# Patient Record
Sex: FEMALE | Race: WHITE | Employment: PART TIME | URBAN - METROPOLITAN AREA
[De-identification: names, ages, dates, MRNs, and addresses within clinical notes are randomized per-mention and may not be internally consistent; named-entity substitution may affect disease eponyms.]

---

## 2017-03-31 PROBLEM — F41.1 ANXIETY STATE: Status: ACTIVE | Noted: 2017-03-31

## 2017-03-31 PROBLEM — R07.89 MUSCULOSKELETAL CHEST PAIN: Status: ACTIVE | Noted: 2017-03-31

## 2018-04-18 ENCOUNTER — OFFICE VISIT (OUTPATIENT)
Dept: OBGYN | Age: 33
End: 2018-04-18
Payer: COMMERCIAL

## 2018-04-18 VITALS
SYSTOLIC BLOOD PRESSURE: 110 MMHG | BODY MASS INDEX: 34.55 KG/M2 | WEIGHT: 195 LBS | RESPIRATION RATE: 16 BRPM | HEART RATE: 82 BPM | TEMPERATURE: 98.3 F | HEIGHT: 63 IN | DIASTOLIC BLOOD PRESSURE: 66 MMHG

## 2018-04-18 DIAGNOSIS — N95.1 VASOMOTOR SYMPTOMS DUE TO MENOPAUSE: Primary | ICD-10-CM

## 2018-04-18 DIAGNOSIS — R23.2 VASOMOTOR FLUSHING: ICD-10-CM

## 2018-04-18 PROCEDURE — 99212 OFFICE O/P EST SF 10 MIN: CPT | Performed by: OBSTETRICS & GYNECOLOGY

## 2018-04-18 PROCEDURE — 1036F TOBACCO NON-USER: CPT | Performed by: OBSTETRICS & GYNECOLOGY

## 2018-04-18 PROCEDURE — G8417 CALC BMI ABV UP PARAM F/U: HCPCS | Performed by: OBSTETRICS & GYNECOLOGY

## 2018-04-18 PROCEDURE — G8427 DOCREV CUR MEDS BY ELIG CLIN: HCPCS | Performed by: OBSTETRICS & GYNECOLOGY

## 2018-04-18 RX ORDER — ESTRADIOL 1 MG/1
1 TABLET ORAL DAILY
Qty: 30 TABLET | Refills: 5 | Status: SHIPPED | OUTPATIENT
Start: 2018-04-18 | End: 2018-09-11

## 2018-04-22 ENCOUNTER — APPOINTMENT (OUTPATIENT)
Dept: CT IMAGING | Age: 33
End: 2018-04-22
Payer: COMMERCIAL

## 2018-04-22 ENCOUNTER — HOSPITAL ENCOUNTER (EMERGENCY)
Age: 33
Discharge: HOME | End: 2018-04-22
Attending: EMERGENCY MEDICINE
Payer: COMMERCIAL

## 2018-04-22 VITALS
RESPIRATION RATE: 20 BRPM | SYSTOLIC BLOOD PRESSURE: 102 MMHG | BODY MASS INDEX: 33.31 KG/M2 | WEIGHT: 188 LBS | DIASTOLIC BLOOD PRESSURE: 52 MMHG | OXYGEN SATURATION: 99 % | TEMPERATURE: 98.4 F | HEIGHT: 63 IN | HEART RATE: 77 BPM

## 2018-04-22 DIAGNOSIS — S16.1XXD CERVICAL STRAIN, ACUTE, SUBSEQUENT ENCOUNTER: ICD-10-CM
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
## 2018-04-22 DIAGNOSIS — G44.319 ACUTE POST-TRAUMATIC HEADACHE, NOT INTRACTABLE: Primary | ICD-10-CM

## 2018-04-22 LAB
CHP ED QC CHECK: YES
PREGNANCY TEST URINE, POC: NEGATIVE

## 2018-04-22 PROCEDURE — 6370000000 HC RX 637 (ALT 250 FOR IP): Performed by: PHYSICIAN ASSISTANT

## 2018-04-22 PROCEDURE — 72125 CT NECK SPINE W/O DYE: CPT

## 2018-04-22 PROCEDURE — 99284 EMERGENCY DEPT VISIT MOD MDM: CPT

## 2018-04-22 PROCEDURE — 70450 CT HEAD/BRAIN W/O DYE: CPT

## 2018-04-22 RX ORDER — ACETAMINOPHEN 500 MG
1000 TABLET ORAL EVERY 8 HOURS PRN
Qty: 20 TABLET | Refills: 0 | Status: SHIPPED | OUTPATIENT
Start: 2018-04-22 | End: 2018-09-11

## 2018-04-22 RX ORDER — ACETAMINOPHEN 500 MG
1000 TABLET ORAL ONCE
Status: COMPLETED | OUTPATIENT
Start: 2018-04-22 | End: 2018-04-22

## 2018-04-22 RX ADMIN — ACETAMINOPHEN 1000 MG: 500 TABLET ORAL at 11:52

## 2018-04-22 ASSESSMENT — PAIN DESCRIPTION - LOCATION: LOCATION: HEAD

## 2018-04-22 ASSESSMENT — PAIN SCALES - GENERAL
PAINLEVEL_OUTOF10: 10
PAINLEVEL_OUTOF10: 10

## 2018-04-22 ASSESSMENT — PAIN DESCRIPTION - PAIN TYPE: TYPE: ACUTE PAIN

## 2018-05-06 ENCOUNTER — HOSPITAL ENCOUNTER (EMERGENCY)
Age: 33
Discharge: HOME | End: 2018-05-06
Attending: EMERGENCY MEDICINE
Payer: COMMERCIAL

## 2018-05-06 ENCOUNTER — APPOINTMENT (OUTPATIENT)
Dept: GENERAL RADIOLOGY | Age: 33
End: 2018-05-06
Payer: COMMERCIAL

## 2018-05-06 VITALS
SYSTOLIC BLOOD PRESSURE: 113 MMHG | BODY MASS INDEX: 33.3 KG/M2 | WEIGHT: 188 LBS | HEART RATE: 66 BPM | TEMPERATURE: 97.8 F | DIASTOLIC BLOOD PRESSURE: 73 MMHG | OXYGEN SATURATION: 100 % | RESPIRATION RATE: 16 BRPM

## 2018-05-06 DIAGNOSIS — R07.89 CHEST WALL PAIN: Primary | ICD-10-CM

## 2018-05-06 LAB
ALBUMIN SERPL-MCNC: 3.9 G/DL (ref 3.5–5.2)
ALP BLD-CCNC: 88 U/L (ref 35–104)
ALT SERPL-CCNC: 18 U/L (ref 0–32)
ANION GAP SERPL CALCULATED.3IONS-SCNC: 12 MMOL/L (ref 7–16)
AST SERPL-CCNC: 31 U/L (ref 0–31)
BASOPHILS ABSOLUTE: 0.04 E9/L (ref 0–0.2)
BASOPHILS RELATIVE PERCENT: 0.5 % (ref 0–2)
BILIRUB SERPL-MCNC: <0.2 MG/DL (ref 0–1.2)
BUN BLDV-MCNC: 10 MG/DL (ref 6–20)
CALCIUM SERPL-MCNC: 8.8 MG/DL (ref 8.6–10.2)
CHLORIDE BLD-SCNC: 99 MMOL/L (ref 98–107)
CO2: 25 MMOL/L (ref 22–29)
CREAT SERPL-MCNC: 0.7 MG/DL (ref 0.5–1)
EKG ATRIAL RATE: 70 BPM
EKG P-R INTERVAL: 150 MS
EKG Q-T INTERVAL: 366 MS
EKG QRS DURATION: 78 MS
EKG QTC CALCULATION (BAZETT): 395 MS
EKG R AXIS: 37 DEGREES
EKG T AXIS: 19 DEGREES
EKG VENTRICULAR RATE: 70 BPM
EOSINOPHILS ABSOLUTE: 0.36 E9/L (ref 0.05–0.5)
EOSINOPHILS RELATIVE PERCENT: 4.2 % (ref 0–6)
GFR AFRICAN AMERICAN: >60
GFR NON-AFRICAN AMERICAN: >60 ML/MIN/1.73
GLUCOSE BLD-MCNC: 77 MG/DL (ref 74–109)
GONADOTROPIN, CHORIONIC (HCG) QUANT: <0.1 MIU/ML
HCT VFR BLD CALC: 33.9 % (ref 34–48)
HEMOGLOBIN: 11.2 G/DL (ref 11.5–15.5)
IMMATURE GRANULOCYTES #: 0.03 E9/L
IMMATURE GRANULOCYTES %: 0.3 % (ref 0–5)
LYMPHOCYTES ABSOLUTE: 2.42 E9/L (ref 1.5–4)
LYMPHOCYTES RELATIVE PERCENT: 28.2 % (ref 20–42)
MCH RBC QN AUTO: 30.3 PG (ref 26–35)
MCHC RBC AUTO-ENTMCNC: 33 % (ref 32–34.5)
MCV RBC AUTO: 91.6 FL (ref 80–99.9)
MONOCYTES ABSOLUTE: 0.73 E9/L (ref 0.1–0.95)
MONOCYTES RELATIVE PERCENT: 8.5 % (ref 2–12)
NEUTROPHILS ABSOLUTE: 5 E9/L (ref 1.8–7.3)
NEUTROPHILS RELATIVE PERCENT: 58.3 % (ref 43–80)
PDW BLD-RTO: 13.4 FL (ref 11.5–15)
PLATELET # BLD: 266 E9/L (ref 130–450)
PMV BLD AUTO: 10 FL (ref 7–12)
POTASSIUM SERPL-SCNC: 4.5 MMOL/L (ref 3.5–5)
RBC # BLD: 3.7 E12/L (ref 3.5–5.5)
SODIUM BLD-SCNC: 136 MMOL/L (ref 132–146)
TOTAL PROTEIN: 7.5 G/DL (ref 6.4–8.3)
TROPONIN: <0.01 NG/ML (ref 0–0.03)
WBC # BLD: 8.6 E9/L (ref 4.5–11.5)

## 2018-05-06 PROCEDURE — 93005 ELECTROCARDIOGRAM TRACING: CPT | Performed by: NURSE PRACTITIONER

## 2018-05-06 PROCEDURE — 99285 EMERGENCY DEPT VISIT HI MDM: CPT

## 2018-05-06 PROCEDURE — 6370000000 HC RX 637 (ALT 250 FOR IP): Performed by: PREVENTIVE MEDICINE

## 2018-05-06 PROCEDURE — 96374 THER/PROPH/DIAG INJ IV PUSH: CPT

## 2018-05-06 PROCEDURE — 36415 COLL VENOUS BLD VENIPUNCTURE: CPT

## 2018-05-06 PROCEDURE — 85025 COMPLETE CBC W/AUTO DIFF WBC: CPT

## 2018-05-06 PROCEDURE — 84484 ASSAY OF TROPONIN QUANT: CPT

## 2018-05-06 PROCEDURE — 84702 CHORIONIC GONADOTROPIN TEST: CPT

## 2018-05-06 PROCEDURE — 6360000002 HC RX W HCPCS: Performed by: PREVENTIVE MEDICINE

## 2018-05-06 PROCEDURE — 71046 X-RAY EXAM CHEST 2 VIEWS: CPT

## 2018-05-06 PROCEDURE — 80053 COMPREHEN METABOLIC PANEL: CPT

## 2018-05-06 RX ORDER — DIPHENHYDRAMINE HCL 25 MG
25 TABLET ORAL ONCE
Status: COMPLETED | OUTPATIENT
Start: 2018-05-06 | End: 2018-05-06

## 2018-05-06 RX ORDER — CYCLOBENZAPRINE HCL 5 MG
5 TABLET ORAL 3 TIMES DAILY PRN
COMMUNITY
End: 2018-09-11

## 2018-05-06 RX ORDER — KETOROLAC TROMETHAMINE 30 MG/ML
30 INJECTION, SOLUTION INTRAMUSCULAR; INTRAVENOUS ONCE
Status: COMPLETED | OUTPATIENT
Start: 2018-05-06 | End: 2018-05-06

## 2018-05-06 RX ADMIN — KETOROLAC TROMETHAMINE 30 MG: 30 INJECTION, SOLUTION INTRAMUSCULAR at 20:00

## 2018-05-06 RX ADMIN — DIPHENHYDRAMINE HCL 25 MG: 25 TABLET ORAL at 20:00

## 2018-05-06 ASSESSMENT — PAIN SCALES - GENERAL
PAINLEVEL_OUTOF10: 5
PAINLEVEL_OUTOF10: 8
PAINLEVEL_OUTOF10: 8

## 2018-05-06 ASSESSMENT — ENCOUNTER SYMPTOMS
CHEST TIGHTNESS: 0
ALLERGIC/IMMUNOLOGIC NEGATIVE: 1
VOMITING: 0
CONSTIPATION: 0
DIARRHEA: 0
ABDOMINAL PAIN: 0
NAUSEA: 0
SHORTNESS OF BREATH: 0
COUGH: 0

## 2018-05-06 ASSESSMENT — HEART SCORE: ECG: 0

## 2018-05-06 ASSESSMENT — PAIN DESCRIPTION - LOCATION: LOCATION: CHEST

## 2018-05-06 ASSESSMENT — PAIN DESCRIPTION - PAIN TYPE: TYPE: ACUTE PAIN

## 2018-05-06 ASSESSMENT — PAIN DESCRIPTION - DESCRIPTORS: DESCRIPTORS: PRESSURE;SHARP

## 2018-06-21 ENCOUNTER — HOSPITAL ENCOUNTER (OUTPATIENT)
Age: 33
Discharge: HOME | End: 2018-06-23
Payer: COMMERCIAL

## 2018-06-21 ENCOUNTER — OFFICE VISIT (OUTPATIENT)
Dept: OBGYN | Age: 33
End: 2018-06-21
Payer: COMMERCIAL

## 2018-06-21 VITALS
WEIGHT: 198 LBS | TEMPERATURE: 99.9 F | DIASTOLIC BLOOD PRESSURE: 60 MMHG | BODY MASS INDEX: 35.08 KG/M2 | SYSTOLIC BLOOD PRESSURE: 120 MMHG | HEIGHT: 63 IN | RESPIRATION RATE: 14 BRPM | HEART RATE: 81 BPM

## 2018-06-21 DIAGNOSIS — R23.2 VASOMOTOR FLUSHING: ICD-10-CM

## 2018-06-21 DIAGNOSIS — N89.8 LEUKORRHEA: ICD-10-CM

## 2018-06-21 DIAGNOSIS — N95.1 VASOMOTOR SYMPTOMS DUE TO MENOPAUSE: Primary | ICD-10-CM

## 2018-06-21 LAB
CLUE CELLS: ABNORMAL
SOURCE WET PREP: ABNORMAL
TRICHOMONAS PREP: ABNORMAL
YEAST WET PREP: ABNORMAL

## 2018-06-21 PROCEDURE — 87210 SMEAR WET MOUNT SALINE/INK: CPT | Performed by: OBSTETRICS & GYNECOLOGY

## 2018-06-21 PROCEDURE — 99213 OFFICE O/P EST LOW 20 MIN: CPT | Performed by: OBSTETRICS & GYNECOLOGY

## 2018-06-21 PROCEDURE — 87210 SMEAR WET MOUNT SALINE/INK: CPT

## 2018-06-21 PROCEDURE — 99212 OFFICE O/P EST SF 10 MIN: CPT | Performed by: OBSTETRICS & GYNECOLOGY

## 2018-06-21 PROCEDURE — G8417 CALC BMI ABV UP PARAM F/U: HCPCS | Performed by: OBSTETRICS & GYNECOLOGY

## 2018-06-21 PROCEDURE — G8427 DOCREV CUR MEDS BY ELIG CLIN: HCPCS | Performed by: OBSTETRICS & GYNECOLOGY

## 2018-06-21 PROCEDURE — 1036F TOBACCO NON-USER: CPT | Performed by: OBSTETRICS & GYNECOLOGY

## 2018-06-22 ENCOUNTER — TELEPHONE (OUTPATIENT)
Dept: OBGYN | Age: 33
End: 2018-06-22

## 2018-06-22 DIAGNOSIS — N76.0 BV (BACTERIAL VAGINOSIS): Primary | ICD-10-CM

## 2018-06-22 DIAGNOSIS — B96.89 BV (BACTERIAL VAGINOSIS): Primary | ICD-10-CM

## 2018-06-22 RX ORDER — METRONIDAZOLE 500 MG/1
500 TABLET ORAL 2 TIMES DAILY
Qty: 14 TABLET | Refills: 0 | Status: SHIPPED | OUTPATIENT
Start: 2018-06-22 | End: 2018-06-29

## 2018-09-11 ENCOUNTER — APPOINTMENT (OUTPATIENT)
Dept: CT IMAGING | Age: 33
End: 2018-09-11
Payer: COMMERCIAL

## 2018-09-11 ENCOUNTER — HOSPITAL ENCOUNTER (EMERGENCY)
Age: 33
Discharge: HOME | End: 2018-09-11
Attending: EMERGENCY MEDICINE
Payer: COMMERCIAL

## 2018-09-11 VITALS
HEART RATE: 60 BPM | WEIGHT: 185 LBS | RESPIRATION RATE: 16 BRPM | SYSTOLIC BLOOD PRESSURE: 114 MMHG | HEIGHT: 63 IN | TEMPERATURE: 98.2 F | DIASTOLIC BLOOD PRESSURE: 77 MMHG | OXYGEN SATURATION: 97 % | BODY MASS INDEX: 32.78 KG/M2

## 2018-09-11 DIAGNOSIS — K63.89 EPIPLOIC APPENDAGITIS: ICD-10-CM

## 2018-09-11 DIAGNOSIS — R10.9 ABDOMINAL PAIN, UNSPECIFIED ABDOMINAL LOCATION: Primary | ICD-10-CM

## 2018-09-11 DIAGNOSIS — K57.32 DIVERTICULITIS OF COLON: ICD-10-CM

## 2018-09-11 LAB
ALBUMIN SERPL-MCNC: 3.8 G/DL (ref 3.5–5.2)
ALP BLD-CCNC: 91 U/L (ref 35–104)
ALT SERPL-CCNC: 20 U/L (ref 0–32)
ANION GAP SERPL CALCULATED.3IONS-SCNC: 12 MMOL/L (ref 7–16)
AST SERPL-CCNC: 30 U/L (ref 0–31)
BASOPHILS ABSOLUTE: 0.02 E9/L (ref 0–0.2)
BASOPHILS RELATIVE PERCENT: 0.2 % (ref 0–2)
BILIRUB SERPL-MCNC: 0.3 MG/DL (ref 0–1.2)
BILIRUBIN URINE: NEGATIVE
BLOOD, URINE: NEGATIVE
BUN BLDV-MCNC: 11 MG/DL (ref 6–20)
CALCIUM SERPL-MCNC: 9.3 MG/DL (ref 8.6–10.2)
CHLORIDE BLD-SCNC: 101 MMOL/L (ref 98–107)
CLARITY: CLEAR
CO2: 25 MMOL/L (ref 22–29)
COLOR: YELLOW
CREAT SERPL-MCNC: 0.7 MG/DL (ref 0.5–1)
EOSINOPHILS ABSOLUTE: 0.29 E9/L (ref 0.05–0.5)
EOSINOPHILS RELATIVE PERCENT: 2.8 % (ref 0–6)
GFR AFRICAN AMERICAN: >60
GFR NON-AFRICAN AMERICAN: >60 ML/MIN/1.73
GLUCOSE BLD-MCNC: 91 MG/DL (ref 74–109)
GLUCOSE URINE: NEGATIVE MG/DL
HCT VFR BLD CALC: 35.9 % (ref 34–48)
HEMOGLOBIN: 11.7 G/DL (ref 11.5–15.5)
IMMATURE GRANULOCYTES #: 0.05 E9/L
IMMATURE GRANULOCYTES %: 0.5 % (ref 0–5)
KETONES, URINE: NEGATIVE MG/DL
LACTIC ACID: 1.2 MMOL/L (ref 0.5–2.2)
LEUKOCYTE ESTERASE, URINE: NEGATIVE
LIPASE: 26 U/L (ref 13–60)
LYMPHOCYTES ABSOLUTE: 3.34 E9/L (ref 1.5–4)
LYMPHOCYTES RELATIVE PERCENT: 32.4 % (ref 20–42)
MCH RBC QN AUTO: 30.2 PG (ref 26–35)
MCHC RBC AUTO-ENTMCNC: 32.6 % (ref 32–34.5)
MCV RBC AUTO: 92.5 FL (ref 80–99.9)
MONOCYTES ABSOLUTE: 0.71 E9/L (ref 0.1–0.95)
MONOCYTES RELATIVE PERCENT: 6.9 % (ref 2–12)
NEUTROPHILS ABSOLUTE: 5.91 E9/L (ref 1.8–7.3)
NEUTROPHILS RELATIVE PERCENT: 57.2 % (ref 43–80)
NITRITE, URINE: NEGATIVE
PDW BLD-RTO: 13.2 FL (ref 11.5–15)
PH UA: 7 (ref 5–9)
PLATELET # BLD: 285 E9/L (ref 130–450)
PMV BLD AUTO: 10.1 FL (ref 7–12)
POTASSIUM SERPL-SCNC: 4.2 MMOL/L (ref 3.5–5)
PROTEIN UA: NEGATIVE MG/DL
RBC # BLD: 3.88 E12/L (ref 3.5–5.5)
SODIUM BLD-SCNC: 138 MMOL/L (ref 132–146)
SPECIFIC GRAVITY UA: 1.02 (ref 1–1.03)
TOTAL PROTEIN: 7.6 G/DL (ref 6.4–8.3)
UROBILINOGEN, URINE: 0.2 E.U./DL
WBC # BLD: 10.3 E9/L (ref 4.5–11.5)

## 2018-09-11 PROCEDURE — 85025 COMPLETE CBC W/AUTO DIFF WBC: CPT

## 2018-09-11 PROCEDURE — 80053 COMPREHEN METABOLIC PANEL: CPT

## 2018-09-11 PROCEDURE — 83690 ASSAY OF LIPASE: CPT

## 2018-09-11 PROCEDURE — 81003 URINALYSIS AUTO W/O SCOPE: CPT

## 2018-09-11 PROCEDURE — 99284 EMERGENCY DEPT VISIT MOD MDM: CPT

## 2018-09-11 PROCEDURE — 6370000000 HC RX 637 (ALT 250 FOR IP): Performed by: EMERGENCY MEDICINE

## 2018-09-11 PROCEDURE — 96372 THER/PROPH/DIAG INJ SC/IM: CPT

## 2018-09-11 PROCEDURE — 83605 ASSAY OF LACTIC ACID: CPT

## 2018-09-11 PROCEDURE — 74176 CT ABD & PELVIS W/O CONTRAST: CPT

## 2018-09-11 PROCEDURE — 6360000002 HC RX W HCPCS: Performed by: EMERGENCY MEDICINE

## 2018-09-11 RX ORDER — CIPROFLOXACIN 500 MG/1
500 TABLET, FILM COATED ORAL 2 TIMES DAILY
Qty: 20 TABLET | Refills: 0 | Status: SHIPPED | OUTPATIENT
Start: 2018-09-11 | End: 2018-09-21

## 2018-09-11 RX ORDER — 0.9 % SODIUM CHLORIDE 0.9 %
500 INTRAVENOUS SOLUTION INTRAVENOUS ONCE
Status: DISCONTINUED | OUTPATIENT
Start: 2018-09-11 | End: 2018-09-11

## 2018-09-11 RX ORDER — ONDANSETRON 4 MG/1
4 TABLET, FILM COATED ORAL EVERY 8 HOURS PRN
Qty: 20 TABLET | Refills: 0 | Status: SHIPPED | OUTPATIENT
Start: 2018-09-11 | End: 2019-05-29

## 2018-09-11 RX ORDER — DICYCLOMINE HYDROCHLORIDE 10 MG/ML
20 INJECTION INTRAMUSCULAR ONCE
Status: COMPLETED | OUTPATIENT
Start: 2018-09-11 | End: 2018-09-11

## 2018-09-11 RX ORDER — DICYCLOMINE HYDROCHLORIDE 10 MG/1
20 CAPSULE ORAL 3 TIMES DAILY PRN
Qty: 20 CAPSULE | Refills: 0 | Status: SHIPPED | OUTPATIENT
Start: 2018-09-11 | End: 2019-05-29

## 2018-09-11 RX ORDER — ONDANSETRON 2 MG/ML
4 INJECTION INTRAMUSCULAR; INTRAVENOUS ONCE
Status: DISCONTINUED | OUTPATIENT
Start: 2018-09-11 | End: 2018-09-11

## 2018-09-11 RX ORDER — PANTOPRAZOLE SODIUM 40 MG/1
40 TABLET, DELAYED RELEASE ORAL ONCE
Status: COMPLETED | OUTPATIENT
Start: 2018-09-11 | End: 2018-09-11

## 2018-09-11 RX ORDER — CIPROFLOXACIN 500 MG/1
500 TABLET, FILM COATED ORAL ONCE
Status: COMPLETED | OUTPATIENT
Start: 2018-09-11 | End: 2018-09-11

## 2018-09-11 RX ORDER — ONDANSETRON 4 MG/1
4 TABLET, ORALLY DISINTEGRATING ORAL ONCE
Status: COMPLETED | OUTPATIENT
Start: 2018-09-11 | End: 2018-09-11

## 2018-09-11 RX ORDER — METRONIDAZOLE 500 MG/1
500 TABLET ORAL ONCE
Status: COMPLETED | OUTPATIENT
Start: 2018-09-11 | End: 2018-09-11

## 2018-09-11 RX ORDER — METRONIDAZOLE 500 MG/1
500 TABLET ORAL 4 TIMES DAILY
Qty: 40 TABLET | Refills: 0 | Status: SHIPPED | OUTPATIENT
Start: 2018-09-11 | End: 2018-09-21

## 2018-09-11 RX ADMIN — PANTOPRAZOLE SODIUM 40 MG: 40 TABLET, DELAYED RELEASE ORAL at 20:36

## 2018-09-11 RX ADMIN — DICYCLOMINE HYDROCHLORIDE 20 MG: 20 INJECTION, SOLUTION INTRAMUSCULAR at 20:13

## 2018-09-11 RX ADMIN — ONDANSETRON 4 MG: 4 TABLET, ORALLY DISINTEGRATING ORAL at 20:36

## 2018-09-11 RX ADMIN — METRONIDAZOLE 500 MG: 500 TABLET ORAL at 20:36

## 2018-09-11 RX ADMIN — CIPROFLOXACIN HYDROCHLORIDE 500 MG: 500 TABLET, FILM COATED ORAL at 20:36

## 2018-09-11 ASSESSMENT — PAIN SCALES - GENERAL: PAINLEVEL_OUTOF10: 8

## 2018-09-11 NOTE — LETTER
[facility] Emergency Department  [address] [place] [id number]  Phone: [phone]    No name on file. September 11, 2018     Patient: Dorie Begum   YOB: 1985   Date of Visit: 9/11/2018       To Whom It May Concern: It is my medical opinion that Kristie Sandoval may return to work on 3 days. If you have any questions or concerns, please don't hesitate to call. Sincerely,        No name on file.

## 2018-09-11 NOTE — ED NOTES
Bed:  [facility]03  Expected date:   Expected time:   Means of arrival:   Comments:  [address], RN  09/11/18 [id number]

## 2018-09-11 NOTE — ED PROVIDER NOTES
hospital encounter of 09/11/18   Comprehensive Metabolic Panel   Result Value Ref Range    Sodium 138 132 - 146 mmol/L    Potassium 4.2 3.5 - 5.0 mmol/L    Chloride 101 98 - 107 mmol/L    CO2 25 22 - 29 mmol/L    Anion Gap 12 7 - 16 mmol/L    Glucose 91 74 - 109 mg/dL    BUN 11 6 - 20 mg/dL    CREATININE 0.7 0.5 - 1.0 mg/dL    GFR Non-African American >60 >=60 mL/min/1.73    GFR African American >60     Calcium 9.3 8.6 - 10.2 mg/dL    Total Protein 7.6 6.4 - 8.3 g/dL    Alb 3.8 3.5 - 5.2 g/dL    Total Bilirubin 0.3 0.0 - 1.2 mg/dL    Alkaline Phosphatase 91 35 - 104 U/L    ALT 20 0 - 32 U/L    AST 30 0 - 31 U/L   CBC Auto Differential   Result Value Ref Range    WBC 10.3 4.5 - 11.5 E9/L    RBC 3.88 3.50 - 5.50 E12/L    Hemoglobin 11.7 11.5 - 15.5 g/dL    Hematocrit 35.9 34.0 - 48.0 %    MCV 92.5 80.0 - 99.9 fL    MCH 30.2 26.0 - 35.0 pg    MCHC 32.6 32.0 - 34.5 %    RDW 13.2 11.5 - 15.0 fL    Platelets 631 027 - 329 E9/L    MPV 10.1 7.0 - 12.0 fL    Neutrophils % 57.2 43.0 - 80.0 %    Immature Granulocytes % 0.5 0.0 - 5.0 %    Lymphocytes % 32.4 20.0 - 42.0 %    Monocytes % 6.9 2.0 - 12.0 %    Eosinophils % 2.8 0.0 - 6.0 %    Basophils % 0.2 0.0 - 2.0 %    Neutrophils # 5.91 1.80 - 7.30 E9/L    Immature Granulocytes # 0.05 E9/L    Lymphocytes # 3.34 1.50 - 4.00 E9/L    Monocytes # 0.71 0.10 - 0.95 E9/L    Eosinophils # 0.29 0.05 - 0.50 E9/L    Basophils # 0.02 0.00 - 0.20 E9/L   Lipase   Result Value Ref Range    Lipase 26 13 - 60 U/L   Urinalysis   Result Value Ref Range    Color, UA Yellow Straw/Yellow    Clarity, UA Clear Clear    Glucose, Ur Negative Negative mg/dL    Bilirubin Urine Negative Negative    Ketones, Urine Negative Negative mg/dL    Specific Gravity, UA 1.020 1.005 - 1.030    Blood, Urine Negative Negative    pH, UA 7.0 5.0 - 9.0    Protein, UA Negative Negative mg/dL    Urobilinogen, Urine 0.2 <2.0 E.U./dL    Nitrite, Urine Negative Negative    Leukocyte Esterase, Urine Negative Negative   Lactic Decision Making:    Patient likely with sigmoid diverticulitis. Patient to be discharged follow up with PCP in 2-3 days. Patient warning signs for when to return. Patient discharged on antibiotics. I have reviewed my findings and recommendations with Kym Hummel and members of family present at the time of disposition. My findings/plan: The primary encounter diagnosis was Abdominal pain, unspecified abdominal location. Diagnoses of Epiploic appendagitis and Diverticulitis of colon were also pertinent to this visit. Discharge Medication List as of 9/11/2018  9:06 PM      START taking these medications    Details   ciprofloxacin (CIPRO) 500 MG tablet Take 1 tablet by mouth 2 times daily for 10 days, Disp-20 tablet, R-0Print      metroNIDAZOLE (FLAGYL) 500 MG tablet Take 1 tablet by mouth 4 times daily for 10 days, Disp-40 tablet, R-0Print      dicyclomine (BENTYL) 10 MG capsule Take 2 capsules by mouth 3 times daily as needed (abdominal pain), Disp-20 capsule, R-0Print      ondansetron (ZOFRAN) 4 MG tablet Take 1 tablet by mouth every 8 hours as needed for Nausea or Vomiting, Disp-20 tablet, R-0Print                 Counseling: The emergency provider has spoken with the patient and discussed todays results, in addition to providing specific details for the plan of care and counseling regarding the diagnosis and prognosis. Questions are answered at this time and they are agreeable with the plan.      --------------------------------- IMPRESSION AND DISPOSITION ---------------------------------    IMPRESSION  1. Abdominal pain, unspecified abdominal location    2. Epiploic appendagitis    3. Diverticulitis of colon        DISPOSITION  Disposition: Discharge to home  Patient condition is good      NOTE: This report was transcribed using voice recognition software.  Every effort was made to ensure accuracy; however, inadvertent computerized transcription errors may be present       Jaylin Mcneil

## 2018-09-21 ENCOUNTER — OFFICE VISIT (OUTPATIENT)
Dept: OBGYN | Age: 33
End: 2018-09-21
Payer: COMMERCIAL

## 2018-09-21 VITALS
TEMPERATURE: 98.7 F | SYSTOLIC BLOOD PRESSURE: 117 MMHG | DIASTOLIC BLOOD PRESSURE: 61 MMHG | BODY MASS INDEX: 34.9 KG/M2 | WEIGHT: 197 LBS | HEART RATE: 83 BPM

## 2018-09-21 DIAGNOSIS — N89.8 VAGINAL ITCHING: ICD-10-CM

## 2018-09-21 DIAGNOSIS — B37.9 MONILIA INFECTION: Primary | ICD-10-CM

## 2018-09-21 PROCEDURE — 99213 OFFICE O/P EST LOW 20 MIN: CPT | Performed by: OBSTETRICS & GYNECOLOGY

## 2018-09-21 PROCEDURE — G8427 DOCREV CUR MEDS BY ELIG CLIN: HCPCS | Performed by: OBSTETRICS & GYNECOLOGY

## 2018-09-21 PROCEDURE — G8417 CALC BMI ABV UP PARAM F/U: HCPCS | Performed by: OBSTETRICS & GYNECOLOGY

## 2018-09-21 PROCEDURE — 1036F TOBACCO NON-USER: CPT | Performed by: OBSTETRICS & GYNECOLOGY

## 2018-09-21 PROCEDURE — 99212 OFFICE O/P EST SF 10 MIN: CPT | Performed by: OBSTETRICS & GYNECOLOGY

## 2018-09-21 RX ORDER — CLOTRIMAZOLE 1 %
CREAM (GRAM) TOPICAL
COMMUNITY
Start: 2018-09-19 | End: 2019-05-29

## 2018-09-21 NOTE — PROGRESS NOTES
Was in the ER with pain and found to have diverticulitis. Treated with Cipro and Flagyl,  Now complains of vaginal itching. Pain is gone. Pelvic:  Loaded with yeast    IMP: Momilia    PLAN: Terazol 7 cream at hs. RTC 3 weeks to recheck.

## 2018-09-21 NOTE — PROGRESS NOTES
Patient here today for ER follow up- Diverticulitis. Patient reported having vaginal discharge and itching.   Patient discharged by Dr. Navya Munoz

## 2018-09-21 NOTE — PATIENT INSTRUCTIONS
Patient is was prescribed vaginal cream for a yeast infection by Dr. Markos Madsen.   Per Dr. Markos Madsen patient is to return in 3 weeks for follow up

## 2018-10-15 ENCOUNTER — OFFICE VISIT (OUTPATIENT)
Dept: OBGYN | Age: 33
End: 2018-10-15
Payer: COMMERCIAL

## 2018-10-15 VITALS
HEIGHT: 63 IN | WEIGHT: 196 LBS | BODY MASS INDEX: 34.73 KG/M2 | RESPIRATION RATE: 16 BRPM | SYSTOLIC BLOOD PRESSURE: 110 MMHG | HEART RATE: 82 BPM | DIASTOLIC BLOOD PRESSURE: 60 MMHG

## 2018-10-15 DIAGNOSIS — B37.9 MONILIA INFECTION: Primary | ICD-10-CM

## 2018-10-15 PROCEDURE — G8417 CALC BMI ABV UP PARAM F/U: HCPCS | Performed by: OBSTETRICS & GYNECOLOGY

## 2018-10-15 PROCEDURE — G8427 DOCREV CUR MEDS BY ELIG CLIN: HCPCS | Performed by: OBSTETRICS & GYNECOLOGY

## 2018-10-15 PROCEDURE — 1036F TOBACCO NON-USER: CPT | Performed by: OBSTETRICS & GYNECOLOGY

## 2018-10-15 PROCEDURE — 99212 OFFICE O/P EST SF 10 MIN: CPT | Performed by: OBSTETRICS & GYNECOLOGY

## 2018-10-15 PROCEDURE — G8484 FLU IMMUNIZE NO ADMIN: HCPCS | Performed by: OBSTETRICS & GYNECOLOGY

## 2018-10-15 NOTE — PROGRESS NOTES
Here to recheck regarding monilia. Thinks it is better. Pelvic: Vagina: appears totally clear today.     RTC prn

## 2018-10-21 ENCOUNTER — HOSPITAL ENCOUNTER (EMERGENCY)
Age: 33
Discharge: HOME | End: 2018-10-21
Payer: COMMERCIAL

## 2018-10-21 VITALS
SYSTOLIC BLOOD PRESSURE: 115 MMHG | OXYGEN SATURATION: 98 % | DIASTOLIC BLOOD PRESSURE: 78 MMHG | HEART RATE: 60 BPM | RESPIRATION RATE: 16 BRPM | TEMPERATURE: 98.4 F

## 2018-10-21 DIAGNOSIS — J02.9 ACUTE PHARYNGITIS, UNSPECIFIED ETIOLOGY: Primary | ICD-10-CM

## 2018-10-21 PROCEDURE — 87880 STREP A ASSAY W/OPTIC: CPT

## 2018-10-21 PROCEDURE — 99282 EMERGENCY DEPT VISIT SF MDM: CPT

## 2018-10-21 RX ORDER — PREDNISONE 10 MG/1
40 TABLET ORAL DAILY
Qty: 20 TABLET | Refills: 0 | Status: SHIPPED | OUTPATIENT
Start: 2018-10-21 | End: 2018-10-26

## 2018-10-21 RX ORDER — AMOXICILLIN AND CLAVULANATE POTASSIUM 875; 125 MG/1; MG/1
1 TABLET, FILM COATED ORAL 2 TIMES DAILY
Qty: 14 TABLET | Refills: 0 | Status: SHIPPED | OUTPATIENT
Start: 2018-10-21 | End: 2018-10-28

## 2018-10-23 NOTE — ED PROVIDER NOTES
intact. Lab / Imaging Results   (All laboratory and radiology results have been personally reviewed by myself)  Labs:  No results found for this visit on 10/21/18. Imaging: All Radiology results interpreted by Radiologist unless otherwise noted. No orders to display       ED Course / Medical Decision Making   Medications - No data to display     Consult(s):   None    Procedure(s):   none    MDM:   Based on moderate suspicion for Strep as per history/physical findings, Rapid Strep/Throat Culture testing was not done due to lab complications. Pharyngitis may  be Strep. Antibiotics are indicated at this time based on clinical presentation and physical findings. Not hypoxic, nothing to suggest pneumonia. Patient is well appearing, non toxic and appropriate for outpatient management. Plan of Care: Normal progression of disease discussed. All questions answered. Instruction provided in the use of fluids, vaporizer, acetaminophen, and other OTC medication for symptom control. Extra fluids  Analgesics as needed, dose reviewed. Follow up as needed should symptoms fail to improve. Follow-up in 2 days, or sooner should symptoms worsen. Counseling: The emergency provider has spoken with the patient and discussed todays results, in addition to providing specific details for the plan of care and counseling regarding the diagnosis and prognosis. Questions are answered at this time and they are agreeable with the plan. Assessment     1.  Acute pharyngitis, unspecified etiology      Plan   Discharge to home  Patient condition is good    New Medications     Discharge Medication List as of 10/21/2018 12:43 PM      START taking these medications    Details   amoxicillin-clavulanate (AUGMENTIN) 875-125 MG per tablet Take 1 tablet by mouth 2 times daily for 7 days, Disp-14 tablet, R-0Print      predniSONE (DELTASONE) 10 MG tablet Take 4 tablets by mouth daily for 5 days, Disp-20 tablet, R-0Print

## 2018-12-17 ENCOUNTER — APPOINTMENT (OUTPATIENT)
Dept: GENERAL RADIOLOGY | Age: 33
End: 2018-12-17
Payer: COMMERCIAL

## 2018-12-17 ENCOUNTER — HOSPITAL ENCOUNTER (EMERGENCY)
Age: 33
Discharge: HOME | End: 2018-12-17
Attending: EMERGENCY MEDICINE
Payer: COMMERCIAL

## 2018-12-17 VITALS
OXYGEN SATURATION: 100 % | DIASTOLIC BLOOD PRESSURE: 78 MMHG | HEART RATE: 64 BPM | RESPIRATION RATE: 18 BRPM | SYSTOLIC BLOOD PRESSURE: 118 MMHG | TEMPERATURE: 98.2 F

## 2018-12-17 DIAGNOSIS — R07.89 CHEST WALL PAIN: Primary | ICD-10-CM

## 2018-12-17 DIAGNOSIS — F41.1 ANXIETY STATE: ICD-10-CM

## 2018-12-17 LAB
ALBUMIN SERPL-MCNC: 4.2 G/DL (ref 3.5–5.2)
ALP BLD-CCNC: 98 U/L (ref 35–104)
ALT SERPL-CCNC: 19 U/L (ref 0–32)
AMPHETAMINE SCREEN, URINE: NOT DETECTED
ANION GAP SERPL CALCULATED.3IONS-SCNC: 12 MMOL/L (ref 7–16)
AST SERPL-CCNC: 23 U/L (ref 0–31)
BARBITURATE SCREEN URINE: NOT DETECTED
BASOPHILS ABSOLUTE: 0.04 E9/L (ref 0–0.2)
BASOPHILS RELATIVE PERCENT: 0.5 % (ref 0–2)
BENZODIAZEPINE SCREEN, URINE: NOT DETECTED
BILIRUB SERPL-MCNC: <0.2 MG/DL (ref 0–1.2)
BILIRUBIN DIRECT: <0.2 MG/DL (ref 0–0.3)
BILIRUBIN URINE: NEGATIVE
BILIRUBIN, INDIRECT: NORMAL MG/DL (ref 0–1)
BLOOD, URINE: NEGATIVE
BUN BLDV-MCNC: 9 MG/DL (ref 6–20)
CALCIUM SERPL-MCNC: 9.2 MG/DL (ref 8.6–10.2)
CANNABINOID SCREEN URINE: POSITIVE
CHLORIDE BLD-SCNC: 103 MMOL/L (ref 98–107)
CLARITY: CLEAR
CO2: 28 MMOL/L (ref 22–29)
COCAINE METABOLITE SCREEN URINE: NOT DETECTED
COLOR: YELLOW
CREAT SERPL-MCNC: 0.7 MG/DL (ref 0.5–1)
EKG ATRIAL RATE: 64 BPM
EKG P AXIS: 8 DEGREES
EKG P-R INTERVAL: 148 MS
EKG Q-T INTERVAL: 382 MS
EKG QRS DURATION: 84 MS
EKG QTC CALCULATION (BAZETT): 394 MS
EKG R AXIS: 29 DEGREES
EKG T AXIS: 8 DEGREES
EKG VENTRICULAR RATE: 64 BPM
EOSINOPHILS ABSOLUTE: 0.21 E9/L (ref 0.05–0.5)
EOSINOPHILS RELATIVE PERCENT: 2.5 % (ref 0–6)
GFR AFRICAN AMERICAN: >60
GFR NON-AFRICAN AMERICAN: >60 ML/MIN/1.73
GLUCOSE BLD-MCNC: 76 MG/DL (ref 74–99)
GLUCOSE URINE: NEGATIVE MG/DL
HCT VFR BLD CALC: 37.6 % (ref 34–48)
HEMOGLOBIN: 12 G/DL (ref 11.5–15.5)
IMMATURE GRANULOCYTES #: 0.02 E9/L
IMMATURE GRANULOCYTES %: 0.2 % (ref 0–5)
KETONES, URINE: NEGATIVE MG/DL
LEUKOCYTE ESTERASE, URINE: NEGATIVE
LIPASE: 24 U/L (ref 13–60)
LYMPHOCYTES ABSOLUTE: 2.86 E9/L (ref 1.5–4)
LYMPHOCYTES RELATIVE PERCENT: 34.2 % (ref 20–42)
MCH RBC QN AUTO: 29.9 PG (ref 26–35)
MCHC RBC AUTO-ENTMCNC: 31.9 % (ref 32–34.5)
MCV RBC AUTO: 93.8 FL (ref 80–99.9)
METHADONE SCREEN, URINE: NOT DETECTED
MONOCYTES ABSOLUTE: 0.56 E9/L (ref 0.1–0.95)
MONOCYTES RELATIVE PERCENT: 6.7 % (ref 2–12)
NEUTROPHILS ABSOLUTE: 4.68 E9/L (ref 1.8–7.3)
NEUTROPHILS RELATIVE PERCENT: 55.9 % (ref 43–80)
NITRITE, URINE: NEGATIVE
OPIATE SCREEN URINE: NOT DETECTED
PDW BLD-RTO: 13 FL (ref 11.5–15)
PH UA: 8.5 (ref 5–9)
PHENCYCLIDINE SCREEN URINE: NOT DETECTED
PLATELET # BLD: 286 E9/L (ref 130–450)
PMV BLD AUTO: 10.3 FL (ref 7–12)
POTASSIUM SERPL-SCNC: 3.7 MMOL/L (ref 3.5–5)
PROPOXYPHENE SCREEN: NOT DETECTED
PROTEIN UA: NEGATIVE MG/DL
RBC # BLD: 4.01 E12/L (ref 3.5–5.5)
SODIUM BLD-SCNC: 143 MMOL/L (ref 132–146)
SPECIFIC GRAVITY UA: 1.01 (ref 1–1.03)
TOTAL PROTEIN: 7.6 G/DL (ref 6.4–8.3)
TROPONIN: <0.01 NG/ML (ref 0–0.03)
UROBILINOGEN, URINE: 0.2 E.U./DL
WBC # BLD: 8.4 E9/L (ref 4.5–11.5)

## 2018-12-17 PROCEDURE — G0480 DRUG TEST DEF 1-7 CLASSES: HCPCS

## 2018-12-17 PROCEDURE — 80076 HEPATIC FUNCTION PANEL: CPT

## 2018-12-17 PROCEDURE — 84484 ASSAY OF TROPONIN QUANT: CPT

## 2018-12-17 PROCEDURE — 81003 URINALYSIS AUTO W/O SCOPE: CPT

## 2018-12-17 PROCEDURE — 80307 DRUG TEST PRSMV CHEM ANLYZR: CPT

## 2018-12-17 PROCEDURE — 83690 ASSAY OF LIPASE: CPT

## 2018-12-17 PROCEDURE — 71046 X-RAY EXAM CHEST 2 VIEWS: CPT

## 2018-12-17 PROCEDURE — 99285 EMERGENCY DEPT VISIT HI MDM: CPT

## 2018-12-17 PROCEDURE — 80048 BASIC METABOLIC PNL TOTAL CA: CPT

## 2018-12-17 PROCEDURE — 93005 ELECTROCARDIOGRAM TRACING: CPT | Performed by: PHYSICIAN ASSISTANT

## 2018-12-17 PROCEDURE — 96374 THER/PROPH/DIAG INJ IV PUSH: CPT

## 2018-12-17 PROCEDURE — 85025 COMPLETE CBC W/AUTO DIFF WBC: CPT

## 2018-12-17 PROCEDURE — 6360000002 HC RX W HCPCS: Performed by: EMERGENCY MEDICINE

## 2018-12-17 PROCEDURE — 96375 TX/PRO/DX INJ NEW DRUG ADDON: CPT

## 2018-12-17 RX ORDER — HYDROXYZINE PAMOATE 50 MG/1
50 CAPSULE ORAL 3 TIMES DAILY PRN
Qty: 21 CAPSULE | Refills: 0 | Status: SHIPPED | OUTPATIENT
Start: 2018-12-17 | End: 2018-12-24

## 2018-12-17 RX ORDER — DIPHENHYDRAMINE HYDROCHLORIDE 50 MG/ML
25 INJECTION INTRAMUSCULAR; INTRAVENOUS ONCE
Status: COMPLETED | OUTPATIENT
Start: 2018-12-17 | End: 2018-12-17

## 2018-12-17 RX ORDER — MORPHINE SULFATE 2 MG/ML
2 INJECTION, SOLUTION INTRAMUSCULAR; INTRAVENOUS ONCE
Status: COMPLETED | OUTPATIENT
Start: 2018-12-17 | End: 2018-12-17

## 2018-12-17 RX ADMIN — MORPHINE SULFATE 2 MG: 2 INJECTION, SOLUTION INTRAMUSCULAR; INTRAVENOUS at 19:33

## 2018-12-17 RX ADMIN — DIPHENHYDRAMINE HYDROCHLORIDE 25 MG: 50 INJECTION, SOLUTION INTRAMUSCULAR; INTRAVENOUS at 19:33

## 2018-12-17 ASSESSMENT — PAIN SCALES - GENERAL
PAINLEVEL_OUTOF10: 2
PAINLEVEL_OUTOF10: 10
PAINLEVEL_OUTOF10: 9

## 2018-12-17 ASSESSMENT — PAIN DESCRIPTION - DESCRIPTORS: DESCRIPTORS: TIGHTNESS;SHARP

## 2018-12-17 ASSESSMENT — PAIN DESCRIPTION - FREQUENCY: FREQUENCY: CONTINUOUS

## 2018-12-17 ASSESSMENT — PAIN DESCRIPTION - ORIENTATION: ORIENTATION: MID

## 2018-12-17 ASSESSMENT — PAIN DESCRIPTION - PAIN TYPE: TYPE: ACUTE PAIN

## 2018-12-17 ASSESSMENT — PAIN DESCRIPTION - LOCATION: LOCATION: CHEST

## 2018-12-17 NOTE — ED PROVIDER NOTES
Naproxen; and Percocet [oxycodone-acetaminophen]    -------------------------------------------------- RESULTS -------------------------------------------------  All laboratory and radiology results have been personally reviewed by myself   LABS:  Results for orders placed or performed during the hospital encounter of 12/17/18   CBC Auto Differential   Result Value Ref Range    WBC 8.4 4.5 - 11.5 E9/L    RBC 4.01 3.50 - 5.50 E12/L    Hemoglobin 12.0 11.5 - 15.5 g/dL    Hematocrit 37.6 34.0 - 48.0 %    MCV 93.8 80.0 - 99.9 fL    MCH 29.9 26.0 - 35.0 pg    MCHC 31.9 (L) 32.0 - 34.5 %    RDW 13.0 11.5 - 15.0 fL    Platelets 714 806 - 602 E9/L    MPV 10.3 7.0 - 12.0 fL    Neutrophils % 55.9 43.0 - 80.0 %    Immature Granulocytes % 0.2 0.0 - 5.0 %    Lymphocytes % 34.2 20.0 - 42.0 %    Monocytes % 6.7 2.0 - 12.0 %    Eosinophils % 2.5 0.0 - 6.0 %    Basophils % 0.5 0.0 - 2.0 %    Neutrophils # 4.68 1.80 - 7.30 E9/L    Immature Granulocytes # 0.02 E9/L    Lymphocytes # 2.86 1.50 - 4.00 E9/L    Monocytes # 0.56 0.10 - 0.95 E9/L    Eosinophils # 0.21 0.05 - 0.50 E9/L    Basophils # 0.04 0.00 - 0.20 G4/X   Basic Metabolic Panel   Result Value Ref Range    Sodium 143 132 - 146 mmol/L    Potassium 3.7 3.5 - 5.0 mmol/L    Chloride 103 98 - 107 mmol/L    CO2 28 22 - 29 mmol/L    Anion Gap 12 7 - 16 mmol/L    Glucose 76 74 - 99 mg/dL    BUN 9 6 - 20 mg/dL    CREATININE 0.7 0.5 - 1.0 mg/dL    GFR Non-African American >60 >=60 mL/min/1.73    GFR African American >60     Calcium 9.2 8.6 - 10.2 mg/dL   Hepatic Function Panel   Result Value Ref Range    Total Protein 7.6 6.4 - 8.3 g/dL    Alb 4.2 3.5 - 5.2 g/dL    Alkaline Phosphatase 98 35 - 104 U/L    ALT 19 0 - 32 U/L    AST 23 0 - 31 U/L    Total Bilirubin <0.2 0.0 - 1.2 mg/dL    Bilirubin, Direct <0.2 0.0 - 0.3 mg/dL    Bilirubin, Indirect see below 0.0 - 1.0 mg/dL   Urinalysis   Result Value Ref Range    Color, UA Yellow Straw/Yellow    Clarity, UA Clear Clear    Glucose, Ur EXAM--------------------------------------      Constitutional/General: Alert and oriented x3, well appearing, non toxic in NAD  Head: NC/AT  Eyes: PERRL, EOMI  Mouth: Oropharynx clear, handling secretions, no trismus  Neck: Supple, full ROM, no meningeal signs  Pulmonary: Lungs clear to auscultation bilaterally, no wheezes, rales, or rhonchi. Not in respiratory distress  Cardiovascular:  Regular rate and rhythm, no murmurs, gallops, or rubs. 2+ distal pulses, Chest wall is tender to palpation. No crepitus palpable. Abdomen: Soft, non tender, non distended,   Extremities: Moves all extremities x 4. Warm and well perfused  Skin: warm and dry without rash  Neurologic: GCS 15,  Psych: Normal Affect      EKG #1:  Interpreted by emergency department physician unless otherwise noted. Time:  1745    Rate: 64  Rhythm: Sinus. Interpretation: normal sinus rhythm.    ------------------------------ ED COURSE/MEDICAL DECISION MAKING----------------------  Medications   morphine (PF) injection 2 mg (2 mg Intravenous Given 12/17/18 1933)   diphenhydrAMINE (BENADRYL) injection 25 mg (25 mg Intravenous Given 12/17/18 1933)         Medical Decision Making:    EKG reviewed cardiac workup chest x-ray pending. Discussed Results and  EKG with patient and family. Counseling: The emergency provider has spoken with the patient and discussed todays results, in addition to providing specific details for the plan of care and counseling regarding the diagnosis and prognosis. Questions are answered at this time and they are agreeable with the plan.      --------------------------------- IMPRESSION AND DISPOSITION ---------------------------------    IMPRESSION  1. Chest wall pain    2.  Anxiety state        DISPOSITION  Disposition: Discharge to home  Patient condition is stable                Loy Nielsen, DO  12/17/18 2115

## 2018-12-22 LAB — CANNABINOIDS CONF, URINE: >500 NG/ML

## 2019-02-23 ENCOUNTER — APPOINTMENT (OUTPATIENT)
Dept: NUCLEAR MEDICINE | Age: 34
End: 2019-02-23
Payer: COMMERCIAL

## 2019-02-23 ENCOUNTER — APPOINTMENT (OUTPATIENT)
Dept: GENERAL RADIOLOGY | Age: 34
End: 2019-02-23
Payer: COMMERCIAL

## 2019-02-23 ENCOUNTER — HOSPITAL ENCOUNTER (EMERGENCY)
Age: 34
Discharge: HOME | End: 2019-02-23
Attending: EMERGENCY MEDICINE
Payer: COMMERCIAL

## 2019-02-23 VITALS
WEIGHT: 175 LBS | TEMPERATURE: 98.2 F | SYSTOLIC BLOOD PRESSURE: 117 MMHG | DIASTOLIC BLOOD PRESSURE: 73 MMHG | OXYGEN SATURATION: 98 % | RESPIRATION RATE: 18 BRPM | HEIGHT: 63 IN | BODY MASS INDEX: 31.01 KG/M2 | HEART RATE: 68 BPM

## 2019-02-23 DIAGNOSIS — R07.89 CHEST WALL PAIN: Primary | ICD-10-CM

## 2019-02-23 LAB
ANION GAP SERPL CALCULATED.3IONS-SCNC: 14 MMOL/L (ref 7–16)
BUN BLDV-MCNC: 11 MG/DL (ref 6–20)
CALCIUM SERPL-MCNC: 9.7 MG/DL (ref 8.6–10.2)
CHLORIDE BLD-SCNC: 103 MMOL/L (ref 98–107)
CO2: 23 MMOL/L (ref 22–29)
CREAT SERPL-MCNC: 0.8 MG/DL (ref 0.5–1)
GFR AFRICAN AMERICAN: >60
GFR NON-AFRICAN AMERICAN: >60 ML/MIN/1.73
GLUCOSE BLD-MCNC: 92 MG/DL (ref 74–99)
HCT VFR BLD CALC: 37.1 % (ref 34–48)
HEMOGLOBIN: 12.3 G/DL (ref 11.5–15.5)
LV EF: 64 %
LVEF MODALITY: NORMAL
MCH RBC QN AUTO: 29.6 PG (ref 26–35)
MCHC RBC AUTO-ENTMCNC: 33.2 % (ref 32–34.5)
MCV RBC AUTO: 89.2 FL (ref 80–99.9)
PDW BLD-RTO: 13 FL (ref 11.5–15)
PLATELET # BLD: 332 E9/L (ref 130–450)
PMV BLD AUTO: 10.5 FL (ref 7–12)
POTASSIUM REFLEX MAGNESIUM: 4.2 MMOL/L (ref 3.5–5)
RBC # BLD: 4.16 E12/L (ref 3.5–5.5)
SODIUM BLD-SCNC: 140 MMOL/L (ref 132–146)
TROPONIN: <0.01 NG/ML (ref 0–0.03)
TROPONIN: <0.01 NG/ML (ref 0–0.03)
WBC # BLD: 9.6 E9/L (ref 4.5–11.5)

## 2019-02-23 PROCEDURE — 80048 BASIC METABOLIC PNL TOTAL CA: CPT

## 2019-02-23 PROCEDURE — 93017 CV STRESS TEST TRACING ONLY: CPT

## 2019-02-23 PROCEDURE — 3430000000 HC RX DIAGNOSTIC RADIOPHARMACEUTICAL: Performed by: RADIOLOGY

## 2019-02-23 PROCEDURE — 6360000002 HC RX W HCPCS: Performed by: EMERGENCY MEDICINE

## 2019-02-23 PROCEDURE — A9500 TC99M SESTAMIBI: HCPCS | Performed by: RADIOLOGY

## 2019-02-23 PROCEDURE — 71046 X-RAY EXAM CHEST 2 VIEWS: CPT

## 2019-02-23 PROCEDURE — 84484 ASSAY OF TROPONIN QUANT: CPT

## 2019-02-23 PROCEDURE — 93016 CV STRESS TEST SUPVJ ONLY: CPT | Performed by: INTERNAL MEDICINE

## 2019-02-23 PROCEDURE — 6370000000 HC RX 637 (ALT 250 FOR IP): Performed by: EMERGENCY MEDICINE

## 2019-02-23 PROCEDURE — 78452 HT MUSCLE IMAGE SPECT MULT: CPT

## 2019-02-23 PROCEDURE — 93018 CV STRESS TEST I&R ONLY: CPT | Performed by: INTERNAL MEDICINE

## 2019-02-23 PROCEDURE — 36415 COLL VENOUS BLD VENIPUNCTURE: CPT

## 2019-02-23 PROCEDURE — 99285 EMERGENCY DEPT VISIT HI MDM: CPT

## 2019-02-23 PROCEDURE — 96375 TX/PRO/DX INJ NEW DRUG ADDON: CPT

## 2019-02-23 PROCEDURE — 85027 COMPLETE CBC AUTOMATED: CPT

## 2019-02-23 PROCEDURE — 96374 THER/PROPH/DIAG INJ IV PUSH: CPT

## 2019-02-23 RX ORDER — LORAZEPAM 2 MG/ML
1 INJECTION INTRAMUSCULAR ONCE
Status: COMPLETED | OUTPATIENT
Start: 2019-02-23 | End: 2019-02-23

## 2019-02-23 RX ORDER — MORPHINE SULFATE 4 MG/ML
4 INJECTION, SOLUTION INTRAMUSCULAR; INTRAVENOUS ONCE
Status: COMPLETED | OUTPATIENT
Start: 2019-02-23 | End: 2019-02-23

## 2019-02-23 RX ORDER — ACETAMINOPHEN 500 MG
1000 TABLET ORAL ONCE
Status: COMPLETED | OUTPATIENT
Start: 2019-02-23 | End: 2019-02-23

## 2019-02-23 RX ORDER — ASPIRIN 81 MG/1
324 TABLET, CHEWABLE ORAL ONCE
Status: COMPLETED | OUTPATIENT
Start: 2019-02-23 | End: 2019-02-23

## 2019-02-23 RX ORDER — PREDNISONE 20 MG/1
40 TABLET ORAL DAILY
Qty: 10 TABLET | Refills: 0 | Status: SHIPPED | OUTPATIENT
Start: 2019-02-23 | End: 2019-02-28

## 2019-02-23 RX ADMIN — MORPHINE SULFATE 4 MG: 4 INJECTION INTRAVENOUS at 13:12

## 2019-02-23 RX ADMIN — ASPIRIN 81 MG 324 MG: 81 TABLET ORAL at 13:12

## 2019-02-23 RX ADMIN — Medication 11 MILLICURIE: at 13:31

## 2019-02-23 RX ADMIN — ACETAMINOPHEN 1000 MG: 500 TABLET ORAL at 11:00

## 2019-02-23 RX ADMIN — Medication 32.9 MILLICURIE: at 15:19

## 2019-02-23 RX ADMIN — REGADENOSON 0.4 MG: 0.08 INJECTION, SOLUTION INTRAVENOUS at 15:02

## 2019-02-23 RX ADMIN — LORAZEPAM 1 MG: 2 INJECTION INTRAMUSCULAR; INTRAVENOUS at 11:00

## 2019-02-23 ASSESSMENT — PAIN SCALES - GENERAL
PAINLEVEL_OUTOF10: 8
PAINLEVEL_OUTOF10: 9

## 2019-03-01 LAB
EKG ATRIAL RATE: 72 BPM
EKG P AXIS: 47 DEGREES
EKG P-R INTERVAL: 156 MS
EKG Q-T INTERVAL: 374 MS
EKG QRS DURATION: 80 MS
EKG QTC CALCULATION (BAZETT): 409 MS
EKG R AXIS: 50 DEGREES
EKG T AXIS: 17 DEGREES
EKG VENTRICULAR RATE: 72 BPM

## 2019-05-29 ENCOUNTER — APPOINTMENT (OUTPATIENT)
Dept: CT IMAGING | Age: 34
End: 2019-05-29
Payer: COMMERCIAL

## 2019-05-29 ENCOUNTER — HOSPITAL ENCOUNTER (OUTPATIENT)
Age: 34
Setting detail: OBSERVATION
Discharge: HOME | End: 2019-05-31
Attending: EMERGENCY MEDICINE | Admitting: INTERNAL MEDICINE
Payer: COMMERCIAL

## 2019-05-29 ENCOUNTER — APPOINTMENT (OUTPATIENT)
Dept: MRI IMAGING | Age: 34
End: 2019-05-29
Payer: COMMERCIAL

## 2019-05-29 DIAGNOSIS — M48.02 CERVICAL STENOSIS OF SPINE: ICD-10-CM

## 2019-05-29 DIAGNOSIS — R20.2 PARESTHESIAS: Primary | ICD-10-CM

## 2019-05-29 DIAGNOSIS — E89.40 SURGICAL MENOPAUSE: ICD-10-CM

## 2019-05-29 LAB
AMPHETAMINE SCREEN, URINE: NOT DETECTED
ANION GAP SERPL CALCULATED.3IONS-SCNC: 9 MMOL/L (ref 7–16)
APTT: 33 SEC (ref 24.5–35.1)
BARBITURATE SCREEN URINE: NOT DETECTED
BASOPHILS ABSOLUTE: 0.04 E9/L (ref 0–0.2)
BASOPHILS RELATIVE PERCENT: 0.4 % (ref 0–2)
BENZODIAZEPINE SCREEN, URINE: NOT DETECTED
BUN BLDV-MCNC: 9 MG/DL (ref 6–20)
CALCIUM SERPL-MCNC: 9.8 MG/DL (ref 8.6–10.2)
CANNABINOID SCREEN URINE: POSITIVE
CHLORIDE BLD-SCNC: 105 MMOL/L (ref 98–107)
CO2: 29 MMOL/L (ref 22–29)
COCAINE METABOLITE SCREEN URINE: NOT DETECTED
CREAT SERPL-MCNC: 0.9 MG/DL (ref 0.5–1)
D DIMER: <200 NG/ML DDU
EKG ATRIAL RATE: 56 BPM
EKG P AXIS: 43 DEGREES
EKG P-R INTERVAL: 150 MS
EKG Q-T INTERVAL: 396 MS
EKG QRS DURATION: 74 MS
EKG QTC CALCULATION (BAZETT): 382 MS
EKG R AXIS: 44 DEGREES
EKG T AXIS: 19 DEGREES
EKG VENTRICULAR RATE: 56 BPM
EOSINOPHILS ABSOLUTE: 0.15 E9/L (ref 0.05–0.5)
EOSINOPHILS RELATIVE PERCENT: 1.6 % (ref 0–6)
GFR AFRICAN AMERICAN: >60
GFR NON-AFRICAN AMERICAN: >60 ML/MIN/1.73
GLUCOSE BLD-MCNC: 102 MG/DL (ref 74–99)
HCT VFR BLD CALC: 37.7 % (ref 34–48)
HEMOGLOBIN: 12.1 G/DL (ref 11.5–15.5)
IMMATURE GRANULOCYTES #: 0.04 E9/L
IMMATURE GRANULOCYTES %: 0.4 % (ref 0–5)
INR BLD: 1
LYMPHOCYTES ABSOLUTE: 2.79 E9/L (ref 1.5–4)
LYMPHOCYTES RELATIVE PERCENT: 29 % (ref 20–42)
MCH RBC QN AUTO: 29.4 PG (ref 26–35)
MCHC RBC AUTO-ENTMCNC: 32.1 % (ref 32–34.5)
MCV RBC AUTO: 91.5 FL (ref 80–99.9)
METHADONE SCREEN, URINE: NOT DETECTED
MONOCYTES ABSOLUTE: 0.63 E9/L (ref 0.1–0.95)
MONOCYTES RELATIVE PERCENT: 6.6 % (ref 2–12)
NEUTROPHILS ABSOLUTE: 5.96 E9/L (ref 1.8–7.3)
NEUTROPHILS RELATIVE PERCENT: 62 % (ref 43–80)
OPIATE SCREEN URINE: NOT DETECTED
PDW BLD-RTO: 13.3 FL (ref 11.5–15)
PHENCYCLIDINE SCREEN URINE: NOT DETECTED
PLATELET # BLD: 325 E9/L (ref 130–450)
PMV BLD AUTO: 10.1 FL (ref 7–12)
POTASSIUM SERPL-SCNC: 3.9 MMOL/L (ref 3.5–5)
PROPOXYPHENE SCREEN: NOT DETECTED
PROTHROMBIN TIME: 11.9 SEC (ref 9.3–12.4)
RBC # BLD: 4.12 E12/L (ref 3.5–5.5)
SODIUM BLD-SCNC: 143 MMOL/L (ref 132–146)
TROPONIN: <0.01 NG/ML (ref 0–0.03)
WBC # BLD: 9.6 E9/L (ref 4.5–11.5)

## 2019-05-29 PROCEDURE — 85730 THROMBOPLASTIN TIME PARTIAL: CPT

## 2019-05-29 PROCEDURE — 6360000002 HC RX W HCPCS: Performed by: EMERGENCY MEDICINE

## 2019-05-29 PROCEDURE — 80048 BASIC METABOLIC PNL TOTAL CA: CPT

## 2019-05-29 PROCEDURE — 96374 THER/PROPH/DIAG INJ IV PUSH: CPT

## 2019-05-29 PROCEDURE — 93010 ELECTROCARDIOGRAM REPORT: CPT | Performed by: INTERNAL MEDICINE

## 2019-05-29 PROCEDURE — 72141 MRI NECK SPINE W/O DYE: CPT

## 2019-05-29 PROCEDURE — 70450 CT HEAD/BRAIN W/O DYE: CPT

## 2019-05-29 PROCEDURE — 85610 PROTHROMBIN TIME: CPT

## 2019-05-29 PROCEDURE — 36415 COLL VENOUS BLD VENIPUNCTURE: CPT

## 2019-05-29 PROCEDURE — 85378 FIBRIN DEGRADE SEMIQUANT: CPT

## 2019-05-29 PROCEDURE — 96376 TX/PRO/DX INJ SAME DRUG ADON: CPT

## 2019-05-29 PROCEDURE — 6370000000 HC RX 637 (ALT 250 FOR IP): Performed by: INTERNAL MEDICINE

## 2019-05-29 PROCEDURE — 93005 ELECTROCARDIOGRAM TRACING: CPT | Performed by: EMERGENCY MEDICINE

## 2019-05-29 PROCEDURE — 85025 COMPLETE CBC W/AUTO DIFF WBC: CPT

## 2019-05-29 PROCEDURE — G0378 HOSPITAL OBSERVATION PER HR: HCPCS

## 2019-05-29 PROCEDURE — 6360000002 HC RX W HCPCS: Performed by: INTERNAL MEDICINE

## 2019-05-29 PROCEDURE — G0480 DRUG TEST DEF 1-7 CLASSES: HCPCS

## 2019-05-29 PROCEDURE — 96375 TX/PRO/DX INJ NEW DRUG ADDON: CPT

## 2019-05-29 PROCEDURE — 96372 THER/PROPH/DIAG INJ SC/IM: CPT

## 2019-05-29 PROCEDURE — 99285 EMERGENCY DEPT VISIT HI MDM: CPT

## 2019-05-29 PROCEDURE — 99223 1ST HOSP IP/OBS HIGH 75: CPT | Performed by: INTERNAL MEDICINE

## 2019-05-29 PROCEDURE — 84484 ASSAY OF TROPONIN QUANT: CPT

## 2019-05-29 PROCEDURE — 80307 DRUG TEST PRSMV CHEM ANLYZR: CPT

## 2019-05-29 PROCEDURE — 2580000003 HC RX 258: Performed by: INTERNAL MEDICINE

## 2019-05-29 PROCEDURE — 70551 MRI BRAIN STEM W/O DYE: CPT

## 2019-05-29 RX ORDER — ONDANSETRON 2 MG/ML
4 INJECTION INTRAMUSCULAR; INTRAVENOUS EVERY 6 HOURS PRN
Status: DISCONTINUED | OUTPATIENT
Start: 2019-05-29 | End: 2019-05-31 | Stop reason: HOSPADM

## 2019-05-29 RX ORDER — ACETAMINOPHEN 325 MG/1
650 TABLET ORAL EVERY 4 HOURS PRN
Status: DISCONTINUED | OUTPATIENT
Start: 2019-05-29 | End: 2019-05-31 | Stop reason: HOSPADM

## 2019-05-29 RX ORDER — METHYLPREDNISOLONE SODIUM SUCCINATE 125 MG/2ML
125 INJECTION, POWDER, LYOPHILIZED, FOR SOLUTION INTRAMUSCULAR; INTRAVENOUS ONCE
Status: DISCONTINUED | OUTPATIENT
Start: 2019-05-29 | End: 2019-05-29

## 2019-05-29 RX ORDER — GABAPENTIN 100 MG/1
200 CAPSULE ORAL 3 TIMES DAILY
Status: DISCONTINUED | OUTPATIENT
Start: 2019-05-30 | End: 2019-05-30

## 2019-05-29 RX ORDER — METHYLPREDNISOLONE SODIUM SUCCINATE 125 MG/2ML
125 INJECTION, POWDER, LYOPHILIZED, FOR SOLUTION INTRAMUSCULAR; INTRAVENOUS ONCE
Status: COMPLETED | OUTPATIENT
Start: 2019-05-29 | End: 2019-05-29

## 2019-05-29 RX ORDER — ALBUTEROL SULFATE 90 UG/1
2 AEROSOL, METERED RESPIRATORY (INHALATION) EVERY 6 HOURS PRN
COMMUNITY

## 2019-05-29 RX ORDER — LIDOCAINE 4 G/G
1 PATCH TOPICAL DAILY
Status: DISCONTINUED | OUTPATIENT
Start: 2019-05-29 | End: 2019-05-31 | Stop reason: HOSPADM

## 2019-05-29 RX ORDER — SODIUM CHLORIDE 0.9 % (FLUSH) 0.9 %
10 SYRINGE (ML) INJECTION PRN
Status: DISCONTINUED | OUTPATIENT
Start: 2019-05-29 | End: 2019-05-31 | Stop reason: HOSPADM

## 2019-05-29 RX ORDER — DIPHENHYDRAMINE HYDROCHLORIDE 50 MG/ML
50 INJECTION INTRAMUSCULAR; INTRAVENOUS ONCE
Status: DISCONTINUED | OUTPATIENT
Start: 2019-05-29 | End: 2019-05-29

## 2019-05-29 RX ORDER — FENTANYL CITRATE 50 UG/ML
50 INJECTION, SOLUTION INTRAMUSCULAR; INTRAVENOUS ONCE
Status: COMPLETED | OUTPATIENT
Start: 2019-05-29 | End: 2019-05-29

## 2019-05-29 RX ORDER — ALBUTEROL SULFATE 90 UG/1
2 AEROSOL, METERED RESPIRATORY (INHALATION) EVERY 6 HOURS PRN
Status: DISCONTINUED | OUTPATIENT
Start: 2019-05-29 | End: 2019-05-31 | Stop reason: HOSPADM

## 2019-05-29 RX ORDER — DIPHENHYDRAMINE HYDROCHLORIDE 50 MG/ML
50 INJECTION INTRAMUSCULAR; INTRAVENOUS ONCE
Status: DISCONTINUED | OUTPATIENT
Start: 2019-05-29 | End: 2019-05-31 | Stop reason: HOSPADM

## 2019-05-29 RX ORDER — SODIUM CHLORIDE 0.9 % (FLUSH) 0.9 %
10 SYRINGE (ML) INJECTION EVERY 12 HOURS SCHEDULED
Status: DISCONTINUED | OUTPATIENT
Start: 2019-05-29 | End: 2019-05-31 | Stop reason: HOSPADM

## 2019-05-29 RX ORDER — FENTANYL CITRATE 50 UG/ML
50 INJECTION, SOLUTION INTRAMUSCULAR; INTRAVENOUS ONCE
Status: DISCONTINUED | OUTPATIENT
Start: 2019-05-29 | End: 2019-05-29

## 2019-05-29 RX ORDER — CYCLOBENZAPRINE HCL 5 MG
5 TABLET ORAL 2 TIMES DAILY PRN
COMMUNITY
End: 2019-11-10 | Stop reason: ALTCHOICE

## 2019-05-29 RX ORDER — CYCLOBENZAPRINE HCL 10 MG
5 TABLET ORAL 2 TIMES DAILY PRN
Status: DISCONTINUED | OUTPATIENT
Start: 2019-05-29 | End: 2019-05-31 | Stop reason: HOSPADM

## 2019-05-29 RX ADMIN — ACETAMINOPHEN 650 MG: 325 TABLET, FILM COATED ORAL at 21:05

## 2019-05-29 RX ADMIN — METHYLPREDNISOLONE SODIUM SUCCINATE 125 MG: 125 INJECTION, POWDER, FOR SOLUTION INTRAMUSCULAR; INTRAVENOUS at 10:19

## 2019-05-29 RX ADMIN — Medication 10 ML: at 21:13

## 2019-05-29 RX ADMIN — FENTANYL CITRATE 50 MCG: 50 INJECTION, SOLUTION INTRAMUSCULAR; INTRAVENOUS at 12:15

## 2019-05-29 RX ADMIN — ACETAMINOPHEN 650 MG: 325 TABLET, FILM COATED ORAL at 15:48

## 2019-05-29 RX ADMIN — CYCLOBENZAPRINE 5 MG: 10 TABLET, FILM COATED ORAL at 18:24

## 2019-05-29 RX ADMIN — FENTANYL CITRATE 50 MCG: 50 INJECTION, SOLUTION INTRAMUSCULAR; INTRAVENOUS at 10:20

## 2019-05-29 RX ADMIN — ENOXAPARIN SODIUM 40 MG: 40 INJECTION SUBCUTANEOUS at 17:06

## 2019-05-29 ASSESSMENT — PAIN DESCRIPTION - PAIN TYPE
TYPE: ACUTE PAIN

## 2019-05-29 ASSESSMENT — ENCOUNTER SYMPTOMS
BACK PAIN: 0
BLOOD IN STOOL: 0
SHORTNESS OF BREATH: 0
NAUSEA: 0
ABDOMINAL PAIN: 0
COUGH: 0
VOMITING: 0

## 2019-05-29 ASSESSMENT — PAIN SCALES - GENERAL
PAINLEVEL_OUTOF10: 9
PAINLEVEL_OUTOF10: 10

## 2019-05-29 ASSESSMENT — PAIN DESCRIPTION - FREQUENCY
FREQUENCY: CONTINUOUS

## 2019-05-29 ASSESSMENT — PAIN DESCRIPTION - LOCATION
LOCATION: BACK
LOCATION: NECK;BACK

## 2019-05-29 ASSESSMENT — PAIN DESCRIPTION - ONSET
ONSET: ON-GOING

## 2019-05-29 ASSESSMENT — PAIN DESCRIPTION - DESCRIPTORS
DESCRIPTORS: ACHING;SHARP;CONSTANT;DISCOMFORT
DESCRIPTORS: ACHING;DISCOMFORT;CONSTANT;SHARP

## 2019-05-29 ASSESSMENT — PAIN DESCRIPTION - ORIENTATION
ORIENTATION: MID;UPPER

## 2019-05-29 ASSESSMENT — PAIN DESCRIPTION - PROGRESSION
CLINICAL_PROGRESSION: GRADUALLY WORSENING

## 2019-05-29 NOTE — H&P
[facility]  Internal Medicine Residency Program  History and Physical    Patient:  Yoni Marquez 34 y.o. female MRN: [id number]     Date of Service: 2019    Hospital Day: 1      Chief complaint: had concerns including Tingling (face, shoulders and back). History of Present Illness   The patient is a 34 y.o. female with non significant PMH presented to the ED today with complaints of paraesthesia of BUE as well as RLE, dizziness and severe neck pain (back of the neck) 30/10 in severity , symptoms have started suddenly this morning however she has been complaining of headaches for the past 2 days.  Denied any weakness, focal deficits, photophobia, ringing of the ears, slurred speech, fever, chills, nausea, vomiting, any change in bowel habits or urinary symptoms.    -Patient reported a similar episode a few weeks ago when she has been told she has nerve pinch.    -She works as a caregiver which requires heavy weight lifting  -current smoker 4-5/day since 16  -Occasional alcohol use, admitted to Marijuana smoking    ED Course:  -VS significant for bradycardia in the 50-60s  -NIHSS 1  -CT head was non significant   -MRI showed possibly an early demyelinating disorder cannot be excluded  -1 dose of 125mg Solumedrol      Past Medical History:      Diagnosis Date    Anxiety     Depression     Ectopic pregnancy, tubal 2012    Gall stone     Heart murmur     Hernia 8548    umbilical     History of blood transfusion        Past Surgical History:        Procedure Laterality Date    ABDOMINAL WALL SURGERY  [id number]     SECTION      x3    CHOLECYSTECTOMY      ECTOPIC PREGNANCY SURGERY      HYSTERECTOMY N/A 2017    robotic assisted ,   BSO    HYSTERECTOMY, TOTAL ABDOMINAL  14    LAPAROSCOPY  2012    left salpingectomy    LAPAROSCOPY  [id number]    RASHEEDA    OTHER SURGICAL HISTORY  3/25/2014    D&C;LEEP    TUBAL LIGATION         Medications Prior to Admission:    Prior to Admission medications    Medication Sig Start Date End Date Taking? Authorizing Provider   albuterol sulfate  (90 Base) MCG/ACT inhaler Inhale 2 puffs into the lungs every 6 hours as needed for Wheezing   Yes Historical Provider, MD   cyclobenzaprine (FLEXERIL) 5 MG tablet Take 5 mg by mouth 2 times daily as needed for Muscle spasms    Yes Historical Provider, MD       Allergies:  Latex; Bactrim [sulfamethoxazole-trimethoprim]; Fish-derived products; Ibuprofen; Iodine; Naproxen; and Percocet [oxycodone-acetaminophen]    Social History:   TOBACCO:   reports that she quit smoking about 2 years ago. Her smoking use included cigarettes. She has never used smokeless tobacco.  ETOH:   reports that she drinks alcohol. Family History:       Problem Relation Age of Onset    Diabetes Mother     High Blood Pressure Mother     Cancer Mother     Ovarian Cancer Neg Hx     Uterine Cancer Neg Hx     Breast Cancer Neg Hx     Colon Cancer Neg Hx        REVIEW OF SYSTEMS:    · Constitutional: No fever, no chills, no change in weight; good appetite  · HEENT: No blurred vision, no ear problems, no sore throat, no rhinorrhea. · Respiratory: No cough, no sputum production, no pleuritic chest pain, no shortness of breath  · Cardiology: No angina, no dyspnea on exertion, no paroxysmal nocturnal dyspnea, no orthopnea, no palpitation, no leg swelling. · Gastroenterology: No dysphagia, no reflux; no abdominal pain, no nausea or vomiting; no constipation or diarrhea. No hematochezia   · Genitourinary: No dysuria, no frequency, hesitancy; no hematuria  · Musculoskeletal: + myalgia, + Neck pain.  no change in range of movement  · Neurology: no focal weakness in extremities, no slurred speech, no double vision, + tingling and numbness of UE and LLE  · Endocrinology: no temperature intolerance, no polyphagia, polydipsia or polyuria  · Hematology: no increased bleeding, no bruising, no lymphadenopathy  · Skin: no skin changes noticed by patient  · Psychology: no depressed mood, no suicidal ideation    Physical Exam   · Vitals: BP (!) 111/58   Pulse 52   Temp 97.6 °F (36.4 °C) (Temporal)   Resp 16   Ht 5' 3\" (1.6 m)   Wt 190 lb (86.2 kg)   LMP 05/19/2014   SpO2 96%   BMI 33.66 kg/m²     Physical Exam   Constitutional: She is oriented to person, place, and time. She appears well-developed and well-nourished. HENT:   Head: Normocephalic and atraumatic. Mouth/Throat: Oropharynx is clear and moist.   Eyes: Pupils are equal, round, and reactive to light. Conjunctivae and EOM are normal.   Neck: Normal range of motion. Neck supple. Noted with severe tenderness on touch of the back of the neck, increased paraesthesia of the BUE with manipulating the neck. Also noted with warmth and mild swelling of the area at the cervical spine    Cardiovascular: Normal rate, regular rhythm, normal heart sounds and intact distal pulses. Pulmonary/Chest: Effort normal and breath sounds normal.   Abdominal: Soft. Bowel sounds are normal.   Musculoskeletal: Normal range of motion. She exhibits tenderness (Upper spine). Neurological: She is alert and oriented to person, place, and time. She displays abnormal reflex (Hyperreflexia on the LE). No cranial nerve deficit. She exhibits normal muscle tone. Coordination normal.   Skin: Skin is warm.      Labs and Imaging Studies   Basic Labs  Recent Labs     05/29/19  1009      K 3.9      CO2 29   BUN 9   CREATININE 0.9   GLUCOSE 102*   CALCIUM 9.8       Recent Labs     05/29/19  1009   WBC 9.6   RBC 4.12   HGB 12.1   HCT 37.7   MCV 91.5   MCH 29.4   MCHC 32.1   RDW 13.3      MPV 10.1       CBC:   Lab Results   Component Value Date    WBC 9.6 05/29/2019    RBC 4.12 05/29/2019    HGB 12.1 05/29/2019    HCT 37.7 05/29/2019    MCV 91.5 05/29/2019    RDW 13.3 05/29/2019     05/29/2019     BMP:    Lab Results   Component Value Date     2019    K 3.9 2019    K 4.2 2019     2019    CO2 29 2019    BUN 9 2019       Imaging Studies:     Ct Head Wo Contrast    Result Date: 2019  Patient MRN: [id number] : 1985 Age:  34 years Gender: Female Order Date: 2019 8:00 AM Exam: CT HEAD WO CONTRAST Number of Images: 139 views Indication:   decreased sensation on right  Comparison: Prior study from 2018 is available Technique: Sequential axial CT of the head was obtained from the base of the skull to the vertex without IV contrast.  Radiation Output: CTDIvol 68.59 (mGy); DLP 1114.03 (mGy-cm) Findings: The study demonstrates the fourth ventricle to be midline. The posterior fossa appears to be normal. There is no mass, mass effect or midline shift. The ventricles, sulci and cisterns are normal in appearance for patient's age. The gray-white matter differentiation is preserved throughout. There is no acute intracranial hemorrhage. No extra-axial fluid collection is identified. The bony calvarium is intact. The visualized portion of the paranasal sinuses and the mastoid air cells are clear. There is no focal extracranial soft tissue swelling. NO ACUTE INTRACRANIAL PROCESS     Mri Brain Wo Contrast    Result Date: 2019  Patient MRN:  [id number] : 1985 Age: 34 years Gender: Female Order Date:  2019 8:00 AM EXAM: MRI BRAIN WO CONTRAST NUMBER OF IMAGES: 180 INDICATION:  STROKE COMPARISON: None Technique and findings: Only limited MRI of the brain was obtained for last. Axial T2 FLAIR images of the whole brain were obtained. Axial diffusion-weighted and ADC maps of the brain were additionally obtained. Several nonspecific tiny bright T2 foci are noted in the gray-white matter junction in the left mid temporal lobe region. Another punctate bright T2 focus is seen on the right side in the vicinity of the claustrum. No areas of diffusion restriction are noted no other abnormality is seen. Nonspecific bilateral punctate bright T2 foci. Correlation with patient's history including any drug ingestion history is suggested. Also, an early demyelinating disorder cannot be excluded and correlation with close clinical examination and laboratory studies suggested. Resident's Assessment and Plan     Patricia Thomas is a 34 y.o. female    Neck pain, r/o discitis vs. osteomylitis vs. Cervical disc prolapse vs. ?MS  -CT head non significant, MRI with possible early demyelinating lesion, possible MS  -R/o infectious process, will check procal, ESR, CRP, A1c, blood Cx  -Denied IVDU, will check UDS, SDS  -Obtain MRI cervical spine, pt os allergic to iodine  -Patient might need LP, neurology consult if infectious markers are negative  -Will hold off to Abx for now  -Pain control, lidocaine patch. Pt is allergic to NSAIDs      PT/OT evaluation: None  DVT prophylaxis/ GI prophylaxis: Lovenox/diet  Disposition: Inpatient    Matteo Ash MD, PGY-1   Attending physician: Dr. Lizzie Duarte  Department of Internal Medicine  Internal Medicine Residency / [address]    Attending Physician Statement    Patricia Thomas case was discussed, including pertinent history and examination findings with the multidisciplinary team during bedside rounds. I have seen and examined the patient and the key elements of the encounter have been performed by myself. I have read and reviewed the documentation. If needed or disputed the following findings, counseling and treatment options which have been corrected and communicated back to the patient, family if applicable and contributing physicians. I agree with the assessment, plan and orders as noted by the resident.         C spine needed  Not a new issue -       Phillip Hawkins DO , Jorden Carrero  Professor of Medicine  Pulmonary, [facility] Sleep Medicine  Internal Medicine Academic Faculty

## 2019-05-29 NOTE — ED PROVIDER NOTES
Horizontal Extraocular Movements 0 - normal   3: Test Visual Fields 0 - no visual loss   4: Test Facial Palsy 0 - normal symmetric movement   5A: Test Left Arm Motor Drift 0 - no drift, limb holds 90 (or 45) degrees for full 10 seconds   5B: Test Right Arm Motor Drift 0 - no drift, limb holds 90 (or 45) degrees for full 10 seconds   6A: Test Left Leg Motor Drift 0 - no drift; leg holds 30 degree position for full 5 seconds   6B: Test Right Leg Motor Drift 0 - no drift; leg holds 30 degree position for full 5 seconds   7: Test Limb Ataxia   (FNF/Heel-Shin) 0 - absent   8: Test Sensation 1 - mild to moderate sensory loss; patient feels pinprick is less sharp or is dull on the affected side; there is a loss of superficial pain with pinprick but patient is aware of being touched    9: Test Language/Aphasia 0 - no aphasia, normal   10: Test Dysarthria 0 - normal   11: Test Extinction/Inattention 0 - no abnormality   Total Score: 1   5/29/19 at 8:16 AM.      Acute CVA Core Measures:      - t-PA Eligibility: IV t-PA was considered and not given due to violations in inclusion criteria including mild/rapidly resolving deficit        MDM  Number of Diagnoses or Management Options  Diagnosis management comments: Patient presented with right sided paresthesias. Sensory deficit on exam. Patient immediately sent to CT with negative exam. MRI DWI study obtained and was negative for stroke although T2 foci present. Patient does have a hx of left sided neurological symptoms. Patient's symptoms concerning for demyelinating disease. Patient also complaining of back pain. No  Injuries. She was treated with fentanyl. Case discussed with Dr. Tracey Daniel and he will be admitting the patient.  Case discussed with medicine resident.               --------------------------------------------- PAST HISTORY ---------------------------------------------  Past Medical History:  has a past medical history of Anxiety, Depression, Ectopic pregnancy, tubal, Gall stone, Heart murmur, Hernia, and History of blood transfusion. Past Surgical History:  has a past surgical history that includes  section; laparoscopy (2012); Ectopic pregnancy surgery; Tubal ligation; laparoscopy (3941955); other surgical history (3/25/2014); Hysterectomy, total abdominal (14); Abdominal wall surgery ([id number]); Cholecystectomy; and Hysterectomy (N/A, 2017). Social History:  reports that she quit smoking about 2 years ago. Her smoking use included cigarettes. She has never used smokeless tobacco. She reports that she drinks alcohol. She reports that she has current or past drug history. Drug: Marijuana. Family History: family history includes Cancer in her mother; Diabetes in her mother; High Blood Pressure in her mother. The patients home medications have been reviewed. Allergies: Latex; Bactrim [sulfamethoxazole-trimethoprim]; Fish-derived products; Ibuprofen;  Iodine; Naproxen; and Percocet [oxycodone-acetaminophen]    -------------------------------------------------- RESULTS -------------------------------------------------    LABS:  Results for orders placed or performed during the hospital encounter of 19   Troponin   Result Value Ref Range    Troponin <0.01 0.00 - 0.03 ng/mL   CBC Auto Differential   Result Value Ref Range    WBC 9.6 4.5 - 11.5 E9/L    RBC 4.12 3.50 - 5.50 E12/L    Hemoglobin 12.1 11.5 - 15.5 g/dL    Hematocrit 37.7 34.0 - 48.0 %    MCV 91.5 80.0 - 99.9 fL    MCH 29.4 26.0 - 35.0 pg    MCHC 32.1 32.0 - 34.5 %    RDW 13.3 11.5 - 15.0 fL    Platelets 569 257 - 741 E9/L    MPV 10.1 7.0 - 12.0 fL    Neutrophils % 62.0 43.0 - 80.0 %    Immature Granulocytes % 0.4 0.0 - 5.0 %    Lymphocytes % 29.0 20.0 - 42.0 %    Monocytes % 6.6 2.0 - 12.0 %    Eosinophils % 1.6 0.0 - 6.0 %    Basophils % 0.4 0.0 - 2.0 %    Neutrophils # 5.96 1.80 - 7.30 E9/L    Immature Granulocytes # 0.04 E9/L    Lymphocytes # 2.79 1.50 - 4.00 E9/L Monocytes # 0.63 0.10 - 0.95 E9/L    Eosinophils # 0.15 0.05 - 0.50 E9/L    Basophils # 0.04 0.00 - 0.20 K6/I   Basic Metabolic Panel   Result Value Ref Range    Sodium 143 132 - 146 mmol/L    Potassium 3.9 3.5 - 5.0 mmol/L    Chloride 105 98 - 107 mmol/L    CO2 29 22 - 29 mmol/L    Anion Gap 9 7 - 16 mmol/L    Glucose 102 (H) 74 - 99 mg/dL    BUN 9 6 - 20 mg/dL    CREATININE 0.9 0.5 - 1.0 mg/dL    GFR Non-African American >60 >=60 mL/min/1.73    GFR African American >60     Calcium 9.8 8.6 - 10.2 mg/dL   Protime-INR   Result Value Ref Range    Protime 11.9 9.3 - 12.4 sec    INR 1.0    APTT   Result Value Ref Range    aPTT 33.0 24.5 - 35.1 sec   D-Dimer, Quantitative   Result Value Ref Range    D-Dimer, Quant <200 ng/mL DDU   EKG 12 Lead   Result Value Ref Range    Ventricular Rate 56 BPM    Atrial Rate 56 BPM    P-R Interval 150 ms    QRS Duration 74 ms    Q-T Interval 396 ms    QTc Calculation (Bazett) 382 ms    P Axis 43 degrees    R Axis 44 degrees    T Axis 19 degrees       RADIOLOGY:  MRI BRAIN WO CONTRAST   Final Result   Nonspecific bilateral punctate bright T2 foci. Correlation   with patient's history including any drug ingestion history is   suggested. Also, an early demyelinating disorder cannot be excluded   and correlation with close clinical examination and laboratory studies   suggested. CT Head WO Contrast   Final Result      NO ACUTE INTRACRANIAL PROCESS         CTA PULMONARY W CONTRAST    (Results Pending)         ------------------------- NURSING NOTES AND VITALS REVIEWED ---------------------------  Date / Time Roomed:  5/29/2019  7:24 AM  ED Bed Assignment:  [id number]    The nursing notes within the ED encounter and vital signs as below have been reviewed.      Patient Vitals for the past 24 hrs:   BP Temp Temp src Pulse Resp SpO2 Height Weight   05/29/19 1500 (!) 111/58 97.6 °F (36.4 °C) Temporal 52 16 96 % -- --   05/29/19 1315 113/60 98 °F (36.7 °C) Temporal 60 18 95 % -- -- 05/29/19 1215 121/71 99 °F (37.2 °C) -- 88 18 -- -- --   05/29/19 1059 110/75 -- -- 56 16 99 % -- --   05/29/19 0728 (!) 140/74 -- -- 62 -- 98 % -- --   05/29/19 0727 -- 98.7 °F (37.1 °C) -- -- 17 -- 5' 3\" (1.6 m) 190 lb (86.2 kg)       Oxygen Saturation Interpretation: Normal    ------------------------------------------ PROGRESS NOTES ------------------------------------------  Re-evaluation(s):  Time:   Patients symptoms show no change  Repeat physical examination is not changed    Counseling:  I have spoken with the patient and discussed todays results, in addition to providing specific details for the plan of care and counseling regarding the diagnosis and prognosis. Their questions are answered at this time and they are agreeable with the plan of admission.    --------------------------------- ADDITIONAL PROVIDER NOTES ---------------------------------  Consultations:  Time: . Spoke with Dr. Sal Melton. Discussed case. They will admit the patient. This patient's ED course included: a personal history and physicial examination, multiple bedside re-evaluations, IV medications, cardiac monitoring, continuous pulse oximetry and complex medical decision making and emergency management    This patient has remained hemodynamically stable during their ED course. Diagnosis:  1. Paresthesias        Disposition:  Patient's disposition: Admit to telemetry  Patient's condition is stable.          Khadijah Herrmann DO  Resident  05/29/19 [id number]

## 2019-05-29 NOTE — LETTER
[address] [place] [id number]  Phone: [phone]    No name on file. May 31, 2019     Patient: Angely Moser   YOB: 1985   Date of Visit: 5/29/2019       To Whom It May Concern: It is my medical opinion that Angely Moser should remain out of work until seen by Neurosurgery. If you have any questions or concerns, please don't hesitate to call. Sincerely,    Emily Patel      No name on file.

## 2019-05-29 NOTE — ED NOTES
Pt is extremely anxious, she was redirected to remain still for catscan and mri. . I was able to calm for completion of the test.     Teagan Rubio RN  05/29/19 [id number]

## 2019-05-29 NOTE — ED NOTES
Bed: 21  Expected date:   Expected time:   Means of arrival:   Comments:  ems     Loc Fraga RN  05/29/19 [id number]

## 2019-05-30 PROBLEM — M48.02 CERVICAL STENOSIS OF SPINAL CANAL: Status: ACTIVE | Noted: 2019-05-30

## 2019-05-30 LAB
ACETAMINOPHEN LEVEL: 9.6 MCG/ML (ref 10–30)
C-REACTIVE PROTEIN: 0.1 MG/DL (ref 0–0.4)
CHOLESTEROL, TOTAL: 182 MG/DL (ref 0–199)
ETHANOL: <10 MG/DL (ref 0–0.08)
HBA1C MFR BLD: 5.6 % (ref 4–5.6)
HDLC SERPL-MCNC: 53 MG/DL
LDL CHOLESTEROL CALCULATED: 112 MG/DL (ref 0–99)
PROCALCITONIN: 0.03 NG/ML (ref 0–0.08)
SALICYLATE, SERUM: <0.3 MG/DL (ref 0–30)
SEDIMENTATION RATE, ERYTHROCYTE: 30 MM/HR (ref 0–20)
TRICYCLIC ANTIDEPRESSANTS SCREEN SERUM: NEGATIVE NG/ML
TRIGL SERPL-MCNC: 84 MG/DL (ref 0–149)
TSH SERPL DL<=0.05 MIU/L-ACNC: 0.56 UIU/ML (ref 0.27–4.2)
VITAMIN D 25-HYDROXY: 19 NG/ML (ref 30–100)
VLDLC SERPL CALC-MCNC: 17 MG/DL

## 2019-05-30 PROCEDURE — 99219 PR INITIAL OBSERVATION CARE/DAY 50 MINUTES: CPT | Performed by: NEUROLOGICAL SURGERY

## 2019-05-30 PROCEDURE — 84443 ASSAY THYROID STIM HORMONE: CPT

## 2019-05-30 PROCEDURE — 99233 SBSQ HOSP IP/OBS HIGH 50: CPT | Performed by: INTERNAL MEDICINE

## 2019-05-30 PROCEDURE — 6360000002 HC RX W HCPCS: Performed by: INTERNAL MEDICINE

## 2019-05-30 PROCEDURE — 6370000000 HC RX 637 (ALT 250 FOR IP): Performed by: INTERNAL MEDICINE

## 2019-05-30 PROCEDURE — G0378 HOSPITAL OBSERVATION PER HR: HCPCS

## 2019-05-30 PROCEDURE — 80307 DRUG TEST PRSMV CHEM ANLYZR: CPT

## 2019-05-30 PROCEDURE — 82306 VITAMIN D 25 HYDROXY: CPT

## 2019-05-30 PROCEDURE — 87040 BLOOD CULTURE FOR BACTERIA: CPT

## 2019-05-30 PROCEDURE — 96372 THER/PROPH/DIAG INJ SC/IM: CPT

## 2019-05-30 PROCEDURE — 84145 PROCALCITONIN (PCT): CPT

## 2019-05-30 PROCEDURE — 36415 COLL VENOUS BLD VENIPUNCTURE: CPT

## 2019-05-30 PROCEDURE — 2580000003 HC RX 258: Performed by: INTERNAL MEDICINE

## 2019-05-30 PROCEDURE — G0480 DRUG TEST DEF 1-7 CLASSES: HCPCS

## 2019-05-30 PROCEDURE — 86140 C-REACTIVE PROTEIN: CPT

## 2019-05-30 PROCEDURE — 85651 RBC SED RATE NONAUTOMATED: CPT

## 2019-05-30 PROCEDURE — 93005 ELECTROCARDIOGRAM TRACING: CPT | Performed by: INTERNAL MEDICINE

## 2019-05-30 PROCEDURE — 83036 HEMOGLOBIN GLYCOSYLATED A1C: CPT

## 2019-05-30 PROCEDURE — 80061 LIPID PANEL: CPT

## 2019-05-30 RX ORDER — GABAPENTIN 400 MG/1
400 CAPSULE ORAL 3 TIMES DAILY
Status: DISCONTINUED | OUTPATIENT
Start: 2019-05-30 | End: 2019-05-31 | Stop reason: HOSPADM

## 2019-05-30 RX ORDER — DEXAMETHASONE 4 MG/1
10 TABLET ORAL EVERY 6 HOURS SCHEDULED
Status: DISCONTINUED | OUTPATIENT
Start: 2019-05-30 | End: 2019-05-31 | Stop reason: HOSPADM

## 2019-05-30 RX ADMIN — Medication 10 ML: at 08:28

## 2019-05-30 RX ADMIN — ENOXAPARIN SODIUM 40 MG: 40 INJECTION SUBCUTANEOUS at 08:28

## 2019-05-30 RX ADMIN — Medication 10 ML: at 21:33

## 2019-05-30 RX ADMIN — GABAPENTIN 200 MG: 100 CAPSULE ORAL at 00:19

## 2019-05-30 RX ADMIN — GABAPENTIN 200 MG: 100 CAPSULE ORAL at 08:28

## 2019-05-30 RX ADMIN — DEXAMETHASONE 10 MG: 4 TABLET ORAL at 15:42

## 2019-05-30 RX ADMIN — ACETAMINOPHEN 650 MG: 325 TABLET, FILM COATED ORAL at 18:45

## 2019-05-30 RX ADMIN — CYCLOBENZAPRINE 5 MG: 10 TABLET, FILM COATED ORAL at 10:46

## 2019-05-30 RX ADMIN — ACETAMINOPHEN 650 MG: 325 TABLET, FILM COATED ORAL at 06:12

## 2019-05-30 RX ADMIN — GABAPENTIN 400 MG: 400 CAPSULE ORAL at 21:30

## 2019-05-30 RX ADMIN — GABAPENTIN 200 MG: 100 CAPSULE ORAL at 14:02

## 2019-05-30 RX ADMIN — DEXAMETHASONE 10 MG: 4 TABLET ORAL at 21:30

## 2019-05-30 ASSESSMENT — PAIN DESCRIPTION - ONSET: ONSET: ON-GOING

## 2019-05-30 ASSESSMENT — PAIN DESCRIPTION - PAIN TYPE: TYPE: ACUTE PAIN

## 2019-05-30 ASSESSMENT — PAIN SCALES - GENERAL
PAINLEVEL_OUTOF10: 9
PAINLEVEL_OUTOF10: 9
PAINLEVEL_OUTOF10: 8

## 2019-05-30 ASSESSMENT — PAIN DESCRIPTION - PROGRESSION: CLINICAL_PROGRESSION: GRADUALLY WORSENING

## 2019-05-30 ASSESSMENT — PAIN DESCRIPTION - ORIENTATION: ORIENTATION: MID;UPPER

## 2019-05-30 ASSESSMENT — PAIN DESCRIPTION - FREQUENCY: FREQUENCY: CONTINUOUS

## 2019-05-30 ASSESSMENT — PAIN - FUNCTIONAL ASSESSMENT: PAIN_FUNCTIONAL_ASSESSMENT: ACTIVITIES ARE NOT PREVENTED

## 2019-05-30 ASSESSMENT — PAIN DESCRIPTION - LOCATION: LOCATION: BACK

## 2019-05-30 ASSESSMENT — PAIN DESCRIPTION - DESCRIPTORS: DESCRIPTORS: ACHING;BURNING;CONSTANT;DISCOMFORT
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
## 2019-05-30 NOTE — PLAN OF CARE
Problem: Falls - Risk of:  Goal: Will remain free from falls  Description  Will remain free from falls  5/30/2019 0113 by Darryl Hernandez RN  Outcome: Met This Shift     Problem: Pain:  Goal: Pain level will decrease  Description  Pain level will decrease  5/30/2019 0113 by Darryl Hernandez RN  Outcome: Met This Shift
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
## 2019-05-30 NOTE — PROGRESS NOTES
[facility]  Internal Medicine Residency Program  Progress Note - House Team 1    Patient:  Brandie Al 34 y.o. female MRN: [id number]     Date of Service: 5/30/2019     CC: Neck pain and paresthesias  Overnight events: Gabapentin was prescribed for pain management. Subjective     Patient explains that the paresthesia in bilateral C5-7 as well as right L4-5 is persistent. The pain has decreased with the use of gabapentin, but is still in pain. The headache and neck pain has not subsided. She slept through the night. The patient denies trouble breathing or shortness of breath. Patient denies fever, nausea, emesis, incontinence, or new symptoms. The patient denies any family history of paresthesia. The patient has not been taking her oral estrogen as prescribed, and last visited her OBGYN in March 2019. The patient presents hirsutism which she claims has been apparent at age 8 and has persisted. The patient admits to smoking cigarettes for the past 15 years, as well as marijuana since her teenage years. The patient works as a caregiver. Objective     Physical Exam:  · Vitals: /70   Pulse 60   Temp 98 °F (36.7 °C) (Temporal)   Resp 16   Ht 5' 3\" (1.6 m)   Wt 190 lb (86.2 kg)   LMP 05/19/2014   SpO2 95%   BMI 33.66 kg/m²     · I & O - 24hr: I/O this shift:  · In: 300 [P.O.:300]  · Out: -    · General Appearance: alert, appears stated age, cooperative, mild distress and mildly obese  · HEENT:  Head: Normocephalic, no lesions, without obvious abnormality. · Eye: Normal external eye, conjunctiva, lids cornea, MERI. · Neck: no adenopathy, no JVD, supple, symmetrical, trachea midline and thyroid not enlarged, symmetric, no tenderness/mass/nodules The patient can flex her neck to 45 degrees before sensing pain.   · Lung: clear to auscultation bilaterally  · Heart: regular rate and rhythm, S1, S2 normal, no murmur, click, rub or gallop  · Abdomen: soft, non-tender; bowel sounds normal; no masses,  no organomegaly  · Extremities:  extremities normal, atraumatic, no cyanosis or edema, no edema, redness or tenderness in the calves or thighs and no ulcers, gangrene or trophic changes  · Musculokeletal: No joint swelling, no muscle tenderness. ROM normal in all joints of extremities. · Neurologic: Mental status: Alert, oriented, thought content appropriate. Strength is noted to be 5/5 in all 4 extremities. Bartlett sign is no longer appreciated. Sensation intact to respected dermatomes. Reflexes are 2/4 in C5-7 and L4 dermatomes. Subject  Pertinent Labs & Imaging Studies   jian  CBC with Differential:    Lab Results   Component Value Date    WBC 9.6 05/29/2019    RBC 4.12 05/29/2019    HGB 12.1 05/29/2019    HCT 37.7 05/29/2019     05/29/2019    MCV 91.5 05/29/2019    MCH 29.4 05/29/2019    MCHC 32.1 05/29/2019    RDW 13.3 05/29/2019    SEGSPCT 57 02/13/2013    LYMPHOPCT 29.0 05/29/2019    MONOPCT 6.6 05/29/2019    BASOPCT 0.4 05/29/2019    MONOSABS 0.63 05/29/2019    LYMPHSABS 2.79 05/29/2019    EOSABS 0.15 05/29/2019    BASOSABS 0.04 05/29/2019     BMP:    Lab Results   Component Value Date     05/29/2019    K 3.9 05/29/2019    K 4.2 02/23/2019     05/29/2019    CO2 29 05/29/2019    BUN 9 05/29/2019    LABALBU 4.2 12/17/2018    CREATININE 0.9 05/29/2019    CALCIUM 9.8 05/29/2019    GFRAA >60 05/29/2019    LABGLOM >60 05/29/2019    GLUCOSE 102 05/29/2019       Resident's Assessment and Plan     Melinda LUIS Patricio Raya is a 34 y.o. female    Cervical Spinal Stenosis  - MRI reveals cervical spinal canal stenosis at C3-4. C4-5, C5-6, and C6-7.  - ESR and CRP are negative for infection. No signs of osteomyelitis and/or discitis on imaging.  - Neurosurgery consulted, recommends conservative management  - Consider surgery if conservative management fails  - Start dexamethasone 10mg Q6H and taper to 5mg tomorrow. - Keep gabapentin and add neck brace.  Patient should be sent to outpatient rehab for pain management. Hx of ESTELA-BSO  -early surgical menopause  -would recommend early osteoporosis screening  -consider DEXA scan outpatient   - check Vit.  D levels.   -    PT/OT evaluation:  DVT prophylaxis/ GI prophylaxis:   Disposition: home +/- home health / SNF / Cheyenne Vargas / Hannah Llanos, PGY-1  Attending physician: Dr. Dominic Bullock

## 2019-05-30 NOTE — PROGRESS NOTES
Consult received, will review chart will have Dr. Abdirahman Jefferson see tomorrow morning. Await PT and OT evals.

## 2019-05-31 VITALS
RESPIRATION RATE: 18 BRPM | SYSTOLIC BLOOD PRESSURE: 117 MMHG | HEART RATE: 76 BPM | DIASTOLIC BLOOD PRESSURE: 67 MMHG | BODY MASS INDEX: 33.66 KG/M2 | OXYGEN SATURATION: 94 % | HEIGHT: 63 IN | TEMPERATURE: 98 F | WEIGHT: 190 LBS

## 2019-05-31 PROCEDURE — 6360000002 HC RX W HCPCS: Performed by: INTERNAL MEDICINE

## 2019-05-31 PROCEDURE — 2580000003 HC RX 258: Performed by: INTERNAL MEDICINE

## 2019-05-31 PROCEDURE — 97161 PT EVAL LOW COMPLEX 20 MIN: CPT

## 2019-05-31 PROCEDURE — 97165 OT EVAL LOW COMPLEX 30 MIN: CPT

## 2019-05-31 PROCEDURE — G0378 HOSPITAL OBSERVATION PER HR: HCPCS

## 2019-05-31 PROCEDURE — 97530 THERAPEUTIC ACTIVITIES: CPT

## 2019-05-31 PROCEDURE — 6370000000 HC RX 637 (ALT 250 FOR IP): Performed by: INTERNAL MEDICINE

## 2019-05-31 PROCEDURE — 99233 SBSQ HOSP IP/OBS HIGH 50: CPT | Performed by: INTERNAL MEDICINE

## 2019-05-31 RX ORDER — DEXAMETHASONE 2 MG/1
TABLET ORAL
Qty: 22 TABLET | Refills: 0 | Status: SHIPPED | OUTPATIENT
Start: 2019-06-01 | End: 2019-05-31 | Stop reason: SDUPTHER

## 2019-05-31 RX ORDER — DEXAMETHASONE 2 MG/1
TABLET ORAL
Qty: 22 TABLET | Refills: 0 | Status: SHIPPED | OUTPATIENT
Start: 2019-06-01 | End: 2019-06-07

## 2019-05-31 RX ORDER — GABAPENTIN 400 MG/1
400 CAPSULE ORAL 3 TIMES DAILY
Qty: 90 CAPSULE | Refills: 0 | Status: SHIPPED | OUTPATIENT
Start: 2019-05-31 | End: 2020-02-13

## 2019-05-31 RX ADMIN — GABAPENTIN 400 MG: 400 CAPSULE ORAL at 08:30

## 2019-05-31 RX ADMIN — DEXAMETHASONE 10 MG: 4 TABLET ORAL at 13:47

## 2019-05-31 RX ADMIN — CYCLOBENZAPRINE 5 MG: 10 TABLET, FILM COATED ORAL at 01:08

## 2019-05-31 RX ADMIN — DEXAMETHASONE 10 MG: 4 TABLET ORAL at 08:30

## 2019-05-31 RX ADMIN — DEXAMETHASONE 10 MG: 4 TABLET ORAL at 02:12

## 2019-05-31 RX ADMIN — ACETAMINOPHEN 650 MG: 325 TABLET, FILM COATED ORAL at 08:30

## 2019-05-31 RX ADMIN — GABAPENTIN 400 MG: 400 CAPSULE ORAL at 13:47

## 2019-05-31 RX ADMIN — VITAMIN D, TAB 1000IU (100/BT) 1000 UNITS: 25 TAB at 10:31

## 2019-05-31 RX ADMIN — Medication 10 ML: at 08:30

## 2019-05-31 ASSESSMENT — PAIN DESCRIPTION - FREQUENCY: FREQUENCY: INTERMITTENT

## 2019-05-31 ASSESSMENT — PAIN DESCRIPTION - ONSET: ONSET: ON-GOING

## 2019-05-31 ASSESSMENT — PAIN DESCRIPTION - ORIENTATION: ORIENTATION: UPPER

## 2019-05-31 ASSESSMENT — PAIN SCALES - GENERAL
PAINLEVEL_OUTOF10: 7
PAINLEVEL_OUTOF10: 0
PAINLEVEL_OUTOF10: 7

## 2019-05-31 ASSESSMENT — PAIN DESCRIPTION - PAIN TYPE: TYPE: ACUTE PAIN

## 2019-05-31 ASSESSMENT — PAIN DESCRIPTION - DESCRIPTORS: DESCRIPTORS: PRESSURE

## 2019-05-31 ASSESSMENT — PAIN DESCRIPTION - LOCATION: LOCATION: BACK

## 2019-05-31 NOTE — CARE COORDINATION
5/31/2019  Social work:dischsrge planning   Spoke within patient at her request. Her plan is home and she state she is awaiting pt/ot eval. She is requesting a script to do out patient pt and ot no c or acute rehab here. Her grand mother will pick ehr up. Will follow . Nursing aware and called for stat pt and ot evals.

## 2019-05-31 NOTE — PROGRESS NOTES
[facility]  Internal Medicine Residency Program  Progress Note - House Team 1    Patient:  Haritha Wright 34 y.o. female MRN: [id number]     Date of Service: 5/31/2019     CC: Tingling and numbness of BUE and RLE    Subjective     Patient was seen and examined this morning, awake, alert and oriented x3, stated she feels better today but complaints of paraesthesia and neck pain are still present.    -Will discharge home later today    Objective     Physical Exam:  · Vitals: /67   Pulse 76   Temp 98 °F (36.7 °C) (Temporal)   Resp 18   Ht 5' 3\" (1.6 m)   Wt 190 lb (86.2 kg)   LMP 05/19/2014   SpO2 94%   BMI 33.66 kg/m²     · I & O - 24hr: No intake/output data recorded. · General Appearance: alert, appears stated age and cooperative  · HEENT:  Head: Normocephalic, no lesions, without obvious abnormality. · Neck: Normal range of motion. Neck supple. Noted with severe tenderness on touch of the back of the neck  · Lung: clear to auscultation bilaterally  · Heart: regular rate and rhythm, S1, S2 normal, no murmur, click, rub or gallop  · Abdomen: soft, non-tender; bowel sounds normal; no masses,  no organomegaly  · Extremities:  extremities normal, atraumatic, no cyanosis or edema  · Musculokeletal: No joint swelling, no muscle tenderness. ROM normal in all joints of extremities.    · Neurologic: Mental status: Alert, oriented, thought content appropriate  Subject  Pertinent Labs & Imaging Studies   jian  CBC with Differential:    Lab Results   Component Value Date    WBC 9.6 05/29/2019    RBC 4.12 05/29/2019    HGB 12.1 05/29/2019    HCT 37.7 05/29/2019     05/29/2019    MCV 91.5 05/29/2019    MCH 29.4 05/29/2019    MCHC 32.1 05/29/2019    RDW 13.3 05/29/2019    SEGSPCT 57 02/13/2013    LYMPHOPCT 29.0 05/29/2019    MONOPCT 6.6 05/29/2019    BASOPCT 0.4 05/29/2019    MONOSABS 0.63 05/29/2019    LYMPHSABS 2.79 05/29/2019    EOSABS 0.15 05/29/2019    BASOSABS 0.04 disputed the following findings, counseling and treatment options which have been corrected and communicated back to the patient, family if applicable and contributing physicians. I agree with the assessment, plan and orders as noted by the resident.       Lorenzo Rebolledo DO , Shani Jin  Professor of Medicine  Pulmonary, [facility] Sleep Medicine  Internal Medicine Academic Faculty

## 2019-05-31 NOTE — PROGRESS NOTES
Acute Rehab Pre-Admission Screen      Referral date: 5/30/19  Onset/Hospital Admit Date: 5/29/2019  7:24 AM  Current Location: [facility]  Admitting Diagnosis: parasthesias   Surgery /  Date:    Name: Michelle Ugalde  YOB: 1985  Age: 34 y.o. Address: [address] [place] [address], [address]  Home Phone: [phone] (home)  Nazario Fitzgerald #:     Sex: female  Race:   Marital Status: single   Ethnic/Cultural/Anglican Considerations: Sikhism    Advanced Directives: [x] Full Code  [] [facility] [] Medications only       [] Living Will  [] DPOA      []Organ donor      [] No mechanical breathing or ventilation     [] no tube feeding, nutrition or hydration      [x] Patient does not have advanced directives or living will     Copies in Chart: n/a    COVERAGE INFORMATION   Primary Insurer: [address] [place] [address],[address] medicaid  Payor Contact:   Phone:   FAX:  Authorization #:     Medicaid #:   Verified coverage: [] Patient  [] Family/caregiver    [x] financial department [] insurance carrier    PHYSICIAN / Sharyle Crea    Referring Physician: NICOLLE DIXONNRWBXF  Attending Physician: Patricia Clarke DO  Primary Care Physician: No primary care provider on file.   Consultants/Opinions (see full consult notes on chart): [facility] ( neurosurgery) - cervical stenosis without signs of myelopathy    SOCIAL INFORMATION    Primary  Contact: Aamir Hernández  Relationship: spouse  Primary Phone: [phone]    Secondary  Contact: Rivera Casillas  Relationship: grandmother  Primary Phone: [phone]      Previous Community Services: none noted  Caregiver available: [x] Yes [] No Hours per day available:   Patient previously employed:  [x] Yes [] Part Time [] Full Time [] No [] Retired  Occupation/Profession: healthcare  Prior living arrangements: [x] Home  [] Assisted living  [] SNF [] Other  Lived with:  [] Alone  [x] Spouse  [] Family  [] Other  Lived with: [address] [place] [address] phone: [phone]  Home:   story home   entry steps  Rails:   Steps:   to 2nd floor  Rails:     Bedroom: [] 1st floor  [] 2nd floor    Bathroom:  [] 1st floor  [] 2nd floor    Prior Functional Level: Independent for:   Assistance for:   Dependent for:   Dominant hand: [] Right  [] Left    Previous Home Equipment:  [] Cane [] Grab bars [] Orthotic / prosthetic   [] Shower chair [] Tub bench  [] 3-in-1 Commode [] Long handle sponge   [] Oxygen [] Sock aide  [] Wheelchair  [] motorized wc/scooter  [] Wheelchair cushion   [] Crutches [] Long handle shoehorn  [] Reachers [] Toilet seat elevator  [] Walker(wheeled)   [] Walker(standard) [] Mechanical lift    [] None of the above    MEDICAL UPDATE:  History of present admission: presents 19 - with complaints of paraesthesia of BUE as well as RLE, dizziness and severe neck pain (back of the neck) 30/10 in severity , symptoms have started suddenly this morning however she has been complaining of headaches for the past 2 days. Per neurosurgery consult has stenosis from C3 - C7. Non-surgical, conservative therapy for now. Past Medical:  Past Medical History:   Diagnosis Date    Anxiety     Depression     Ectopic pregnancy, tubal     Hansa booth     Heart murmur     Hernia 9681    umbilical     History of blood transfusion         Influenza vaccine given:  [] yes   [] no Date:  [x] n/a (out of season)    Has patient had 2 or more falls in the past year? [] yes   [x] no Date  Has patient had any fall with injury in the past year? [] yes   [x] no  [] unknown  Has patient had major surgery during past 100 days prior to admission?    [] yes   [x] no Type/ Date:       Surgical History:  Past Surgical History:   Procedure Laterality Date    ABDOMINAL WALL SURGERY  [id number]     SECTION      x3    CHOLECYSTECTOMY      ECTOPIC PREGNANCY SURGERY      HYSTERECTOMY N/A 2017    robotic assisted ,   BSO    HYSTERECTOMY, TOTAL ABDOMINAL  14    LAPAROSCOPY  9/28/2012    left salpingectomy    LAPAROSCOPY  [id number]    RASHEEDA    OTHER SURGICAL HISTORY  3/25/2014    D&C;LEEP    TUBAL LIGATION           Current Co-morbidities:  [] Alzheimer's   [] Dysphasia     [] Parkinsonism  [] Amputation   [] Edema of larynx  [] Peripheral artery disease   [] Anemia      [] Encephalopathy  [] Peripheral vascular disease  [x] Anxiety   [] Gangrene   [] Pneumonia  [] Aphasia   [] Gout    [] Polyneuropathy  [] Asthma   [] Heart Failure (diastolic) [] Post-polio syndrome  [] Atrial fibrillation  [] Heart Failure (left-sided) [] Pseudomonas enteritis   [] Blind    [] Heart Failure (right-sided) [] Pulmonary embolism  [] Cellulitis     [] Heart Failure (systolic) [] Renal dialysis  [] Clostridium difficile  [] Hemiparesis   [] Renal failure  [] Congestive heart failure [] Hypertension   [] Rheumatoid arthritis  [] COPD   [] Hypotension   [] Seizure disorder   [] Coronary Artery Disease [] Hypothyroidism  [] Septicemia   [] Deaf    [] Malnutrition   [] Sleep apnea  [x] Depression   [] Morbid obesity  [] Spinal cord injury  [] Diabetes   [] MRSA   [] Stroke  [] Diabetic nephropathy  [] Myocardial infarction  [] Tracheostomy  [] Diabetic neuropathy  [] Osteoarthritis  [] Traumatic brain injury   [] Diabetic retinopathy  [] Osteoporosis   [] Urinary tract infection  [] DVT    [] Pancytopenia  [] Vocal cord paralysis  [x] neck pain    []     [] VRE          Medical/Functional Conditions, Risk for Clinical Complications/Interventions Required:    Medical/Functional Conditions: anxiety,depression, neck pain    Risk for Medical/Clinical Complications: falls, injuries, decreased mobility    CLINICAL DATA:     Height : 5'3\"     Weight:  190#   BMI: 33.7         Date: 5/31/19 Date:  Date:    temperature 97.9     pulse 65     respirations 18     Blood pressure 119/78     Pulse oximeter 94% room air          ALLERGIES: Latex; Bactrim [sulfamethoxazole-trimethoprim];  Fish-derived products; Ibuprofen; Iodine; Naproxen; and Percocet [oxycodone-acetaminophen]    DIET : DIET GENERAL;    Current Lab and Diagnostic Tests:   Recent Results (from the past 24 hour(s))   EKG 12 Lead    Collection Time: 05/30/19  9:42 AM   Result Value Ref Range    Ventricular Rate 58 BPM    Atrial Rate 58 BPM    P-R Interval 140 ms    QRS Duration 82 ms    Q-T Interval 386 ms    QTc Calculation (Bazett) 378 ms    P Axis 0 degrees    R Axis 28 degrees    T Axis 4 degrees     Ct Head Wo Contrast  Result Date: 5/29/2019  NO ACUTE INTRACRANIAL PROCESS     Mri Cervical Spine Wo Contrast  Result Date: 5/29/2019  Central spinal canal stenosis is present at C3-4, C4-5, C5-6 and C6-7. Posterior disc protrusions exert mass effect on the cord at C3-4, C4-5, C5-6 and C6-7. No foraminal stenosis. Cervical kyphosis apex C4-5. This could be due to muscle spasm or positional.    Mri Brain Wo Contrast  Result Date: 5/29/2019  Nonspecific bilateral punctate bright T2 foci. Correlation with patient's history including any drug ingestion history is suggested. Also, an early demyelinating disorder cannot be excluded and correlation with close clinical examination and laboratory studies suggested.       Additional labs or diagnostic studies needed before admission to rehabilitation unit:      Medications:   vitamin D  1,000 Units Oral Daily    dexamethasone  10 mg Oral 4 times per day    gabapentin  400 mg Oral TID    diphenhydrAMINE  50 mg Intravenous Once    sodium chloride flush  10 mL Intravenous 2 times per day    enoxaparin  40 mg Subcutaneous Daily    lidocaine  1 patch Transdermal Daily       acetaminophen, sodium chloride flush, magnesium hydroxide, ondansetron, cyclobenzaprine, albuterol sulfate HFA    SPECIAL PRECAUTIONS: [x] No current precautions  [] Cardiac  [] Renal [] Sternal [] Respiratory      [] Neurological           [] Hip  [] Spinal [] Seizure  [] Aspiration  [] Isolation precautions:    [] Contact   [] Respiratory   [] Expression SP      Social Interaction SP      Problem Solving SP      Memory SP      Comprehension SP      Swallowing SP      Bowel Management NSG      Bladder Management NSG        Comments on Functional Status:     [x] Able to participate a minimum of 3 hours per day of therapy intervention    Required treatments/services: [x] Rehabilitation nursing [] Dietitian / nurtition                 [x] Case management  [] Respiratory Therapy      [x] Social work   [] Other     Required Therapy:  Therapy Hours per Day Days per Week Therapeutic Interventions Required   [x] Physical Therapy      [x] Occupational Therapy      [] Speech Pathology      [] Prosthetics / Orthotics       []         Anticipated Discharge Plan:   Anticipated DME Needs:  [] Home     [] Commode   [] Alone    [] Wheelchair   [] Supervised    [] Catia Monico   [] Assist    [] Oxygen  [] LTAC     [] Hospital Bed  [] Assisted Living    [] [facility]  [] Chintan Pito   [] To Be Determined      Anticipated Home Health Services:  Anticipated Outpatient Services:  [] PT       [] PT  [] OT      [] OT  [] Speech     [] Speech  [] Nursing     [] Dialysis  [] Aide      [] To Be Determined  [] To Be Determined    Anticipated support group:  [] Amputation  [] Multiple Sclerosis  [] Stroke  [] Brain Injury  [] Spinal cord injury  [] Other     Barriers to discharge:     Discharge Support: [] Patient lives alone and does not have a caregiver available     [] Patient has a caregiver available     [] Discharge plan has been verified with patient's caregiver    [] Caregiver is in agreement with the discharge plan     Expected functional status for safe discharge:     Patient/support person goals:     Expected length of stay:     Discussed expected length of stay and agreeable to IRF plan: [] Yes   [] No    Impairment Group Category:     Etiological Diagnosis:     Primary Rehabilitation Diagnosis:       Electronically signed by Nic Lucio RN on 5/31/2019 at 8:45 TERENCE    Prescreen completed __________________________________ (signature of prescreener)    Date:    Time:        JUSTIFICATION FOR ADMISSION TO ACUTE REHABILITATION:          RECOMMEND LEVEL OF CARE  Recommend inpatient rehabilitation: [] Yes   [x] No  If no indicate reason:    [] Functional level too high  [] Unmotivated  [] No insurance carrier approval [] Unlikely to return to community  [x] No medical necessity  [] Patient or family chose other facility  [] Too medically complex  [] Inadequate discharge plan  [] Rehabilitation bed unavailable [] Functional level too low  [] patient or family refused ARU    If patient not accepted for IRF admission, recommended level of care:  [] [facility]  [] [facility]  [] [facility]   [x] Home   [] Other      [] LTAC       Physician Assigned:  [] Dr. Jing Lopes [] Dr. Nnamdi Bojorquez  [x] Dr. Malika Rosa     [] Dr. Leona Schultz [] Dr. Dilia Gomez  [] Dr. Pascale Betancourt:    ____________________________________________________________________  ____________________________________________________________________  ____________________________________________________________________  ____________________________________________________________________  ____________________________________________________________________      Physician Signature:_____________________________________    Print Signature:_________________________________________    Date:    Time:          PRE-SCREEN ASSESSMENT UPDATE (if not admitted within 48 hours of initial pre-screen)    Medical Update/Changes:     Functional Update/Changes:       Reviewer Signature:_____________________________________    Date:   Time:     PHYSICIAN ADMISSION DETERMINATION AND REVIEW UPDATE: ____________________________________________________________________  ____________________________________________________________________  ____________________________________________________________________  ____________________________________________________________________  ____________________________________________________________________                                                                                                                                                                                                                                                                                                                                            Physician Signature:_____________________________________    Print Signature:_________________________________________    Date:     Time:

## 2019-05-31 NOTE — CARE COORDINATION
PM&R order noted. Met with patient in room to explain role and discuss transition of care. Patient said that Dr. Juan Carlos Henao was in to see her this am. He said that he will review the PT/OT evals once completed to see if she qualifies for AR. If not she could go to outpatient therapy. Therapy notified to to the evals stat. Patient said that she lives with her  and 4 children in a ranch style home with bed & bath on first floor. Children are ages, 15, 8, 15, 11. She said that her grandmother will be transporting her home when discharged. Await PT/OT evals to see if she is a candidate for AR.   Oneil Gage RN CM

## 2019-05-31 NOTE — PROGRESS NOTES
OCCUPATIONAL THERAPY INITIAL EVALUATION      Date:2019  Patient Name: Karma Valentine  MRN: [id number]  : 1985  Room: [address] [place] [address]    Evaluating OT: Sherwin Baumgarten, MOT, OTR/L [id number]    AM-PAC Daily Activity Raw Score:   Recommended Adaptive Equipment: none    Diagnosis: paresthesias   Surgery: n/a   Pertinent Medical History: none   Precautions:  Falls, spinal stenosis, soft cervical collar, spinal precautions for conservation     Home Living: Pt lives with  and 4 children in a ranch home with 3 step(s) to enter and 0 rail(s); bed/bath on main floor; laundry in basement but  completes   Bathroom setup: tub/shower unit   Equipment owned: none  Prior Level of Function: Mod I with ADLs, completes cooking/cleaning; using no AD for functional mobility   Driving: yes  Occupation: is a caregiver for [employer]    Pain Level: 7/10 at cervical  Cognition: A&O: 4/4; Follows multi step directions   Memory:  good    Sequencing:  good    Problem solving:  good    Judgement/safety:  good      Functional Assessment:   Initial Eval Status  Date: 19     Feeding Mod I (self feeding in rm upon arrival)     Grooming Mod I (standing at sink)      UB Dressing Mod I       LB Dressing Mod I (pt able to use crossing of leg technique to doff/becca B socks bed level)     Bathing Mod I     Toileting Mod I     Bed Mobility  Log roll: Mod I  Supine to sit: Mod I   Sit to supine: Mod I      Functional Transfers Sit to stand: Mod I   Stand to sit:Mod I  Commode: Mod I     Functional Mobility Mod I (no AD, to/from bathroom and in miller with PT, slow,steady pace)     Balance Sitting:     Static:  Mod I    Dynamic:Mod I  Standing:  Mod I     Activity Tolerance good     Visual/  Perceptual Glasses: no                UE ROM: RUE:  WFL  LUE:  WFL  Strength: Deferred MMT due to pressure at neck, however, RUE: grossly 3/5 LUE: grossly 3/5    Strength: B WFL  Fine Motor Coordination:  WFL    Hearing: WFL  Sensation: c/o numbness/tingling in B hands and R leg; light touch assessment WFL  Tone:  WFL  Edema: none noted                            Comments/Treatment: Cleared by RN to see pt. Upon arrival, patient sitting upright in bed and agreeable to OT session. Educated pt on spinal precautions for conservation and heavy duties at [employer]. Educated pt on crossing of leg technique for ease with LE dressing. Pt completes ADL/sfunctional transfer/mobility at Mod I, demo'ing good balance/safety awareness. Pt appeared to have tolerated session well. At end of session, patient sitting upright in bed with call light and phone within reach, all lines and tubes intact. No OT needs at this time. Eval Complexity: Low    Assessment of current deficits   Functional mobility [x]  ADLs [x] Strength [x]  Cognition [x]  Functional transfers  [x] IADLs [x] Safety Awareness [x]  Endurance [x]  Fine Motor Coordination [] Balance [x] Vision/perception [] Sensation []   Gross Motor Coordination [] ROM [] Delirium []                  Motor Control []    Patient / Family Goal: to get back home     Patient and/or family were instructed on diagnosis, prognosis/goals and plan of care. Demonstrated good understanding. [] Malnutrition indicators have been identified and nursing has been notified to ensure a dietitian consult is ordered. Evaluation time includes thorough review of current medical information, gathering information on past medical & social history & PLOF, completion of standardized testing, informal observation of tasks, consultation with other medical professions/disciplines, assessment of data & development of POC/goals. Tx. Time in: 1:20  Tx.  Time out: 1:40  low Evaluation + 20 timed treatment minutes    Nicole Loaiza, [facility], OTR/L [id number]

## 2019-05-31 NOTE — PLAN OF CARE
Problem: Falls - Risk of:  Goal: Will remain free from falls  Description  Will remain free from falls  5/31/2019 0046 by Vonda Garrison RN  Outcome: Met This Shift  5/30/2019 1329 by Mimi Porter RN  Outcome: Met This Shift     Problem: Pain:  Goal: Pain level will decrease  Description  Pain level will decrease  5/31/2019 0046 by Vonda Garrison RN  Outcome: Met This Shift  5/30/2019 1329 by Mimi Porter RN  Outcome: Met This Shift

## 2019-05-31 NOTE — CONSULTS
[address]                  [address] [place],  [facility]                                  CONSULTATION    PATIENT NAME: Janette Wise        :        1985  MED REC NO:   [id number]                            ROOM:       8405  ACCOUNT NO:   [de-identified]                           ADMIT DATE: 2019  PROVIDER:     Muna Elise MD    REHAB CONSULT    CHIEF COMPLAINT:  Upper extremity numbness. HISTORY OF PRESENT ILLNESS:  The patient was admitted to [facility]  with numbness and tingling of both hands. She is a rather vague  historian, but apparently it was sudden onset. She had an MRI of the  cervical spine that showed extensive spinal stenosis. She was evaluated  by Dr. Yesenia Fountain. At this point, he does not feel that there is anything  urgent surgically, although he has strongly recommended that she quit  smoking and have further followup with a possible plan for decompression  down the road. She has not yet been seen by Therapy, but as will be  seen below on my exam, she seems to be moving pretty well. PAST MEDICAL HISTORY:  1. Asthma, although the patient continues to smoke. 2.  Chronic anxiety. 3.  Previous low back pain. ALLERGIES:  LATEX, BACTRIM, FISH, IBUPROFEN, IODINE, NAPROXEN, and  PERCOCET. HABITS:  The patient smoked four to five cigarettes daily. Occasional  alcohol. REVIEW OF SYSTEMS:  Negative for prior HEENT problems. Negative for  prior cardiac problems. Pulmonary is positive for asthma. GI is  positive for adhesions.  is negative. Orthopedic is positive for the  spinal stenosis. Neurologic is positive for the numbness and tingling. PHYSICAL EXAMINATION:  GENERAL:  Obese  female in no acute distress at rest.  HEAD:  Normocephalic, atraumatic. EYES:  Pupils equal, round, reactive to light. PHARYNX:  Normal.  NECK:  Very minimal tenderness.   Normal

## 2019-05-31 NOTE — PLAN OF CARE
Problem: Falls - Risk of:  Goal: Will remain free from falls  Description  Will remain free from falls  5/31/2019 0243 by Dorothy Mann RN  Outcome: Met This Shift  5/31/2019 0046 by Latoya Howard RN  Outcome: Met This Shift  5/30/2019 1329 by Kat Baldwin RN  Outcome: Met This Shift     Problem: Falls - Risk of:  Goal: Absence of physical injury  Description  Absence of physical injury  Outcome: Met This Shift     Problem: Pain:  Goal: Control of acute pain  Description  Control of acute pain  Outcome: Met This Shift

## 2019-05-31 NOTE — PROGRESS NOTES
All discharge instructions reviewed with patient at bedside. Patient verbalizes understanding of all medications and importance of follow up appointments. Patient's IV removed. Script for therapy and copy of discharge instructions given to patient.

## 2019-05-31 NOTE — PROGRESS NOTES
[facility]  Physical Therapy Initial Evaluation  Name: Ramsey Benites  : 1985  MRN: [id number]    Date of Service: 2019    Evaluating Therapist: Danny Malik PT, DPT  [id number]    Room #: 7125/0799-C  DIAGNOSIS: Paraesthesias  PRECAUTIONS: Falls    Social:  Pt lives with her  and 4 kids in a 1 story home with 3 stairs and no rail/s to enter. Pt was Independent for mobility and ADLs. Initial Evaluation  Date:    Was pt agreeable to Eval/treatment? Yes   Does pt have pain? 7/10 neck    Bed Mobility  Rolling: Independent   Supine to sit: Independent   Sit to supine: Independent   Scooting: Independent    Transfers Sit to stand: Independent   Stand to sit: Independent   Stand pivot: Independent    Ambulation   400 feet using no AD with [place]   Stair negotiation:  10 steps using 1 rail with Mod Independent    ROM RLE: WFL  LLE: WFL   Strength RLE: 4+/5  LLE: 5/5   Balance   Sitting: Independent   Standing: Independent    AM-PAC Basic Mobility Inpatient Short Form Raw Score:  24/24     Patient is A&Ox4. Sensation: numbness/tingling in BUE and RLE; intact to light touch  Coordination: NT  Edema: None noted     ASSESSMENT  Pt displays functional ability as noted in the objective portion of this evaluation. Comments/Treatment:  Pt was cleared by RN prior to PT evaluation. Pt was received supine and was agreeable to PT evaluation. Pt amb with decreased gait speed and non reciprocal pattern on steps when descending. Pt educated on cervical/spinal precautions for spine health and benefits of smoking cessation. Pt perseverative on going to smoke a \"black and mild\" during evaluation. Pt returned to supine following evaluation with call button within reach and all needs met. Patient education  Pt educated on PT role, good spinal mechanics and benefits of smoking cessation. Benefits/option of outpatient PT.       Patient response to education:   Pt verbalized understanding Pt demonstrated skill Pt requires further education in this area   Yes  No     Pts/ family goals   1. To return home. Patient and or family understand(s) diagnosis, prognosis, and plan of care. PLAN  Patient reports that she is at her baseline for functional mobility. Pt does not demonstrate any skilled acute Physical Therapy needs and will be discharged from PT services.       Time in: 1325  Time out: [address] [facility] [place] DP  License .[id number]

## 2019-05-31 NOTE — PROGRESS NOTES
[facility]  Internal Medicine Residency Program  Progress Note - House Team 1    Patient:  Emmanuel Hernandez 34 y.o. female MRN: [id number]     Date of Service: 5/31/2019     CC: Paresthesias in BUE and RLE with severe neck pain  Overnight events: Slept through the night with no issue     Subjective     Patient appears rested, calm, and afebrile. The headache has resolved and the neck pain has decreased to 7/10. The paresthesia in her RLE has almost completely dissipated, and the paresthesia in BUE has decreased but is still present. She denies the onset of new symptoms including fever. She has walked twice with mild dizziness. Patient denies difficulty breathing, SOB, and weakness. Objective     Physical Exam:  · Vitals: /78   Pulse 65   Temp 97.9 °F (36.6 °C) (Temporal)   Resp 18   Ht 5' 3\" (1.6 m)   Wt 190 lb (86.2 kg)   LMP 05/19/2014   SpO2 94%   BMI 33.66 kg/m²     · I & O - 24hr: No intake/output data recorded. · General Appearance: alert, appears stated age, cooperative and no distress  · HEENT:  Head: Normocephalic, no lesions, without obvious abnormality. · Head: Normal, normocephalic, atraumatic. · Neck: no adenopathy, no carotid bruit, no JVD, supple, symmetrical, trachea midline and thyroid not enlarged, symmetric, no tenderness/mass/nodules  · Lung: clear to auscultation bilaterally  · Heart: regular rate and rhythm, S1, S2 normal, no murmur, click, rub or gallop  · Abdomen: soft, non-tender; bowel sounds normal; no masses,  no organomegaly  · Extremities:  extremities normal, atraumatic, no cyanosis or edema  · Musculokeletal: No joint swelling, no muscle tenderness. ROM normal in all joints of extremities. · Neurologic: Mental status: Alert, oriented, thought content appropriate. No sign of hyperreflexia in the RLE. Sensation to light touch in tact in bilateral C5-8 dermatomes. BUE reflexes 2/4.   Subject  Pertinent Labs & Imaging Studies jian      Resident's Assessment and Plan     Patricia Davenport 44 is a 34 y.o. female    Cervical Spinal Stenosis   - MRI shows posterior disc herniation and cervical stenosis   - Paresthesias in BUE   - denies fever, inflammatory markers CRP and ESR are inconsistent with osteomyelitis and discitis   - Keep soft collar and f/u with PT   - Keep Dexamethasone 10Q6h, decrease dose to 4mgQ6h IM, switch to oral when edema is resolved. Taper off in 5 to 7 days.   - Keep gabapentin 400 mg TID   - Consult with PMR   -  Vit. D deficiency   - Started on 1000 mg Vit.  D   - Order DEXA scan outpatient      PT/OT evaluation:  DVT prophylaxis/ GI prophylaxis:   Disposition: home +/- home health / SNF / Mariam Luna / Greer Harrison, PGY-1  Attending physician: Dr. Geovanny Pratt

## 2019-05-31 NOTE — PROGRESS NOTES
Messaged attending Aracelis Monge in regards to patients request for something for anxiety. Will wait for new orders.

## 2019-06-01 NOTE — DISCHARGE SUMMARY
Base) MCG/ACT inhaler  Inhale 2 puffs into the lungs every 6 hours as needed for Wheezing             cyclobenzaprine (FLEXERIL) 5 MG tablet  Take 5 mg by mouth 2 times daily as needed for Muscle spasms              dexamethasone (DECADRON) 2 MG tablet  Take 2 tablets by mouth every 6 hours for 1 day, THEN 2 tablets every 8 hours for 1 day, THEN 2 tablets 2 times daily (with meals) for 1 day, THEN 2 tablets daily for 1 day, THEN 1 tablet daily for 1 day, THEN 0.5 tablets daily for 1 day.             gabapentin (NEURONTIN) 400 MG capsule  Take 1 capsule by mouth 3 times daily for 30 days. vitamin D (CHOLECALCIFEROL) 1000 UNIT TABS tablet  Take 1 tablet by mouth daily                 Follow-up appointments:  -PCP in 1 week  -Neurosurgery   -Physical therapy       To do:  1. Keep appointment  2.  Be compliant with medication      Eze Raymundo M.D., PGY 1    Attending physician: Dr. Jen Johnston

## 2019-06-02 LAB
EKG ATRIAL RATE: 58 BPM
EKG P AXIS: 0 DEGREES
EKG P-R INTERVAL: 140 MS
EKG Q-T INTERVAL: 386 MS
EKG QRS DURATION: 82 MS
EKG QTC CALCULATION (BAZETT): 378 MS
EKG R AXIS: 28 DEGREES
EKG T AXIS: 4 DEGREES
EKG VENTRICULAR RATE: 58 BPM

## 2019-06-02 PROCEDURE — 93010 ELECTROCARDIOGRAM REPORT: CPT | Performed by: INTERNAL MEDICINE

## 2019-06-03 LAB — CANNABINOIDS CONF, URINE: >500 NG/ML

## 2019-06-04 LAB
BLOOD CULTURE, ROUTINE: NORMAL
CULTURE, BLOOD 2: NORMAL

## 2019-06-06 ENCOUNTER — HOSPITAL ENCOUNTER (OUTPATIENT)
Dept: PHYSICAL THERAPY | Age: 34
Setting detail: THERAPIES SERIES
Discharge: HOME | End: 2019-06-06
Payer: COMMERCIAL

## 2019-06-06 PROCEDURE — 97161 PT EVAL LOW COMPLEX 20 MIN: CPT | Performed by: PHYSICAL THERAPIST

## 2019-06-06 ASSESSMENT — PAIN DESCRIPTION - ORIENTATION: ORIENTATION: RIGHT;LEFT

## 2019-06-06 ASSESSMENT — PAIN DESCRIPTION - DESCRIPTORS: DESCRIPTORS: ACHING;CONSTANT;NUMBNESS

## 2019-06-06 ASSESSMENT — PAIN - FUNCTIONAL ASSESSMENT: PAIN_FUNCTIONAL_ASSESSMENT: PREVENTS OR INTERFERES WITH MANY ACTIVE NOT PASSIVE ACTIVITIES

## 2019-06-06 ASSESSMENT — PAIN DESCRIPTION - LOCATION: LOCATION: ARM;NECK

## 2019-06-06 ASSESSMENT — PAIN SCALES - GENERAL: PAINLEVEL_OUTOF10: 10

## 2019-06-06 ASSESSMENT — PAIN DESCRIPTION - PAIN TYPE: TYPE: CHRONIC PAIN

## 2019-06-06 NOTE — PROGRESS NOTES
Physical Therapy  Initial Assessment  Date: 2019  Patient Name: Angel Gupta  MRN: [id number]  : 1985        Subjective   General  Chart Reviewed: Yes  Referring Practitioner: Marge Regalado   Referral Date : 19  Diagnosis: cervical stenosis   PT Visit Information  Onset Date: 19  PT Insurance Information: [id number] [facility]  Total # of Visits Approved: 8  Total # of Visits to Date: 1  Subjective  Subjective: pt presents to therapy with c/o neck/upper back pain for ~ 1 yr of insidious onset with exacerbation of symptoms ~ 1 month ago; spent 3-4 days in hospital with D/C to home; MRI done + for stenosis and disk protrusions at all levels; no PMH for neck/shoulder injury/sx per pt; wearing collar at all times; c/o N/T into B UE to hands as well as clicking in her neck with movement; sleep is hampered due to aching; MEDS help somewhat; RTD for follow-up after therapy; neuro consult scheduled for July   Pain Screening  Patient Currently in Pain: Yes  Pain Assessment  Pain Assessment: 0-10  Pain Level: 10  Pain Type: Chronic pain  Pain Location: Arm;Neck  Pain Orientation: Right;Left  Pain Descriptors: Aching;Constant;Numbness  Functional Pain Assessment: Prevents or interferes with many active not passive activities  Vital Signs  Patient Currently in Pain: Yes    Objective     Observation/Palpation  Palpation: discomfort noted across B upper traps/cervical paraspinals C2-T4 with L being worse than R   Observation: posture:  foreward head/rounded shoulders/B protracted scapulae     AROM RLE (degrees)  RLE AROM: WNL  AROM LLE (degrees)  LLE AROM : WNL  AROM RUE (degrees)  RUE AROM : WNL  AROM LUE (degrees)  LUE AROM : WNL  Spine  Cervical: limited ~ 75% WNL for all ranges with no c/o radiculopathy noted  Special Tests: cervical compression:  neutral/flexed/extended      +/-/+ to R side     Strength Other  Other: grossly 4, 4+/5 for all ranges B UE's      Additional Measures  Other: endurance

## 2019-06-06 NOTE — PROGRESS NOTES
[address] [facility]                Phone: [phone]   Fax: [phone]    Physical Therapy Daily Treatment Note  Date:  2019    Patient Name:  Enedina hWaley    :  1985  MRN: [id number]    Evaluating therapist:  COSMO Dos Santos                     (19)  Restrictions/Precautions:    Diagnosis:  cervical stenosis   Treatment Diagnosis:    Insurance/Certification information:  [id number] [facility],[address]  Referring Physician:   Lack of care signed (Y/N):    Visit# / total visits:    Pain level: 10/10   Time In:  Time Out:    Subjective:      Exercises:  Exercise/Equipment Resistance/Repetitions Other comments   cervical traction:  15lb/10lb                                    30s/10s            UBE            corner st     upper trap st     thoracic st            shrugs     scap ret                                                               Other Therapeutic Activities:      Home Exercise Program:      Manual Treatments:      Modalities:   IFC/MH to neck/upper back     Timed Code Treatment Minutes: Total Treatment Minutes:      Treatment/Activity Tolerance:  [] Patient tolerated treatment well [] Patient limited by fatique  [] Patient limited by pain  [] Patient limited by other medical complications  [] Other:     Prognosis: [] Good [] Fair  [] Poor    Patient Requires Follow-up: [] Yes  [] No    Plan:   [] Continue per plan of care [] Alter current plan (see comments)  [] Plan of care initiated [] Hold pending MD visit [] Discharge  Plan for Next Session:      See Weekly Progress Note: []  Yes  []  No  Next due:        Electronically signed by:   Seng Brewer

## 2019-06-10 ENCOUNTER — TELEPHONE (OUTPATIENT)
Dept: OBGYN | Age: 34
End: 2019-06-10

## 2019-06-10 NOTE — TELEPHONE ENCOUNTER
Patient called c/o having car problems and will not be able to make her appointment this afternoon. Rescheduled patient for next Monday, 6/17/19 @ 2:15p.     -Christine, 6/10/19

## 2019-06-11 ENCOUNTER — HOSPITAL ENCOUNTER (OUTPATIENT)
Dept: PHYSICAL THERAPY | Age: 34
Setting detail: THERAPIES SERIES
Discharge: HOME | End: 2019-06-11
Payer: COMMERCIAL

## 2019-06-11 PROCEDURE — 97110 THERAPEUTIC EXERCISES: CPT | Performed by: PHYSICAL THERAPIST

## 2019-06-11 PROCEDURE — G0283 ELEC STIM OTHER THAN WOUND: HCPCS | Performed by: PHYSICAL THERAPIST

## 2019-06-11 PROCEDURE — 97012 MECHANICAL TRACTION THERAPY: CPT | Performed by: PHYSICAL THERAPIST

## 2019-06-11 NOTE — PROGRESS NOTES
[address] [facility]                Phone: [phone]   Fax: [phone]    Physical Therapy Daily Treatment Note  Date:  2019    Patient Name:  Jolene Vázquez    :  1985  MRN: [id number]    Evaluating therapist:  COSMO Zheng                     (19)  Restrictions/Precautions:    Diagnosis:  cervical stenosis   Treatment Diagnosis:    Insurance/Certification information:  [id number] [facility],[address]  Referring Physician:  Suzanna Florence of care signed (Y/N):    Visit# / total visits:    Pain level: 10/10   Time In:  925  Time Out:  1035    Subjective:      Exercises:  Exercise/Equipment Resistance/Repetitions Other comments   cervical traction:  15lb/10lb   15 min                                  30s/10s            UBE 3 min F/B           corner st 3x20s    upper trap st 3x20s    thoracic st 3x20s           shrugs 15x3s    scap ret 15x3s                                                              Other Therapeutic Activities:      Home Exercise Program:  provided     Manual Treatments:      Modalities:   IFC/MH to neck/upper back x 15 min     Timed Code Treatment Minutes: Total Treatment Minutes:      Treatment/Activity Tolerance:  [] Patient tolerated treatment well [] Patient limited by fatique  [] Patient limited by pain  [] Patient limited by other medical complications  [] Other:     Prognosis: [] Good [] Fair  [] Poor    Patient Requires Follow-up: [] Yes  [] No    Plan:   [] Continue per plan of care [] Alter current plan (see comments)  [] Plan of care initiated [] Hold pending MD visit [] Discharge  Plan for Next Session:      See Weekly Progress Note: []  Yes  []  No  Next due:        Electronically signed by:   Wolf Mueller

## 2019-06-17 ENCOUNTER — HOSPITAL ENCOUNTER (OUTPATIENT)
Dept: PHYSICAL THERAPY | Age: 34
Setting detail: THERAPIES SERIES
Discharge: HOME | End: 2019-06-17
Payer: COMMERCIAL

## 2019-06-17 ENCOUNTER — OFFICE VISIT (OUTPATIENT)
Dept: OBGYN | Age: 34
End: 2019-06-17
Payer: COMMERCIAL

## 2019-06-17 VITALS
RESPIRATION RATE: 16 BRPM | BODY MASS INDEX: 33.66 KG/M2 | SYSTOLIC BLOOD PRESSURE: 132 MMHG | DIASTOLIC BLOOD PRESSURE: 68 MMHG | HEART RATE: 88 BPM | HEIGHT: 63 IN | WEIGHT: 190 LBS | TEMPERATURE: 98 F

## 2019-06-17 DIAGNOSIS — B37.9 MONILIA INFECTION: ICD-10-CM

## 2019-06-17 DIAGNOSIS — N89.8 ITCHING IN THE VAGINAL AREA: Primary | ICD-10-CM

## 2019-06-17 PROCEDURE — 97110 THERAPEUTIC EXERCISES: CPT | Performed by: PHYSICAL THERAPIST

## 2019-06-17 PROCEDURE — 99213 OFFICE O/P EST LOW 20 MIN: CPT | Performed by: OBSTETRICS & GYNECOLOGY

## 2019-06-17 PROCEDURE — G8427 DOCREV CUR MEDS BY ELIG CLIN: HCPCS | Performed by: OBSTETRICS & GYNECOLOGY

## 2019-06-17 PROCEDURE — 1036F TOBACCO NON-USER: CPT | Performed by: OBSTETRICS & GYNECOLOGY

## 2019-06-17 PROCEDURE — 99212 OFFICE O/P EST SF 10 MIN: CPT | Performed by: OBSTETRICS & GYNECOLOGY

## 2019-06-17 PROCEDURE — 97012 MECHANICAL TRACTION THERAPY: CPT | Performed by: PHYSICAL THERAPIST

## 2019-06-17 PROCEDURE — G8417 CALC BMI ABV UP PARAM F/U: HCPCS | Performed by: OBSTETRICS & GYNECOLOGY

## 2019-06-17 PROCEDURE — G0283 ELEC STIM OTHER THAN WOUND: HCPCS | Performed by: PHYSICAL THERAPIST

## 2019-06-17 RX ORDER — FLUOXETINE HYDROCHLORIDE 20 MG/1
40 CAPSULE ORAL DAILY
Status: ON HOLD | COMMUNITY
End: 2019-11-10 | Stop reason: ALTCHOICE

## 2019-06-17 NOTE — PROGRESS NOTES
Here today for some vaginal irritation and itching. No recent antibiotic therapy. Using Dove sensitive to wash with. Not really any discharge noted. Pelvic:Loaded qwith Monilia today. IMP: Monilia    PLAN:Terazol 7 at HS for 7 nites. See back if renutrns or not better.

## 2019-06-17 NOTE — PROGRESS NOTES
S:  pt presents to therapy for only scheduled visit for the week; at this time she reports that her neck/upper back continues to ache with most prolonged activities; pain level given as 10/10 and remains constant; does feel she is moving better however; sleep remains hampered due to aching and she continues to wear collar most of the time; HEP going well per pt      O:  performed the exercises/treatments as written in the flowsheet for the week ending 6/21/19; initiated HEP for home management of condition; cervical AROM grossly 50%WNL for all ranges; strength across B UE's grossly 5/5 for all ranges    A:  toyin tx well; pt able to perform all requested tasks with good form and pacing noted; B UE strength stable while cervical AROM shows improvement in both quality and quantity; sitting/standing postures also appear to be more relaxed and less guarded     P:  cont with POC of stretching/strengthening for neck/upper back with modalities as needed

## 2019-06-17 NOTE — PROGRESS NOTES
[address] [facility]                Phone: [phone]   Fax: [phone]    Physical Therapy Daily Treatment Note  Date:  2019    Patient Name:  Angely Moser    :  1985  MRN: [id number]    Evaluating therapist:  COSMO Mace                     (19)  Restrictions/Precautions:    Diagnosis:  cervical stenosis   Treatment Diagnosis:    Insurance/Certification information:  Astrid Rojas  Referring Physician:  Julisa Weldon of care signed (Y/N):    Visit# / total visits:  3/8  Pain level: 10/10   Time In:  957  Time Out:  1103    Subjective:      Exercises:  Exercise/Equipment Resistance/Repetitions Other comments   cervical traction:  15lb/10lb   15 min                                  30s/10s            UBE 3 min F/B           corner st 3x20s    upper trap st 3x20s    thoracic st 3x20s           shrugs 15x3s    scap ret 15x3s                                                              Other Therapeutic Activities:      Home Exercise Program:  provided     Manual Treatments:      Modalities:   IFC/MH to neck/upper back x 15 min     Timed Code Treatment Minutes: Total Treatment Minutes:      Treatment/Activity Tolerance:  [] Patient tolerated treatment well [] Patient limited by fatique  [] Patient limited by pain  [] Patient limited by other medical complications  [] Other:     Prognosis: [] Good [] Fair  [] Poor    Patient Requires Follow-up: [] Yes  [] No    Plan:   [] Continue per plan of care [] Alter current plan (see comments)  [] Plan of care initiated [] Hold pending MD visit [] Discharge  Plan for Next Session:      See Weekly Progress Note: []  Yes  []  No  Next due:        Electronically signed by:   Michelle Arnett

## 2019-06-24 ENCOUNTER — HOSPITAL ENCOUNTER (OUTPATIENT)
Dept: PHYSICAL THERAPY | Age: 34
Setting detail: THERAPIES SERIES
Discharge: HOME | End: 2019-06-24
Payer: COMMERCIAL

## 2019-06-24 PROCEDURE — G0283 ELEC STIM OTHER THAN WOUND: HCPCS | Performed by: PHYSICAL THERAPIST

## 2019-06-24 PROCEDURE — 97110 THERAPEUTIC EXERCISES: CPT | Performed by: PHYSICAL THERAPIST

## 2019-06-24 PROCEDURE — 97012 MECHANICAL TRACTION THERAPY: CPT | Performed by: PHYSICAL THERAPIST

## 2019-06-24 NOTE — PROGRESS NOTES
[address] [facility]                Phone: [phone]   Fax: [phone]    Physical Therapy Daily Treatment Note  Date:  2019    Patient Name:  Lilian Knox    :  1985  MRN: [id number]    Evaluating therapist:  COSMO Castillo                     (19)  Restrictions/Precautions:    Diagnosis:  cervical stenosis   Treatment Diagnosis:    Insurance/Certification information:  [id number] [facility],[address]  Referring Physician:  Gigi Ludwig of care signed (Y/N):    Visit# / total visits:    Pain level: 10/10   Time In:  956  Time Out:  1059    Subjective:      Exercises:  Exercise/Equipment Resistance/Repetitions Other comments   cervical traction:  15lb/10lb   15 min                                  30s/10s            UBE 3 min F/B           corner st 3x20s    upper trap st 3x20s    thoracic st 3x20s           shrugs 15x3s    scap ret 15x3s           pulleys for flex   5 min                                                 Other Therapeutic Activities:      Home Exercise Program:  provided     Manual Treatments:      Modalities:   IFC/MH to neck/upper back x 15 min     Timed Code Treatment Minutes: Total Treatment Minutes:      Treatment/Activity Tolerance:  [] Patient tolerated treatment well [] Patient limited by fatique  [] Patient limited by pain  [] Patient limited by other medical complications  [] Other:     Prognosis: [] Good [] Fair  [] Poor    Patient Requires Follow-up: [] Yes  [] No    Plan:   [] Continue per plan of care [] Alter current plan (see comments)  [] Plan of care initiated [] Hold pending MD visit [] Discharge  Plan for Next Session:      See Weekly Progress Note: []  Yes  []  No  Next due:        Electronically signed by:   Melina May

## 2019-06-26 ENCOUNTER — HOSPITAL ENCOUNTER (OUTPATIENT)
Dept: PHYSICAL THERAPY | Age: 34
Setting detail: THERAPIES SERIES
Discharge: HOME | End: 2019-06-26
Payer: COMMERCIAL

## 2019-06-26 PROCEDURE — 97012 MECHANICAL TRACTION THERAPY: CPT | Performed by: PHYSICAL THERAPIST

## 2019-06-26 PROCEDURE — 97110 THERAPEUTIC EXERCISES: CPT | Performed by: PHYSICAL THERAPIST

## 2019-06-26 PROCEDURE — G0283 ELEC STIM OTHER THAN WOUND: HCPCS | Performed by: PHYSICAL THERAPIST

## 2019-07-01 ENCOUNTER — HOSPITAL ENCOUNTER (OUTPATIENT)
Dept: PHYSICAL THERAPY | Age: 34
Setting detail: THERAPIES SERIES
Discharge: HOME | End: 2019-07-01
Payer: COMMERCIAL

## 2019-07-01 NOTE — PROGRESS NOTES
[address] [facility]                Phone: [phone]  Fax: [phone]    Physical Therapy  Cancellation/No-show Note  Patient Name:  Chandni Saavedra  :  1985   Date:  2019    For today's appointment patient:  [x]  Cancelled  []  Rescheduled appointment  []  No-show     Reason given by patient:  []  Patient ill  []  Conflicting appointment  []  No transportation    []  Conflict with work  []  No reason given  [x]  Other:  pt out of town     Comments:      Electronically signed by:   Bree Juárez

## 2019-07-02 ENCOUNTER — HOSPITAL ENCOUNTER (EMERGENCY)
Age: 34
Discharge: HOME | End: 2019-07-02
Attending: EMERGENCY MEDICINE
Payer: COMMERCIAL

## 2019-07-02 VITALS
DIASTOLIC BLOOD PRESSURE: 81 MMHG | RESPIRATION RATE: 16 BRPM | SYSTOLIC BLOOD PRESSURE: 113 MMHG | HEIGHT: 63 IN | OXYGEN SATURATION: 98 % | WEIGHT: 190 LBS | BODY MASS INDEX: 33.66 KG/M2 | TEMPERATURE: 97.1 F | HEART RATE: 84 BPM

## 2019-07-02 DIAGNOSIS — J02.9 ACUTE PHARYNGITIS, UNSPECIFIED ETIOLOGY: Primary | ICD-10-CM

## 2019-07-02 LAB — STREP GRP A PCR: NEGATIVE

## 2019-07-02 PROCEDURE — 99282 EMERGENCY DEPT VISIT SF MDM: CPT

## 2019-07-02 PROCEDURE — 87880 STREP A ASSAY W/OPTIC: CPT

## 2019-07-02 PROCEDURE — 6360000002 HC RX W HCPCS: Performed by: EMERGENCY MEDICINE

## 2019-07-02 PROCEDURE — 96372 THER/PROPH/DIAG INJ SC/IM: CPT

## 2019-07-02 RX ORDER — DEXAMETHASONE SODIUM PHOSPHATE 10 MG/ML
10 INJECTION INTRAMUSCULAR; INTRAVENOUS ONCE
Status: COMPLETED | OUTPATIENT
Start: 2019-07-02 | End: 2019-07-02

## 2019-07-02 RX ORDER — AMOXICILLIN 500 MG/1
500 CAPSULE ORAL 2 TIMES DAILY
Qty: 20 CAPSULE | Refills: 0 | Status: SHIPPED | OUTPATIENT
Start: 2019-07-02 | End: 2019-07-12

## 2019-07-02 RX ORDER — KETOROLAC TROMETHAMINE 30 MG/ML
30 INJECTION, SOLUTION INTRAMUSCULAR; INTRAVENOUS ONCE
Status: COMPLETED | OUTPATIENT
Start: 2019-07-02 | End: 2019-07-02

## 2019-07-02 RX ADMIN — KETOROLAC TROMETHAMINE 30 MG: 30 INJECTION, SOLUTION INTRAMUSCULAR; INTRAVENOUS at 07:44

## 2019-07-02 RX ADMIN — DEXAMETHASONE SODIUM PHOSPHATE 10 MG: 10 INJECTION INTRAMUSCULAR; INTRAVENOUS at 07:44

## 2019-07-02 ASSESSMENT — PAIN DESCRIPTION - DESCRIPTORS: DESCRIPTORS: DISCOMFORT

## 2019-07-02 ASSESSMENT — PAIN DESCRIPTION - PAIN TYPE: TYPE: ACUTE PAIN

## 2019-07-02 ASSESSMENT — PAIN SCALES - GENERAL
PAINLEVEL_OUTOF10: 7
PAINLEVEL_OUTOF10: 7

## 2019-07-02 ASSESSMENT — PAIN DESCRIPTION - LOCATION: LOCATION: THROAT

## 2019-07-02 NOTE — ED PROVIDER NOTES
reviewed by myself   LABS:  Results for orders placed or performed during the hospital encounter of 07/02/19   Strep Screen Group A Throat   Result Value Ref Range    Strep Grp A PCR Negative Negative       RADIOLOGY:  Interpreted by Radiologist.  No orders to display       ------------------------- NURSING NOTES AND VITALS REVIEWED ---------------------------   The nursing notes within the ED encounter and vital signs as below have been reviewed. /81   Pulse 84   Temp 97.1 °F (36.2 °C)   Resp 16   Ht 5' 3\" (1.6 m)   Wt 190 lb (86.2 kg)   LMP 05/19/2014   SpO2 98%   BMI 33.66 kg/m²   Oxygen Saturation Interpretation: Normal    ---------------------------------------------------PHYSICAL EXAM--------------------------------------    Constitutional/General: Alert and oriented x3, well appearing, non toxic in NAD  Head: NC/AT  Eyes: PERRL, EOMI  Mouth: Oropharynx clear, handling secretions, no trismus, asymmetric right sided 3+ tonsillar swelling and erythema, no exudate; uvula midline   Neck: No meningismus, anterior cervical adenopathy   Pulmonary: Lungs clear to auscultation bilaterally, no wheezes, rales, or rhonchi. Not in respiratory distress  Cardiovascular:  Regular rate and rhythm, no murmurs, gallops, or rubs. 2+ distal pulses  Abdomen: Soft, non tender, non distended,   Extremities: Moves all extremities x 4. Warm and well perfused  Skin: warm and dry  Neurologic: GCS 15  Psych: Normal Affect      ------------------------------ ED COURSE/MEDICAL DECISION MAKING----------------------  Medications   ketorolac (TORADOL) injection 30 mg (30 mg Intramuscular Given 7/2/19 0744)   dexamethasone (DECADRON) injection 10 mg (10 mg Oral Given 7/2/19 0744)       Medical Decision Making: Zahira Delgado presents for acute pharyngitis with right sided tonsillar swelling and erythema. Pt given toradol and decadron for symptomatic relief.  Pt discharged home with ABX despite negative group A strep

## 2019-07-03 ENCOUNTER — HOSPITAL ENCOUNTER (OUTPATIENT)
Dept: PHYSICAL THERAPY | Age: 34
Setting detail: THERAPIES SERIES
Discharge: HOME | End: 2019-07-03
Payer: COMMERCIAL

## 2019-07-03 PROCEDURE — 97012 MECHANICAL TRACTION THERAPY: CPT | Performed by: PHYSICAL THERAPIST

## 2019-07-03 PROCEDURE — G0283 ELEC STIM OTHER THAN WOUND: HCPCS | Performed by: PHYSICAL THERAPIST

## 2019-07-03 PROCEDURE — 97110 THERAPEUTIC EXERCISES: CPT | Performed by: PHYSICAL THERAPIST

## 2019-07-05 ENCOUNTER — OFFICE VISIT (OUTPATIENT)
Dept: NEUROSURGERY | Age: 34
End: 2019-07-05
Payer: COMMERCIAL

## 2019-07-05 VITALS
DIASTOLIC BLOOD PRESSURE: 79 MMHG | SYSTOLIC BLOOD PRESSURE: 120 MMHG | WEIGHT: 211 LBS | HEIGHT: 67 IN | HEART RATE: 82 BPM | BODY MASS INDEX: 33.12 KG/M2

## 2019-07-05 DIAGNOSIS — M48.02 CERVICAL STENOSIS OF SPINAL CANAL: Primary | ICD-10-CM

## 2019-07-05 PROCEDURE — 4004F PT TOBACCO SCREEN RCVD TLK: CPT | Performed by: NEUROLOGICAL SURGERY

## 2019-07-05 PROCEDURE — G8417 CALC BMI ABV UP PARAM F/U: HCPCS | Performed by: NEUROLOGICAL SURGERY

## 2019-07-05 PROCEDURE — G8427 DOCREV CUR MEDS BY ELIG CLIN: HCPCS | Performed by: NEUROLOGICAL SURGERY

## 2019-07-05 PROCEDURE — 99213 OFFICE O/P EST LOW 20 MIN: CPT | Performed by: NEUROLOGICAL SURGERY

## 2019-07-05 NOTE — CONSULTS
position. LUNGS:  Clear to P and A. HEART:  Regular rate and rhythm. S1, S2 normal.  ABDOMEN:  Bowel sounds normal.  Soft, nontender. No masses. EXTREMITIES:  Without clubbing, cyanosis, or edema. MENTAL STATUS:  Alert and oriented. NEUROLOGIC:  Sensation, slight dysesthesia in the hands. NEUROMUSCULAR:  4+/5 and very close to normal throughout with no clear  focal deficits. PROBLEM LIST:  1. Cervical spinal stenosis. 2.  Nicotine addiction. 3.  Obesity. RECOMMENDATIONS:  At this point, I suspect that she will be doing well  enough for therapy to return home. If it is not, then we can  reconsider, but I think her deficits are primarily just sensory. Again,  the plan will be that she absolutely needs to quit smoking and then have  followup with Dr. Zoë Brumfield. There is also a question of pain management. I do not think that there is any indication for narcotics and she is  already on gabapentin, which is appropriate.         Agata Pyle MD    D: 05/31/2019 9:11:08       T: 05/31/2019 10:10:51     [email or web address]  Job#: [id number]     Doc#: [id number]    CC:

## 2019-07-09 ENCOUNTER — HOSPITAL ENCOUNTER (OUTPATIENT)
Dept: PHYSICAL THERAPY | Age: 34
Setting detail: THERAPIES SERIES
Discharge: HOME | End: 2019-07-09
Payer: COMMERCIAL

## 2019-07-09 PROCEDURE — 97110 THERAPEUTIC EXERCISES: CPT | Performed by: PHYSICAL THERAPIST

## 2019-07-09 PROCEDURE — 97012 MECHANICAL TRACTION THERAPY: CPT | Performed by: PHYSICAL THERAPIST

## 2019-07-09 PROCEDURE — G0283 ELEC STIM OTHER THAN WOUND: HCPCS | Performed by: PHYSICAL THERAPIST

## 2019-07-11 ENCOUNTER — HOSPITAL ENCOUNTER (OUTPATIENT)
Dept: PHYSICAL THERAPY | Age: 34
Setting detail: THERAPIES SERIES
Discharge: HOME | End: 2019-07-11
Payer: COMMERCIAL

## 2019-07-11 PROCEDURE — 97110 THERAPEUTIC EXERCISES: CPT

## 2019-07-11 PROCEDURE — 97012 MECHANICAL TRACTION THERAPY: CPT

## 2019-07-12 ENCOUNTER — HOSPITAL ENCOUNTER (EMERGENCY)
Age: 34
Discharge: HOME | End: 2019-07-13
Payer: COMMERCIAL

## 2019-07-12 ENCOUNTER — APPOINTMENT (OUTPATIENT)
Dept: GENERAL RADIOLOGY | Age: 34
End: 2019-07-12
Payer: COMMERCIAL

## 2019-07-12 ENCOUNTER — APPOINTMENT (OUTPATIENT)
Dept: CT IMAGING | Age: 34
End: 2019-07-12
Payer: COMMERCIAL

## 2019-07-12 VITALS
WEIGHT: 190 LBS | DIASTOLIC BLOOD PRESSURE: 72 MMHG | RESPIRATION RATE: 16 BRPM | BODY MASS INDEX: 33.66 KG/M2 | SYSTOLIC BLOOD PRESSURE: 119 MMHG | OXYGEN SATURATION: 100 % | HEART RATE: 73 BPM | TEMPERATURE: 98.8 F | HEIGHT: 63 IN

## 2019-07-12 DIAGNOSIS — K52.9 GASTROENTERITIS: Primary | ICD-10-CM

## 2019-07-12 LAB
ALBUMIN SERPL-MCNC: 4.2 G/DL (ref 3.5–5.2)
ALP BLD-CCNC: 115 U/L (ref 35–104)
ALT SERPL-CCNC: 20 U/L (ref 0–32)
ANION GAP SERPL CALCULATED.3IONS-SCNC: 14 MMOL/L (ref 7–16)
AST SERPL-CCNC: 22 U/L (ref 0–31)
BASOPHILS ABSOLUTE: 0.04 E9/L (ref 0–0.2)
BASOPHILS RELATIVE PERCENT: 0.3 % (ref 0–2)
BILIRUB SERPL-MCNC: 0.3 MG/DL (ref 0–1.2)
BILIRUBIN URINE: NEGATIVE
BLOOD, URINE: NEGATIVE
BUN BLDV-MCNC: 10 MG/DL (ref 6–20)
CALCIUM SERPL-MCNC: 9.9 MG/DL (ref 8.6–10.2)
CHLORIDE BLD-SCNC: 100 MMOL/L (ref 98–107)
CLARITY: CLEAR
CO2: 22 MMOL/L (ref 22–29)
COLOR: YELLOW
CREAT SERPL-MCNC: 0.9 MG/DL (ref 0.5–1)
EOSINOPHILS ABSOLUTE: 0.1 E9/L (ref 0.05–0.5)
EOSINOPHILS RELATIVE PERCENT: 0.7 % (ref 0–6)
GFR AFRICAN AMERICAN: >60
GFR NON-AFRICAN AMERICAN: >60 ML/MIN/1.73
GLUCOSE BLD-MCNC: 113 MG/DL (ref 74–99)
GLUCOSE URINE: NEGATIVE MG/DL
HCG(URINE) PREGNANCY TEST: NEGATIVE
HCT VFR BLD CALC: 37.2 % (ref 34–48)
HEMOGLOBIN: 12.4 G/DL (ref 11.5–15.5)
IMMATURE GRANULOCYTES #: 0.06 E9/L
IMMATURE GRANULOCYTES %: 0.4 % (ref 0–5)
KETONES, URINE: 15 MG/DL
LACTIC ACID: 1.7 MMOL/L (ref 0.5–2.2)
LEUKOCYTE ESTERASE, URINE: NEGATIVE
LIPASE: 29 U/L (ref 13–60)
LYMPHOCYTES ABSOLUTE: 2.38 E9/L (ref 1.5–4)
LYMPHOCYTES RELATIVE PERCENT: 15.5 % (ref 20–42)
MCH RBC QN AUTO: 30.2 PG (ref 26–35)
MCHC RBC AUTO-ENTMCNC: 33.3 % (ref 32–34.5)
MCV RBC AUTO: 90.5 FL (ref 80–99.9)
MONOCYTES ABSOLUTE: 0.91 E9/L (ref 0.1–0.95)
MONOCYTES RELATIVE PERCENT: 5.9 % (ref 2–12)
NEUTROPHILS ABSOLUTE: 11.88 E9/L (ref 1.8–7.3)
NEUTROPHILS RELATIVE PERCENT: 77.2 % (ref 43–80)
NITRITE, URINE: NEGATIVE
PDW BLD-RTO: 13.4 FL (ref 11.5–15)
PH UA: 6 (ref 5–9)
PLATELET # BLD: 341 E9/L (ref 130–450)
PMV BLD AUTO: 9.9 FL (ref 7–12)
POTASSIUM REFLEX MAGNESIUM: 3.8 MMOL/L (ref 3.5–5)
PROTEIN UA: NEGATIVE MG/DL
RBC # BLD: 4.11 E12/L (ref 3.5–5.5)
SODIUM BLD-SCNC: 136 MMOL/L (ref 132–146)
SPECIFIC GRAVITY UA: 1.02 (ref 1–1.03)
TOTAL PROTEIN: 7.9 G/DL (ref 6.4–8.3)
UROBILINOGEN, URINE: 0.2 E.U./DL
WBC # BLD: 15.4 E9/L (ref 4.5–11.5)

## 2019-07-12 PROCEDURE — 96372 THER/PROPH/DIAG INJ SC/IM: CPT

## 2019-07-12 PROCEDURE — 74019 RADEX ABDOMEN 2 VIEWS: CPT

## 2019-07-12 PROCEDURE — 99284 EMERGENCY DEPT VISIT MOD MDM: CPT

## 2019-07-12 PROCEDURE — 6360000002 HC RX W HCPCS: Performed by: NURSE PRACTITIONER

## 2019-07-12 PROCEDURE — 2580000003 HC RX 258: Performed by: NURSE PRACTITIONER

## 2019-07-12 PROCEDURE — 83690 ASSAY OF LIPASE: CPT

## 2019-07-12 PROCEDURE — 81025 URINE PREGNANCY TEST: CPT

## 2019-07-12 PROCEDURE — 83605 ASSAY OF LACTIC ACID: CPT

## 2019-07-12 PROCEDURE — 80053 COMPREHEN METABOLIC PANEL: CPT

## 2019-07-12 PROCEDURE — 85025 COMPLETE CBC W/AUTO DIFF WBC: CPT

## 2019-07-12 PROCEDURE — 6370000000 HC RX 637 (ALT 250 FOR IP): Performed by: NURSE PRACTITIONER

## 2019-07-12 PROCEDURE — 81003 URINALYSIS AUTO W/O SCOPE: CPT

## 2019-07-12 PROCEDURE — 74176 CT ABD & PELVIS W/O CONTRAST: CPT

## 2019-07-12 RX ORDER — ONDANSETRON 4 MG/1
4 TABLET, ORALLY DISINTEGRATING ORAL ONCE
Status: COMPLETED | OUTPATIENT
Start: 2019-07-12 | End: 2019-07-12

## 2019-07-12 RX ORDER — 0.9 % SODIUM CHLORIDE 0.9 %
1000 INTRAVENOUS SOLUTION INTRAVENOUS ONCE
Status: COMPLETED | OUTPATIENT
Start: 2019-07-12 | End: 2019-07-13

## 2019-07-12 RX ORDER — DICYCLOMINE HYDROCHLORIDE 10 MG/ML
20 INJECTION INTRAMUSCULAR ONCE
Status: COMPLETED | OUTPATIENT
Start: 2019-07-12 | End: 2019-07-12

## 2019-07-12 RX ADMIN — SODIUM CHLORIDE 1000 ML: 9 INJECTION, SOLUTION INTRAVENOUS at 22:59

## 2019-07-12 RX ADMIN — ONDANSETRON 4 MG: 4 TABLET, ORALLY DISINTEGRATING ORAL at 22:44

## 2019-07-12 RX ADMIN — DICYCLOMINE HYDROCHLORIDE 20 MG: 20 INJECTION, SOLUTION INTRAMUSCULAR at 22:44

## 2019-07-12 ASSESSMENT — PAIN DESCRIPTION - LOCATION: LOCATION: ABDOMEN

## 2019-07-12 ASSESSMENT — PAIN DESCRIPTION - PAIN TYPE: TYPE: ACUTE PAIN

## 2019-07-12 ASSESSMENT — PAIN SCALES - GENERAL: PAINLEVEL_OUTOF10: 10

## 2019-07-13 NOTE — ED NOTES
Remainder of documentation completed during downtime, please see paper chart     Xu Florian RN  07/13/19 5620

## 2019-07-13 NOTE — ED PROVIDER NOTES
Seen with Attending Physician      HPI:  19,   Time: 9:20 PM         Patricia Thomas is a 34 y.o. female presenting to the ED for generalized abd cramping intermittently with nausea no vomiting today and thought she was possibly constipated over the last 5 days so took and enema with a small BM this morning. The complaint has been intermittent, severe in severity, and worsened by nothing. Denies F/C/V/SOB/HA/CP/visual changes or eye pain/fall, injury, or other trauma/LOC or Syncope/Lightheadedness/change in sensation, numbness, tingling, function of neck, facial structures, bilateral upper and lower extremities /change in function of or incontinence of bowel or bladder /hematuria or dysuria. ROS:   Pertinent positives and negatives are stated within HPI, all other systems reviewed and are negative.  --------------------------------------------- PAST HISTORY ---------------------------------------------  Past Medical History:  has a past medical history of Anxiety, Depression, Ectopic pregnancy, tubal, Gall stone, Heart murmur, Hernia, and History of blood transfusion. Past Surgical History:  has a past surgical history that includes  section; laparoscopy (2012); Ectopic pregnancy surgery; Tubal ligation; laparoscopy (2004958); other surgical history (3/25/2014); Hysterectomy, total abdominal (16); Abdominal wall surgery ([id number]); Cholecystectomy; and Hysterectomy (N/A, 2017). Social History:  reports that she has been smoking cigarettes. She has never used smokeless tobacco. She reports that she drinks alcohol. She reports that she has current or past drug history. Drug: Marijuana. Family History: family history includes Cancer in her mother; Diabetes in her mother; High Blood Pressure in her mother. The patients home medications have been reviewed. Allergies: Latex; Bactrim [sulfamethoxazole-trimethoprim]; Fish-derived products;  Ibuprofen;

## 2019-08-07 ENCOUNTER — OFFICE VISIT (OUTPATIENT)
Dept: NEUROSURGERY | Age: 34
End: 2019-08-07
Payer: COMMERCIAL

## 2019-08-07 VITALS
WEIGHT: 208 LBS | DIASTOLIC BLOOD PRESSURE: 77 MMHG | SYSTOLIC BLOOD PRESSURE: 111 MMHG | BODY MASS INDEX: 36.85 KG/M2 | HEART RATE: 81 BPM

## 2019-08-07 DIAGNOSIS — M47.12 CERVICAL SPONDYLOSIS WITH MYELOPATHY: Primary | ICD-10-CM

## 2019-08-07 PROCEDURE — G8427 DOCREV CUR MEDS BY ELIG CLIN: HCPCS | Performed by: PHYSICIAN ASSISTANT

## 2019-08-07 PROCEDURE — 99212 OFFICE O/P EST SF 10 MIN: CPT | Performed by: PHYSICIAN ASSISTANT

## 2019-08-07 PROCEDURE — G8417 CALC BMI ABV UP PARAM F/U: HCPCS | Performed by: PHYSICIAN ASSISTANT

## 2019-08-07 PROCEDURE — 4004F PT TOBACCO SCREEN RCVD TLK: CPT | Performed by: PHYSICIAN ASSISTANT

## 2019-08-13 ENCOUNTER — HOSPITAL ENCOUNTER (EMERGENCY)
Age: 34
Discharge: HOME | End: 2019-08-14
Attending: EMERGENCY MEDICINE
Payer: COMMERCIAL

## 2019-08-13 ENCOUNTER — APPOINTMENT (OUTPATIENT)
Dept: GENERAL RADIOLOGY | Age: 34
End: 2019-08-13
Payer: COMMERCIAL

## 2019-08-13 ENCOUNTER — APPOINTMENT (OUTPATIENT)
Dept: CT IMAGING | Age: 34
End: 2019-08-13
Payer: COMMERCIAL

## 2019-08-13 DIAGNOSIS — M54.2 NECK PAIN: Primary | ICD-10-CM

## 2019-08-13 DIAGNOSIS — M48.02 CERVICAL STENOSIS OF SPINE: ICD-10-CM

## 2019-08-13 DIAGNOSIS — R07.9 CHEST PAIN, UNSPECIFIED TYPE: ICD-10-CM

## 2019-08-13 DIAGNOSIS — R51.9 ACUTE NONINTRACTABLE HEADACHE, UNSPECIFIED HEADACHE TYPE: ICD-10-CM

## 2019-08-13 LAB
ALBUMIN SERPL-MCNC: 3.9 G/DL (ref 3.5–5.2)
ALP BLD-CCNC: 102 U/L (ref 35–104)
ALT SERPL-CCNC: 19 U/L (ref 0–32)
ANION GAP SERPL CALCULATED.3IONS-SCNC: 8 MMOL/L (ref 7–16)
AST SERPL-CCNC: 23 U/L (ref 0–31)
BASOPHILS ABSOLUTE: 0.04 E9/L (ref 0–0.2)
BASOPHILS RELATIVE PERCENT: 0.3 % (ref 0–2)
BILIRUB SERPL-MCNC: 0.2 MG/DL (ref 0–1.2)
BUN BLDV-MCNC: 8 MG/DL (ref 6–20)
CALCIUM SERPL-MCNC: 9.1 MG/DL (ref 8.6–10.2)
CHLORIDE BLD-SCNC: 102 MMOL/L (ref 98–107)
CO2: 28 MMOL/L (ref 22–29)
CREAT SERPL-MCNC: 0.8 MG/DL (ref 0.5–1)
EOSINOPHILS ABSOLUTE: 0.37 E9/L (ref 0.05–0.5)
EOSINOPHILS RELATIVE PERCENT: 3.2 % (ref 0–6)
GFR AFRICAN AMERICAN: >60
GFR NON-AFRICAN AMERICAN: >60 ML/MIN/1.73
GLUCOSE BLD-MCNC: 102 MG/DL (ref 74–99)
HCT VFR BLD CALC: 37.1 % (ref 34–48)
HEMOGLOBIN: 11.8 G/DL (ref 11.5–15.5)
IMMATURE GRANULOCYTES #: 0.04 E9/L
IMMATURE GRANULOCYTES %: 0.3 % (ref 0–5)
LYMPHOCYTES ABSOLUTE: 3.29 E9/L (ref 1.5–4)
LYMPHOCYTES RELATIVE PERCENT: 28.3 % (ref 20–42)
MCH RBC QN AUTO: 29.4 PG (ref 26–35)
MCHC RBC AUTO-ENTMCNC: 31.8 % (ref 32–34.5)
MCV RBC AUTO: 92.3 FL (ref 80–99.9)
MONOCYTES ABSOLUTE: 0.74 E9/L (ref 0.1–0.95)
MONOCYTES RELATIVE PERCENT: 6.4 % (ref 2–12)
NEUTROPHILS ABSOLUTE: 7.15 E9/L (ref 1.8–7.3)
NEUTROPHILS RELATIVE PERCENT: 61.5 % (ref 43–80)
PDW BLD-RTO: 13.6 FL (ref 11.5–15)
PLATELET # BLD: 304 E9/L (ref 130–450)
PMV BLD AUTO: 9.5 FL (ref 7–12)
POTASSIUM SERPL-SCNC: 3.8 MMOL/L (ref 3.5–5)
RBC # BLD: 4.02 E12/L (ref 3.5–5.5)
SODIUM BLD-SCNC: 138 MMOL/L (ref 132–146)
TOTAL PROTEIN: 7.5 G/DL (ref 6.4–8.3)
TROPONIN: <0.01 NG/ML (ref 0–0.03)
WBC # BLD: 11.6 E9/L (ref 4.5–11.5)

## 2019-08-13 PROCEDURE — 85025 COMPLETE CBC W/AUTO DIFF WBC: CPT

## 2019-08-13 PROCEDURE — 84484 ASSAY OF TROPONIN QUANT: CPT

## 2019-08-13 PROCEDURE — 70450 CT HEAD/BRAIN W/O DYE: CPT

## 2019-08-13 PROCEDURE — 71046 X-RAY EXAM CHEST 2 VIEWS: CPT

## 2019-08-13 PROCEDURE — 36415 COLL VENOUS BLD VENIPUNCTURE: CPT

## 2019-08-13 PROCEDURE — 99284 EMERGENCY DEPT VISIT MOD MDM: CPT

## 2019-08-13 PROCEDURE — 72125 CT NECK SPINE W/O DYE: CPT

## 2019-08-13 PROCEDURE — 93005 ELECTROCARDIOGRAM TRACING: CPT | Performed by: EMERGENCY MEDICINE

## 2019-08-13 PROCEDURE — 80053 COMPREHEN METABOLIC PANEL: CPT

## 2019-08-13 RX ORDER — DIPHENHYDRAMINE HYDROCHLORIDE 50 MG/ML
12.5 INJECTION INTRAMUSCULAR; INTRAVENOUS ONCE
Status: COMPLETED | OUTPATIENT
Start: 2019-08-13 | End: 2019-08-14

## 2019-08-13 RX ORDER — METOCLOPRAMIDE HYDROCHLORIDE 5 MG/ML
10 INJECTION INTRAMUSCULAR; INTRAVENOUS ONCE
Status: COMPLETED | OUTPATIENT
Start: 2019-08-13 | End: 2019-08-14

## 2019-08-13 RX ORDER — 0.9 % SODIUM CHLORIDE 0.9 %
1000 INTRAVENOUS SOLUTION INTRAVENOUS ONCE
Status: COMPLETED | OUTPATIENT
Start: 2019-08-13 | End: 2019-08-14

## 2019-08-13 ASSESSMENT — PAIN DESCRIPTION - LOCATION: LOCATION: NECK;CHEST

## 2019-08-13 ASSESSMENT — PAIN DESCRIPTION - PAIN TYPE: TYPE: ACUTE PAIN;CHRONIC PAIN

## 2019-08-13 ASSESSMENT — PAIN SCALES - GENERAL: PAINLEVEL_OUTOF10: 10

## 2019-08-14 VITALS
BODY MASS INDEX: 36.86 KG/M2 | SYSTOLIC BLOOD PRESSURE: 130 MMHG | OXYGEN SATURATION: 99 % | DIASTOLIC BLOOD PRESSURE: 84 MMHG | WEIGHT: 208 LBS | TEMPERATURE: 97.3 F | HEART RATE: 78 BPM | HEIGHT: 63 IN | RESPIRATION RATE: 18 BRPM

## 2019-08-14 LAB
D DIMER: <200 NG/ML DDU
EKG ATRIAL RATE: 66 BPM
EKG P AXIS: 42 DEGREES
EKG P-R INTERVAL: 148 MS
EKG Q-T INTERVAL: 390 MS
EKG QRS DURATION: 78 MS
EKG QTC CALCULATION (BAZETT): 408 MS
EKG R AXIS: 35 DEGREES
EKG T AXIS: 12 DEGREES
EKG VENTRICULAR RATE: 66 BPM

## 2019-08-14 PROCEDURE — 96375 TX/PRO/DX INJ NEW DRUG ADDON: CPT

## 2019-08-14 PROCEDURE — 85378 FIBRIN DEGRADE SEMIQUANT: CPT

## 2019-08-14 PROCEDURE — 96374 THER/PROPH/DIAG INJ IV PUSH: CPT

## 2019-08-14 PROCEDURE — 6360000002 HC RX W HCPCS: Performed by: STUDENT IN AN ORGANIZED HEALTH CARE EDUCATION/TRAINING PROGRAM

## 2019-08-14 PROCEDURE — 93010 ELECTROCARDIOGRAM REPORT: CPT | Performed by: INTERNAL MEDICINE

## 2019-08-14 PROCEDURE — 36415 COLL VENOUS BLD VENIPUNCTURE: CPT

## 2019-08-14 PROCEDURE — 2580000003 HC RX 258: Performed by: STUDENT IN AN ORGANIZED HEALTH CARE EDUCATION/TRAINING PROGRAM

## 2019-08-14 RX ADMIN — SODIUM CHLORIDE 1000 ML: 9 INJECTION, SOLUTION INTRAVENOUS at 00:47

## 2019-08-14 RX ADMIN — DIPHENHYDRAMINE HYDROCHLORIDE 12.5 MG: 50 INJECTION, SOLUTION INTRAMUSCULAR; INTRAVENOUS at 00:51

## 2019-08-14 RX ADMIN — METOCLOPRAMIDE 10 MG: 5 INJECTION, SOLUTION INTRAMUSCULAR; INTRAVENOUS at 00:48

## 2019-08-14 ASSESSMENT — ENCOUNTER SYMPTOMS
TROUBLE SWALLOWING: 0
ABDOMINAL PAIN: 0
PHOTOPHOBIA: 0
NAUSEA: 0
BOWEL INCONTINENCE: 0
VISUAL CHANGE: 0
COUGH: 0
VOMITING: 0
DIARRHEA: 0
SHORTNESS OF BREATH: 0
BACK PAIN: 0

## 2019-08-14 ASSESSMENT — PAIN SCALES - GENERAL
PAINLEVEL_OUTOF10: 5
PAINLEVEL_OUTOF10: 5
PAINLEVEL_OUTOF10: 10

## 2019-08-14 ASSESSMENT — PAIN DESCRIPTION - PAIN TYPE: TYPE: ACUTE PAIN

## 2019-08-14 ASSESSMENT — PAIN DESCRIPTION - ONSET: ONSET: ON-GOING

## 2019-08-14 ASSESSMENT — PAIN DESCRIPTION - DESCRIPTORS: DESCRIPTORS: THROBBING

## 2019-08-14 ASSESSMENT — PAIN DESCRIPTION - FREQUENCY: FREQUENCY: CONTINUOUS

## 2019-08-14 ASSESSMENT — PAIN DESCRIPTION - PROGRESSION
CLINICAL_PROGRESSION: GRADUALLY IMPROVING
CLINICAL_PROGRESSION: GRADUALLY IMPROVING

## 2019-08-14 ASSESSMENT — PAIN DESCRIPTION - LOCATION: LOCATION: GENERALIZED

## 2019-08-14 NOTE — ED NOTES
Received report from Elder Foreman [facility].        Suresh Zuluaga RN  08/13/19 9604

## 2019-08-14 NOTE — ED PROVIDER NOTES
patient to return if she experiences any worsening symptoms or other acute symptoms or concerns. She verbalized understanding and agreement to treatment plan and discharge instructions. EKG: This EKG is signed and interpreted by me. Rate: 66  Rhythm: Sinus  Interpretation: Normal sinus rhythm, normal axis, nonspecific ST changes present  Comparison: stable as compared to patient's most recent EKG 19           --------------------------------------------- PAST HISTORY ---------------------------------------------  Past Medical History:  has a past medical history of Anxiety, Depression, Ectopic pregnancy, tubal, Gall stone, Heart murmur, Hernia, and History of blood transfusion. Past Surgical History:  has a past surgical history that includes  section; laparoscopy (2012); Ectopic pregnancy surgery; Tubal ligation; laparoscopy ([id number]); other surgical history (3/25/2014); Hysterectomy, total abdominal (14); Abdominal wall surgery ([id number]); Cholecystectomy; and Hysterectomy (N/A, 2017). Social History:  reports that she has been smoking cigarettes. She has never used smokeless tobacco. She reports that she drinks alcohol. She reports that she has current or past drug history. Drug: Marijuana. Family History: family history includes Cancer in her mother; Diabetes in her mother; High Blood Pressure in her mother. The patients home medications have been reviewed. Allergies: Latex; Bactrim [sulfamethoxazole-trimethoprim]; Fish-derived products; Ibuprofen;  Iodine; Naproxen; and Percocet [oxycodone-acetaminophen]    -------------------------------------------------- RESULTS -------------------------------------------------  Labs:  Results for orders placed or performed during the hospital encounter of 19   Comprehensive metabolic panel   Result Value Ref Range    Sodium 138 132 - 146 mmol/L    Potassium 3.8 3.5 - 5.0 mmol/L    Chloride 102 98 - 107 mmol/L and/or participated in the history, exam, medical decision making, and procedures and agree with all pertinent clinical information. I have also reviewed and agree with the past medical, family and social history unless otherwise noted. I have discussed this patient in detail with the resident, and provided the instruction and education regarding headache. My findings/Plan: Signs because of headache mainly right-sided. Patient reports feeling dizzy and lightheaded. She also reported some chest pains but currently has no pains in her chest right now. Patient reports she had fallen several days ago but did not strike her head at the time. She reports bending down toward her dog. She reports no visual changes she reports no bowel or bladder incontinence she does report some numbness to her lower extremities that she is experienced in the past but mainly to her right leg. She has no abdominal pain or vomiting. Patient awake alert oriented heart and lung exam normal abdomen is soft and nontender she has no lower back tenderness she has a soft collar in place. Patient moves all extremities there is no noted deficit on exam labs and EKG reviewed as well as CTs. Patient medicated for headache and significantly improved. Patient having no active chest pains vital signs are stable. Plan will be to discharge home and patient to follow-up outpatient.        Brenda Spears MD  08/14/19 54 Bartolo Elizabeth MD  08/14/19 2614

## 2019-08-14 NOTE — ED NOTES
Orders have been placed by physician. Patient not in room at this time. Patient's visitor in room states that the patient is in xray at this time.       Lisa Lloyd RN  08/13/19 9156

## 2019-09-03 ENCOUNTER — OFFICE VISIT (OUTPATIENT)
Dept: NEUROSURGERY | Age: 34
End: 2019-09-03
Payer: COMMERCIAL

## 2019-09-03 VITALS
HEIGHT: 63 IN | HEART RATE: 92 BPM | SYSTOLIC BLOOD PRESSURE: 119 MMHG | WEIGHT: 213 LBS | DIASTOLIC BLOOD PRESSURE: 73 MMHG | BODY MASS INDEX: 37.74 KG/M2

## 2019-09-03 DIAGNOSIS — M48.02 CERVICAL STENOSIS OF SPINAL CANAL: Primary | ICD-10-CM

## 2019-09-03 PROCEDURE — 99213 OFFICE O/P EST LOW 20 MIN: CPT | Performed by: NEUROLOGICAL SURGERY

## 2019-09-03 PROCEDURE — G8417 CALC BMI ABV UP PARAM F/U: HCPCS | Performed by: NEUROLOGICAL SURGERY

## 2019-09-03 PROCEDURE — G8427 DOCREV CUR MEDS BY ELIG CLIN: HCPCS | Performed by: NEUROLOGICAL SURGERY

## 2019-09-03 PROCEDURE — 1036F TOBACCO NON-USER: CPT | Performed by: NEUROLOGICAL SURGERY

## 2019-09-12 ENCOUNTER — PREP FOR PROCEDURE (OUTPATIENT)
Dept: NEUROSURGERY | Age: 34
End: 2019-09-12

## 2019-09-12 DIAGNOSIS — M48.02 CERVICAL STENOSIS OF SPINAL CANAL: Primary | ICD-10-CM

## 2019-09-12 RX ORDER — SODIUM CHLORIDE 0.9 % (FLUSH) 0.9 %
10 SYRINGE (ML) INJECTION PRN
Status: CANCELLED | OUTPATIENT
Start: 2019-09-12

## 2019-09-12 RX ORDER — SODIUM CHLORIDE 0.9 % (FLUSH) 0.9 %
10 SYRINGE (ML) INJECTION EVERY 12 HOURS SCHEDULED
Status: CANCELLED | OUTPATIENT
Start: 2019-09-12

## 2019-09-12 RX ORDER — SODIUM CHLORIDE 9 MG/ML
INJECTION, SOLUTION INTRAVENOUS CONTINUOUS
Status: CANCELLED | OUTPATIENT
Start: 2019-09-12

## 2019-09-20 ENCOUNTER — HOSPITAL ENCOUNTER (OUTPATIENT)
Dept: MAMMOGRAPHY | Age: 34
Discharge: HOME | End: 2019-09-22
Payer: COMMERCIAL

## 2019-09-20 DIAGNOSIS — E89.40 SURGICAL MENOPAUSE: ICD-10-CM

## 2019-09-20 PROCEDURE — 77080 DXA BONE DENSITY AXIAL: CPT

## 2019-10-11 ENCOUNTER — HOSPITAL ENCOUNTER (OUTPATIENT)
Age: 34
Discharge: HOME | End: 2019-10-13
Payer: COMMERCIAL

## 2019-10-11 ENCOUNTER — OFFICE VISIT (OUTPATIENT)
Dept: OBGYN | Age: 34
End: 2019-10-11
Payer: COMMERCIAL

## 2019-10-11 VITALS
BODY MASS INDEX: 38.62 KG/M2 | WEIGHT: 218 LBS | DIASTOLIC BLOOD PRESSURE: 56 MMHG | TEMPERATURE: 98.1 F | SYSTOLIC BLOOD PRESSURE: 120 MMHG | RESPIRATION RATE: 16 BRPM | HEART RATE: 80 BPM

## 2019-10-11 DIAGNOSIS — N89.8 VAGINAL IRRITATION: Primary | ICD-10-CM

## 2019-10-11 DIAGNOSIS — B37.9 MONILIA INFECTION: ICD-10-CM

## 2019-10-11 DIAGNOSIS — R35.1 NOCTURIA: ICD-10-CM

## 2019-10-11 DIAGNOSIS — N89.8 VAGINAL IRRITATION: ICD-10-CM

## 2019-10-11 LAB
CLUE CELLS: ABNORMAL
SOURCE WET PREP: ABNORMAL
TRICHOMONAS PREP: ABNORMAL
YEAST WET PREP: ABNORMAL

## 2019-10-11 PROCEDURE — G8484 FLU IMMUNIZE NO ADMIN: HCPCS | Performed by: OBSTETRICS & GYNECOLOGY

## 2019-10-11 PROCEDURE — 99213 OFFICE O/P EST LOW 20 MIN: CPT | Performed by: OBSTETRICS & GYNECOLOGY

## 2019-10-11 PROCEDURE — G8427 DOCREV CUR MEDS BY ELIG CLIN: HCPCS | Performed by: OBSTETRICS & GYNECOLOGY

## 2019-10-11 PROCEDURE — 87088 URINE BACTERIA CULTURE: CPT

## 2019-10-11 PROCEDURE — G8417 CALC BMI ABV UP PARAM F/U: HCPCS | Performed by: OBSTETRICS & GYNECOLOGY

## 2019-10-11 PROCEDURE — 1036F TOBACCO NON-USER: CPT | Performed by: OBSTETRICS & GYNECOLOGY

## 2019-10-11 PROCEDURE — 87210 SMEAR WET MOUNT SALINE/INK: CPT

## 2019-10-11 PROCEDURE — 99212 OFFICE O/P EST SF 10 MIN: CPT | Performed by: OBSTETRICS & GYNECOLOGY

## 2019-10-13 LAB — URINE CULTURE, ROUTINE: NORMAL

## 2019-10-15 ENCOUNTER — TELEPHONE (OUTPATIENT)
Dept: OBGYN | Age: 34
End: 2019-10-15

## 2019-10-16 RX ORDER — METRONIDAZOLE 500 MG/1
500 TABLET ORAL 2 TIMES DAILY
Qty: 14 TABLET | Refills: 0 | Status: SHIPPED | OUTPATIENT
Start: 2019-10-16 | End: 2019-10-23

## 2019-10-21 RX ORDER — ONDANSETRON 4 MG/1
1 TABLET, FILM COATED ORAL DAILY PRN
Status: ON HOLD | COMMUNITY
Start: 2019-10-15 | End: 2019-11-10 | Stop reason: ALTCHOICE

## 2019-10-21 RX ORDER — OMEPRAZOLE 20 MG/1
1 CAPSULE, DELAYED RELEASE ORAL DAILY PRN
Refills: 0 | Status: ON HOLD | COMMUNITY
Start: 2019-09-07 | End: 2019-11-10 | Stop reason: ALTCHOICE

## 2019-10-23 ENCOUNTER — HOSPITAL ENCOUNTER (OUTPATIENT)
Dept: PREADMISSION TESTING | Age: 34
Discharge: HOME | End: 2019-10-23
Payer: COMMERCIAL

## 2019-10-23 ENCOUNTER — HOSPITAL ENCOUNTER (OUTPATIENT)
Dept: GENERAL RADIOLOGY | Age: 34
Discharge: HOME | End: 2019-10-25
Payer: COMMERCIAL

## 2019-10-23 ENCOUNTER — ANESTHESIA EVENT (OUTPATIENT)
Dept: OPERATING ROOM | Age: 34
DRG: 321 | End: 2019-10-23
Payer: COMMERCIAL

## 2019-10-23 VITALS
RESPIRATION RATE: 12 BRPM | DIASTOLIC BLOOD PRESSURE: 65 MMHG | HEART RATE: 98 BPM | TEMPERATURE: 98.7 F | OXYGEN SATURATION: 98 % | HEIGHT: 63 IN | WEIGHT: 218 LBS | SYSTOLIC BLOOD PRESSURE: 120 MMHG | BODY MASS INDEX: 38.62 KG/M2

## 2019-10-23 DIAGNOSIS — M48.02 CERVICAL STENOSIS OF SPINAL CANAL: ICD-10-CM

## 2019-10-23 DIAGNOSIS — Z01.812 PRE-OPERATIVE LABORATORY EXAMINATION: Primary | ICD-10-CM

## 2019-10-23 LAB
ABO/RH: NORMAL
ANION GAP SERPL CALCULATED.3IONS-SCNC: 12 MMOL/L (ref 7–16)
ANTIBODY SCREEN: NORMAL
BACTERIA: ABNORMAL /HPF
BASOPHILS ABSOLUTE: 0.04 E9/L (ref 0–0.2)
BASOPHILS RELATIVE PERCENT: 0.3 % (ref 0–2)
BILIRUBIN URINE: NEGATIVE
BLOOD, URINE: NEGATIVE
BUN BLDV-MCNC: 10 MG/DL (ref 6–20)
CALCIUM SERPL-MCNC: 10 MG/DL (ref 8.6–10.2)
CHLORIDE BLD-SCNC: 99 MMOL/L (ref 98–107)
CLARITY: CLEAR
CO2: 28 MMOL/L (ref 22–29)
COLOR: YELLOW
CREAT SERPL-MCNC: 1.1 MG/DL (ref 0.5–1)
EOSINOPHILS ABSOLUTE: 0.3 E9/L (ref 0.05–0.5)
EOSINOPHILS RELATIVE PERCENT: 2.5 % (ref 0–6)
GFR AFRICAN AMERICAN: >60
GFR NON-AFRICAN AMERICAN: >60 ML/MIN/1.73
GLUCOSE BLD-MCNC: 89 MG/DL (ref 74–99)
GLUCOSE URINE: NEGATIVE MG/DL
HCT VFR BLD CALC: 39.9 % (ref 34–48)
HEMOGLOBIN: 13 G/DL (ref 11.5–15.5)
IMMATURE GRANULOCYTES #: 0.05 E9/L
IMMATURE GRANULOCYTES %: 0.4 % (ref 0–5)
INR BLD: 1.1
KETONES, URINE: NEGATIVE MG/DL
LEUKOCYTE ESTERASE, URINE: ABNORMAL
LYMPHOCYTES ABSOLUTE: 2.25 E9/L (ref 1.5–4)
LYMPHOCYTES RELATIVE PERCENT: 18.5 % (ref 20–42)
MCH RBC QN AUTO: 29.3 PG (ref 26–35)
MCHC RBC AUTO-ENTMCNC: 32.6 % (ref 32–34.5)
MCV RBC AUTO: 90.1 FL (ref 80–99.9)
MONOCYTES ABSOLUTE: 0.57 E9/L (ref 0.1–0.95)
MONOCYTES RELATIVE PERCENT: 4.7 % (ref 2–12)
NEUTROPHILS ABSOLUTE: 8.94 E9/L (ref 1.8–7.3)
NEUTROPHILS RELATIVE PERCENT: 73.6 % (ref 43–80)
NITRITE, URINE: NEGATIVE
PDW BLD-RTO: 13 FL (ref 11.5–15)
PH UA: 6 (ref 5–9)
PLATELET # BLD: 346 E9/L (ref 130–450)
PMV BLD AUTO: 9.9 FL (ref 7–12)
POTASSIUM REFLEX MAGNESIUM: 3.8 MMOL/L (ref 3.5–5)
PROTEIN UA: NEGATIVE MG/DL
PROTHROMBIN TIME: 12.3 SEC (ref 9.3–12.4)
RBC # BLD: 4.43 E12/L (ref 3.5–5.5)
RBC UA: ABNORMAL /HPF (ref 0–2)
SODIUM BLD-SCNC: 139 MMOL/L (ref 132–146)
SPECIFIC GRAVITY UA: 1.02 (ref 1–1.03)
UROBILINOGEN, URINE: 0.2 E.U./DL
WBC # BLD: 12.2 E9/L (ref 4.5–11.5)
WBC UA: ABNORMAL /HPF (ref 0–5)

## 2019-10-23 PROCEDURE — 86900 BLOOD TYPING SEROLOGIC ABO: CPT

## 2019-10-23 PROCEDURE — 87088 URINE BACTERIA CULTURE: CPT

## 2019-10-23 PROCEDURE — 81001 URINALYSIS AUTO W/SCOPE: CPT

## 2019-10-23 PROCEDURE — 86850 RBC ANTIBODY SCREEN: CPT

## 2019-10-23 PROCEDURE — 86901 BLOOD TYPING SEROLOGIC RH(D): CPT

## 2019-10-23 PROCEDURE — 85610 PROTHROMBIN TIME: CPT

## 2019-10-23 PROCEDURE — 36415 COLL VENOUS BLD VENIPUNCTURE: CPT

## 2019-10-23 PROCEDURE — 71046 X-RAY EXAM CHEST 2 VIEWS: CPT

## 2019-10-23 PROCEDURE — 80048 BASIC METABOLIC PNL TOTAL CA: CPT

## 2019-10-23 PROCEDURE — 87081 CULTURE SCREEN ONLY: CPT

## 2019-10-23 PROCEDURE — 85025 COMPLETE CBC W/AUTO DIFF WBC: CPT

## 2019-10-24 LAB — MRSA CULTURE ONLY: NORMAL

## 2019-10-25 LAB — URINE CULTURE, ROUTINE: NORMAL

## 2019-11-01 ENCOUNTER — APPOINTMENT (OUTPATIENT)
Dept: GENERAL RADIOLOGY | Age: 34
DRG: 321 | End: 2019-11-01
Attending: NEUROLOGICAL SURGERY
Payer: COMMERCIAL

## 2019-11-01 ENCOUNTER — HOSPITAL ENCOUNTER (INPATIENT)
Age: 34
LOS: 5 days | Discharge: HOME | DRG: 321 | End: 2019-11-06
Attending: NEUROLOGICAL SURGERY | Admitting: NEUROLOGICAL SURGERY
Payer: COMMERCIAL

## 2019-11-01 ENCOUNTER — ANESTHESIA (OUTPATIENT)
Dept: OPERATING ROOM | Age: 34
DRG: 321 | End: 2019-11-01
Payer: COMMERCIAL

## 2019-11-01 VITALS
OXYGEN SATURATION: 94 % | RESPIRATION RATE: 1 BRPM | TEMPERATURE: 96.4 F | DIASTOLIC BLOOD PRESSURE: 64 MMHG | SYSTOLIC BLOOD PRESSURE: 111 MMHG

## 2019-11-01 DIAGNOSIS — M48.02 CERVICAL STENOSIS OF SPINAL CANAL: Primary | ICD-10-CM

## 2019-11-01 PROCEDURE — 6360000002 HC RX W HCPCS

## 2019-11-01 PROCEDURE — 6360000002 HC RX W HCPCS: Performed by: NEUROLOGICAL SURGERY

## 2019-11-01 PROCEDURE — 6360000002 HC RX W HCPCS: Performed by: ANESTHESIOLOGY

## 2019-11-01 PROCEDURE — 2580000003 HC RX 258: Performed by: PHYSICIAN ASSISTANT

## 2019-11-01 PROCEDURE — 2580000003 HC RX 258

## 2019-11-01 PROCEDURE — 2780000010 HC IMPLANT OTHER: Performed by: NEUROLOGICAL SURGERY

## 2019-11-01 PROCEDURE — 3700000001 HC ADD 15 MINUTES (ANESTHESIA): Performed by: NEUROLOGICAL SURGERY

## 2019-11-01 PROCEDURE — C9359 IMPLNT,BON VOID FILLER-PUTTY: HCPCS | Performed by: NEUROLOGICAL SURGERY

## 2019-11-01 PROCEDURE — 63015 REMOVE SPINE LAMINA >2 CRVCL: CPT | Performed by: NEUROLOGICAL SURGERY

## 2019-11-01 PROCEDURE — C1713 ANCHOR/SCREW BN/BN,TIS/BN: HCPCS | Performed by: NEUROLOGICAL SURGERY

## 2019-11-01 PROCEDURE — 22614 ARTHRD PST TQ 1NTRSPC EA ADD: CPT | Performed by: NEUROLOGICAL SURGERY

## 2019-11-01 PROCEDURE — 2720000010 HC SURG SUPPLY STERILE: Performed by: NEUROLOGICAL SURGERY

## 2019-11-01 PROCEDURE — 3700000000 HC ANESTHESIA ATTENDED CARE: Performed by: NEUROLOGICAL SURGERY

## 2019-11-01 PROCEDURE — 6360000002 HC RX W HCPCS: Performed by: PHYSICIAN ASSISTANT

## 2019-11-01 PROCEDURE — 3600000004 HC SURGERY LEVEL 4 BASE: Performed by: NEUROLOGICAL SURGERY

## 2019-11-01 PROCEDURE — 22842 INSERT SPINE FIXATION DEVICE: CPT | Performed by: NEUROLOGICAL SURGERY

## 2019-11-01 PROCEDURE — 6370000000 HC RX 637 (ALT 250 FOR IP): Performed by: PHYSICIAN ASSISTANT

## 2019-11-01 PROCEDURE — 1200000000 HC SEMI PRIVATE

## 2019-11-01 PROCEDURE — 22600 ARTHRD PST TQ 1NTRSPC CRV: CPT | Performed by: NEUROLOGICAL SURGERY

## 2019-11-01 PROCEDURE — 2580000003 HC RX 258: Performed by: NEUROLOGICAL SURGERY

## 2019-11-01 PROCEDURE — 7100000000 HC PACU RECOVERY - FIRST 15 MIN: Performed by: NEUROLOGICAL SURGERY

## 2019-11-01 PROCEDURE — 6370000000 HC RX 637 (ALT 250 FOR IP): Performed by: NEUROLOGICAL SURGERY

## 2019-11-01 PROCEDURE — 0RG4071 FUSION OF CERVICOTHORACIC VERTEBRAL JOINT WITH AUTOLOGOUS TISSUE SUBSTITUTE, POSTERIOR APPROACH, POSTERIOR COLUMN, OPEN APPROACH: ICD-10-PCS | Performed by: NEUROLOGICAL SURGERY

## 2019-11-01 PROCEDURE — 3600000014 HC SURGERY LEVEL 4 ADDTL 15MIN: Performed by: NEUROLOGICAL SURGERY

## 2019-11-01 PROCEDURE — 2709999900 HC NON-CHARGEABLE SUPPLY: Performed by: NEUROLOGICAL SURGERY

## 2019-11-01 PROCEDURE — 2500000003 HC RX 250 WO HCPCS

## 2019-11-01 PROCEDURE — 0RG2071 FUSION OF 2 OR MORE CERVICAL VERTEBRAL JOINTS WITH AUTOLOGOUS TISSUE SUBSTITUTE, POSTERIOR APPROACH, POSTERIOR COLUMN, OPEN APPROACH: ICD-10-PCS | Performed by: NEUROLOGICAL SURGERY

## 2019-11-01 PROCEDURE — 2500000003 HC RX 250 WO HCPCS: Performed by: NEUROLOGICAL SURGERY

## 2019-11-01 PROCEDURE — 3209999900 FLUORO FOR SURGICAL PROCEDURES

## 2019-11-01 PROCEDURE — 00QT0ZZ REPAIR SPINAL MENINGES, OPEN APPROACH: ICD-10-PCS | Performed by: NEUROLOGICAL SURGERY

## 2019-11-01 PROCEDURE — 7100000001 HC PACU RECOVERY - ADDTL 15 MIN: Performed by: NEUROLOGICAL SURGERY

## 2019-11-01 DEVICE — 3.5MM CAPITOL CURVED ROD, 85MM
Type: IMPLANTABLE DEVICE | Site: SPINE CERVICAL | Status: FUNCTIONAL
Brand: CAPITOL

## 2019-11-01 DEVICE — DURAGEN® PLUS DURAL REGENERATION MATRIX, 2 IN X 2 IN (5 CM X 5 CM)
Type: IMPLANTABLE DEVICE | Site: SPINE CERVICAL | Status: FUNCTIONAL
Brand: DURAGEN® PLUS

## 2019-11-01 DEVICE — DURASEAL® DURAL SEALANT SYSTEM 5ML 5 PACK
Type: IMPLANTABLE DEVICE | Site: SPINE CERVICAL | Status: FUNCTIONAL
Brand: DURASEAL®

## 2019-11-01 DEVICE — QUARTEX THREADED LOCKING CAP
Type: IMPLANTABLE DEVICE | Site: SPINE CERVICAL | Status: FUNCTIONAL
Brand: QUARTEX

## 2019-11-01 DEVICE — QUARTEX 4.0MM POLYAXIAL SCREW, 20MM
Type: IMPLANTABLE DEVICE | Site: SPINE CERVICAL | Status: FUNCTIONAL
Brand: QUARTEX

## 2019-11-01 DEVICE — DBM 006010 PROGENIX PLUS 10CC
Type: IMPLANTABLE DEVICE | Site: SPINE CERVICAL | Status: FUNCTIONAL
Brand: PROGENIX® PUTTY AND PROGENIX® PLUS

## 2019-11-01 DEVICE — QUARTEX 3.5MM POLYAXIAL SCREW, 14MM
Type: IMPLANTABLE DEVICE | Site: SPINE CERVICAL | Status: FUNCTIONAL
Brand: QUARTEX

## 2019-11-01 RX ORDER — PROPOFOL 10 MG/ML
INJECTION, EMULSION INTRAVENOUS PRN
Status: DISCONTINUED | OUTPATIENT
Start: 2019-11-01 | End: 2019-11-01 | Stop reason: SDUPTHER

## 2019-11-01 RX ORDER — DEXAMETHASONE SODIUM PHOSPHATE 10 MG/ML
INJECTION INTRAMUSCULAR; INTRAVENOUS PRN
Status: DISCONTINUED | OUTPATIENT
Start: 2019-11-01 | End: 2019-11-01 | Stop reason: SDUPTHER

## 2019-11-01 RX ORDER — DOCUSATE SODIUM 100 MG/1
100 CAPSULE, LIQUID FILLED ORAL 2 TIMES DAILY
Status: DISCONTINUED | OUTPATIENT
Start: 2019-11-01 | End: 2019-11-06 | Stop reason: HOSPADM

## 2019-11-01 RX ORDER — PROMETHAZINE HYDROCHLORIDE 25 MG/ML
25 INJECTION, SOLUTION INTRAMUSCULAR; INTRAVENOUS PRN
Status: DISCONTINUED | OUTPATIENT
Start: 2019-11-01 | End: 2019-11-01 | Stop reason: HOSPADM

## 2019-11-01 RX ORDER — BISACODYL 10 MG
10 SUPPOSITORY, RECTAL RECTAL DAILY PRN
Status: DISCONTINUED | OUTPATIENT
Start: 2019-11-01 | End: 2019-11-06 | Stop reason: HOSPADM

## 2019-11-01 RX ORDER — PHENYLEPHRINE HYDROCHLORIDE 10 MG/ML
INJECTION INTRAVENOUS PRN
Status: DISCONTINUED | OUTPATIENT
Start: 2019-11-01 | End: 2019-11-01 | Stop reason: SDUPTHER

## 2019-11-01 RX ORDER — LABETALOL HYDROCHLORIDE 5 MG/ML
5 INJECTION, SOLUTION INTRAVENOUS EVERY 10 MIN PRN
Status: DISCONTINUED | OUTPATIENT
Start: 2019-11-01 | End: 2019-11-01 | Stop reason: HOSPADM

## 2019-11-01 RX ORDER — KETAMINE HYDROCHLORIDE 10 MG/ML
INJECTION, SOLUTION INTRAMUSCULAR; INTRAVENOUS PRN
Status: DISCONTINUED | OUTPATIENT
Start: 2019-11-01 | End: 2019-11-01

## 2019-11-01 RX ORDER — SODIUM CHLORIDE 9 MG/ML
INJECTION, SOLUTION INTRAVENOUS CONTINUOUS
Status: DISCONTINUED | OUTPATIENT
Start: 2019-11-01 | End: 2019-11-06 | Stop reason: HOSPADM

## 2019-11-01 RX ORDER — FENTANYL CITRATE 50 UG/ML
50 INJECTION, SOLUTION INTRAMUSCULAR; INTRAVENOUS EVERY 5 MIN PRN
Status: DISCONTINUED | OUTPATIENT
Start: 2019-11-01 | End: 2019-11-01 | Stop reason: HOSPADM

## 2019-11-01 RX ORDER — SODIUM CHLORIDE 0.9 % (FLUSH) 0.9 %
10 SYRINGE (ML) INJECTION EVERY 12 HOURS SCHEDULED
Status: DISCONTINUED | OUTPATIENT
Start: 2019-11-01 | End: 2019-11-06 | Stop reason: HOSPADM

## 2019-11-01 RX ORDER — GLYCOPYRROLATE 1 MG/5 ML
SYRINGE (ML) INTRAVENOUS PRN
Status: DISCONTINUED | OUTPATIENT
Start: 2019-11-01 | End: 2019-11-01 | Stop reason: SDUPTHER

## 2019-11-01 RX ORDER — FLUOXETINE HYDROCHLORIDE 20 MG/1
40 CAPSULE ORAL DAILY
Status: DISCONTINUED | OUTPATIENT
Start: 2019-11-01 | End: 2019-11-06 | Stop reason: HOSPADM

## 2019-11-01 RX ORDER — LIDOCAINE HYDROCHLORIDE AND EPINEPHRINE 5; 5 MG/ML; UG/ML
INJECTION, SOLUTION INFILTRATION; PERINEURAL PRN
Status: DISCONTINUED | OUTPATIENT
Start: 2019-11-01 | End: 2019-11-01 | Stop reason: ALTCHOICE

## 2019-11-01 RX ORDER — HYDROCODONE BITARTRATE AND ACETAMINOPHEN 10; 325 MG/1; MG/1
1 TABLET ORAL EVERY 4 HOURS PRN
Status: DISCONTINUED | OUTPATIENT
Start: 2019-11-01 | End: 2019-11-06 | Stop reason: HOSPADM

## 2019-11-01 RX ORDER — MEPERIDINE HYDROCHLORIDE 50 MG/ML
12.5 INJECTION INTRAMUSCULAR; INTRAVENOUS; SUBCUTANEOUS EVERY 5 MIN PRN
Status: DISCONTINUED | OUTPATIENT
Start: 2019-11-01 | End: 2019-11-01 | Stop reason: HOSPADM

## 2019-11-01 RX ORDER — FENTANYL CITRATE 50 UG/ML
INJECTION, SOLUTION INTRAMUSCULAR; INTRAVENOUS PRN
Status: DISCONTINUED | OUTPATIENT
Start: 2019-11-01 | End: 2019-11-01 | Stop reason: SDUPTHER

## 2019-11-01 RX ORDER — ONDANSETRON 2 MG/ML
4 INJECTION INTRAMUSCULAR; INTRAVENOUS EVERY 6 HOURS PRN
Status: DISCONTINUED | OUTPATIENT
Start: 2019-11-01 | End: 2019-11-06 | Stop reason: HOSPADM

## 2019-11-01 RX ORDER — MIDAZOLAM HYDROCHLORIDE 1 MG/ML
INJECTION INTRAMUSCULAR; INTRAVENOUS PRN
Status: DISCONTINUED | OUTPATIENT
Start: 2019-11-01 | End: 2019-11-01 | Stop reason: SDUPTHER

## 2019-11-01 RX ORDER — SODIUM CHLORIDE 0.9 % (FLUSH) 0.9 %
10 SYRINGE (ML) INJECTION PRN
Status: DISCONTINUED | OUTPATIENT
Start: 2019-11-01 | End: 2019-11-06 | Stop reason: HOSPADM

## 2019-11-01 RX ORDER — CYCLOBENZAPRINE HCL 10 MG
5 TABLET ORAL 2 TIMES DAILY PRN
Status: DISCONTINUED | OUTPATIENT
Start: 2019-11-01 | End: 2019-11-06 | Stop reason: HOSPADM

## 2019-11-01 RX ORDER — CEFAZOLIN SODIUM 2 G/50ML
2 SOLUTION INTRAVENOUS EVERY 8 HOURS
Status: COMPLETED | OUTPATIENT
Start: 2019-11-01 | End: 2019-11-04

## 2019-11-01 RX ORDER — ONDANSETRON 2 MG/ML
INJECTION INTRAMUSCULAR; INTRAVENOUS PRN
Status: DISCONTINUED | OUTPATIENT
Start: 2019-11-01 | End: 2019-11-01 | Stop reason: SDUPTHER

## 2019-11-01 RX ORDER — METOPROLOL TARTRATE 5 MG/5ML
INJECTION INTRAVENOUS PRN
Status: DISCONTINUED | OUTPATIENT
Start: 2019-11-01 | End: 2019-11-01 | Stop reason: SDUPTHER

## 2019-11-01 RX ORDER — BUPIVACAINE HYDROCHLORIDE 2.5 MG/ML
INJECTION, SOLUTION EPIDURAL; INFILTRATION; INTRACAUDAL PRN
Status: DISCONTINUED | OUTPATIENT
Start: 2019-11-01 | End: 2019-11-01 | Stop reason: ALTCHOICE

## 2019-11-01 RX ORDER — SUCCINYLCHOLINE/SOD CL,ISO/PF 200MG/10ML
SYRINGE (ML) INTRAVENOUS PRN
Status: DISCONTINUED | OUTPATIENT
Start: 2019-11-01 | End: 2019-11-01 | Stop reason: SDUPTHER

## 2019-11-01 RX ORDER — ALBUTEROL SULFATE 90 UG/1
2 AEROSOL, METERED RESPIRATORY (INHALATION) EVERY 6 HOURS PRN
Status: DISCONTINUED | OUTPATIENT
Start: 2019-11-01 | End: 2019-11-06 | Stop reason: HOSPADM

## 2019-11-01 RX ORDER — SENNA AND DOCUSATE SODIUM 50; 8.6 MG/1; MG/1
1 TABLET, FILM COATED ORAL 2 TIMES DAILY
Status: DISCONTINUED | OUTPATIENT
Start: 2019-11-01 | End: 2019-11-06 | Stop reason: HOSPADM

## 2019-11-01 RX ORDER — FENTANYL CITRATE 50 UG/ML
25 INJECTION, SOLUTION INTRAMUSCULAR; INTRAVENOUS EVERY 5 MIN PRN
Status: DISCONTINUED | OUTPATIENT
Start: 2019-11-01 | End: 2019-11-01 | Stop reason: HOSPADM

## 2019-11-01 RX ORDER — DIAPER,BRIEF,INFANT-TODD,DISP
EACH MISCELLANEOUS PRN
Status: DISCONTINUED | OUTPATIENT
Start: 2019-11-01 | End: 2019-11-01 | Stop reason: ALTCHOICE

## 2019-11-01 RX ORDER — SODIUM CHLORIDE 0.9 % (FLUSH) 0.9 %
10 SYRINGE (ML) INJECTION EVERY 12 HOURS SCHEDULED
Status: DISCONTINUED | OUTPATIENT
Start: 2019-11-01 | End: 2019-11-01 | Stop reason: HOSPADM

## 2019-11-01 RX ORDER — SODIUM CHLORIDE 0.9 % (FLUSH) 0.9 %
10 SYRINGE (ML) INJECTION PRN
Status: DISCONTINUED | OUTPATIENT
Start: 2019-11-01 | End: 2019-11-01 | Stop reason: HOSPADM

## 2019-11-01 RX ORDER — CEFAZOLIN SODIUM 2 G/50ML
2 SOLUTION INTRAVENOUS
Status: COMPLETED | OUTPATIENT
Start: 2019-11-01 | End: 2019-11-01

## 2019-11-01 RX ORDER — ROCURONIUM BROMIDE 10 MG/ML
INJECTION, SOLUTION INTRAVENOUS PRN
Status: DISCONTINUED | OUTPATIENT
Start: 2019-11-01 | End: 2019-11-01 | Stop reason: SDUPTHER

## 2019-11-01 RX ORDER — KETAMINE HCL IN NACL, ISO-OSM 100MG/10ML
SYRINGE (ML) INJECTION PRN
Status: DISCONTINUED | OUTPATIENT
Start: 2019-11-01 | End: 2019-11-01 | Stop reason: SDUPTHER

## 2019-11-01 RX ORDER — MORPHINE SULFATE 2 MG/ML
2 INJECTION, SOLUTION INTRAMUSCULAR; INTRAVENOUS
Status: DISCONTINUED | OUTPATIENT
Start: 2019-11-01 | End: 2019-11-05

## 2019-11-01 RX ORDER — SODIUM CHLORIDE, SODIUM LACTATE, POTASSIUM CHLORIDE, CALCIUM CHLORIDE 600; 310; 30; 20 MG/100ML; MG/100ML; MG/100ML; MG/100ML
INJECTION, SOLUTION INTRAVENOUS CONTINUOUS PRN
Status: DISCONTINUED | OUTPATIENT
Start: 2019-11-01 | End: 2019-11-01 | Stop reason: SDUPTHER

## 2019-11-01 RX ORDER — VANCOMYCIN HYDROCHLORIDE 500 MG/10ML
INJECTION, POWDER, LYOPHILIZED, FOR SOLUTION INTRAVENOUS PRN
Status: DISCONTINUED | OUTPATIENT
Start: 2019-11-01 | End: 2019-11-01 | Stop reason: ALTCHOICE

## 2019-11-01 RX ORDER — SODIUM PHOSPHATE, DIBASIC AND SODIUM PHOSPHATE, MONOBASIC 7; 19 G/133ML; G/133ML
1 ENEMA RECTAL DAILY PRN
Status: DISCONTINUED | OUTPATIENT
Start: 2019-11-01 | End: 2019-11-06 | Stop reason: HOSPADM

## 2019-11-01 RX ORDER — MORPHINE SULFATE 4 MG/ML
4 INJECTION, SOLUTION INTRAMUSCULAR; INTRAVENOUS
Status: DISCONTINUED | OUTPATIENT
Start: 2019-11-01 | End: 2019-11-05

## 2019-11-01 RX ORDER — NEOSTIGMINE METHYLSULFATE 1 MG/ML
INJECTION, SOLUTION INTRAVENOUS PRN
Status: DISCONTINUED | OUTPATIENT
Start: 2019-11-01 | End: 2019-11-01 | Stop reason: SDUPTHER

## 2019-11-01 RX ORDER — PANTOPRAZOLE SODIUM 40 MG/1
40 TABLET, DELAYED RELEASE ORAL
Status: DISCONTINUED | OUTPATIENT
Start: 2019-11-02 | End: 2019-11-06 | Stop reason: HOSPADM

## 2019-11-01 RX ORDER — GABAPENTIN 400 MG/1
400 CAPSULE ORAL 3 TIMES DAILY
Status: DISCONTINUED | OUTPATIENT
Start: 2019-11-01 | End: 2019-11-06 | Stop reason: HOSPADM

## 2019-11-01 RX ORDER — SODIUM CHLORIDE 9 MG/ML
INJECTION, SOLUTION INTRAVENOUS CONTINUOUS
Status: DISCONTINUED | OUTPATIENT
Start: 2019-11-01 | End: 2019-11-01

## 2019-11-01 RX ORDER — LIDOCAINE HYDROCHLORIDE 20 MG/ML
INJECTION, SOLUTION INTRAVENOUS PRN
Status: DISCONTINUED | OUTPATIENT
Start: 2019-11-01 | End: 2019-11-01 | Stop reason: SDUPTHER

## 2019-11-01 RX ADMIN — GABAPENTIN 400 MG: 400 CAPSULE ORAL at 21:21

## 2019-11-01 RX ADMIN — METOPROLOL TARTRATE 5 MG: 5 INJECTION, SOLUTION INTRAVENOUS at 09:12

## 2019-11-01 RX ADMIN — PHENYLEPHRINE HYDROCHLORIDE 50 MCG: 10 INJECTION INTRAVENOUS at 07:58

## 2019-11-01 RX ADMIN — ROCURONIUM BROMIDE 10 MG: 10 INJECTION, SOLUTION INTRAVENOUS at 06:38

## 2019-11-01 RX ADMIN — DOCUSATE SODIUM AND SENNOSIDES 1 TABLET: 50; 8.6 TABLET, FILM COATED ORAL at 21:21

## 2019-11-01 RX ADMIN — FENTANYL CITRATE 100 MCG: 50 INJECTION, SOLUTION INTRAMUSCULAR; INTRAVENOUS at 08:55

## 2019-11-01 RX ADMIN — CYCLOBENZAPRINE 5 MG: 10 TABLET, FILM COATED ORAL at 15:00

## 2019-11-01 RX ADMIN — MIDAZOLAM 2 MG: 1 INJECTION INTRAMUSCULAR; INTRAVENOUS at 06:32

## 2019-11-01 RX ADMIN — FENTANYL CITRATE 50 MCG: 50 INJECTION INTRAMUSCULAR; INTRAVENOUS at 11:28

## 2019-11-01 RX ADMIN — SODIUM CHLORIDE, POTASSIUM CHLORIDE, SODIUM LACTATE AND CALCIUM CHLORIDE: 600; 310; 30; 20 INJECTION, SOLUTION INTRAVENOUS at 06:45

## 2019-11-01 RX ADMIN — FENTANYL CITRATE 100 MCG: 50 INJECTION, SOLUTION INTRAMUSCULAR; INTRAVENOUS at 06:38

## 2019-11-01 RX ADMIN — MORPHINE SULFATE 4 MG: 4 INJECTION, SOLUTION INTRAMUSCULAR; INTRAVENOUS at 21:22

## 2019-11-01 RX ADMIN — DEXAMETHASONE SODIUM PHOSPHATE 10 MG: 10 INJECTION INTRAMUSCULAR; INTRAVENOUS at 06:43

## 2019-11-01 RX ADMIN — MORPHINE SULFATE 4 MG: 4 INJECTION, SOLUTION INTRAMUSCULAR; INTRAVENOUS at 17:32

## 2019-11-01 RX ADMIN — PROPOFOL 180 MG: 10 INJECTION, EMULSION INTRAVENOUS at 06:38

## 2019-11-01 RX ADMIN — HYDROCODONE BITARTRATE AND ACETAMINOPHEN 1 TABLET: 10; 325 TABLET ORAL at 19:53

## 2019-11-01 RX ADMIN — SODIUM CHLORIDE: 9 INJECTION, SOLUTION INTRAVENOUS at 05:56

## 2019-11-01 RX ADMIN — ROCURONIUM BROMIDE 20 MG: 10 INJECTION, SOLUTION INTRAVENOUS at 07:11

## 2019-11-01 RX ADMIN — ONDANSETRON HYDROCHLORIDE 4 MG: 2 INJECTION, SOLUTION INTRAMUSCULAR; INTRAVENOUS at 09:44

## 2019-11-01 RX ADMIN — DOCUSATE SODIUM 100 MG: 100 CAPSULE, LIQUID FILLED ORAL at 15:25

## 2019-11-01 RX ADMIN — CEFAZOLIN SODIUM 2 G: 2 SOLUTION INTRAVENOUS at 15:25

## 2019-11-01 RX ADMIN — CEFAZOLIN SODIUM 2 G: 2 SOLUTION INTRAVENOUS at 06:52

## 2019-11-01 RX ADMIN — ROCURONIUM BROMIDE 40 MG: 10 INJECTION, SOLUTION INTRAVENOUS at 06:43

## 2019-11-01 RX ADMIN — Medication 0.5 MG: at 09:43

## 2019-11-01 RX ADMIN — GABAPENTIN 400 MG: 400 CAPSULE ORAL at 15:26

## 2019-11-01 RX ADMIN — FENTANYL CITRATE 150 MCG: 50 INJECTION, SOLUTION INTRAMUSCULAR; INTRAVENOUS at 07:00

## 2019-11-01 RX ADMIN — SODIUM CHLORIDE: 9 INJECTION, SOLUTION INTRAVENOUS at 13:11

## 2019-11-01 RX ADMIN — FENTANYL CITRATE 50 MCG: 50 INJECTION INTRAMUSCULAR; INTRAVENOUS at 11:50

## 2019-11-01 RX ADMIN — SODIUM CHLORIDE, POTASSIUM CHLORIDE, SODIUM LACTATE AND CALCIUM CHLORIDE: 600; 310; 30; 20 INJECTION, SOLUTION INTRAVENOUS at 09:00

## 2019-11-01 RX ADMIN — PHENYLEPHRINE HYDROCHLORIDE 50 MCG: 10 INJECTION INTRAVENOUS at 09:04

## 2019-11-01 RX ADMIN — Medication 0.1 MG: at 06:47

## 2019-11-01 RX ADMIN — DOCUSATE SODIUM 100 MG: 100 CAPSULE, LIQUID FILLED ORAL at 21:21

## 2019-11-01 RX ADMIN — ROCURONIUM BROMIDE 15 MG: 10 INJECTION, SOLUTION INTRAVENOUS at 07:57

## 2019-11-01 RX ADMIN — Medication 50 MG: at 06:46

## 2019-11-01 RX ADMIN — Medication 140 MG: at 06:38

## 2019-11-01 RX ADMIN — LIDOCAINE HYDROCHLORIDE 100 MG: 20 INJECTION, SOLUTION INTRAVENOUS at 06:38

## 2019-11-01 RX ADMIN — Medication 3 MG: at 09:43

## 2019-11-01 RX ADMIN — HYDROCODONE BITARTRATE AND ACETAMINOPHEN 1 TABLET: 10; 325 TABLET ORAL at 15:25

## 2019-11-01 RX ADMIN — FLUOXETINE HYDROCHLORIDE 40 MG: 20 CAPSULE ORAL at 15:42

## 2019-11-01 RX ADMIN — MORPHINE SULFATE 4 MG: 4 INJECTION, SOLUTION INTRAMUSCULAR; INTRAVENOUS at 13:08

## 2019-11-01 ASSESSMENT — PAIN DESCRIPTION - ORIENTATION
ORIENTATION: POSTERIOR

## 2019-11-01 ASSESSMENT — PULMONARY FUNCTION TESTS
PIF_VALUE: 30
PIF_VALUE: 15
PIF_VALUE: 33
PIF_VALUE: 3
PIF_VALUE: 33
PIF_VALUE: 31
PIF_VALUE: 20
PIF_VALUE: 30
PIF_VALUE: 32
PIF_VALUE: 33
PIF_VALUE: 30
PIF_VALUE: 31
PIF_VALUE: 29
PIF_VALUE: 20
PIF_VALUE: 31
PIF_VALUE: 20
PIF_VALUE: 30
PIF_VALUE: 31
PIF_VALUE: 30
PIF_VALUE: 31
PIF_VALUE: 30
PIF_VALUE: 24
PIF_VALUE: 1
PIF_VALUE: 31
PIF_VALUE: 31
PIF_VALUE: 30
PIF_VALUE: 22
PIF_VALUE: 2
PIF_VALUE: 31
PIF_VALUE: 5
PIF_VALUE: 31
PIF_VALUE: 30
PIF_VALUE: 33
PIF_VALUE: 31
PIF_VALUE: 30
PIF_VALUE: 3
PIF_VALUE: 30
PIF_VALUE: 31
PIF_VALUE: 30
PIF_VALUE: 30
PIF_VALUE: 29
PIF_VALUE: 20
PIF_VALUE: 30
PIF_VALUE: 1
PIF_VALUE: 30
PIF_VALUE: 32
PIF_VALUE: 30
PIF_VALUE: 4
PIF_VALUE: 31
PIF_VALUE: 31
PIF_VALUE: 20
PIF_VALUE: 29
PIF_VALUE: 30
PIF_VALUE: 32
PIF_VALUE: 30
PIF_VALUE: 31
PIF_VALUE: 30
PIF_VALUE: 32
PIF_VALUE: 28
PIF_VALUE: 31
PIF_VALUE: 30
PIF_VALUE: 33
PIF_VALUE: 31
PIF_VALUE: 28
PIF_VALUE: 33
PIF_VALUE: 31
PIF_VALUE: 30
PIF_VALUE: 1
PIF_VALUE: 33
PIF_VALUE: 32
PIF_VALUE: 31
PIF_VALUE: 33
PIF_VALUE: 20
PIF_VALUE: 31
PIF_VALUE: 20
PIF_VALUE: 30
PIF_VALUE: 0
PIF_VALUE: 2
PIF_VALUE: 34
PIF_VALUE: 31
PIF_VALUE: 31
PIF_VALUE: 30
PIF_VALUE: 31
PIF_VALUE: 30
PIF_VALUE: 31
PIF_VALUE: 21
PIF_VALUE: 30
PIF_VALUE: 6
PIF_VALUE: 0
PIF_VALUE: 31
PIF_VALUE: 30
PIF_VALUE: 32
PIF_VALUE: 32
PIF_VALUE: 30
PIF_VALUE: 30
PIF_VALUE: 34
PIF_VALUE: 32
PIF_VALUE: 1
PIF_VALUE: 1
PIF_VALUE: 31
PIF_VALUE: 20
PIF_VALUE: 31
PIF_VALUE: 30
PIF_VALUE: 0
PIF_VALUE: 33
PIF_VALUE: 15
PIF_VALUE: 31
PIF_VALUE: 30
PIF_VALUE: 31
PIF_VALUE: 33
PIF_VALUE: 30
PIF_VALUE: 30
PIF_VALUE: 31
PIF_VALUE: 30
PIF_VALUE: 31
PIF_VALUE: 31
PIF_VALUE: 2
PIF_VALUE: 25
PIF_VALUE: 31
PIF_VALUE: 30
PIF_VALUE: 31
PIF_VALUE: 30
PIF_VALUE: 31
PIF_VALUE: 32
PIF_VALUE: 29
PIF_VALUE: 0
PIF_VALUE: 30
PIF_VALUE: 31
PIF_VALUE: 31
PIF_VALUE: 25
PIF_VALUE: 31
PIF_VALUE: 24
PIF_VALUE: 29
PIF_VALUE: 29
PIF_VALUE: 25
PIF_VALUE: 32
PIF_VALUE: 2
PIF_VALUE: 1
PIF_VALUE: 31
PIF_VALUE: 30
PIF_VALUE: 31
PIF_VALUE: 31
PIF_VALUE: 33
PIF_VALUE: 2
PIF_VALUE: 20
PIF_VALUE: 33
PIF_VALUE: 30
PIF_VALUE: 31
PIF_VALUE: 19
PIF_VALUE: 26
PIF_VALUE: 1
PIF_VALUE: 27
PIF_VALUE: 31
PIF_VALUE: 3
PIF_VALUE: 31
PIF_VALUE: 33
PIF_VALUE: 32
PIF_VALUE: 32
PIF_VALUE: 0
PIF_VALUE: 30
PIF_VALUE: 1
PIF_VALUE: 30
PIF_VALUE: 2
PIF_VALUE: 30
PIF_VALUE: 33
PIF_VALUE: 1
PIF_VALUE: 29
PIF_VALUE: 27
PIF_VALUE: 33
PIF_VALUE: 34
PIF_VALUE: 30
PIF_VALUE: 31
PIF_VALUE: 34
PIF_VALUE: 29
PIF_VALUE: 30
PIF_VALUE: 33
PIF_VALUE: 28
PIF_VALUE: 30
PIF_VALUE: 1
PIF_VALUE: 2
PIF_VALUE: 32
PIF_VALUE: 36
PIF_VALUE: 33
PIF_VALUE: 31
PIF_VALUE: 31
PIF_VALUE: 37
PIF_VALUE: 1
PIF_VALUE: 31
PIF_VALUE: 35
PIF_VALUE: 31
PIF_VALUE: 4
PIF_VALUE: 30
PIF_VALUE: 1
PIF_VALUE: 3
PIF_VALUE: 0
PIF_VALUE: 32
PIF_VALUE: 27
PIF_VALUE: 30
PIF_VALUE: 31
PIF_VALUE: 27
PIF_VALUE: 31
PIF_VALUE: 31
PIF_VALUE: 4
PIF_VALUE: 31
PIF_VALUE: 22
PIF_VALUE: 20
PIF_VALUE: 27
PIF_VALUE: 30
PIF_VALUE: 31
PIF_VALUE: 30
PIF_VALUE: 31
PIF_VALUE: 32
PIF_VALUE: 33
PIF_VALUE: 30
PIF_VALUE: 31
PIF_VALUE: 31
PIF_VALUE: 30
PIF_VALUE: 30
PIF_VALUE: 33
PIF_VALUE: 33
PIF_VALUE: 1
PIF_VALUE: 30
PIF_VALUE: 33
PIF_VALUE: 28
PIF_VALUE: 20
PIF_VALUE: 30

## 2019-11-01 ASSESSMENT — PAIN SCALES - GENERAL
PAINLEVEL_OUTOF10: 9
PAINLEVEL_OUTOF10: 10
PAINLEVEL_OUTOF10: 0
PAINLEVEL_OUTOF10: 4
PAINLEVEL_OUTOF10: 10
PAINLEVEL_OUTOF10: 10
PAINLEVEL_OUTOF10: 0
PAINLEVEL_OUTOF10: 8

## 2019-11-01 ASSESSMENT — PAIN DESCRIPTION - PAIN TYPE
TYPE: SURGICAL PAIN

## 2019-11-01 ASSESSMENT — PAIN DESCRIPTION - DESCRIPTORS
DESCRIPTORS: ACHING;CONSTANT
DESCRIPTORS: ACHING;DISCOMFORT;SORE
DESCRIPTORS: ACHING;DISCOMFORT;SORE
DESCRIPTORS: ACHING;BURNING;CONSTANT
DESCRIPTORS: ACHING;BURNING;CONSTANT

## 2019-11-01 ASSESSMENT — PAIN - FUNCTIONAL ASSESSMENT: PAIN_FUNCTIONAL_ASSESSMENT: 0-10

## 2019-11-01 ASSESSMENT — PAIN DESCRIPTION - LOCATION
LOCATION: NECK

## 2019-11-01 ASSESSMENT — PAIN DESCRIPTION - FREQUENCY: FREQUENCY: CONTINUOUS

## 2019-11-01 ASSESSMENT — PAIN DESCRIPTION - PROGRESSION: CLINICAL_PROGRESSION: GRADUALLY WORSENING

## 2019-11-01 ASSESSMENT — PAIN DESCRIPTION - ONSET: ONSET: GRADUAL

## 2019-11-02 LAB
ANION GAP SERPL CALCULATED.3IONS-SCNC: 10 MMOL/L (ref 7–16)
BASOPHILS ABSOLUTE: 0.01 E9/L (ref 0–0.2)
BASOPHILS RELATIVE PERCENT: 0.1 % (ref 0–2)
BUN BLDV-MCNC: 10 MG/DL (ref 6–20)
CALCIUM SERPL-MCNC: 8.8 MG/DL (ref 8.6–10.2)
CHLORIDE BLD-SCNC: 102 MMOL/L (ref 98–107)
CO2: 26 MMOL/L (ref 22–29)
CREAT SERPL-MCNC: 0.7 MG/DL (ref 0.5–1)
EOSINOPHILS ABSOLUTE: 0 E9/L (ref 0.05–0.5)
EOSINOPHILS RELATIVE PERCENT: 0 % (ref 0–6)
GFR AFRICAN AMERICAN: >60
GFR NON-AFRICAN AMERICAN: >60 ML/MIN/1.73
GLUCOSE BLD-MCNC: 137 MG/DL (ref 74–99)
HCT VFR BLD CALC: 33.1 % (ref 34–48)
HEMOGLOBIN: 10.3 G/DL (ref 11.5–15.5)
IMMATURE GRANULOCYTES #: 0.14 E9/L
IMMATURE GRANULOCYTES %: 0.8 % (ref 0–5)
LYMPHOCYTES ABSOLUTE: 1.55 E9/L (ref 1.5–4)
LYMPHOCYTES RELATIVE PERCENT: 9.3 % (ref 20–42)
MCH RBC QN AUTO: 29.1 PG (ref 26–35)
MCHC RBC AUTO-ENTMCNC: 31.1 % (ref 32–34.5)
MCV RBC AUTO: 93.5 FL (ref 80–99.9)
MONOCYTES ABSOLUTE: 1.21 E9/L (ref 0.1–0.95)
MONOCYTES RELATIVE PERCENT: 7.3 % (ref 2–12)
NEUTROPHILS ABSOLUTE: 13.77 E9/L (ref 1.8–7.3)
NEUTROPHILS RELATIVE PERCENT: 82.5 % (ref 43–80)
PDW BLD-RTO: 13.4 FL (ref 11.5–15)
PLATELET # BLD: 293 E9/L (ref 130–450)
PMV BLD AUTO: 9.9 FL (ref 7–12)
POTASSIUM SERPL-SCNC: 4.3 MMOL/L (ref 3.5–5)
RBC # BLD: 3.54 E12/L (ref 3.5–5.5)
SODIUM BLD-SCNC: 138 MMOL/L (ref 132–146)
WBC # BLD: 16.7 E9/L (ref 4.5–11.5)

## 2019-11-02 PROCEDURE — 97166 OT EVAL MOD COMPLEX 45 MIN: CPT

## 2019-11-02 PROCEDURE — 6360000002 HC RX W HCPCS: Performed by: PHYSICIAN ASSISTANT

## 2019-11-02 PROCEDURE — 97530 THERAPEUTIC ACTIVITIES: CPT

## 2019-11-02 PROCEDURE — 2580000003 HC RX 258: Performed by: PHYSICIAN ASSISTANT

## 2019-11-02 PROCEDURE — 6360000002 HC RX W HCPCS: Performed by: NEUROLOGICAL SURGERY

## 2019-11-02 PROCEDURE — 1200000000 HC SEMI PRIVATE

## 2019-11-02 PROCEDURE — 6370000000 HC RX 637 (ALT 250 FOR IP): Performed by: PHYSICIAN ASSISTANT

## 2019-11-02 PROCEDURE — 80048 BASIC METABOLIC PNL TOTAL CA: CPT

## 2019-11-02 PROCEDURE — 36415 COLL VENOUS BLD VENIPUNCTURE: CPT

## 2019-11-02 PROCEDURE — 97161 PT EVAL LOW COMPLEX 20 MIN: CPT

## 2019-11-02 PROCEDURE — 85025 COMPLETE CBC W/AUTO DIFF WBC: CPT

## 2019-11-02 RX ADMIN — Medication 10 ML: at 21:08

## 2019-11-02 RX ADMIN — GABAPENTIN 400 MG: 400 CAPSULE ORAL at 16:05

## 2019-11-02 RX ADMIN — Medication 10 ML: at 08:31

## 2019-11-02 RX ADMIN — HYDROCODONE BITARTRATE AND ACETAMINOPHEN 1 TABLET: 10; 325 TABLET ORAL at 12:56

## 2019-11-02 RX ADMIN — CEFAZOLIN SODIUM 2 G: 2 SOLUTION INTRAVENOUS at 15:11

## 2019-11-02 RX ADMIN — DOCUSATE SODIUM 100 MG: 100 CAPSULE, LIQUID FILLED ORAL at 21:08

## 2019-11-02 RX ADMIN — MORPHINE SULFATE 4 MG: 4 INJECTION, SOLUTION INTRAMUSCULAR; INTRAVENOUS at 21:08

## 2019-11-02 RX ADMIN — CEFAZOLIN SODIUM 2 G: 2 SOLUTION INTRAVENOUS at 00:05

## 2019-11-02 RX ADMIN — DOCUSATE SODIUM 100 MG: 100 CAPSULE, LIQUID FILLED ORAL at 08:32

## 2019-11-02 RX ADMIN — MORPHINE SULFATE 4 MG: 4 INJECTION, SOLUTION INTRAMUSCULAR; INTRAVENOUS at 15:11

## 2019-11-02 RX ADMIN — PANTOPRAZOLE SODIUM 40 MG: 40 TABLET, DELAYED RELEASE ORAL at 06:58

## 2019-11-02 RX ADMIN — HYDROCODONE BITARTRATE AND ACETAMINOPHEN 1 TABLET: 10; 325 TABLET ORAL at 00:05

## 2019-11-02 RX ADMIN — FLUOXETINE HYDROCHLORIDE 40 MG: 20 CAPSULE ORAL at 08:32

## 2019-11-02 RX ADMIN — CEFAZOLIN SODIUM 2 G: 2 SOLUTION INTRAVENOUS at 06:58

## 2019-11-02 RX ADMIN — DOCUSATE SODIUM AND SENNOSIDES 1 TABLET: 50; 8.6 TABLET, FILM COATED ORAL at 21:08

## 2019-11-02 RX ADMIN — GABAPENTIN 400 MG: 400 CAPSULE ORAL at 08:32

## 2019-11-02 RX ADMIN — CYCLOBENZAPRINE 5 MG: 10 TABLET, FILM COATED ORAL at 02:47

## 2019-11-02 RX ADMIN — CYCLOBENZAPRINE 5 MG: 10 TABLET, FILM COATED ORAL at 16:05

## 2019-11-02 RX ADMIN — MORPHINE SULFATE 4 MG: 4 INJECTION, SOLUTION INTRAMUSCULAR; INTRAVENOUS at 23:59

## 2019-11-02 RX ADMIN — GABAPENTIN 400 MG: 400 CAPSULE ORAL at 21:08

## 2019-11-02 RX ADMIN — MORPHINE SULFATE 4 MG: 4 INJECTION, SOLUTION INTRAMUSCULAR; INTRAVENOUS at 02:48

## 2019-11-02 RX ADMIN — HYDROCODONE BITARTRATE AND ACETAMINOPHEN 1 TABLET: 10; 325 TABLET ORAL at 06:58

## 2019-11-02 RX ADMIN — MORPHINE SULFATE 4 MG: 4 INJECTION, SOLUTION INTRAMUSCULAR; INTRAVENOUS at 08:31

## 2019-11-02 RX ADMIN — CEFAZOLIN SODIUM 2 G: 2 SOLUTION INTRAVENOUS at 23:59

## 2019-11-02 RX ADMIN — MORPHINE SULFATE 4 MG: 4 INJECTION, SOLUTION INTRAMUSCULAR; INTRAVENOUS at 18:35

## 2019-11-02 RX ADMIN — DOCUSATE SODIUM AND SENNOSIDES 1 TABLET: 50; 8.6 TABLET, FILM COATED ORAL at 08:32

## 2019-11-02 RX ADMIN — HYDROCODONE BITARTRATE AND ACETAMINOPHEN 1 TABLET: 10; 325 TABLET ORAL at 20:05

## 2019-11-02 ASSESSMENT — PAIN DESCRIPTION - DESCRIPTORS
DESCRIPTORS: ACHING;DISCOMFORT;SORE
DESCRIPTORS: ACHING;SHARP;SORE
DESCRIPTORS: ACHING;SHARP;SORE;DISCOMFORT
DESCRIPTORS: ACHING;DISCOMFORT;SORE

## 2019-11-02 ASSESSMENT — PAIN - FUNCTIONAL ASSESSMENT
PAIN_FUNCTIONAL_ASSESSMENT: PREVENTS OR INTERFERES SOME ACTIVE ACTIVITIES AND ADLS
PAIN_FUNCTIONAL_ASSESSMENT: PREVENTS OR INTERFERES SOME ACTIVE ACTIVITIES AND ADLS

## 2019-11-02 ASSESSMENT — PAIN DESCRIPTION - PROGRESSION
CLINICAL_PROGRESSION: GRADUALLY IMPROVING
CLINICAL_PROGRESSION: NOT CHANGED

## 2019-11-02 ASSESSMENT — PAIN SCALES - GENERAL
PAINLEVEL_OUTOF10: 10
PAINLEVEL_OUTOF10: 8
PAINLEVEL_OUTOF10: 10
PAINLEVEL_OUTOF10: 4
PAINLEVEL_OUTOF10: 10
PAINLEVEL_OUTOF10: 10
PAINLEVEL_OUTOF10: 9
PAINLEVEL_OUTOF10: 10
PAINLEVEL_OUTOF10: 10
PAINLEVEL_OUTOF10: 5

## 2019-11-02 ASSESSMENT — PAIN DESCRIPTION - PAIN TYPE
TYPE: SURGICAL PAIN
TYPE: ACUTE PAIN
TYPE: SURGICAL PAIN

## 2019-11-02 ASSESSMENT — PAIN DESCRIPTION - LOCATION
LOCATION: NECK

## 2019-11-02 ASSESSMENT — PAIN DESCRIPTION - ORIENTATION
ORIENTATION: MID
ORIENTATION: POSTERIOR

## 2019-11-02 ASSESSMENT — PAIN DESCRIPTION - FREQUENCY
FREQUENCY: CONTINUOUS
FREQUENCY: CONTINUOUS

## 2019-11-02 ASSESSMENT — PAIN DESCRIPTION - ONSET
ONSET: ON-GOING
ONSET: ON-GOING

## 2019-11-03 PROCEDURE — 6360000002 HC RX W HCPCS: Performed by: PHYSICIAN ASSISTANT

## 2019-11-03 PROCEDURE — 2700000000 HC OXYGEN THERAPY PER DAY

## 2019-11-03 PROCEDURE — 6360000002 HC RX W HCPCS: Performed by: NEUROLOGICAL SURGERY

## 2019-11-03 PROCEDURE — 1200000000 HC SEMI PRIVATE

## 2019-11-03 PROCEDURE — 6370000000 HC RX 637 (ALT 250 FOR IP): Performed by: NEUROLOGICAL SURGERY

## 2019-11-03 PROCEDURE — 6370000000 HC RX 637 (ALT 250 FOR IP): Performed by: PHYSICIAN ASSISTANT

## 2019-11-03 PROCEDURE — 2580000003 HC RX 258: Performed by: PHYSICIAN ASSISTANT

## 2019-11-03 RX ORDER — ACETAMINOPHEN 325 MG/1
650 TABLET ORAL EVERY 4 HOURS PRN
Status: DISCONTINUED | OUTPATIENT
Start: 2019-11-03 | End: 2019-11-06 | Stop reason: HOSPADM

## 2019-11-03 RX ADMIN — HYDROCODONE BITARTRATE AND ACETAMINOPHEN 1 TABLET: 10; 325 TABLET ORAL at 09:23

## 2019-11-03 RX ADMIN — Medication 10 ML: at 09:24

## 2019-11-03 RX ADMIN — DOCUSATE SODIUM 100 MG: 100 CAPSULE, LIQUID FILLED ORAL at 09:24

## 2019-11-03 RX ADMIN — GABAPENTIN 400 MG: 400 CAPSULE ORAL at 15:59

## 2019-11-03 RX ADMIN — HYDROCODONE BITARTRATE AND ACETAMINOPHEN 1 TABLET: 10; 325 TABLET ORAL at 17:57

## 2019-11-03 RX ADMIN — MAGNESIUM HYDROXIDE 30 ML: 400 SUSPENSION ORAL at 13:25

## 2019-11-03 RX ADMIN — GABAPENTIN 400 MG: 400 CAPSULE ORAL at 21:56

## 2019-11-03 RX ADMIN — HYDROCODONE BITARTRATE AND ACETAMINOPHEN 1 TABLET: 10; 325 TABLET ORAL at 23:55

## 2019-11-03 RX ADMIN — CYCLOBENZAPRINE 5 MG: 10 TABLET, FILM COATED ORAL at 23:55

## 2019-11-03 RX ADMIN — HYDROCODONE BITARTRATE AND ACETAMINOPHEN 1 TABLET: 10; 325 TABLET ORAL at 13:24

## 2019-11-03 RX ADMIN — CYCLOBENZAPRINE 5 MG: 10 TABLET, FILM COATED ORAL at 09:23

## 2019-11-03 RX ADMIN — ACETAMINOPHEN 650 MG: 325 TABLET, FILM COATED ORAL at 15:59

## 2019-11-03 RX ADMIN — CEFAZOLIN SODIUM 2 G: 2 SOLUTION INTRAVENOUS at 23:55

## 2019-11-03 RX ADMIN — CEFAZOLIN SODIUM 2 G: 2 SOLUTION INTRAVENOUS at 15:59

## 2019-11-03 RX ADMIN — CYCLOBENZAPRINE 5 MG: 10 TABLET, FILM COATED ORAL at 04:20

## 2019-11-03 RX ADMIN — DOCUSATE SODIUM 100 MG: 100 CAPSULE, LIQUID FILLED ORAL at 21:56

## 2019-11-03 RX ADMIN — CEFAZOLIN SODIUM 2 G: 2 SOLUTION INTRAVENOUS at 06:42

## 2019-11-03 RX ADMIN — PANTOPRAZOLE SODIUM 40 MG: 40 TABLET, DELAYED RELEASE ORAL at 06:42

## 2019-11-03 RX ADMIN — FLUOXETINE HYDROCHLORIDE 40 MG: 20 CAPSULE ORAL at 09:24

## 2019-11-03 RX ADMIN — Medication 10 ML: at 21:57

## 2019-11-03 RX ADMIN — MORPHINE SULFATE 4 MG: 4 INJECTION, SOLUTION INTRAMUSCULAR; INTRAVENOUS at 06:42

## 2019-11-03 RX ADMIN — GABAPENTIN 400 MG: 400 CAPSULE ORAL at 09:24

## 2019-11-03 RX ADMIN — HYDROCODONE BITARTRATE AND ACETAMINOPHEN 1 TABLET: 10; 325 TABLET ORAL at 04:20

## 2019-11-03 RX ADMIN — DOCUSATE SODIUM AND SENNOSIDES 1 TABLET: 50; 8.6 TABLET, FILM COATED ORAL at 21:56

## 2019-11-03 RX ADMIN — DOCUSATE SODIUM AND SENNOSIDES 1 TABLET: 50; 8.6 TABLET, FILM COATED ORAL at 09:24

## 2019-11-03 ASSESSMENT — PAIN DESCRIPTION - ONSET
ONSET: ON-GOING

## 2019-11-03 ASSESSMENT — PAIN DESCRIPTION - FREQUENCY
FREQUENCY: CONTINUOUS

## 2019-11-03 ASSESSMENT — PAIN DESCRIPTION - PAIN TYPE
TYPE: SURGICAL PAIN
TYPE: ACUTE PAIN
TYPE: SURGICAL PAIN

## 2019-11-03 ASSESSMENT — PAIN DESCRIPTION - LOCATION
LOCATION: BACK
LOCATION: NECK

## 2019-11-03 ASSESSMENT — PAIN DESCRIPTION - PROGRESSION
CLINICAL_PROGRESSION: NOT CHANGED

## 2019-11-03 ASSESSMENT — PAIN - FUNCTIONAL ASSESSMENT
PAIN_FUNCTIONAL_ASSESSMENT: PREVENTS OR INTERFERES SOME ACTIVE ACTIVITIES AND ADLS

## 2019-11-03 ASSESSMENT — PAIN DESCRIPTION - ORIENTATION
ORIENTATION: MID
ORIENTATION: MID

## 2019-11-03 ASSESSMENT — PAIN DESCRIPTION - DESCRIPTORS
DESCRIPTORS: ACHING;DISCOMFORT;SORE
DESCRIPTORS: ACHING;DISCOMFORT;SORE
DESCRIPTORS: ACHING;SHARP;SORE
DESCRIPTORS: ACHING;SHARP;SORE
DESCRIPTORS: ACHING;SHARP;SORE;DISCOMFORT

## 2019-11-03 ASSESSMENT — PAIN SCALES - GENERAL
PAINLEVEL_OUTOF10: 10

## 2019-11-04 PROCEDURE — 6370000000 HC RX 637 (ALT 250 FOR IP): Performed by: NEUROLOGICAL SURGERY

## 2019-11-04 PROCEDURE — 97530 THERAPEUTIC ACTIVITIES: CPT

## 2019-11-04 PROCEDURE — 6360000002 HC RX W HCPCS: Performed by: NEUROLOGICAL SURGERY

## 2019-11-04 PROCEDURE — 1200000000 HC SEMI PRIVATE

## 2019-11-04 PROCEDURE — 6360000002 HC RX W HCPCS: Performed by: PHYSICIAN ASSISTANT

## 2019-11-04 PROCEDURE — 6370000000 HC RX 637 (ALT 250 FOR IP): Performed by: PHYSICIAN ASSISTANT

## 2019-11-04 PROCEDURE — 2580000003 HC RX 258: Performed by: PHYSICIAN ASSISTANT

## 2019-11-04 RX ORDER — OXYCODONE HCL 10 MG/1
10 TABLET, FILM COATED, EXTENDED RELEASE ORAL EVERY 12 HOURS SCHEDULED
Status: DISCONTINUED | OUTPATIENT
Start: 2019-11-04 | End: 2019-11-06 | Stop reason: HOSPADM

## 2019-11-04 RX ADMIN — CEFAZOLIN SODIUM 2 G: 2 SOLUTION INTRAVENOUS at 06:24

## 2019-11-04 RX ADMIN — GABAPENTIN 400 MG: 400 CAPSULE ORAL at 09:04

## 2019-11-04 RX ADMIN — GABAPENTIN 400 MG: 400 CAPSULE ORAL at 15:05

## 2019-11-04 RX ADMIN — CYCLOBENZAPRINE 5 MG: 10 TABLET, FILM COATED ORAL at 22:05

## 2019-11-04 RX ADMIN — Medication 10 ML: at 09:05

## 2019-11-04 RX ADMIN — PANTOPRAZOLE SODIUM 40 MG: 40 TABLET, DELAYED RELEASE ORAL at 06:24

## 2019-11-04 RX ADMIN — GABAPENTIN 400 MG: 400 CAPSULE ORAL at 22:06

## 2019-11-04 RX ADMIN — HYDROCODONE BITARTRATE AND ACETAMINOPHEN 1 TABLET: 10; 325 TABLET ORAL at 18:23

## 2019-11-04 RX ADMIN — MORPHINE SULFATE 4 MG: 4 INJECTION, SOLUTION INTRAMUSCULAR; INTRAVENOUS at 09:18

## 2019-11-04 RX ADMIN — FLUOXETINE HYDROCHLORIDE 40 MG: 20 CAPSULE ORAL at 09:05

## 2019-11-04 RX ADMIN — OXYCODONE HYDROCHLORIDE 10 MG: 10 TABLET, FILM COATED, EXTENDED RELEASE ORAL at 12:42

## 2019-11-04 RX ADMIN — DOCUSATE SODIUM 100 MG: 100 CAPSULE, LIQUID FILLED ORAL at 22:06

## 2019-11-04 RX ADMIN — HYDROCODONE BITARTRATE AND ACETAMINOPHEN 1 TABLET: 10; 325 TABLET ORAL at 06:24

## 2019-11-04 RX ADMIN — DOCUSATE SODIUM AND SENNOSIDES 1 TABLET: 50; 8.6 TABLET, FILM COATED ORAL at 09:04

## 2019-11-04 RX ADMIN — DOCUSATE SODIUM AND SENNOSIDES 1 TABLET: 50; 8.6 TABLET, FILM COATED ORAL at 22:05

## 2019-11-04 RX ADMIN — MAGNESIUM HYDROXIDE 30 ML: 400 SUSPENSION ORAL at 09:04

## 2019-11-04 RX ADMIN — OXYCODONE HYDROCHLORIDE 10 MG: 10 TABLET, FILM COATED, EXTENDED RELEASE ORAL at 22:06

## 2019-11-04 RX ADMIN — Medication 10 ML: at 22:06

## 2019-11-04 RX ADMIN — DOCUSATE SODIUM 100 MG: 100 CAPSULE, LIQUID FILLED ORAL at 09:05

## 2019-11-04 ASSESSMENT — PAIN SCALES - GENERAL
PAINLEVEL_OUTOF10: 10

## 2019-11-04 ASSESSMENT — PAIN DESCRIPTION - DESCRIPTORS
DESCRIPTORS: ACHING;DISCOMFORT;SORE
DESCRIPTORS: ACHING;DISCOMFORT;SORE

## 2019-11-04 ASSESSMENT — PAIN DESCRIPTION - PAIN TYPE
TYPE: SURGICAL PAIN
TYPE: SURGICAL PAIN

## 2019-11-04 ASSESSMENT — PAIN DESCRIPTION - LOCATION
LOCATION: NECK
LOCATION: NECK

## 2019-11-05 PROCEDURE — 97112 NEUROMUSCULAR REEDUCATION: CPT

## 2019-11-05 PROCEDURE — 97110 THERAPEUTIC EXERCISES: CPT

## 2019-11-05 PROCEDURE — 6360000002 HC RX W HCPCS: Performed by: PHYSICIAN ASSISTANT

## 2019-11-05 PROCEDURE — 6370000000 HC RX 637 (ALT 250 FOR IP): Performed by: PHYSICIAN ASSISTANT

## 2019-11-05 PROCEDURE — 97530 THERAPEUTIC ACTIVITIES: CPT

## 2019-11-05 PROCEDURE — 97535 SELF CARE MNGMENT TRAINING: CPT

## 2019-11-05 PROCEDURE — 6370000000 HC RX 637 (ALT 250 FOR IP): Performed by: NEUROLOGICAL SURGERY

## 2019-11-05 PROCEDURE — 2580000003 HC RX 258: Performed by: PHYSICIAN ASSISTANT

## 2019-11-05 PROCEDURE — 1200000000 HC SEMI PRIVATE

## 2019-11-05 RX ADMIN — OXYCODONE HYDROCHLORIDE 10 MG: 10 TABLET, FILM COATED, EXTENDED RELEASE ORAL at 21:11

## 2019-11-05 RX ADMIN — HYDROCODONE BITARTRATE AND ACETAMINOPHEN 1 TABLET: 10; 325 TABLET ORAL at 16:38

## 2019-11-05 RX ADMIN — DOCUSATE SODIUM AND SENNOSIDES 1 TABLET: 50; 8.6 TABLET, FILM COATED ORAL at 09:09

## 2019-11-05 RX ADMIN — PANTOPRAZOLE SODIUM 40 MG: 40 TABLET, DELAYED RELEASE ORAL at 06:39

## 2019-11-05 RX ADMIN — GABAPENTIN 400 MG: 400 CAPSULE ORAL at 21:11

## 2019-11-05 RX ADMIN — GABAPENTIN 400 MG: 400 CAPSULE ORAL at 14:20

## 2019-11-05 RX ADMIN — MAGNESIUM HYDROXIDE 30 ML: 400 SUSPENSION ORAL at 09:08

## 2019-11-05 RX ADMIN — DOCUSATE SODIUM 100 MG: 100 CAPSULE, LIQUID FILLED ORAL at 09:08

## 2019-11-05 RX ADMIN — Medication 10 ML: at 21:11

## 2019-11-05 RX ADMIN — HYDROCODONE BITARTRATE AND ACETAMINOPHEN 1 TABLET: 10; 325 TABLET ORAL at 02:17

## 2019-11-05 RX ADMIN — FLUOXETINE HYDROCHLORIDE 40 MG: 20 CAPSULE ORAL at 09:08

## 2019-11-05 RX ADMIN — HYDROCODONE BITARTRATE AND ACETAMINOPHEN 1 TABLET: 10; 325 TABLET ORAL at 06:39

## 2019-11-05 RX ADMIN — OXYCODONE HYDROCHLORIDE 10 MG: 10 TABLET, FILM COATED, EXTENDED RELEASE ORAL at 09:08

## 2019-11-05 RX ADMIN — DOCUSATE SODIUM AND SENNOSIDES 1 TABLET: 50; 8.6 TABLET, FILM COATED ORAL at 21:11

## 2019-11-05 RX ADMIN — MORPHINE SULFATE 4 MG: 4 INJECTION, SOLUTION INTRAMUSCULAR; INTRAVENOUS at 18:13

## 2019-11-05 RX ADMIN — DOCUSATE SODIUM 100 MG: 100 CAPSULE, LIQUID FILLED ORAL at 21:11

## 2019-11-05 RX ADMIN — Medication 10 ML: at 09:09

## 2019-11-05 RX ADMIN — GABAPENTIN 400 MG: 400 CAPSULE ORAL at 09:08

## 2019-11-05 RX ADMIN — CYCLOBENZAPRINE 5 MG: 10 TABLET, FILM COATED ORAL at 21:18

## 2019-11-05 RX ADMIN — Medication 10 ML: at 18:13

## 2019-11-05 ASSESSMENT — PAIN DESCRIPTION - PAIN TYPE
TYPE: SURGICAL PAIN

## 2019-11-05 ASSESSMENT — PAIN DESCRIPTION - LOCATION
LOCATION: NECK
LOCATION: NECK;SHOULDER

## 2019-11-05 ASSESSMENT — PAIN SCALES - GENERAL
PAINLEVEL_OUTOF10: 10
PAINLEVEL_OUTOF10: 9

## 2019-11-05 ASSESSMENT — PAIN DESCRIPTION - DESCRIPTORS
DESCRIPTORS: ACHING;DISCOMFORT;SORE
DESCRIPTORS: SHARP;THROBBING;TENDER
DESCRIPTORS: ACHING;DISCOMFORT;SORE
DESCRIPTORS: ACHING;DISCOMFORT

## 2019-11-05 ASSESSMENT — PAIN - FUNCTIONAL ASSESSMENT: PAIN_FUNCTIONAL_ASSESSMENT: PREVENTS OR INTERFERES SOME ACTIVE ACTIVITIES AND ADLS

## 2019-11-05 ASSESSMENT — PAIN DESCRIPTION - ONSET: ONSET: ON-GOING

## 2019-11-05 ASSESSMENT — PAIN DESCRIPTION - PROGRESSION: CLINICAL_PROGRESSION: NOT CHANGED

## 2019-11-05 ASSESSMENT — PAIN DESCRIPTION - FREQUENCY
FREQUENCY: CONTINUOUS
FREQUENCY: CONTINUOUS

## 2019-11-06 VITALS
SYSTOLIC BLOOD PRESSURE: 113 MMHG | WEIGHT: 218 LBS | HEIGHT: 63 IN | OXYGEN SATURATION: 93 % | HEART RATE: 102 BPM | RESPIRATION RATE: 18 BRPM | BODY MASS INDEX: 38.62 KG/M2 | TEMPERATURE: 98.8 F | DIASTOLIC BLOOD PRESSURE: 72 MMHG

## 2019-11-06 PROCEDURE — 6370000000 HC RX 637 (ALT 250 FOR IP): Performed by: PHYSICIAN ASSISTANT

## 2019-11-06 PROCEDURE — 2580000003 HC RX 258: Performed by: PHYSICIAN ASSISTANT

## 2019-11-06 PROCEDURE — 6370000000 HC RX 637 (ALT 250 FOR IP): Performed by: NEUROLOGICAL SURGERY

## 2019-11-06 PROCEDURE — 97530 THERAPEUTIC ACTIVITIES: CPT

## 2019-11-06 RX ORDER — OXYCODONE HCL 10 MG/1
10 TABLET, FILM COATED, EXTENDED RELEASE ORAL EVERY 12 HOURS SCHEDULED
Qty: 60 EACH | Refills: 0 | Status: ON HOLD | OUTPATIENT
Start: 2019-11-06 | End: 2019-11-15 | Stop reason: HOSPADM

## 2019-11-06 RX ORDER — HYDROCODONE BITARTRATE AND ACETAMINOPHEN 10; 325 MG/1; MG/1
1 TABLET ORAL EVERY 4 HOURS PRN
Qty: 42 TABLET | Refills: 0 | Status: ON HOLD | OUTPATIENT
Start: 2019-11-06 | End: 2019-11-15 | Stop reason: HOSPADM

## 2019-11-06 RX ADMIN — OXYCODONE HYDROCHLORIDE 10 MG: 10 TABLET, FILM COATED, EXTENDED RELEASE ORAL at 09:16

## 2019-11-06 RX ADMIN — GABAPENTIN 400 MG: 400 CAPSULE ORAL at 09:17

## 2019-11-06 RX ADMIN — GABAPENTIN 400 MG: 400 CAPSULE ORAL at 15:19

## 2019-11-06 RX ADMIN — PANTOPRAZOLE SODIUM 40 MG: 40 TABLET, DELAYED RELEASE ORAL at 06:21

## 2019-11-06 RX ADMIN — MAGNESIUM CITRATE 296 ML: 1.75 LIQUID ORAL at 00:39

## 2019-11-06 RX ADMIN — Medication 10 ML: at 09:18

## 2019-11-06 RX ADMIN — HYDROCODONE BITARTRATE AND ACETAMINOPHEN 1 TABLET: 10; 325 TABLET ORAL at 13:15

## 2019-11-06 RX ADMIN — FLUOXETINE HYDROCHLORIDE 40 MG: 20 CAPSULE ORAL at 09:16

## 2019-11-06 RX ADMIN — HYDROCODONE BITARTRATE AND ACETAMINOPHEN 1 TABLET: 10; 325 TABLET ORAL at 04:54

## 2019-11-06 RX ADMIN — HYDROCODONE BITARTRATE AND ACETAMINOPHEN 1 TABLET: 10; 325 TABLET ORAL at 00:47

## 2019-11-06 ASSESSMENT — PAIN SCALES - GENERAL
PAINLEVEL_OUTOF10: 4
PAINLEVEL_OUTOF10: 0
PAINLEVEL_OUTOF10: 10
PAINLEVEL_OUTOF10: 0
PAINLEVEL_OUTOF10: 10
PAINLEVEL_OUTOF10: 10

## 2019-11-10 ENCOUNTER — APPOINTMENT (OUTPATIENT)
Dept: GENERAL RADIOLOGY | Age: 34
DRG: 711 | End: 2019-11-10
Payer: COMMERCIAL

## 2019-11-10 ENCOUNTER — APPOINTMENT (OUTPATIENT)
Dept: CT IMAGING | Age: 34
DRG: 711 | End: 2019-11-10
Payer: COMMERCIAL

## 2019-11-10 ENCOUNTER — HOSPITAL ENCOUNTER (INPATIENT)
Age: 34
LOS: 5 days | Discharge: HOME HEALTH | DRG: 711 | End: 2019-11-15
Attending: EMERGENCY MEDICINE | Admitting: INTERNAL MEDICINE
Payer: COMMERCIAL

## 2019-11-10 DIAGNOSIS — T81.49XA WOUND INFECTION AFTER SURGERY: ICD-10-CM

## 2019-11-10 DIAGNOSIS — R50.9 FEVER, UNSPECIFIED FEVER CAUSE: Primary | ICD-10-CM

## 2019-11-10 DIAGNOSIS — T81.49XA POSTOPERATIVE WOUND INFECTION: ICD-10-CM

## 2019-11-10 LAB
ALBUMIN SERPL-MCNC: 3.8 G/DL (ref 3.5–5.2)
ALP BLD-CCNC: 125 U/L (ref 35–104)
ALT SERPL-CCNC: 29 U/L (ref 0–32)
ANION GAP SERPL CALCULATED.3IONS-SCNC: 8 MMOL/L (ref 7–16)
AST SERPL-CCNC: 24 U/L (ref 0–31)
BASOPHILS ABSOLUTE: 0.05 E9/L (ref 0–0.2)
BASOPHILS RELATIVE PERCENT: 0.3 % (ref 0–2)
BILIRUB SERPL-MCNC: <0.2 MG/DL (ref 0–1.2)
BILIRUBIN URINE: NEGATIVE
BLOOD, URINE: NEGATIVE
BUN BLDV-MCNC: 7 MG/DL (ref 6–20)
C-REACTIVE PROTEIN: 3.1 MG/DL (ref 0–0.4)
CALCIUM SERPL-MCNC: 9.9 MG/DL (ref 8.6–10.2)
CHLORIDE BLD-SCNC: 99 MMOL/L (ref 98–107)
CLARITY: CLEAR
CO2: 29 MMOL/L (ref 22–29)
COLOR: YELLOW
CREAT SERPL-MCNC: 0.8 MG/DL (ref 0.5–1)
EKG ATRIAL RATE: 99 BPM
EKG P AXIS: 39 DEGREES
EKG P-R INTERVAL: 144 MS
EKG Q-T INTERVAL: 334 MS
EKG QRS DURATION: 74 MS
EKG QTC CALCULATION (BAZETT): 428 MS
EKG R AXIS: 35 DEGREES
EKG T AXIS: 3 DEGREES
EKG VENTRICULAR RATE: 99 BPM
EOSINOPHILS ABSOLUTE: 0.54 E9/L (ref 0.05–0.5)
EOSINOPHILS RELATIVE PERCENT: 3.7 % (ref 0–6)
GFR AFRICAN AMERICAN: >60
GFR NON-AFRICAN AMERICAN: >60 ML/MIN/1.73
GLUCOSE BLD-MCNC: 103 MG/DL (ref 74–99)
GLUCOSE URINE: NEGATIVE MG/DL
HCT VFR BLD CALC: 31.3 % (ref 34–48)
HEMOGLOBIN: 10 G/DL (ref 11.5–15.5)
IMMATURE GRANULOCYTES #: 0.1 E9/L
IMMATURE GRANULOCYTES %: 0.7 % (ref 0–5)
KETONES, URINE: NEGATIVE MG/DL
LACTIC ACID: 0.9 MMOL/L (ref 0.5–2.2)
LEUKOCYTE ESTERASE, URINE: NEGATIVE
LYMPHOCYTES ABSOLUTE: 3.01 E9/L (ref 1.5–4)
LYMPHOCYTES RELATIVE PERCENT: 20.7 % (ref 20–42)
MCH RBC QN AUTO: 29.2 PG (ref 26–35)
MCHC RBC AUTO-ENTMCNC: 31.9 % (ref 32–34.5)
MCV RBC AUTO: 91.3 FL (ref 80–99.9)
MONOCYTES ABSOLUTE: 1.19 E9/L (ref 0.1–0.95)
MONOCYTES RELATIVE PERCENT: 8.2 % (ref 2–12)
NEUTROPHILS ABSOLUTE: 9.62 E9/L (ref 1.8–7.3)
NEUTROPHILS RELATIVE PERCENT: 66.4 % (ref 43–80)
NITRITE, URINE: NEGATIVE
PDW BLD-RTO: 13 FL (ref 11.5–15)
PH UA: 6 (ref 5–9)
PLATELET # BLD: 452 E9/L (ref 130–450)
PMV BLD AUTO: 9.3 FL (ref 7–12)
POTASSIUM REFLEX MAGNESIUM: 4.1 MMOL/L (ref 3.5–5)
PROCALCITONIN: 0.04 NG/ML (ref 0–0.08)
PROTEIN UA: NEGATIVE MG/DL
RBC # BLD: 3.43 E12/L (ref 3.5–5.5)
SEDIMENTATION RATE, ERYTHROCYTE: 121 MM/HR (ref 0–20)
SODIUM BLD-SCNC: 136 MMOL/L (ref 132–146)
SPECIFIC GRAVITY UA: 1.01 (ref 1–1.03)
TOTAL PROTEIN: 8.1 G/DL (ref 6.4–8.3)
UROBILINOGEN, URINE: 0.2 E.U./DL
WBC # BLD: 14.5 E9/L (ref 4.5–11.5)

## 2019-11-10 PROCEDURE — 6360000002 HC RX W HCPCS: Performed by: NURSE PRACTITIONER

## 2019-11-10 PROCEDURE — 6360000002 HC RX W HCPCS: Performed by: EMERGENCY MEDICINE

## 2019-11-10 PROCEDURE — 87186 SC STD MICRODIL/AGAR DIL: CPT

## 2019-11-10 PROCEDURE — 6360000002 HC RX W HCPCS: Performed by: SPECIALIST

## 2019-11-10 PROCEDURE — 80053 COMPREHEN METABOLIC PANEL: CPT

## 2019-11-10 PROCEDURE — 87088 URINE BACTERIA CULTURE: CPT

## 2019-11-10 PROCEDURE — 72125 CT NECK SPINE W/O DYE: CPT

## 2019-11-10 PROCEDURE — 94760 N-INVAS EAR/PLS OXIMETRY 1: CPT

## 2019-11-10 PROCEDURE — 85651 RBC SED RATE NONAUTOMATED: CPT

## 2019-11-10 PROCEDURE — 87040 BLOOD CULTURE FOR BACTERIA: CPT

## 2019-11-10 PROCEDURE — 6370000000 HC RX 637 (ALT 250 FOR IP): Performed by: INTERNAL MEDICINE

## 2019-11-10 PROCEDURE — 71045 X-RAY EXAM CHEST 1 VIEW: CPT

## 2019-11-10 PROCEDURE — 99285 EMERGENCY DEPT VISIT HI MDM: CPT

## 2019-11-10 PROCEDURE — 86140 C-REACTIVE PROTEIN: CPT

## 2019-11-10 PROCEDURE — 83605 ASSAY OF LACTIC ACID: CPT

## 2019-11-10 PROCEDURE — 96375 TX/PRO/DX INJ NEW DRUG ADDON: CPT

## 2019-11-10 PROCEDURE — 2580000003 HC RX 258: Performed by: NURSE PRACTITIONER

## 2019-11-10 PROCEDURE — 2580000003 HC RX 258: Performed by: EMERGENCY MEDICINE

## 2019-11-10 PROCEDURE — 2140000000 HC CCU INTERMEDIATE R&B

## 2019-11-10 PROCEDURE — 84145 PROCALCITONIN (PCT): CPT

## 2019-11-10 PROCEDURE — 87070 CULTURE OTHR SPECIMN AEROBIC: CPT

## 2019-11-10 PROCEDURE — 81003 URINALYSIS AUTO W/O SCOPE: CPT

## 2019-11-10 PROCEDURE — 36415 COLL VENOUS BLD VENIPUNCTURE: CPT

## 2019-11-10 PROCEDURE — 93010 ELECTROCARDIOGRAM REPORT: CPT | Performed by: INTERNAL MEDICINE

## 2019-11-10 PROCEDURE — 85025 COMPLETE CBC W/AUTO DIFF WBC: CPT

## 2019-11-10 PROCEDURE — 2580000003 HC RX 258: Performed by: SPECIALIST

## 2019-11-10 PROCEDURE — 6370000000 HC RX 637 (ALT 250 FOR IP): Performed by: NURSE PRACTITIONER

## 2019-11-10 PROCEDURE — 6360000002 HC RX W HCPCS: Performed by: INTERNAL MEDICINE

## 2019-11-10 PROCEDURE — 93005 ELECTROCARDIOGRAM TRACING: CPT | Performed by: NURSE PRACTITIONER

## 2019-11-10 PROCEDURE — 96365 THER/PROPH/DIAG IV INF INIT: CPT

## 2019-11-10 RX ORDER — FLUOXETINE 10 MG/1
40 TABLET, FILM COATED ORAL DAILY
COMMUNITY
End: 2019-12-09

## 2019-11-10 RX ORDER — PANTOPRAZOLE SODIUM 40 MG/1
40 TABLET, DELAYED RELEASE ORAL
Status: DISCONTINUED | OUTPATIENT
Start: 2019-11-10 | End: 2019-11-15 | Stop reason: HOSPADM

## 2019-11-10 RX ORDER — ALBUTEROL SULFATE 2.5 MG/3ML
2.5 SOLUTION RESPIRATORY (INHALATION) EVERY 6 HOURS PRN
Status: DISCONTINUED | OUTPATIENT
Start: 2019-11-10 | End: 2019-11-15 | Stop reason: HOSPADM

## 2019-11-10 RX ORDER — SODIUM CHLORIDE 0.9 % (FLUSH) 0.9 %
10 SYRINGE (ML) INJECTION PRN
Status: DISCONTINUED | OUTPATIENT
Start: 2019-11-10 | End: 2019-11-11 | Stop reason: SDUPTHER

## 2019-11-10 RX ORDER — SODIUM CHLORIDE 0.9 % (FLUSH) 0.9 %
10 SYRINGE (ML) INJECTION PRN
Status: DISCONTINUED | OUTPATIENT
Start: 2019-11-10 | End: 2019-11-10 | Stop reason: SDUPTHER

## 2019-11-10 RX ORDER — HYDROCODONE BITARTRATE AND ACETAMINOPHEN 10; 325 MG/1; MG/1
1 TABLET ORAL EVERY 4 HOURS PRN
Status: DISCONTINUED | OUTPATIENT
Start: 2019-11-10 | End: 2019-11-15 | Stop reason: HOSPADM

## 2019-11-10 RX ORDER — SODIUM CHLORIDE 0.9 % (FLUSH) 0.9 %
10 SYRINGE (ML) INJECTION EVERY 12 HOURS SCHEDULED
Status: DISCONTINUED | OUTPATIENT
Start: 2019-11-10 | End: 2019-11-11 | Stop reason: SDUPTHER

## 2019-11-10 RX ORDER — VITAMIN B COMPLEX
1000 TABLET ORAL DAILY
Status: DISCONTINUED | OUTPATIENT
Start: 2019-11-10 | End: 2019-11-15 | Stop reason: HOSPADM

## 2019-11-10 RX ORDER — ACETAMINOPHEN 325 MG/1
650 TABLET ORAL EVERY 4 HOURS PRN
Status: DISCONTINUED | OUTPATIENT
Start: 2019-11-10 | End: 2019-11-15 | Stop reason: HOSPADM

## 2019-11-10 RX ORDER — FENTANYL CITRATE 50 UG/ML
50 INJECTION, SOLUTION INTRAMUSCULAR; INTRAVENOUS ONCE
Status: COMPLETED | OUTPATIENT
Start: 2019-11-10 | End: 2019-11-10

## 2019-11-10 RX ORDER — GABAPENTIN 400 MG/1
400 CAPSULE ORAL 3 TIMES DAILY
Status: DISCONTINUED | OUTPATIENT
Start: 2019-11-10 | End: 2019-11-15 | Stop reason: HOSPADM

## 2019-11-10 RX ORDER — FLUOXETINE HYDROCHLORIDE 20 MG/1
40 CAPSULE ORAL DAILY
Status: DISCONTINUED | OUTPATIENT
Start: 2019-11-10 | End: 2019-11-15 | Stop reason: HOSPADM

## 2019-11-10 RX ORDER — LIDOCAINE HYDROCHLORIDE 10 MG/ML
5 INJECTION, SOLUTION EPIDURAL; INFILTRATION; INTRACAUDAL; PERINEURAL ONCE
Status: DISCONTINUED | OUTPATIENT
Start: 2019-11-10 | End: 2019-11-11

## 2019-11-10 RX ORDER — ONDANSETRON 2 MG/ML
4 INJECTION INTRAMUSCULAR; INTRAVENOUS EVERY 4 HOURS PRN
Status: DISCONTINUED | OUTPATIENT
Start: 2019-11-10 | End: 2019-11-11 | Stop reason: SDUPTHER

## 2019-11-10 RX ORDER — CYCLOBENZAPRINE HCL 10 MG
10 TABLET ORAL 2 TIMES DAILY PRN
Status: DISCONTINUED | OUTPATIENT
Start: 2019-11-10 | End: 2019-11-11

## 2019-11-10 RX ORDER — 0.9 % SODIUM CHLORIDE 0.9 %
30 INTRAVENOUS SOLUTION INTRAVENOUS ONCE
Status: COMPLETED | OUTPATIENT
Start: 2019-11-10 | End: 2019-11-10

## 2019-11-10 RX ORDER — OXYCODONE HCL 10 MG/1
10 TABLET, FILM COATED, EXTENDED RELEASE ORAL EVERY 12 HOURS SCHEDULED
Status: DISCONTINUED | OUTPATIENT
Start: 2019-11-10 | End: 2019-11-15 | Stop reason: HOSPADM

## 2019-11-10 RX ORDER — ACETAMINOPHEN 500 MG
1000 TABLET ORAL ONCE
Status: COMPLETED | OUTPATIENT
Start: 2019-11-10 | End: 2019-11-10

## 2019-11-10 RX ADMIN — ACETAMINOPHEN 650 MG: 325 TABLET, FILM COATED ORAL at 18:04

## 2019-11-10 RX ADMIN — ENOXAPARIN SODIUM 40 MG: 40 INJECTION SUBCUTANEOUS at 15:50

## 2019-11-10 RX ADMIN — FENTANYL CITRATE 50 MCG: 50 INJECTION, SOLUTION INTRAMUSCULAR; INTRAVENOUS at 10:06

## 2019-11-10 RX ADMIN — OXYCODONE HYDROCHLORIDE 10 MG: 10 TABLET, FILM COATED, EXTENDED RELEASE ORAL at 21:04

## 2019-11-10 RX ADMIN — FENTANYL CITRATE 50 MCG: 50 INJECTION, SOLUTION INTRAMUSCULAR; INTRAVENOUS at 12:14

## 2019-11-10 RX ADMIN — VANCOMYCIN HYDROCHLORIDE 1.5 G: 10 INJECTION, POWDER, LYOPHILIZED, FOR SOLUTION INTRAVENOUS at 12:14

## 2019-11-10 RX ADMIN — HYDROCODONE BITARTRATE AND ACETAMINOPHEN 1 TABLET: 10; 325 TABLET ORAL at 20:12

## 2019-11-10 RX ADMIN — PIPERACILLIN SODIUM AND TAZOBACTAM SODIUM 4.5 G: 4; .5 INJECTION, POWDER, LYOPHILIZED, FOR SOLUTION INTRAVENOUS at 10:07

## 2019-11-10 RX ADMIN — VANCOMYCIN HYDROCHLORIDE 1.5 G: 10 INJECTION, POWDER, LYOPHILIZED, FOR SOLUTION INTRAVENOUS at 23:32

## 2019-11-10 RX ADMIN — ACETAMINOPHEN 1000 MG: 500 TABLET ORAL at 09:22

## 2019-11-10 RX ADMIN — HYDROCODONE BITARTRATE AND ACETAMINOPHEN 1 TABLET: 10; 325 TABLET ORAL at 15:33

## 2019-11-10 RX ADMIN — CYCLOBENZAPRINE 10 MG: 10 TABLET, FILM COATED ORAL at 23:32

## 2019-11-10 RX ADMIN — SODIUM CHLORIDE 2979 ML: 9 INJECTION, SOLUTION INTRAVENOUS at 09:05

## 2019-11-10 RX ADMIN — Medication 10 ML: at 21:05

## 2019-11-10 RX ADMIN — CEFEPIME HYDROCHLORIDE 2 G: 2 INJECTION, POWDER, FOR SOLUTION INTRAVENOUS at 18:02

## 2019-11-10 RX ADMIN — GABAPENTIN 400 MG: 400 CAPSULE ORAL at 15:50

## 2019-11-10 RX ADMIN — GABAPENTIN 400 MG: 400 CAPSULE ORAL at 21:04

## 2019-11-10 ASSESSMENT — PAIN DESCRIPTION - PAIN TYPE
TYPE: ACUTE PAIN

## 2019-11-10 ASSESSMENT — PAIN DESCRIPTION - PROGRESSION
CLINICAL_PROGRESSION: NOT CHANGED
CLINICAL_PROGRESSION: GRADUALLY IMPROVING
CLINICAL_PROGRESSION: GRADUALLY WORSENING
CLINICAL_PROGRESSION: GRADUALLY IMPROVING
CLINICAL_PROGRESSION: NOT CHANGED
CLINICAL_PROGRESSION: GRADUALLY IMPROVING

## 2019-11-10 ASSESSMENT — PAIN DESCRIPTION - FREQUENCY
FREQUENCY: CONTINUOUS

## 2019-11-10 ASSESSMENT — PAIN SCALES - GENERAL
PAINLEVEL_OUTOF10: 10
PAINLEVEL_OUTOF10: 10
PAINLEVEL_OUTOF10: 5
PAINLEVEL_OUTOF10: 10
PAINLEVEL_OUTOF10: 6
PAINLEVEL_OUTOF10: 10
PAINLEVEL_OUTOF10: 2
PAINLEVEL_OUTOF10: 10
PAINLEVEL_OUTOF10: 10
PAINLEVEL_OUTOF10: 1
PAINLEVEL_OUTOF10: 2
PAINLEVEL_OUTOF10: 10

## 2019-11-10 ASSESSMENT — PAIN DESCRIPTION - ORIENTATION
ORIENTATION: MID

## 2019-11-10 ASSESSMENT — PAIN DESCRIPTION - DESCRIPTORS
DESCRIPTORS: SHARP;STABBING;TENDER

## 2019-11-10 ASSESSMENT — PAIN DESCRIPTION - LOCATION
LOCATION: NECK

## 2019-11-10 ASSESSMENT — PAIN DESCRIPTION - ONSET
ONSET: ON-GOING

## 2019-11-11 ENCOUNTER — ANESTHESIA (OUTPATIENT)
Dept: OPERATING ROOM | Age: 34
DRG: 711 | End: 2019-11-11
Payer: COMMERCIAL

## 2019-11-11 ENCOUNTER — ANESTHESIA EVENT (OUTPATIENT)
Dept: OPERATING ROOM | Age: 34
DRG: 711 | End: 2019-11-11
Payer: COMMERCIAL

## 2019-11-11 VITALS — SYSTOLIC BLOOD PRESSURE: 115 MMHG | OXYGEN SATURATION: 96 % | DIASTOLIC BLOOD PRESSURE: 76 MMHG

## 2019-11-11 PROBLEM — G97.82 POSTOPERATIVE CEREBROSPINAL FLUID LEAK: Status: ACTIVE | Noted: 2019-11-11

## 2019-11-11 PROBLEM — G96.00 POSTOPERATIVE CEREBROSPINAL FLUID LEAK: Status: ACTIVE | Noted: 2019-11-11

## 2019-11-11 LAB
ANION GAP SERPL CALCULATED.3IONS-SCNC: 13 MMOL/L (ref 7–16)
BASOPHILS ABSOLUTE: 0.04 E9/L (ref 0–0.2)
BASOPHILS RELATIVE PERCENT: 0.4 % (ref 0–2)
BUN BLDV-MCNC: 8 MG/DL (ref 6–20)
CALCIUM SERPL-MCNC: 9.2 MG/DL (ref 8.6–10.2)
CHLORIDE BLD-SCNC: 100 MMOL/L (ref 98–107)
CO2: 24 MMOL/L (ref 22–29)
CREAT SERPL-MCNC: 0.8 MG/DL (ref 0.5–1)
EOSINOPHILS ABSOLUTE: 0.63 E9/L (ref 0.05–0.5)
EOSINOPHILS RELATIVE PERCENT: 6.3 % (ref 0–6)
GFR AFRICAN AMERICAN: >60
GFR NON-AFRICAN AMERICAN: >60 ML/MIN/1.73
GLUCOSE BLD-MCNC: 120 MG/DL (ref 74–99)
HCT VFR BLD CALC: 31.2 % (ref 34–48)
HEMOGLOBIN: 9.7 G/DL (ref 11.5–15.5)
IMMATURE GRANULOCYTES #: 0.1 E9/L
IMMATURE GRANULOCYTES %: 1 % (ref 0–5)
INR BLD: 1.1
LYMPHOCYTES ABSOLUTE: 1.74 E9/L (ref 1.5–4)
LYMPHOCYTES RELATIVE PERCENT: 17.3 % (ref 20–42)
MCH RBC QN AUTO: 29 PG (ref 26–35)
MCHC RBC AUTO-ENTMCNC: 31.1 % (ref 32–34.5)
MCV RBC AUTO: 93.1 FL (ref 80–99.9)
MONOCYTES ABSOLUTE: 0.78 E9/L (ref 0.1–0.95)
MONOCYTES RELATIVE PERCENT: 7.7 % (ref 2–12)
NEUTROPHILS ABSOLUTE: 6.79 E9/L (ref 1.8–7.3)
NEUTROPHILS RELATIVE PERCENT: 67.3 % (ref 43–80)
PDW BLD-RTO: 13.2 FL (ref 11.5–15)
PLATELET # BLD: 427 E9/L (ref 130–450)
PMV BLD AUTO: 9.6 FL (ref 7–12)
POTASSIUM REFLEX MAGNESIUM: 4.3 MMOL/L (ref 3.5–5)
PROTHROMBIN TIME: 12.6 SEC (ref 9.3–12.4)
RBC # BLD: 3.35 E12/L (ref 3.5–5.5)
SODIUM BLD-SCNC: 137 MMOL/L (ref 132–146)
WBC # BLD: 10.1 E9/L (ref 4.5–11.5)

## 2019-11-11 PROCEDURE — 3600000014 HC SURGERY LEVEL 4 ADDTL 15MIN: Performed by: NEUROLOGICAL SURGERY

## 2019-11-11 PROCEDURE — 6360000002 HC RX W HCPCS: Performed by: PHYSICIAN ASSISTANT

## 2019-11-11 PROCEDURE — 6370000000 HC RX 637 (ALT 250 FOR IP): Performed by: PHYSICIAN ASSISTANT

## 2019-11-11 PROCEDURE — 6360000002 HC RX W HCPCS: Performed by: SPECIALIST

## 2019-11-11 PROCEDURE — 6360000002 HC RX W HCPCS: Performed by: NEUROLOGICAL SURGERY

## 2019-11-11 PROCEDURE — 7100000000 HC PACU RECOVERY - FIRST 15 MIN: Performed by: NEUROLOGICAL SURGERY

## 2019-11-11 PROCEDURE — 87186 SC STD MICRODIL/AGAR DIL: CPT

## 2019-11-11 PROCEDURE — 3700000000 HC ANESTHESIA ATTENDED CARE: Performed by: NEUROLOGICAL SURGERY

## 2019-11-11 PROCEDURE — 36415 COLL VENOUS BLD VENIPUNCTURE: CPT

## 2019-11-11 PROCEDURE — 87070 CULTURE OTHR SPECIMN AEROBIC: CPT

## 2019-11-11 PROCEDURE — 2580000003 HC RX 258: Performed by: PHYSICIAN ASSISTANT

## 2019-11-11 PROCEDURE — 3700000001 HC ADD 15 MINUTES (ANESTHESIA): Performed by: NEUROLOGICAL SURGERY

## 2019-11-11 PROCEDURE — 63707 REPAIR SPINAL FLUID LEAKAGE: CPT | Performed by: NEUROLOGICAL SURGERY

## 2019-11-11 PROCEDURE — 2500000003 HC RX 250 WO HCPCS: Performed by: NEUROLOGICAL SURGERY

## 2019-11-11 PROCEDURE — 7100000001 HC PACU RECOVERY - ADDTL 15 MIN: Performed by: NEUROLOGICAL SURGERY

## 2019-11-11 PROCEDURE — 99222 1ST HOSP IP/OBS MODERATE 55: CPT | Performed by: INTERNAL MEDICINE

## 2019-11-11 PROCEDURE — 6370000000 HC RX 637 (ALT 250 FOR IP): Performed by: INTERNAL MEDICINE

## 2019-11-11 PROCEDURE — 2580000003 HC RX 258: Performed by: NEUROLOGICAL SURGERY

## 2019-11-11 PROCEDURE — 2709999900 HC NON-CHARGEABLE SUPPLY: Performed by: NEUROLOGICAL SURGERY

## 2019-11-11 PROCEDURE — 85025 COMPLETE CBC W/AUTO DIFF WBC: CPT

## 2019-11-11 PROCEDURE — 6360000002 HC RX W HCPCS

## 2019-11-11 PROCEDURE — 00U20KZ SUPPLEMENT DURA MATER WITH NONAUTOLOGOUS TISSUE SUBSTITUTE, OPEN APPROACH: ICD-10-PCS | Performed by: NEUROLOGICAL SURGERY

## 2019-11-11 PROCEDURE — 2580000003 HC RX 258: Performed by: SPECIALIST

## 2019-11-11 PROCEDURE — 6360000002 HC RX W HCPCS: Performed by: ANESTHESIOLOGY

## 2019-11-11 PROCEDURE — 2780000010 HC IMPLANT OTHER: Performed by: NEUROLOGICAL SURGERY

## 2019-11-11 PROCEDURE — 87077 CULTURE AEROBIC IDENTIFY: CPT

## 2019-11-11 PROCEDURE — 80048 BASIC METABOLIC PNL TOTAL CA: CPT

## 2019-11-11 PROCEDURE — 85610 PROTHROMBIN TIME: CPT

## 2019-11-11 PROCEDURE — 009U30Z DRAINAGE OF SPINAL CANAL WITH DRAINAGE DEVICE, PERCUTANEOUS APPROACH: ICD-10-PCS | Performed by: NEUROLOGICAL SURGERY

## 2019-11-11 PROCEDURE — 6370000000 HC RX 637 (ALT 250 FOR IP): Performed by: NEUROLOGICAL SURGERY

## 2019-11-11 PROCEDURE — 6360000002 HC RX W HCPCS: Performed by: NURSE ANESTHETIST, CERTIFIED REGISTERED

## 2019-11-11 PROCEDURE — 62272 THER SPI PNXR DRG CSF: CPT | Performed by: NEUROLOGICAL SURGERY

## 2019-11-11 PROCEDURE — 2000000000 HC ICU R&B

## 2019-11-11 PROCEDURE — 2580000003 HC RX 258

## 2019-11-11 PROCEDURE — 87075 CULTR BACTERIA EXCEPT BLOOD: CPT

## 2019-11-11 PROCEDURE — 87081 CULTURE SCREEN ONLY: CPT

## 2019-11-11 PROCEDURE — 2500000003 HC RX 250 WO HCPCS

## 2019-11-11 PROCEDURE — 3600000004 HC SURGERY LEVEL 4 BASE: Performed by: NEUROLOGICAL SURGERY

## 2019-11-11 DEVICE — COLLAGEN DURAL REGENERATION MEMBRANE 2IN X 2IN (5CM X 5CM)
Type: IMPLANTABLE DEVICE | Site: SPINE CERVICAL | Status: FUNCTIONAL
Brand: DURAMATRIX-ONLAY PLUS

## 2019-11-11 DEVICE — DURASEAL® DURAL SEALANT SYSTEM 5ML 5 PACK
Type: IMPLANTABLE DEVICE | Site: SPINE CERVICAL | Status: FUNCTIONAL
Brand: DURASEAL®

## 2019-11-11 RX ORDER — MORPHINE SULFATE 2 MG/ML
4 INJECTION, SOLUTION INTRAMUSCULAR; INTRAVENOUS ONCE
Status: COMPLETED | OUTPATIENT
Start: 2019-11-11 | End: 2019-11-11

## 2019-11-11 RX ORDER — ONDANSETRON 2 MG/ML
4 INJECTION INTRAMUSCULAR; INTRAVENOUS EVERY 6 HOURS PRN
Status: DISCONTINUED | OUTPATIENT
Start: 2019-11-11 | End: 2019-11-15 | Stop reason: HOSPADM

## 2019-11-11 RX ORDER — FENTANYL CITRATE 50 UG/ML
INJECTION, SOLUTION INTRAMUSCULAR; INTRAVENOUS PRN
Status: DISCONTINUED | OUTPATIENT
Start: 2019-11-11 | End: 2019-11-11 | Stop reason: SDUPTHER

## 2019-11-11 RX ORDER — SODIUM CHLORIDE 9 MG/ML
INJECTION, SOLUTION INTRAVENOUS CONTINUOUS
Status: DISCONTINUED | OUTPATIENT
Start: 2019-11-11 | End: 2019-11-13

## 2019-11-11 RX ORDER — LIDOCAINE HYDROCHLORIDE 20 MG/ML
INJECTION, SOLUTION INTRAVENOUS PRN
Status: DISCONTINUED | OUTPATIENT
Start: 2019-11-11 | End: 2019-11-11 | Stop reason: SDUPTHER

## 2019-11-11 RX ORDER — SODIUM CHLORIDE 0.9 % (FLUSH) 0.9 %
10 SYRINGE (ML) INJECTION EVERY 12 HOURS SCHEDULED
Status: DISCONTINUED | OUTPATIENT
Start: 2019-11-11 | End: 2019-11-15 | Stop reason: HOSPADM

## 2019-11-11 RX ORDER — FENTANYL CITRATE 50 UG/ML
50 INJECTION, SOLUTION INTRAMUSCULAR; INTRAVENOUS EVERY 5 MIN PRN
Status: DISCONTINUED | OUTPATIENT
Start: 2019-11-11 | End: 2019-11-11 | Stop reason: HOSPADM

## 2019-11-11 RX ORDER — VANCOMYCIN HYDROCHLORIDE 1 G/20ML
INJECTION, POWDER, LYOPHILIZED, FOR SOLUTION INTRAVENOUS PRN
Status: DISCONTINUED | OUTPATIENT
Start: 2019-11-11 | End: 2019-11-11 | Stop reason: SDUPTHER

## 2019-11-11 RX ORDER — DOCUSATE SODIUM 100 MG/1
100 CAPSULE, LIQUID FILLED ORAL 2 TIMES DAILY
Status: DISCONTINUED | OUTPATIENT
Start: 2019-11-11 | End: 2019-11-15

## 2019-11-11 RX ORDER — PROPOFOL 10 MG/ML
INJECTION, EMULSION INTRAVENOUS PRN
Status: DISCONTINUED | OUTPATIENT
Start: 2019-11-11 | End: 2019-11-11 | Stop reason: SDUPTHER

## 2019-11-11 RX ORDER — SUCCINYLCHOLINE CHLORIDE 20 MG/ML
INJECTION INTRAMUSCULAR; INTRAVENOUS PRN
Status: DISCONTINUED | OUTPATIENT
Start: 2019-11-11 | End: 2019-11-11 | Stop reason: SDUPTHER

## 2019-11-11 RX ORDER — MIDAZOLAM HYDROCHLORIDE 1 MG/ML
INJECTION INTRAMUSCULAR; INTRAVENOUS PRN
Status: DISCONTINUED | OUTPATIENT
Start: 2019-11-11 | End: 2019-11-11 | Stop reason: SDUPTHER

## 2019-11-11 RX ORDER — NEOSTIGMINE METHYLSULFATE 1 MG/ML
INJECTION, SOLUTION INTRAVENOUS PRN
Status: DISCONTINUED | OUTPATIENT
Start: 2019-11-11 | End: 2019-11-11 | Stop reason: SDUPTHER

## 2019-11-11 RX ORDER — SODIUM CHLORIDE 9 MG/ML
INJECTION, SOLUTION INTRAVENOUS CONTINUOUS PRN
Status: DISCONTINUED | OUTPATIENT
Start: 2019-11-11 | End: 2019-11-11 | Stop reason: SDUPTHER

## 2019-11-11 RX ORDER — DEXAMETHASONE SODIUM PHOSPHATE 10 MG/ML
INJECTION INTRAMUSCULAR; INTRAVENOUS PRN
Status: DISCONTINUED | OUTPATIENT
Start: 2019-11-11 | End: 2019-11-11 | Stop reason: SDUPTHER

## 2019-11-11 RX ORDER — SODIUM CHLORIDE 0.9 % (FLUSH) 0.9 %
10 SYRINGE (ML) INJECTION PRN
Status: DISCONTINUED | OUTPATIENT
Start: 2019-11-11 | End: 2019-11-15 | Stop reason: HOSPADM

## 2019-11-11 RX ORDER — ROCURONIUM BROMIDE 10 MG/ML
INJECTION, SOLUTION INTRAVENOUS PRN
Status: DISCONTINUED | OUTPATIENT
Start: 2019-11-11 | End: 2019-11-11 | Stop reason: SDUPTHER

## 2019-11-11 RX ORDER — FENTANYL CITRATE 50 UG/ML
25 INJECTION, SOLUTION INTRAMUSCULAR; INTRAVENOUS EVERY 5 MIN PRN
Status: DISCONTINUED | OUTPATIENT
Start: 2019-11-11 | End: 2019-11-11 | Stop reason: HOSPADM

## 2019-11-11 RX ORDER — GLYCOPYRROLATE 1 MG/5 ML
SYRINGE (ML) INTRAVENOUS PRN
Status: DISCONTINUED | OUTPATIENT
Start: 2019-11-11 | End: 2019-11-11 | Stop reason: SDUPTHER

## 2019-11-11 RX ORDER — ONDANSETRON 2 MG/ML
INJECTION INTRAMUSCULAR; INTRAVENOUS PRN
Status: DISCONTINUED | OUTPATIENT
Start: 2019-11-11 | End: 2019-11-11 | Stop reason: SDUPTHER

## 2019-11-11 RX ORDER — CYCLOBENZAPRINE HCL 10 MG
10 TABLET ORAL 3 TIMES DAILY PRN
Status: DISCONTINUED | OUTPATIENT
Start: 2019-11-11 | End: 2019-11-14

## 2019-11-11 RX ADMIN — VANCOMYCIN HYDROCHLORIDE 1500 MG: 500 INJECTION, POWDER, LYOPHILIZED, FOR SOLUTION INTRAVENOUS at 12:33

## 2019-11-11 RX ADMIN — Medication 3 MG: at 12:15

## 2019-11-11 RX ADMIN — FENTANYL CITRATE 50 MCG: 50 INJECTION INTRAMUSCULAR; INTRAVENOUS at 14:00

## 2019-11-11 RX ADMIN — FENTANYL CITRATE 100 MCG: 50 INJECTION, SOLUTION INTRAMUSCULAR; INTRAVENOUS at 10:47

## 2019-11-11 RX ADMIN — DOCUSATE SODIUM 100 MG: 100 CAPSULE, LIQUID FILLED ORAL at 20:07

## 2019-11-11 RX ADMIN — Medication 10 ML: at 20:08

## 2019-11-11 RX ADMIN — MIDAZOLAM 2 MG: 1 INJECTION INTRAMUSCULAR; INTRAVENOUS at 10:27

## 2019-11-11 RX ADMIN — HYDROCODONE BITARTRATE AND ACETAMINOPHEN 1 TABLET: 10; 325 TABLET ORAL at 15:28

## 2019-11-11 RX ADMIN — CEFEPIME HYDROCHLORIDE 2 G: 2 INJECTION, POWDER, FOR SOLUTION INTRAVENOUS at 01:47

## 2019-11-11 RX ADMIN — CYCLOBENZAPRINE 10 MG: 10 TABLET, FILM COATED ORAL at 05:26

## 2019-11-11 RX ADMIN — Medication 10 ML: at 09:05

## 2019-11-11 RX ADMIN — DEXAMETHASONE SODIUM PHOSPHATE 10 MG: 10 INJECTION INTRAMUSCULAR; INTRAVENOUS at 10:47

## 2019-11-11 RX ADMIN — OXYCODONE HYDROCHLORIDE 10 MG: 10 TABLET, FILM COATED, EXTENDED RELEASE ORAL at 20:07

## 2019-11-11 RX ADMIN — LIDOCAINE HYDROCHLORIDE 100 MG: 20 INJECTION, SOLUTION INTRAVENOUS at 10:47

## 2019-11-11 RX ADMIN — FENTANYL CITRATE 50 MCG: 50 INJECTION, SOLUTION INTRAMUSCULAR; INTRAVENOUS at 13:02

## 2019-11-11 RX ADMIN — FENTANYL CITRATE 50 MCG: 50 INJECTION, SOLUTION INTRAMUSCULAR; INTRAVENOUS at 11:24

## 2019-11-11 RX ADMIN — FENTANYL CITRATE 100 MCG: 50 INJECTION, SOLUTION INTRAMUSCULAR; INTRAVENOUS at 10:27

## 2019-11-11 RX ADMIN — Medication 0.6 MG: at 12:15

## 2019-11-11 RX ADMIN — ROCURONIUM BROMIDE 10 MG: 10 INJECTION, SOLUTION INTRAVENOUS at 10:47

## 2019-11-11 RX ADMIN — SODIUM CHLORIDE: 9 INJECTION, SOLUTION INTRAVENOUS at 12:26

## 2019-11-11 RX ADMIN — SUCCINYLCHOLINE CHLORIDE 140 MG: 20 INJECTION, SOLUTION INTRAMUSCULAR; INTRAVENOUS at 10:47

## 2019-11-11 RX ADMIN — PANTOPRAZOLE SODIUM 40 MG: 40 TABLET, DELAYED RELEASE ORAL at 05:27

## 2019-11-11 RX ADMIN — VANCOMYCIN HYDROCHLORIDE 1.5 G: 10 INJECTION, POWDER, LYOPHILIZED, FOR SOLUTION INTRAVENOUS at 23:55

## 2019-11-11 RX ADMIN — CEFEPIME HYDROCHLORIDE 2 G: 2 INJECTION, POWDER, FOR SOLUTION INTRAVENOUS at 09:26

## 2019-11-11 RX ADMIN — CYCLOBENZAPRINE 10 MG: 10 TABLET, FILM COATED ORAL at 18:04

## 2019-11-11 RX ADMIN — SODIUM CHLORIDE: 9 INJECTION, SOLUTION INTRAVENOUS at 10:44

## 2019-11-11 RX ADMIN — CEFEPIME HYDROCHLORIDE 2 G: 2 INJECTION, POWDER, FOR SOLUTION INTRAVENOUS at 17:58

## 2019-11-11 RX ADMIN — HYDROCODONE BITARTRATE AND ACETAMINOPHEN 1 TABLET: 10; 325 TABLET ORAL at 03:53

## 2019-11-11 RX ADMIN — HYDROCODONE BITARTRATE AND ACETAMINOPHEN 1 TABLET: 10; 325 TABLET ORAL at 23:59

## 2019-11-11 RX ADMIN — OXYCODONE HYDROCHLORIDE 10 MG: 10 TABLET, FILM COATED, EXTENDED RELEASE ORAL at 09:05

## 2019-11-11 RX ADMIN — FENTANYL CITRATE 50 MCG: 50 INJECTION, SOLUTION INTRAMUSCULAR; INTRAVENOUS at 12:18

## 2019-11-11 RX ADMIN — GABAPENTIN 400 MG: 400 CAPSULE ORAL at 20:07

## 2019-11-11 RX ADMIN — PROPOFOL 200 MG: 10 INJECTION, EMULSION INTRAVENOUS at 10:47

## 2019-11-11 RX ADMIN — ONDANSETRON HYDROCHLORIDE 4 MG: 2 INJECTION, SOLUTION INTRAMUSCULAR; INTRAVENOUS at 10:47

## 2019-11-11 RX ADMIN — MORPHINE SULFATE 4 MG: 2 INJECTION, SOLUTION INTRAMUSCULAR; INTRAVENOUS at 09:17

## 2019-11-11 RX ADMIN — ROCURONIUM BROMIDE 20 MG: 10 INJECTION, SOLUTION INTRAVENOUS at 10:57

## 2019-11-11 ASSESSMENT — PULMONARY FUNCTION TESTS
PIF_VALUE: 32
PIF_VALUE: 19
PIF_VALUE: 35
PIF_VALUE: 2
PIF_VALUE: 34
PIF_VALUE: 1
PIF_VALUE: 30
PIF_VALUE: 19
PIF_VALUE: 32
PIF_VALUE: 19
PIF_VALUE: 10
PIF_VALUE: 41
PIF_VALUE: 19
PIF_VALUE: 35
PIF_VALUE: 19
PIF_VALUE: 28
PIF_VALUE: 17
PIF_VALUE: 17
PIF_VALUE: 30
PIF_VALUE: 28
PIF_VALUE: 28
PIF_VALUE: 17
PIF_VALUE: 1
PIF_VALUE: 17
PIF_VALUE: 19
PIF_VALUE: 31
PIF_VALUE: 17
PIF_VALUE: 17
PIF_VALUE: 32
PIF_VALUE: 31
PIF_VALUE: 19
PIF_VALUE: 17
PIF_VALUE: 28
PIF_VALUE: 36
PIF_VALUE: 20
PIF_VALUE: 31
PIF_VALUE: 17
PIF_VALUE: 19
PIF_VALUE: 27
PIF_VALUE: 31
PIF_VALUE: 22
PIF_VALUE: 30
PIF_VALUE: 32
PIF_VALUE: 29
PIF_VALUE: 31
PIF_VALUE: 38
PIF_VALUE: 28
PIF_VALUE: 0
PIF_VALUE: 26
PIF_VALUE: 31
PIF_VALUE: 19
PIF_VALUE: 19
PIF_VALUE: 32
PIF_VALUE: 3
PIF_VALUE: 18
PIF_VALUE: 32
PIF_VALUE: 17
PIF_VALUE: 27
PIF_VALUE: 19
PIF_VALUE: 35
PIF_VALUE: 19
PIF_VALUE: 19
PIF_VALUE: 35
PIF_VALUE: 28
PIF_VALUE: 18
PIF_VALUE: 2
PIF_VALUE: 30
PIF_VALUE: 17
PIF_VALUE: 19
PIF_VALUE: 31
PIF_VALUE: 19
PIF_VALUE: 19
PIF_VALUE: 17
PIF_VALUE: 2
PIF_VALUE: 1
PIF_VALUE: 19
PIF_VALUE: 23
PIF_VALUE: 19
PIF_VALUE: 27
PIF_VALUE: 32
PIF_VALUE: 35
PIF_VALUE: 30
PIF_VALUE: 19
PIF_VALUE: 32
PIF_VALUE: 37
PIF_VALUE: 32
PIF_VALUE: 36
PIF_VALUE: 29
PIF_VALUE: 38
PIF_VALUE: 17
PIF_VALUE: 19
PIF_VALUE: 19
PIF_VALUE: 29
PIF_VALUE: 32
PIF_VALUE: 21
PIF_VALUE: 28
PIF_VALUE: 19
PIF_VALUE: 17
PIF_VALUE: 37
PIF_VALUE: 19
PIF_VALUE: 20
PIF_VALUE: 35
PIF_VALUE: 26
PIF_VALUE: 31
PIF_VALUE: 26
PIF_VALUE: 29
PIF_VALUE: 29
PIF_VALUE: 26
PIF_VALUE: 32
PIF_VALUE: 2
PIF_VALUE: 31
PIF_VALUE: 37
PIF_VALUE: 30
PIF_VALUE: 34
PIF_VALUE: 32
PIF_VALUE: 32
PIF_VALUE: 9
PIF_VALUE: 3
PIF_VALUE: 31
PIF_VALUE: 28
PIF_VALUE: 32
PIF_VALUE: 20
PIF_VALUE: 17
PIF_VALUE: 28
PIF_VALUE: 35
PIF_VALUE: 17
PIF_VALUE: 29
PIF_VALUE: 35
PIF_VALUE: 32
PIF_VALUE: 0
PIF_VALUE: 20
PIF_VALUE: 35

## 2019-11-11 ASSESSMENT — PAIN - FUNCTIONAL ASSESSMENT
PAIN_FUNCTIONAL_ASSESSMENT: PREVENTS OR INTERFERES SOME ACTIVE ACTIVITIES AND ADLS

## 2019-11-11 ASSESSMENT — PAIN SCALES - GENERAL
PAINLEVEL_OUTOF10: 6
PAINLEVEL_OUTOF10: 10
PAINLEVEL_OUTOF10: 5
PAINLEVEL_OUTOF10: 9
PAINLEVEL_OUTOF10: 1
PAINLEVEL_OUTOF10: 10
PAINLEVEL_OUTOF10: 10
PAINLEVEL_OUTOF10: 2

## 2019-11-11 ASSESSMENT — PAIN DESCRIPTION - LOCATION
LOCATION: BACK;NECK
LOCATION: NECK

## 2019-11-11 ASSESSMENT — PAIN DESCRIPTION - PAIN TYPE
TYPE: ACUTE PAIN
TYPE: SURGICAL PAIN
TYPE: SURGICAL PAIN
TYPE: ACUTE PAIN
TYPE: ACUTE PAIN

## 2019-11-11 ASSESSMENT — PAIN DESCRIPTION - FREQUENCY
FREQUENCY: CONTINUOUS

## 2019-11-11 ASSESSMENT — PAIN DESCRIPTION - ONSET
ONSET: ON-GOING
ONSET: ON-GOING
ONSET: AWAKENED FROM SLEEP

## 2019-11-11 ASSESSMENT — PAIN DESCRIPTION - PROGRESSION
CLINICAL_PROGRESSION: GRADUALLY IMPROVING
CLINICAL_PROGRESSION: GRADUALLY IMPROVING
CLINICAL_PROGRESSION: GRADUALLY WORSENING

## 2019-11-11 ASSESSMENT — PAIN DESCRIPTION - DESCRIPTORS
DESCRIPTORS: SHARP;STABBING;TENDER
DESCRIPTORS: ACHING;SORE
DESCRIPTORS: SHARP;STABBING;TENDER
DESCRIPTORS: SHARP;STABBING;TENDER

## 2019-11-11 ASSESSMENT — PAIN DESCRIPTION - ORIENTATION
ORIENTATION: MID
ORIENTATION: POSTERIOR
ORIENTATION: MID
ORIENTATION: MID

## 2019-11-12 LAB
ANION GAP SERPL CALCULATED.3IONS-SCNC: 12 MMOL/L (ref 7–16)
BASOPHILS ABSOLUTE: 0.02 E9/L (ref 0–0.2)
BASOPHILS RELATIVE PERCENT: 0.1 % (ref 0–2)
BUN BLDV-MCNC: 9 MG/DL (ref 6–20)
CALCIUM SERPL-MCNC: 9.2 MG/DL (ref 8.6–10.2)
CHLORIDE BLD-SCNC: 100 MMOL/L (ref 98–107)
CO2: 24 MMOL/L (ref 22–29)
CREAT SERPL-MCNC: 0.7 MG/DL (ref 0.5–1)
EOSINOPHILS ABSOLUTE: 0.04 E9/L (ref 0.05–0.5)
EOSINOPHILS RELATIVE PERCENT: 0.3 % (ref 0–6)
GFR AFRICAN AMERICAN: >60
GFR NON-AFRICAN AMERICAN: >60 ML/MIN/1.73
GLUCOSE BLD-MCNC: 118 MG/DL (ref 74–99)
HCT VFR BLD CALC: 29.6 % (ref 34–48)
HEMOGLOBIN: 9.4 G/DL (ref 11.5–15.5)
IMMATURE GRANULOCYTES #: 0.1 E9/L
IMMATURE GRANULOCYTES %: 0.7 % (ref 0–5)
INR BLD: 1.2
LYMPHOCYTES ABSOLUTE: 1.6 E9/L (ref 1.5–4)
LYMPHOCYTES RELATIVE PERCENT: 11.7 % (ref 20–42)
MAGNESIUM: 2 MG/DL (ref 1.6–2.6)
MCH RBC QN AUTO: 28.8 PG (ref 26–35)
MCHC RBC AUTO-ENTMCNC: 31.8 % (ref 32–34.5)
MCV RBC AUTO: 90.8 FL (ref 80–99.9)
MONOCYTES ABSOLUTE: 0.98 E9/L (ref 0.1–0.95)
MONOCYTES RELATIVE PERCENT: 7.2 % (ref 2–12)
NEUTROPHILS ABSOLUTE: 10.96 E9/L (ref 1.8–7.3)
NEUTROPHILS RELATIVE PERCENT: 80 % (ref 43–80)
ORGANISM: ABNORMAL
PDW BLD-RTO: 12.8 FL (ref 11.5–15)
PHOSPHORUS: 4.4 MG/DL (ref 2.5–4.5)
PLATELET # BLD: 419 E9/L (ref 130–450)
PMV BLD AUTO: 9.9 FL (ref 7–12)
POTASSIUM SERPL-SCNC: 4.2 MMOL/L (ref 3.5–5)
PROTHROMBIN TIME: 13.4 SEC (ref 9.3–12.4)
RBC # BLD: 3.26 E12/L (ref 3.5–5.5)
SODIUM BLD-SCNC: 136 MMOL/L (ref 132–146)
URINE CULTURE, ROUTINE: NORMAL
WBC # BLD: 13.7 E9/L (ref 4.5–11.5)
WOUND/ABSCESS: ABNORMAL

## 2019-11-12 PROCEDURE — 2580000003 HC RX 258: Performed by: PHYSICIAN ASSISTANT

## 2019-11-12 PROCEDURE — 97530 THERAPEUTIC ACTIVITIES: CPT

## 2019-11-12 PROCEDURE — 6370000000 HC RX 637 (ALT 250 FOR IP): Performed by: PHYSICIAN ASSISTANT

## 2019-11-12 PROCEDURE — 6370000000 HC RX 637 (ALT 250 FOR IP): Performed by: NURSE PRACTITIONER

## 2019-11-12 PROCEDURE — 83735 ASSAY OF MAGNESIUM: CPT

## 2019-11-12 PROCEDURE — 97535 SELF CARE MNGMENT TRAINING: CPT

## 2019-11-12 PROCEDURE — 85025 COMPLETE CBC W/AUTO DIFF WBC: CPT

## 2019-11-12 PROCEDURE — 99231 SBSQ HOSP IP/OBS SF/LOW 25: CPT | Performed by: INTERNAL MEDICINE

## 2019-11-12 PROCEDURE — 84100 ASSAY OF PHOSPHORUS: CPT

## 2019-11-12 PROCEDURE — 80048 BASIC METABOLIC PNL TOTAL CA: CPT

## 2019-11-12 PROCEDURE — 2500000003 HC RX 250 WO HCPCS: Performed by: SPECIALIST

## 2019-11-12 PROCEDURE — 76937 US GUIDE VASCULAR ACCESS: CPT

## 2019-11-12 PROCEDURE — C1751 CATH, INF, PER/CENT/MIDLINE: HCPCS

## 2019-11-12 PROCEDURE — 97166 OT EVAL MOD COMPLEX 45 MIN: CPT

## 2019-11-12 PROCEDURE — 85610 PROTHROMBIN TIME: CPT

## 2019-11-12 PROCEDURE — 97162 PT EVAL MOD COMPLEX 30 MIN: CPT

## 2019-11-12 PROCEDURE — 2580000003 HC RX 258: Performed by: SPECIALIST

## 2019-11-12 PROCEDURE — 36569 INSJ PICC 5 YR+ W/O IMAGING: CPT

## 2019-11-12 PROCEDURE — 2000000000 HC ICU R&B

## 2019-11-12 PROCEDURE — 36415 COLL VENOUS BLD VENIPUNCTURE: CPT

## 2019-11-12 PROCEDURE — 6360000002 HC RX W HCPCS: Performed by: PHYSICIAN ASSISTANT

## 2019-11-12 RX ORDER — LANOLIN ALCOHOL/MO/W.PET/CERES
6 CREAM (GRAM) TOPICAL NIGHTLY PRN
Status: DISCONTINUED | OUTPATIENT
Start: 2019-11-12 | End: 2019-11-15 | Stop reason: HOSPADM

## 2019-11-12 RX ADMIN — CYCLOBENZAPRINE 10 MG: 10 TABLET, FILM COATED ORAL at 15:11

## 2019-11-12 RX ADMIN — PANTOPRAZOLE SODIUM 40 MG: 40 TABLET, DELAYED RELEASE ORAL at 06:38

## 2019-11-12 RX ADMIN — GABAPENTIN 400 MG: 400 CAPSULE ORAL at 20:25

## 2019-11-12 RX ADMIN — CEFEPIME HYDROCHLORIDE 2 G: 2 INJECTION, POWDER, FOR SOLUTION INTRAVENOUS at 10:30

## 2019-11-12 RX ADMIN — OXYCODONE HYDROCHLORIDE 10 MG: 10 TABLET, FILM COATED, EXTENDED RELEASE ORAL at 08:50

## 2019-11-12 RX ADMIN — NAFCILLIN SODIUM 2 G: 2 INJECTION, POWDER, LYOPHILIZED, FOR SOLUTION INTRAMUSCULAR; INTRAVENOUS at 12:30

## 2019-11-12 RX ADMIN — Medication 10 ML: at 20:26

## 2019-11-12 RX ADMIN — CYCLOBENZAPRINE 10 MG: 10 TABLET, FILM COATED ORAL at 06:39

## 2019-11-12 RX ADMIN — Medication 10 ML: at 08:49

## 2019-11-12 RX ADMIN — DOCUSATE SODIUM 100 MG: 100 CAPSULE, LIQUID FILLED ORAL at 20:25

## 2019-11-12 RX ADMIN — GABAPENTIN 400 MG: 400 CAPSULE ORAL at 08:49

## 2019-11-12 RX ADMIN — NAFCILLIN SODIUM 2 G: 2 INJECTION, POWDER, LYOPHILIZED, FOR SOLUTION INTRAMUSCULAR; INTRAVENOUS at 20:26

## 2019-11-12 RX ADMIN — OXYCODONE HYDROCHLORIDE 10 MG: 10 TABLET, FILM COATED, EXTENDED RELEASE ORAL at 20:24

## 2019-11-12 RX ADMIN — MELATONIN 3 MG ORAL TABLET 6 MG: 3 TABLET ORAL at 20:25

## 2019-11-12 RX ADMIN — FLUOXETINE HYDROCHLORIDE 40 MG: 20 CAPSULE ORAL at 08:49

## 2019-11-12 RX ADMIN — DOCUSATE SODIUM 100 MG: 100 CAPSULE, LIQUID FILLED ORAL at 08:49

## 2019-11-12 RX ADMIN — GABAPENTIN 400 MG: 400 CAPSULE ORAL at 13:47

## 2019-11-12 RX ADMIN — CEFEPIME HYDROCHLORIDE 2 G: 2 INJECTION, POWDER, FOR SOLUTION INTRAVENOUS at 01:59

## 2019-11-12 RX ADMIN — HYDROCODONE BITARTRATE AND ACETAMINOPHEN 1 TABLET: 10; 325 TABLET ORAL at 10:53

## 2019-11-12 RX ADMIN — VITAMIN D, TAB 1000IU (100/BT) 1000 UNITS: 25 TAB at 08:49

## 2019-11-12 RX ADMIN — NAFCILLIN SODIUM 2 G: 2 INJECTION, POWDER, LYOPHILIZED, FOR SOLUTION INTRAMUSCULAR; INTRAVENOUS at 16:30

## 2019-11-12 ASSESSMENT — PAIN SCALES - GENERAL
PAINLEVEL_OUTOF10: 0
PAINLEVEL_OUTOF10: 10
PAINLEVEL_OUTOF10: 0
PAINLEVEL_OUTOF10: 4
PAINLEVEL_OUTOF10: 10

## 2019-11-12 ASSESSMENT — PAIN DESCRIPTION - FREQUENCY: FREQUENCY: CONTINUOUS

## 2019-11-12 ASSESSMENT — PAIN DESCRIPTION - PAIN TYPE
TYPE: SURGICAL PAIN
TYPE: SURGICAL PAIN

## 2019-11-12 ASSESSMENT — PAIN DESCRIPTION - PROGRESSION: CLINICAL_PROGRESSION: NOT CHANGED

## 2019-11-12 ASSESSMENT — PAIN DESCRIPTION - ORIENTATION: ORIENTATION: POSTERIOR

## 2019-11-12 ASSESSMENT — PAIN DESCRIPTION - LOCATION: LOCATION: BACK;NECK

## 2019-11-12 ASSESSMENT — PAIN DESCRIPTION - ONSET: ONSET: ON-GOING

## 2019-11-12 ASSESSMENT — PAIN DESCRIPTION - DESCRIPTORS: DESCRIPTORS: ACHING;CONSTANT

## 2019-11-13 LAB
ANAEROBIC CULTURE: NORMAL
ANION GAP SERPL CALCULATED.3IONS-SCNC: 14 MMOL/L (ref 7–16)
BASOPHILS ABSOLUTE: 0.03 E9/L (ref 0–0.2)
BASOPHILS RELATIVE PERCENT: 0.2 % (ref 0–2)
BUN BLDV-MCNC: 12 MG/DL (ref 6–20)
CALCIUM SERPL-MCNC: 9.3 MG/DL (ref 8.6–10.2)
CHLORIDE BLD-SCNC: 100 MMOL/L (ref 98–107)
CO2: 24 MMOL/L (ref 22–29)
CREAT SERPL-MCNC: 0.8 MG/DL (ref 0.5–1)
EOSINOPHILS ABSOLUTE: 0.27 E9/L (ref 0.05–0.5)
EOSINOPHILS RELATIVE PERCENT: 2.2 % (ref 0–6)
GFR AFRICAN AMERICAN: >60
GFR NON-AFRICAN AMERICAN: >60 ML/MIN/1.73
GLUCOSE BLD-MCNC: 106 MG/DL (ref 74–99)
HCT VFR BLD CALC: 29.8 % (ref 34–48)
HEMOGLOBIN: 9.4 G/DL (ref 11.5–15.5)
IMMATURE GRANULOCYTES #: 0.1 E9/L
IMMATURE GRANULOCYTES %: 0.8 % (ref 0–5)
LYMPHOCYTES ABSOLUTE: 2.05 E9/L (ref 1.5–4)
LYMPHOCYTES RELATIVE PERCENT: 17 % (ref 20–42)
MAGNESIUM: 2.1 MG/DL (ref 1.6–2.6)
MCH RBC QN AUTO: 28.9 PG (ref 26–35)
MCHC RBC AUTO-ENTMCNC: 31.5 % (ref 32–34.5)
MCV RBC AUTO: 91.7 FL (ref 80–99.9)
METER GLUCOSE: 171 MG/DL (ref 74–99)
MONOCYTES ABSOLUTE: 0.75 E9/L (ref 0.1–0.95)
MONOCYTES RELATIVE PERCENT: 6.2 % (ref 2–12)
MRSA CULTURE ONLY: NORMAL
NEUTROPHILS ABSOLUTE: 8.89 E9/L (ref 1.8–7.3)
NEUTROPHILS RELATIVE PERCENT: 73.6 % (ref 43–80)
PDW BLD-RTO: 13.1 FL (ref 11.5–15)
PHOSPHORUS: 4.7 MG/DL (ref 2.5–4.5)
PLATELET # BLD: 473 E9/L (ref 130–450)
PMV BLD AUTO: 9.6 FL (ref 7–12)
POTASSIUM SERPL-SCNC: 4 MMOL/L (ref 3.5–5)
RBC # BLD: 3.25 E12/L (ref 3.5–5.5)
SODIUM BLD-SCNC: 138 MMOL/L (ref 132–146)
WBC # BLD: 12.1 E9/L (ref 4.5–11.5)

## 2019-11-13 PROCEDURE — 84100 ASSAY OF PHOSPHORUS: CPT

## 2019-11-13 PROCEDURE — 99231 SBSQ HOSP IP/OBS SF/LOW 25: CPT | Performed by: INTERNAL MEDICINE

## 2019-11-13 PROCEDURE — 99232 SBSQ HOSP IP/OBS MODERATE 35: CPT | Performed by: SURGERY

## 2019-11-13 PROCEDURE — 83735 ASSAY OF MAGNESIUM: CPT

## 2019-11-13 PROCEDURE — 85025 COMPLETE CBC W/AUTO DIFF WBC: CPT

## 2019-11-13 PROCEDURE — 97530 THERAPEUTIC ACTIVITIES: CPT

## 2019-11-13 PROCEDURE — 97535 SELF CARE MNGMENT TRAINING: CPT

## 2019-11-13 PROCEDURE — 82962 GLUCOSE BLOOD TEST: CPT

## 2019-11-13 PROCEDURE — 36415 COLL VENOUS BLD VENIPUNCTURE: CPT

## 2019-11-13 PROCEDURE — 2500000003 HC RX 250 WO HCPCS: Performed by: SPECIALIST

## 2019-11-13 PROCEDURE — 2000000000 HC ICU R&B

## 2019-11-13 PROCEDURE — 80048 BASIC METABOLIC PNL TOTAL CA: CPT

## 2019-11-13 PROCEDURE — 6370000000 HC RX 637 (ALT 250 FOR IP): Performed by: NURSE PRACTITIONER

## 2019-11-13 PROCEDURE — 6360000002 HC RX W HCPCS: Performed by: PHYSICIAN ASSISTANT

## 2019-11-13 PROCEDURE — 2580000003 HC RX 258: Performed by: PHYSICIAN ASSISTANT

## 2019-11-13 PROCEDURE — APPSS60 APP SPLIT SHARED TIME 46-60 MINUTES: Performed by: NURSE PRACTITIONER

## 2019-11-13 PROCEDURE — 6370000000 HC RX 637 (ALT 250 FOR IP): Performed by: PHYSICIAN ASSISTANT

## 2019-11-13 PROCEDURE — 2580000003 HC RX 258: Performed by: SPECIALIST

## 2019-11-13 RX ORDER — DEXTROSE MONOHYDRATE 25 G/50ML
12.5 INJECTION, SOLUTION INTRAVENOUS PRN
Status: DISCONTINUED | OUTPATIENT
Start: 2019-11-13 | End: 2019-11-15 | Stop reason: HOSPADM

## 2019-11-13 RX ORDER — DEXTROSE MONOHYDRATE 50 MG/ML
100 INJECTION, SOLUTION INTRAVENOUS PRN
Status: DISCONTINUED | OUTPATIENT
Start: 2019-11-13 | End: 2019-11-15 | Stop reason: HOSPADM

## 2019-11-13 RX ORDER — NICOTINE POLACRILEX 4 MG
15 LOZENGE BUCCAL PRN
Status: DISCONTINUED | OUTPATIENT
Start: 2019-11-13 | End: 2019-11-15 | Stop reason: HOSPADM

## 2019-11-13 RX ADMIN — HYDROCODONE BITARTRATE AND ACETAMINOPHEN 1 TABLET: 10; 325 TABLET ORAL at 23:07

## 2019-11-13 RX ADMIN — FLUOXETINE HYDROCHLORIDE 40 MG: 20 CAPSULE ORAL at 09:55

## 2019-11-13 RX ADMIN — HYDROCODONE BITARTRATE AND ACETAMINOPHEN 1 TABLET: 10; 325 TABLET ORAL at 17:57

## 2019-11-13 RX ADMIN — Medication 10 ML: at 09:56

## 2019-11-13 RX ADMIN — NAFCILLIN SODIUM 2 G: 2 INJECTION, POWDER, LYOPHILIZED, FOR SOLUTION INTRAMUSCULAR; INTRAVENOUS at 16:11

## 2019-11-13 RX ADMIN — MELATONIN 3 MG ORAL TABLET 6 MG: 3 TABLET ORAL at 20:16

## 2019-11-13 RX ADMIN — VITAMIN D, TAB 1000IU (100/BT) 1000 UNITS: 25 TAB at 09:56

## 2019-11-13 RX ADMIN — GABAPENTIN 400 MG: 400 CAPSULE ORAL at 13:51

## 2019-11-13 RX ADMIN — DOCUSATE SODIUM 100 MG: 100 CAPSULE, LIQUID FILLED ORAL at 09:57

## 2019-11-13 RX ADMIN — DOCUSATE SODIUM 100 MG: 100 CAPSULE, LIQUID FILLED ORAL at 20:16

## 2019-11-13 RX ADMIN — ONDANSETRON HYDROCHLORIDE 4 MG: 2 INJECTION, SOLUTION INTRAMUSCULAR; INTRAVENOUS at 07:57

## 2019-11-13 RX ADMIN — NAFCILLIN SODIUM 2 G: 2 INJECTION, POWDER, LYOPHILIZED, FOR SOLUTION INTRAMUSCULAR; INTRAVENOUS at 00:00

## 2019-11-13 RX ADMIN — NAFCILLIN SODIUM 2 G: 2 INJECTION, POWDER, LYOPHILIZED, FOR SOLUTION INTRAMUSCULAR; INTRAVENOUS at 12:30

## 2019-11-13 RX ADMIN — CYCLOBENZAPRINE 10 MG: 10 TABLET, FILM COATED ORAL at 01:04

## 2019-11-13 RX ADMIN — Medication 10 ML: at 20:16

## 2019-11-13 RX ADMIN — CYCLOBENZAPRINE 10 MG: 10 TABLET, FILM COATED ORAL at 09:55

## 2019-11-13 RX ADMIN — GABAPENTIN 400 MG: 400 CAPSULE ORAL at 20:16

## 2019-11-13 RX ADMIN — CYCLOBENZAPRINE 10 MG: 10 TABLET, FILM COATED ORAL at 18:21

## 2019-11-13 RX ADMIN — HYDROCODONE BITARTRATE AND ACETAMINOPHEN 1 TABLET: 10; 325 TABLET ORAL at 13:50

## 2019-11-13 RX ADMIN — NAFCILLIN SODIUM 2 G: 2 INJECTION, POWDER, LYOPHILIZED, FOR SOLUTION INTRAMUSCULAR; INTRAVENOUS at 20:16

## 2019-11-13 RX ADMIN — ACETAMINOPHEN 650 MG: 325 TABLET, FILM COATED ORAL at 02:29

## 2019-11-13 RX ADMIN — NAFCILLIN SODIUM 2 G: 2 INJECTION, POWDER, LYOPHILIZED, FOR SOLUTION INTRAMUSCULAR; INTRAVENOUS at 08:30

## 2019-11-13 RX ADMIN — GABAPENTIN 400 MG: 400 CAPSULE ORAL at 08:57

## 2019-11-13 RX ADMIN — PANTOPRAZOLE SODIUM 40 MG: 40 TABLET, DELAYED RELEASE ORAL at 05:06

## 2019-11-13 RX ADMIN — NAFCILLIN SODIUM 2 G: 2 INJECTION, POWDER, LYOPHILIZED, FOR SOLUTION INTRAMUSCULAR; INTRAVENOUS at 04:03

## 2019-11-13 RX ADMIN — OXYCODONE HYDROCHLORIDE 10 MG: 10 TABLET, FILM COATED, EXTENDED RELEASE ORAL at 21:08

## 2019-11-13 RX ADMIN — OXYCODONE HYDROCHLORIDE 10 MG: 10 TABLET, FILM COATED, EXTENDED RELEASE ORAL at 08:57

## 2019-11-13 ASSESSMENT — PAIN DESCRIPTION - LOCATION
LOCATION: NECK
LOCATION: NECK
LOCATION: BACK;NECK
LOCATION: BACK;NECK
LOCATION: HEAD

## 2019-11-13 ASSESSMENT — PAIN DESCRIPTION - FREQUENCY
FREQUENCY: CONTINUOUS

## 2019-11-13 ASSESSMENT — PAIN SCALES - GENERAL
PAINLEVEL_OUTOF10: 6
PAINLEVEL_OUTOF10: 0
PAINLEVEL_OUTOF10: 0
PAINLEVEL_OUTOF10: 10
PAINLEVEL_OUTOF10: 10
PAINLEVEL_OUTOF10: 8
PAINLEVEL_OUTOF10: 10
PAINLEVEL_OUTOF10: 0
PAINLEVEL_OUTOF10: 0
PAINLEVEL_OUTOF10: 10
PAINLEVEL_OUTOF10: 0
PAINLEVEL_OUTOF10: 10

## 2019-11-13 ASSESSMENT — PAIN DESCRIPTION - PROGRESSION: CLINICAL_PROGRESSION: GRADUALLY WORSENING

## 2019-11-13 ASSESSMENT — PAIN DESCRIPTION - PAIN TYPE
TYPE: SURGICAL PAIN
TYPE: NEUROPATHIC PAIN;OTHER (COMMENT)
TYPE: ACUTE PAIN
TYPE: SURGICAL PAIN

## 2019-11-13 ASSESSMENT — PAIN DESCRIPTION - ONSET
ONSET: PROGRESSIVE

## 2019-11-13 ASSESSMENT — PAIN DESCRIPTION - DESCRIPTORS
DESCRIPTORS: HEADACHE
DESCRIPTORS: ACHING;BURNING;CONSTANT

## 2019-11-14 LAB
ANION GAP SERPL CALCULATED.3IONS-SCNC: 16 MMOL/L (ref 7–16)
BASOPHILS ABSOLUTE: 0.04 E9/L (ref 0–0.2)
BASOPHILS RELATIVE PERCENT: 0.4 % (ref 0–2)
BUN BLDV-MCNC: 12 MG/DL (ref 6–20)
CALCIUM SERPL-MCNC: 9.4 MG/DL (ref 8.6–10.2)
CHLORIDE BLD-SCNC: 101 MMOL/L (ref 98–107)
CO2: 25 MMOL/L (ref 22–29)
CREAT SERPL-MCNC: 0.8 MG/DL (ref 0.5–1)
EOSINOPHILS ABSOLUTE: 0.41 E9/L (ref 0.05–0.5)
EOSINOPHILS RELATIVE PERCENT: 4.4 % (ref 0–6)
GFR AFRICAN AMERICAN: >60
GFR NON-AFRICAN AMERICAN: >60 ML/MIN/1.73
GLUCOSE BLD-MCNC: 102 MG/DL (ref 74–99)
HCT VFR BLD CALC: 29.9 % (ref 34–48)
HEMOGLOBIN: 9.2 G/DL (ref 11.5–15.5)
IMMATURE GRANULOCYTES #: 0.07 E9/L
IMMATURE GRANULOCYTES %: 0.7 % (ref 0–5)
LYMPHOCYTES ABSOLUTE: 2.3 E9/L (ref 1.5–4)
LYMPHOCYTES RELATIVE PERCENT: 24.4 % (ref 20–42)
MAGNESIUM: 2.2 MG/DL (ref 1.6–2.6)
MCH RBC QN AUTO: 28.5 PG (ref 26–35)
MCHC RBC AUTO-ENTMCNC: 30.8 % (ref 32–34.5)
MCV RBC AUTO: 92.6 FL (ref 80–99.9)
METER GLUCOSE: 139 MG/DL (ref 74–99)
MONOCYTES ABSOLUTE: 0.74 E9/L (ref 0.1–0.95)
MONOCYTES RELATIVE PERCENT: 7.9 % (ref 2–12)
NEUTROPHILS ABSOLUTE: 5.86 E9/L (ref 1.8–7.3)
NEUTROPHILS RELATIVE PERCENT: 62.2 % (ref 43–80)
PDW BLD-RTO: 13.3 FL (ref 11.5–15)
PHOSPHORUS: 4.5 MG/DL (ref 2.5–4.5)
PLATELET # BLD: 467 E9/L (ref 130–450)
PMV BLD AUTO: 9.5 FL (ref 7–12)
POTASSIUM SERPL-SCNC: 4 MMOL/L (ref 3.5–5)
RBC # BLD: 3.23 E12/L (ref 3.5–5.5)
SODIUM BLD-SCNC: 142 MMOL/L (ref 132–146)
WBC # BLD: 9.4 E9/L (ref 4.5–11.5)

## 2019-11-14 PROCEDURE — 2500000003 HC RX 250 WO HCPCS: Performed by: SPECIALIST

## 2019-11-14 PROCEDURE — 97530 THERAPEUTIC ACTIVITIES: CPT

## 2019-11-14 PROCEDURE — 6370000000 HC RX 637 (ALT 250 FOR IP): Performed by: PHYSICIAN ASSISTANT

## 2019-11-14 PROCEDURE — 2580000003 HC RX 258: Performed by: SPECIALIST

## 2019-11-14 PROCEDURE — APPSS15 APP SPLIT SHARED TIME 0-15 MINUTES: Performed by: NURSE PRACTITIONER

## 2019-11-14 PROCEDURE — 85025 COMPLETE CBC W/AUTO DIFF WBC: CPT

## 2019-11-14 PROCEDURE — 99231 SBSQ HOSP IP/OBS SF/LOW 25: CPT | Performed by: INTERNAL MEDICINE

## 2019-11-14 PROCEDURE — 97535 SELF CARE MNGMENT TRAINING: CPT

## 2019-11-14 PROCEDURE — 2580000003 HC RX 258: Performed by: PHYSICIAN ASSISTANT

## 2019-11-14 PROCEDURE — 80048 BASIC METABOLIC PNL TOTAL CA: CPT

## 2019-11-14 PROCEDURE — 36415 COLL VENOUS BLD VENIPUNCTURE: CPT

## 2019-11-14 PROCEDURE — 99233 SBSQ HOSP IP/OBS HIGH 50: CPT | Performed by: SURGERY

## 2019-11-14 PROCEDURE — 2000000000 HC ICU R&B

## 2019-11-14 PROCEDURE — 83735 ASSAY OF MAGNESIUM: CPT

## 2019-11-14 PROCEDURE — 84100 ASSAY OF PHOSPHORUS: CPT

## 2019-11-14 PROCEDURE — 82962 GLUCOSE BLOOD TEST: CPT

## 2019-11-14 PROCEDURE — 6370000000 HC RX 637 (ALT 250 FOR IP): Performed by: NURSE PRACTITIONER

## 2019-11-14 RX ORDER — DIAZEPAM 5 MG/1
5 TABLET ORAL EVERY 6 HOURS PRN
Status: DISCONTINUED | OUTPATIENT
Start: 2019-11-14 | End: 2019-11-15 | Stop reason: HOSPADM

## 2019-11-14 RX ORDER — CYCLOBENZAPRINE HCL 10 MG
10 TABLET ORAL EVERY 8 HOURS
Status: DISCONTINUED | OUTPATIENT
Start: 2019-11-14 | End: 2019-11-14

## 2019-11-14 RX ADMIN — NAFCILLIN SODIUM 2 G: 2 INJECTION, POWDER, LYOPHILIZED, FOR SOLUTION INTRAMUSCULAR; INTRAVENOUS at 12:13

## 2019-11-14 RX ADMIN — Medication 10 ML: at 21:00

## 2019-11-14 RX ADMIN — DOCUSATE SODIUM 100 MG: 100 CAPSULE, LIQUID FILLED ORAL at 09:06

## 2019-11-14 RX ADMIN — NAFCILLIN SODIUM 2 G: 2 INJECTION, POWDER, LYOPHILIZED, FOR SOLUTION INTRAMUSCULAR; INTRAVENOUS at 00:01

## 2019-11-14 RX ADMIN — HYDROCODONE BITARTRATE AND ACETAMINOPHEN 1 TABLET: 10; 325 TABLET ORAL at 20:06

## 2019-11-14 RX ADMIN — GABAPENTIN 400 MG: 400 CAPSULE ORAL at 20:54

## 2019-11-14 RX ADMIN — GABAPENTIN 400 MG: 400 CAPSULE ORAL at 09:06

## 2019-11-14 RX ADMIN — NAFCILLIN SODIUM 2 G: 2 INJECTION, POWDER, LYOPHILIZED, FOR SOLUTION INTRAMUSCULAR; INTRAVENOUS at 16:20

## 2019-11-14 RX ADMIN — DIAZEPAM 5 MG: 5 TABLET ORAL at 17:48

## 2019-11-14 RX ADMIN — OXYCODONE HYDROCHLORIDE 10 MG: 10 TABLET, FILM COATED, EXTENDED RELEASE ORAL at 09:06

## 2019-11-14 RX ADMIN — NAFCILLIN SODIUM 2 G: 2 INJECTION, POWDER, LYOPHILIZED, FOR SOLUTION INTRAMUSCULAR; INTRAVENOUS at 04:05

## 2019-11-14 RX ADMIN — FLUOXETINE HYDROCHLORIDE 40 MG: 20 CAPSULE ORAL at 09:06

## 2019-11-14 RX ADMIN — CYCLOBENZAPRINE 10 MG: 10 TABLET, FILM COATED ORAL at 02:12

## 2019-11-14 RX ADMIN — OXYCODONE HYDROCHLORIDE 10 MG: 10 TABLET, FILM COATED, EXTENDED RELEASE ORAL at 20:54

## 2019-11-14 RX ADMIN — CYCLOBENZAPRINE 10 MG: 10 TABLET, FILM COATED ORAL at 09:32

## 2019-11-14 RX ADMIN — VITAMIN D, TAB 1000IU (100/BT) 1000 UNITS: 25 TAB at 09:06

## 2019-11-14 RX ADMIN — NAFCILLIN SODIUM 2 G: 2 INJECTION, POWDER, LYOPHILIZED, FOR SOLUTION INTRAMUSCULAR; INTRAVENOUS at 20:06

## 2019-11-14 RX ADMIN — NAFCILLIN SODIUM 2 G: 2 INJECTION, POWDER, LYOPHILIZED, FOR SOLUTION INTRAMUSCULAR; INTRAVENOUS at 08:21

## 2019-11-14 RX ADMIN — DIAZEPAM 5 MG: 5 TABLET ORAL at 11:28

## 2019-11-14 RX ADMIN — HYDROCODONE BITARTRATE AND ACETAMINOPHEN 1 TABLET: 10; 325 TABLET ORAL at 05:25

## 2019-11-14 RX ADMIN — DOCUSATE SODIUM 100 MG: 100 CAPSULE, LIQUID FILLED ORAL at 20:54

## 2019-11-14 RX ADMIN — HYDROCODONE BITARTRATE AND ACETAMINOPHEN 1 TABLET: 10; 325 TABLET ORAL at 16:16

## 2019-11-14 RX ADMIN — Medication 10 ML: at 09:06

## 2019-11-14 RX ADMIN — PANTOPRAZOLE SODIUM 40 MG: 40 TABLET, DELAYED RELEASE ORAL at 06:39

## 2019-11-14 RX ADMIN — GABAPENTIN 400 MG: 400 CAPSULE ORAL at 13:46

## 2019-11-14 ASSESSMENT — PAIN SCALES - GENERAL
PAINLEVEL_OUTOF10: 0
PAINLEVEL_OUTOF10: 10
PAINLEVEL_OUTOF10: 10
PAINLEVEL_OUTOF10: 4
PAINLEVEL_OUTOF10: 0
PAINLEVEL_OUTOF10: 10
PAINLEVEL_OUTOF10: 6

## 2019-11-14 ASSESSMENT — PAIN DESCRIPTION - PAIN TYPE
TYPE: OTHER (COMMENT)
TYPE: SURGICAL PAIN

## 2019-11-14 ASSESSMENT — PAIN DESCRIPTION - LOCATION
LOCATION: BACK
LOCATION: NECK

## 2019-11-14 ASSESSMENT — PAIN DESCRIPTION - DESCRIPTORS
DESCRIPTORS: CRAMPING;DISCOMFORT

## 2019-11-15 VITALS
HEART RATE: 96 BPM | TEMPERATURE: 98.8 F | BODY MASS INDEX: 39.45 KG/M2 | SYSTOLIC BLOOD PRESSURE: 94 MMHG | HEIGHT: 63 IN | WEIGHT: 222.66 LBS | DIASTOLIC BLOOD PRESSURE: 69 MMHG | OXYGEN SATURATION: 98 % | RESPIRATION RATE: 16 BRPM

## 2019-11-15 LAB
ANION GAP SERPL CALCULATED.3IONS-SCNC: 13 MMOL/L (ref 7–16)
BASOPHILS ABSOLUTE: 0.03 E9/L (ref 0–0.2)
BASOPHILS RELATIVE PERCENT: 0.3 % (ref 0–2)
BLOOD CULTURE, ROUTINE: NORMAL
BUN BLDV-MCNC: 11 MG/DL (ref 6–20)
CALCIUM SERPL-MCNC: 9.1 MG/DL (ref 8.6–10.2)
CHLORIDE BLD-SCNC: 100 MMOL/L (ref 98–107)
CO2: 26 MMOL/L (ref 22–29)
CREAT SERPL-MCNC: 0.8 MG/DL (ref 0.5–1)
CULTURE SURGICAL: ABNORMAL
CULTURE, BLOOD 2: NORMAL
EOSINOPHILS ABSOLUTE: 0.47 E9/L (ref 0.05–0.5)
EOSINOPHILS RELATIVE PERCENT: 5.4 % (ref 0–6)
GFR AFRICAN AMERICAN: >60
GFR NON-AFRICAN AMERICAN: >60 ML/MIN/1.73
GLUCOSE BLD-MCNC: 115 MG/DL (ref 74–99)
HCT VFR BLD CALC: 29.4 % (ref 34–48)
HEMOGLOBIN: 9.1 G/DL (ref 11.5–15.5)
IMMATURE GRANULOCYTES #: 0.08 E9/L
IMMATURE GRANULOCYTES %: 0.9 % (ref 0–5)
LYMPHOCYTES ABSOLUTE: 2.37 E9/L (ref 1.5–4)
LYMPHOCYTES RELATIVE PERCENT: 27.1 % (ref 20–42)
MAGNESIUM: 2.2 MG/DL (ref 1.6–2.6)
MCH RBC QN AUTO: 28.7 PG (ref 26–35)
MCHC RBC AUTO-ENTMCNC: 31 % (ref 32–34.5)
MCV RBC AUTO: 92.7 FL (ref 80–99.9)
MONOCYTES ABSOLUTE: 0.58 E9/L (ref 0.1–0.95)
MONOCYTES RELATIVE PERCENT: 6.6 % (ref 2–12)
NEUTROPHILS ABSOLUTE: 5.21 E9/L (ref 1.8–7.3)
NEUTROPHILS RELATIVE PERCENT: 59.7 % (ref 43–80)
ORGANISM: ABNORMAL
PDW BLD-RTO: 13.4 FL (ref 11.5–15)
PHOSPHORUS: 4.6 MG/DL (ref 2.5–4.5)
PLATELET # BLD: 443 E9/L (ref 130–450)
PMV BLD AUTO: 9 FL (ref 7–12)
POTASSIUM SERPL-SCNC: 3.9 MMOL/L (ref 3.5–5)
RBC # BLD: 3.17 E12/L (ref 3.5–5.5)
SODIUM BLD-SCNC: 139 MMOL/L (ref 132–146)
WBC # BLD: 8.7 E9/L (ref 4.5–11.5)

## 2019-11-15 PROCEDURE — 84100 ASSAY OF PHOSPHORUS: CPT

## 2019-11-15 PROCEDURE — 85025 COMPLETE CBC W/AUTO DIFF WBC: CPT

## 2019-11-15 PROCEDURE — 83735 ASSAY OF MAGNESIUM: CPT

## 2019-11-15 PROCEDURE — 97535 SELF CARE MNGMENT TRAINING: CPT

## 2019-11-15 PROCEDURE — 6370000000 HC RX 637 (ALT 250 FOR IP): Performed by: INTERNAL MEDICINE

## 2019-11-15 PROCEDURE — APPSS15 APP SPLIT SHARED TIME 0-15 MINUTES: Performed by: NURSE PRACTITIONER

## 2019-11-15 PROCEDURE — 99238 HOSP IP/OBS DSCHRG MGMT 30/<: CPT | Performed by: INTERNAL MEDICINE

## 2019-11-15 PROCEDURE — 80048 BASIC METABOLIC PNL TOTAL CA: CPT

## 2019-11-15 PROCEDURE — 6360000002 HC RX W HCPCS: Performed by: SPECIALIST

## 2019-11-15 PROCEDURE — 2500000003 HC RX 250 WO HCPCS: Performed by: SPECIALIST

## 2019-11-15 PROCEDURE — 6370000000 HC RX 637 (ALT 250 FOR IP): Performed by: PHYSICIAN ASSISTANT

## 2019-11-15 PROCEDURE — 2580000003 HC RX 258: Performed by: SPECIALIST

## 2019-11-15 PROCEDURE — 2580000003 HC RX 258: Performed by: PHYSICIAN ASSISTANT

## 2019-11-15 PROCEDURE — 97530 THERAPEUTIC ACTIVITIES: CPT

## 2019-11-15 PROCEDURE — 36415 COLL VENOUS BLD VENIPUNCTURE: CPT

## 2019-11-15 RX ORDER — SENNA AND DOCUSATE SODIUM 50; 8.6 MG/1; MG/1
2 TABLET, FILM COATED ORAL DAILY
Status: DISCONTINUED | OUTPATIENT
Start: 2019-11-15 | End: 2019-11-15

## 2019-11-15 RX ORDER — DIAZEPAM 5 MG/1
5 TABLET ORAL EVERY 6 HOURS PRN
Qty: 40 TABLET | Refills: 0 | Status: SHIPPED | OUTPATIENT
Start: 2019-11-15 | End: 2019-11-20 | Stop reason: SDUPTHER

## 2019-11-15 RX ORDER — OXYCODONE HYDROCHLORIDE 10 MG/1
10 TABLET ORAL EVERY 4 HOURS PRN
Qty: 42 TABLET | Refills: 0 | Status: SHIPPED | OUTPATIENT
Start: 2019-11-15 | End: 2019-12-11 | Stop reason: SDUPTHER

## 2019-11-15 RX ORDER — SENNA AND DOCUSATE SODIUM 50; 8.6 MG/1; MG/1
2 TABLET, FILM COATED ORAL DAILY PRN
Status: DISCONTINUED | OUTPATIENT
Start: 2019-11-15 | End: 2019-11-15 | Stop reason: HOSPADM

## 2019-11-15 RX ADMIN — OXYCODONE HYDROCHLORIDE 10 MG: 10 TABLET, FILM COATED, EXTENDED RELEASE ORAL at 08:05

## 2019-11-15 RX ADMIN — PANTOPRAZOLE SODIUM 40 MG: 40 TABLET, DELAYED RELEASE ORAL at 08:05

## 2019-11-15 RX ADMIN — DIAZEPAM 5 MG: 5 TABLET ORAL at 00:57

## 2019-11-15 RX ADMIN — DIAZEPAM 5 MG: 5 TABLET ORAL at 13:38

## 2019-11-15 RX ADMIN — Medication 10 ML: at 08:05

## 2019-11-15 RX ADMIN — NAFCILLIN SODIUM 2 G: 2 INJECTION, POWDER, LYOPHILIZED, FOR SOLUTION INTRAMUSCULAR; INTRAVENOUS at 08:05

## 2019-11-15 RX ADMIN — DOCUSATE SODIUM AND SENNOSIDES 2 TABLET: 8.6; 5 TABLET, FILM COATED ORAL at 08:17

## 2019-11-15 RX ADMIN — HYDROCODONE BITARTRATE AND ACETAMINOPHEN 1 TABLET: 10; 325 TABLET ORAL at 11:46

## 2019-11-15 RX ADMIN — HYDROCODONE BITARTRATE AND ACETAMINOPHEN 1 TABLET: 10; 325 TABLET ORAL at 15:52

## 2019-11-15 RX ADMIN — NAFCILLIN SODIUM 2 G: 2 INJECTION, POWDER, LYOPHILIZED, FOR SOLUTION INTRAMUSCULAR; INTRAVENOUS at 04:15

## 2019-11-15 RX ADMIN — GABAPENTIN 400 MG: 400 CAPSULE ORAL at 08:05

## 2019-11-15 RX ADMIN — FLUOXETINE HYDROCHLORIDE 40 MG: 20 CAPSULE ORAL at 08:04

## 2019-11-15 RX ADMIN — NAFCILLIN SODIUM 2 G: 2 INJECTION, POWDER, LYOPHILIZED, FOR SOLUTION INTRAMUSCULAR; INTRAVENOUS at 11:46

## 2019-11-15 RX ADMIN — DAPTOMYCIN 700 MG: 500 INJECTION, POWDER, LYOPHILIZED, FOR SOLUTION INTRAVENOUS at 13:44

## 2019-11-15 RX ADMIN — HYDROCODONE BITARTRATE AND ACETAMINOPHEN 1 TABLET: 10; 325 TABLET ORAL at 00:57

## 2019-11-15 RX ADMIN — HYDROCODONE BITARTRATE AND ACETAMINOPHEN 1 TABLET: 10; 325 TABLET ORAL at 05:23

## 2019-11-15 RX ADMIN — VITAMIN D, TAB 1000IU (100/BT) 1000 UNITS: 25 TAB at 08:05

## 2019-11-15 RX ADMIN — NAFCILLIN SODIUM 2 G: 2 INJECTION, POWDER, LYOPHILIZED, FOR SOLUTION INTRAMUSCULAR; INTRAVENOUS at 00:47

## 2019-11-15 RX ADMIN — GABAPENTIN 400 MG: 400 CAPSULE ORAL at 15:09

## 2019-11-15 RX ADMIN — DIAZEPAM 5 MG: 5 TABLET ORAL at 08:05

## 2019-11-15 ASSESSMENT — PAIN DESCRIPTION - DESCRIPTORS
DESCRIPTORS: CRAMPING;DISCOMFORT
DESCRIPTORS: CRAMPING;CONSTANT;DISCOMFORT

## 2019-11-15 ASSESSMENT — PAIN DESCRIPTION - PAIN TYPE
TYPE: SURGICAL PAIN

## 2019-11-15 ASSESSMENT — PAIN SCALES - GENERAL
PAINLEVEL_OUTOF10: 10
PAINLEVEL_OUTOF10: 6
PAINLEVEL_OUTOF10: 10
PAINLEVEL_OUTOF10: 5
PAINLEVEL_OUTOF10: 10

## 2019-11-15 ASSESSMENT — PAIN DESCRIPTION - LOCATION
LOCATION: BACK;NECK

## 2019-11-18 ENCOUNTER — HOSPITAL ENCOUNTER (OUTPATIENT)
Age: 34
Discharge: HOME | End: 2019-11-20
Payer: COMMERCIAL

## 2019-11-18 LAB
ALBUMIN SERPL-MCNC: 3.8 G/DL (ref 3.5–5.2)
ALP BLD-CCNC: 99 U/L (ref 35–104)
ALT SERPL-CCNC: 25 U/L (ref 0–32)
ANION GAP SERPL CALCULATED.3IONS-SCNC: 18 MMOL/L (ref 7–16)
AST SERPL-CCNC: 32 U/L (ref 0–31)
BASOPHILS ABSOLUTE: 0.04 E9/L (ref 0–0.2)
BASOPHILS RELATIVE PERCENT: 0.5 % (ref 0–2)
BILIRUB SERPL-MCNC: <0.2 MG/DL (ref 0–1.2)
BUN BLDV-MCNC: 11 MG/DL (ref 6–20)
C-REACTIVE PROTEIN: 0.5 MG/DL (ref 0–0.4)
CALCIUM SERPL-MCNC: 10 MG/DL (ref 8.6–10.2)
CHLORIDE BLD-SCNC: 100 MMOL/L (ref 98–107)
CO2: 21 MMOL/L (ref 22–29)
CREAT SERPL-MCNC: 0.7 MG/DL (ref 0.5–1)
EOSINOPHILS ABSOLUTE: 0.5 E9/L (ref 0.05–0.5)
EOSINOPHILS RELATIVE PERCENT: 6.6 % (ref 0–6)
GFR AFRICAN AMERICAN: >60
GFR NON-AFRICAN AMERICAN: >60 ML/MIN/1.73
GLUCOSE BLD-MCNC: 73 MG/DL (ref 74–99)
HCT VFR BLD CALC: 33.9 % (ref 34–48)
HEMOGLOBIN: 10.2 G/DL (ref 11.5–15.5)
IMMATURE GRANULOCYTES #: 0.07 E9/L
IMMATURE GRANULOCYTES %: 0.9 % (ref 0–5)
LYMPHOCYTES ABSOLUTE: 2.29 E9/L (ref 1.5–4)
LYMPHOCYTES RELATIVE PERCENT: 30 % (ref 20–42)
MCH RBC QN AUTO: 28.7 PG (ref 26–35)
MCHC RBC AUTO-ENTMCNC: 30.1 % (ref 32–34.5)
MCV RBC AUTO: 95.5 FL (ref 80–99.9)
MONOCYTES ABSOLUTE: 0.53 E9/L (ref 0.1–0.95)
MONOCYTES RELATIVE PERCENT: 6.9 % (ref 2–12)
NEUTROPHILS ABSOLUTE: 4.2 E9/L (ref 1.8–7.3)
NEUTROPHILS RELATIVE PERCENT: 55.1 % (ref 43–80)
PDW BLD-RTO: 13.8 FL (ref 11.5–15)
PLATELET # BLD: 530 E9/L (ref 130–450)
PMV BLD AUTO: 9.4 FL (ref 7–12)
POTASSIUM SERPL-SCNC: 4.3 MMOL/L (ref 3.5–5)
RBC # BLD: 3.55 E12/L (ref 3.5–5.5)
SEDIMENTATION RATE, ERYTHROCYTE: 95 MM/HR (ref 0–20)
SODIUM BLD-SCNC: 139 MMOL/L (ref 132–146)
TOTAL CK: 219 U/L (ref 20–180)
TOTAL PROTEIN: 7.7 G/DL (ref 6.4–8.3)
WBC # BLD: 7.6 E9/L (ref 4.5–11.5)

## 2019-11-18 PROCEDURE — 36415 COLL VENOUS BLD VENIPUNCTURE: CPT

## 2019-11-18 PROCEDURE — 86140 C-REACTIVE PROTEIN: CPT

## 2019-11-18 PROCEDURE — 85025 COMPLETE CBC W/AUTO DIFF WBC: CPT

## 2019-11-18 PROCEDURE — 82550 ASSAY OF CK (CPK): CPT

## 2019-11-18 PROCEDURE — 85651 RBC SED RATE NONAUTOMATED: CPT

## 2019-11-18 PROCEDURE — 80053 COMPREHEN METABOLIC PANEL: CPT

## 2019-11-20 ENCOUNTER — TELEPHONE (OUTPATIENT)
Dept: NEUROSURGERY | Age: 34
End: 2019-11-20

## 2019-11-20 DIAGNOSIS — T81.49XA WOUND INFECTION AFTER SURGERY: ICD-10-CM

## 2019-11-20 RX ORDER — HYDROCODONE BITARTRATE AND ACETAMINOPHEN 5; 325 MG/1; MG/1
1 TABLET ORAL EVERY 4 HOURS PRN
Qty: 42 TABLET | Refills: 0 | Status: SHIPPED | OUTPATIENT
Start: 2019-11-20 | End: 2019-11-27

## 2019-11-20 RX ORDER — DIAZEPAM 5 MG/1
5 TABLET ORAL EVERY 6 HOURS PRN
Qty: 40 TABLET | Refills: 0 | Status: SHIPPED | OUTPATIENT
Start: 2019-11-20 | End: 2019-11-30

## 2019-11-21 ENCOUNTER — HOSPITAL ENCOUNTER (OUTPATIENT)
Age: 34
Discharge: HOME | End: 2019-11-23
Payer: COMMERCIAL

## 2019-11-21 LAB
ALBUMIN SERPL-MCNC: 4 G/DL (ref 3.5–5.2)
ALP BLD-CCNC: 113 U/L (ref 35–104)
ALT SERPL-CCNC: 37 U/L (ref 0–32)
ANION GAP SERPL CALCULATED.3IONS-SCNC: 15 MMOL/L (ref 7–16)
AST SERPL-CCNC: 33 U/L (ref 0–31)
BASOPHILS ABSOLUTE: 0.05 E9/L (ref 0–0.2)
BASOPHILS RELATIVE PERCENT: 0.6 % (ref 0–2)
BILIRUB SERPL-MCNC: <0.2 MG/DL (ref 0–1.2)
BUN BLDV-MCNC: 9 MG/DL (ref 6–20)
CALCIUM SERPL-MCNC: 9.6 MG/DL (ref 8.6–10.2)
CHLORIDE BLD-SCNC: 101 MMOL/L (ref 98–107)
CO2: 23 MMOL/L (ref 22–29)
CREAT SERPL-MCNC: 0.7 MG/DL (ref 0.5–1)
EOSINOPHILS ABSOLUTE: 0.57 E9/L (ref 0.05–0.5)
EOSINOPHILS RELATIVE PERCENT: 6.8 % (ref 0–6)
GFR AFRICAN AMERICAN: >60
GFR NON-AFRICAN AMERICAN: >60 ML/MIN/1.73
GLUCOSE BLD-MCNC: 96 MG/DL (ref 74–99)
HCT VFR BLD CALC: 36 % (ref 34–48)
HEMOGLOBIN: 10.8 G/DL (ref 11.5–15.5)
IMMATURE GRANULOCYTES #: 0.04 E9/L
IMMATURE GRANULOCYTES %: 0.5 % (ref 0–5)
LYMPHOCYTES ABSOLUTE: 2.62 E9/L (ref 1.5–4)
LYMPHOCYTES RELATIVE PERCENT: 31.5 % (ref 20–42)
MCH RBC QN AUTO: 28.7 PG (ref 26–35)
MCHC RBC AUTO-ENTMCNC: 30 % (ref 32–34.5)
MCV RBC AUTO: 95.7 FL (ref 80–99.9)
MONOCYTES ABSOLUTE: 0.51 E9/L (ref 0.1–0.95)
MONOCYTES RELATIVE PERCENT: 6.1 % (ref 2–12)
NEUTROPHILS ABSOLUTE: 4.54 E9/L (ref 1.8–7.3)
NEUTROPHILS RELATIVE PERCENT: 54.5 % (ref 43–80)
PDW BLD-RTO: 14 FL (ref 11.5–15)
PLATELET # BLD: 568 E9/L (ref 130–450)
PMV BLD AUTO: 9.5 FL (ref 7–12)
POTASSIUM SERPL-SCNC: 3.9 MMOL/L (ref 3.5–5)
RBC # BLD: 3.76 E12/L (ref 3.5–5.5)
SODIUM BLD-SCNC: 139 MMOL/L (ref 132–146)
TOTAL PROTEIN: 7.8 G/DL (ref 6.4–8.3)
WBC # BLD: 8.3 E9/L (ref 4.5–11.5)

## 2019-11-21 PROCEDURE — 85025 COMPLETE CBC W/AUTO DIFF WBC: CPT

## 2019-11-21 PROCEDURE — 80053 COMPREHEN METABOLIC PANEL: CPT

## 2019-11-21 PROCEDURE — 36415 COLL VENOUS BLD VENIPUNCTURE: CPT

## 2019-11-25 ENCOUNTER — HOSPITAL ENCOUNTER (OUTPATIENT)
Age: 34
Discharge: HOME | End: 2019-11-27
Payer: COMMERCIAL

## 2019-11-25 LAB
ALBUMIN SERPL-MCNC: 3.9 G/DL (ref 3.5–5.2)
ALP BLD-CCNC: 115 U/L (ref 35–104)
ALT SERPL-CCNC: 40 U/L (ref 0–32)
ANION GAP SERPL CALCULATED.3IONS-SCNC: 15 MMOL/L (ref 7–16)
AST SERPL-CCNC: 34 U/L (ref 0–31)
BASOPHILS ABSOLUTE: 0.04 E9/L (ref 0–0.2)
BASOPHILS RELATIVE PERCENT: 0.4 % (ref 0–2)
BILIRUB SERPL-MCNC: <0.2 MG/DL (ref 0–1.2)
BUN BLDV-MCNC: 9 MG/DL (ref 6–20)
C-REACTIVE PROTEIN: 0.5 MG/DL (ref 0–0.4)
CALCIUM SERPL-MCNC: 9.7 MG/DL (ref 8.6–10.2)
CHLORIDE BLD-SCNC: 94 MMOL/L (ref 98–107)
CO2: 25 MMOL/L (ref 22–29)
CREAT SERPL-MCNC: 0.7 MG/DL (ref 0.5–1)
EOSINOPHILS ABSOLUTE: 0.63 E9/L (ref 0.05–0.5)
EOSINOPHILS RELATIVE PERCENT: 7.1 % (ref 0–6)
GFR AFRICAN AMERICAN: >60
GFR NON-AFRICAN AMERICAN: >60 ML/MIN/1.73
GLUCOSE BLD-MCNC: 139 MG/DL (ref 74–99)
HCT VFR BLD CALC: 34.6 % (ref 34–48)
HEMOGLOBIN: 10.5 G/DL (ref 11.5–15.5)
IMMATURE GRANULOCYTES #: 0.03 E9/L
IMMATURE GRANULOCYTES %: 0.3 % (ref 0–5)
LYMPHOCYTES ABSOLUTE: 2.57 E9/L (ref 1.5–4)
LYMPHOCYTES RELATIVE PERCENT: 28.9 % (ref 20–42)
MCH RBC QN AUTO: 28.5 PG (ref 26–35)
MCHC RBC AUTO-ENTMCNC: 30.3 % (ref 32–34.5)
MCV RBC AUTO: 94 FL (ref 80–99.9)
MONOCYTES ABSOLUTE: 0.56 E9/L (ref 0.1–0.95)
MONOCYTES RELATIVE PERCENT: 6.3 % (ref 2–12)
NEUTROPHILS ABSOLUTE: 5.07 E9/L (ref 1.8–7.3)
NEUTROPHILS RELATIVE PERCENT: 57 % (ref 43–80)
PDW BLD-RTO: 13.8 FL (ref 11.5–15)
PLATELET # BLD: 614 E9/L (ref 130–450)
PMV BLD AUTO: 9.5 FL (ref 7–12)
POTASSIUM SERPL-SCNC: 4 MMOL/L (ref 3.5–5)
RBC # BLD: 3.68 E12/L (ref 3.5–5.5)
SEDIMENTATION RATE, ERYTHROCYTE: 85 MM/HR (ref 0–20)
SODIUM BLD-SCNC: 134 MMOL/L (ref 132–146)
TOTAL CK: 253 U/L (ref 20–180)
TOTAL PROTEIN: 8.1 G/DL (ref 6.4–8.3)
WBC # BLD: 8.9 E9/L (ref 4.5–11.5)

## 2019-11-25 PROCEDURE — 86140 C-REACTIVE PROTEIN: CPT

## 2019-11-25 PROCEDURE — 85025 COMPLETE CBC W/AUTO DIFF WBC: CPT

## 2019-11-25 PROCEDURE — 80053 COMPREHEN METABOLIC PANEL: CPT

## 2019-11-25 PROCEDURE — 82550 ASSAY OF CK (CPK): CPT

## 2019-11-25 PROCEDURE — 36415 COLL VENOUS BLD VENIPUNCTURE: CPT

## 2019-11-25 PROCEDURE — 85651 RBC SED RATE NONAUTOMATED: CPT

## 2019-11-27 ENCOUNTER — HOSPITAL ENCOUNTER (OUTPATIENT)
Age: 34
Discharge: HOME | End: 2019-11-29
Payer: COMMERCIAL

## 2019-11-27 ENCOUNTER — HOSPITAL ENCOUNTER (OUTPATIENT)
Dept: GENERAL RADIOLOGY | Age: 34
Discharge: HOME | End: 2019-11-29
Payer: COMMERCIAL

## 2019-11-27 ENCOUNTER — OFFICE VISIT (OUTPATIENT)
Dept: NEUROSURGERY | Age: 34
End: 2019-11-27

## 2019-11-27 VITALS
WEIGHT: 218 LBS | HEIGHT: 63 IN | SYSTOLIC BLOOD PRESSURE: 116 MMHG | DIASTOLIC BLOOD PRESSURE: 67 MMHG | BODY MASS INDEX: 38.62 KG/M2 | HEART RATE: 98 BPM

## 2019-11-27 DIAGNOSIS — M48.02 CERVICAL STENOSIS OF SPINAL CANAL: Primary | ICD-10-CM

## 2019-11-27 DIAGNOSIS — M48.02 CERVICAL STENOSIS OF SPINAL CANAL: ICD-10-CM

## 2019-11-27 DIAGNOSIS — M54.2 NECK PAIN: Primary | ICD-10-CM

## 2019-11-27 PROCEDURE — 99024 POSTOP FOLLOW-UP VISIT: CPT | Performed by: NEUROLOGICAL SURGERY

## 2019-11-27 PROCEDURE — 72040 X-RAY EXAM NECK SPINE 2-3 VW: CPT

## 2019-11-29 ENCOUNTER — HOSPITAL ENCOUNTER (OUTPATIENT)
Age: 34
Discharge: HOME | End: 2019-12-01
Payer: COMMERCIAL

## 2019-11-29 LAB
ALBUMIN SERPL-MCNC: 3.8 G/DL (ref 3.5–5.2)
ALP BLD-CCNC: 109 U/L (ref 35–104)
ALT SERPL-CCNC: 34 U/L (ref 0–32)
ANION GAP SERPL CALCULATED.3IONS-SCNC: 15 MMOL/L (ref 7–16)
AST SERPL-CCNC: 27 U/L (ref 0–31)
BASOPHILS ABSOLUTE: 0.03 E9/L (ref 0–0.2)
BASOPHILS RELATIVE PERCENT: 0.3 % (ref 0–2)
BILIRUB SERPL-MCNC: 0.3 MG/DL (ref 0–1.2)
BUN BLDV-MCNC: 7 MG/DL (ref 6–20)
CALCIUM SERPL-MCNC: 9.8 MG/DL (ref 8.6–10.2)
CHLORIDE BLD-SCNC: 102 MMOL/L (ref 98–107)
CO2: 24 MMOL/L (ref 22–29)
CREAT SERPL-MCNC: 0.8 MG/DL (ref 0.5–1)
EOSINOPHILS ABSOLUTE: 1.2 E9/L (ref 0.05–0.5)
EOSINOPHILS RELATIVE PERCENT: 11.7 % (ref 0–6)
GFR AFRICAN AMERICAN: >60
GFR NON-AFRICAN AMERICAN: >60 ML/MIN/1.73
GLUCOSE BLD-MCNC: 105 MG/DL (ref 74–99)
HCT VFR BLD CALC: 33.6 % (ref 34–48)
HEMOGLOBIN: 10.5 G/DL (ref 11.5–15.5)
IMMATURE GRANULOCYTES #: 0.03 E9/L
IMMATURE GRANULOCYTES %: 0.3 % (ref 0–5)
LYMPHOCYTES ABSOLUTE: 1.87 E9/L (ref 1.5–4)
LYMPHOCYTES RELATIVE PERCENT: 18.2 % (ref 20–42)
MCH RBC QN AUTO: 28.9 PG (ref 26–35)
MCHC RBC AUTO-ENTMCNC: 31.3 % (ref 32–34.5)
MCV RBC AUTO: 92.6 FL (ref 80–99.9)
MONOCYTES ABSOLUTE: 0.8 E9/L (ref 0.1–0.95)
MONOCYTES RELATIVE PERCENT: 7.8 % (ref 2–12)
NEUTROPHILS ABSOLUTE: 6.35 E9/L (ref 1.8–7.3)
NEUTROPHILS RELATIVE PERCENT: 61.7 % (ref 43–80)
PDW BLD-RTO: 13.9 FL (ref 11.5–15)
PLATELET # BLD: 617 E9/L (ref 130–450)
PMV BLD AUTO: 9.5 FL (ref 7–12)
POTASSIUM SERPL-SCNC: 3.6 MMOL/L (ref 3.5–5)
RBC # BLD: 3.63 E12/L (ref 3.5–5.5)
SODIUM BLD-SCNC: 141 MMOL/L (ref 132–146)
TOTAL PROTEIN: 7.7 G/DL (ref 6.4–8.3)
WBC # BLD: 10.3 E9/L (ref 4.5–11.5)

## 2019-11-29 PROCEDURE — 80053 COMPREHEN METABOLIC PANEL: CPT

## 2019-11-29 PROCEDURE — 85025 COMPLETE CBC W/AUTO DIFF WBC: CPT

## 2019-11-29 PROCEDURE — 36415 COLL VENOUS BLD VENIPUNCTURE: CPT

## 2019-12-02 ENCOUNTER — HOSPITAL ENCOUNTER (OUTPATIENT)
Age: 34
Discharge: HOME | End: 2019-12-04
Payer: COMMERCIAL

## 2019-12-02 LAB
ALBUMIN SERPL-MCNC: 4 G/DL (ref 3.5–5.2)
ALP BLD-CCNC: 114 U/L (ref 35–104)
ALT SERPL-CCNC: 30 U/L (ref 0–32)
ANION GAP SERPL CALCULATED.3IONS-SCNC: 15 MMOL/L (ref 7–16)
AST SERPL-CCNC: 29 U/L (ref 0–31)
BASOPHILS ABSOLUTE: 0.04 E9/L (ref 0–0.2)
BASOPHILS RELATIVE PERCENT: 0.4 % (ref 0–2)
BILIRUB SERPL-MCNC: 0.3 MG/DL (ref 0–1.2)
BUN BLDV-MCNC: 9 MG/DL (ref 6–20)
C-REACTIVE PROTEIN: 1 MG/DL (ref 0–0.4)
CALCIUM SERPL-MCNC: 10 MG/DL (ref 8.6–10.2)
CHLORIDE BLD-SCNC: 103 MMOL/L (ref 98–107)
CO2: 23 MMOL/L (ref 22–29)
CREAT SERPL-MCNC: 0.8 MG/DL (ref 0.5–1)
EOSINOPHILS ABSOLUTE: 1.05 E9/L (ref 0.05–0.5)
EOSINOPHILS RELATIVE PERCENT: 10.7 % (ref 0–6)
GFR AFRICAN AMERICAN: >60
GFR NON-AFRICAN AMERICAN: >60 ML/MIN/1.73
GLUCOSE BLD-MCNC: 92 MG/DL (ref 74–99)
HCT VFR BLD CALC: 34.2 % (ref 34–48)
HEMOGLOBIN: 10.5 G/DL (ref 11.5–15.5)
IMMATURE GRANULOCYTES #: 0.04 E9/L
IMMATURE GRANULOCYTES %: 0.4 % (ref 0–5)
LYMPHOCYTES ABSOLUTE: 2.5 E9/L (ref 1.5–4)
LYMPHOCYTES RELATIVE PERCENT: 25.5 % (ref 20–42)
MCH RBC QN AUTO: 28.8 PG (ref 26–35)
MCHC RBC AUTO-ENTMCNC: 30.7 % (ref 32–34.5)
MCV RBC AUTO: 94 FL (ref 80–99.9)
MONOCYTES ABSOLUTE: 0.72 E9/L (ref 0.1–0.95)
MONOCYTES RELATIVE PERCENT: 7.4 % (ref 2–12)
NEUTROPHILS ABSOLUTE: 5.44 E9/L (ref 1.8–7.3)
NEUTROPHILS RELATIVE PERCENT: 55.6 % (ref 43–80)
PDW BLD-RTO: 13.7 FL (ref 11.5–15)
PLATELET # BLD: 598 E9/L (ref 130–450)
PMV BLD AUTO: 9.6 FL (ref 7–12)
POTASSIUM SERPL-SCNC: 3.9 MMOL/L (ref 3.5–5)
RBC # BLD: 3.64 E12/L (ref 3.5–5.5)
SEDIMENTATION RATE, ERYTHROCYTE: 76 MM/HR (ref 0–20)
SODIUM BLD-SCNC: 141 MMOL/L (ref 132–146)
TOTAL CK: 329 U/L (ref 20–180)
TOTAL PROTEIN: 7.9 G/DL (ref 6.4–8.3)
WBC # BLD: 9.8 E9/L (ref 4.5–11.5)

## 2019-12-02 PROCEDURE — 82550 ASSAY OF CK (CPK): CPT

## 2019-12-02 PROCEDURE — 80053 COMPREHEN METABOLIC PANEL: CPT

## 2019-12-02 PROCEDURE — 85651 RBC SED RATE NONAUTOMATED: CPT

## 2019-12-02 PROCEDURE — 36415 COLL VENOUS BLD VENIPUNCTURE: CPT

## 2019-12-02 PROCEDURE — 85025 COMPLETE CBC W/AUTO DIFF WBC: CPT

## 2019-12-02 PROCEDURE — 86140 C-REACTIVE PROTEIN: CPT

## 2019-12-05 ENCOUNTER — HOSPITAL ENCOUNTER (OUTPATIENT)
Age: 34
Discharge: HOME | End: 2019-12-07
Payer: COMMERCIAL

## 2019-12-05 LAB
BASOPHILS ABSOLUTE: 0.05 E9/L (ref 0–0.2)
BASOPHILS RELATIVE PERCENT: 0.5 % (ref 0–2)
EOSINOPHILS ABSOLUTE: 0.82 E9/L (ref 0.05–0.5)
EOSINOPHILS RELATIVE PERCENT: 7.8 % (ref 0–6)
HCT VFR BLD CALC: 34.2 % (ref 34–48)
HEMOGLOBIN: 10.5 G/DL (ref 11.5–15.5)
IMMATURE GRANULOCYTES #: 0.04 E9/L
IMMATURE GRANULOCYTES %: 0.4 % (ref 0–5)
LYMPHOCYTES ABSOLUTE: 2.87 E9/L (ref 1.5–4)
LYMPHOCYTES RELATIVE PERCENT: 27.4 % (ref 20–42)
MCH RBC QN AUTO: 28.6 PG (ref 26–35)
MCHC RBC AUTO-ENTMCNC: 30.7 % (ref 32–34.5)
MCV RBC AUTO: 93.2 FL (ref 80–99.9)
MONOCYTES ABSOLUTE: 0.95 E9/L (ref 0.1–0.95)
MONOCYTES RELATIVE PERCENT: 9.1 % (ref 2–12)
NEUTROPHILS ABSOLUTE: 5.74 E9/L (ref 1.8–7.3)
NEUTROPHILS RELATIVE PERCENT: 54.8 % (ref 43–80)
PDW BLD-RTO: 13.7 FL (ref 11.5–15)
PLATELET # BLD: 520 E9/L (ref 130–450)
PMV BLD AUTO: 9.8 FL (ref 7–12)
RBC # BLD: 3.67 E12/L (ref 3.5–5.5)
WBC # BLD: 10.5 E9/L (ref 4.5–11.5)

## 2019-12-05 PROCEDURE — 85025 COMPLETE CBC W/AUTO DIFF WBC: CPT

## 2019-12-05 PROCEDURE — 36415 COLL VENOUS BLD VENIPUNCTURE: CPT

## 2019-12-05 PROCEDURE — 80053 COMPREHEN METABOLIC PANEL: CPT

## 2019-12-06 LAB
ALBUMIN SERPL-MCNC: 3.9 G/DL (ref 3.5–5.2)
ALP BLD-CCNC: 88 U/L (ref 35–104)
ALT SERPL-CCNC: 22 U/L (ref 0–32)
ANION GAP SERPL CALCULATED.3IONS-SCNC: 17 MMOL/L (ref 7–16)
AST SERPL-CCNC: 24 U/L (ref 0–31)
BILIRUB SERPL-MCNC: 0.2 MG/DL (ref 0–1.2)
BUN BLDV-MCNC: 7 MG/DL (ref 6–20)
CALCIUM SERPL-MCNC: 9.4 MG/DL (ref 8.6–10.2)
CHLORIDE BLD-SCNC: 102 MMOL/L (ref 98–107)
CO2: 23 MMOL/L (ref 22–29)
CREAT SERPL-MCNC: 0.8 MG/DL (ref 0.5–1)
GFR AFRICAN AMERICAN: >60
GFR NON-AFRICAN AMERICAN: >60 ML/MIN/1.73
GLUCOSE BLD-MCNC: 108 MG/DL (ref 74–99)
POTASSIUM SERPL-SCNC: 3.2 MMOL/L (ref 3.5–5)
SODIUM BLD-SCNC: 142 MMOL/L (ref 132–146)
TOTAL PROTEIN: 7.5 G/DL (ref 6.4–8.3)

## 2019-12-11 ENCOUNTER — HOSPITAL ENCOUNTER (OUTPATIENT)
Dept: GENERAL RADIOLOGY | Age: 34
Discharge: HOME | End: 2019-12-13
Payer: COMMERCIAL

## 2019-12-11 ENCOUNTER — HOSPITAL ENCOUNTER (OUTPATIENT)
Age: 34
Discharge: HOME | End: 2019-12-13
Payer: COMMERCIAL

## 2019-12-11 ENCOUNTER — OFFICE VISIT (OUTPATIENT)
Dept: NEUROSURGERY | Age: 34
End: 2019-12-11

## 2019-12-11 VITALS
SYSTOLIC BLOOD PRESSURE: 107 MMHG | DIASTOLIC BLOOD PRESSURE: 62 MMHG | WEIGHT: 217 LBS | HEIGHT: 63 IN | BODY MASS INDEX: 38.45 KG/M2 | HEART RATE: 80 BPM

## 2019-12-11 DIAGNOSIS — T81.49XA WOUND INFECTION AFTER SURGERY: ICD-10-CM

## 2019-12-11 DIAGNOSIS — M54.2 NECK PAIN: ICD-10-CM

## 2019-12-11 DIAGNOSIS — M54.2 NECK PAIN: Primary | ICD-10-CM

## 2019-12-11 DIAGNOSIS — M50.90 CERVICAL DISC DISEASE: Primary | ICD-10-CM

## 2019-12-11 PROCEDURE — 99024 POSTOP FOLLOW-UP VISIT: CPT | Performed by: PHYSICIAN ASSISTANT

## 2019-12-11 PROCEDURE — 72040 X-RAY EXAM NECK SPINE 2-3 VW: CPT

## 2019-12-11 RX ORDER — DIAZEPAM 5 MG/1
5 TABLET ORAL EVERY 6 HOURS PRN
Qty: 28 TABLET | Refills: 0 | Status: SHIPPED | OUTPATIENT
Start: 2019-12-11 | End: 2019-12-26 | Stop reason: SDUPTHER

## 2019-12-11 RX ORDER — OXYCODONE HYDROCHLORIDE 10 MG/1
10 TABLET ORAL EVERY 4 HOURS PRN
Qty: 42 TABLET | Refills: 0 | Status: SHIPPED | OUTPATIENT
Start: 2019-12-11 | End: 2019-12-26 | Stop reason: SDUPTHER

## 2019-12-11 RX ORDER — OXYCODONE HYDROCHLORIDE 10 MG/1
10 TABLET ORAL EVERY 4 HOURS PRN
Qty: 42 TABLET | Refills: 0 | Status: SHIPPED | OUTPATIENT
Start: 2019-12-11 | End: 2019-12-11 | Stop reason: SDUPTHER

## 2019-12-26 ENCOUNTER — TELEPHONE (OUTPATIENT)
Dept: NEUROSURGERY | Age: 34
End: 2019-12-26

## 2019-12-26 DIAGNOSIS — M50.90 CERVICAL DISC DISEASE: ICD-10-CM

## 2019-12-26 DIAGNOSIS — T81.49XA WOUND INFECTION AFTER SURGERY: ICD-10-CM

## 2019-12-26 RX ORDER — OXYCODONE HYDROCHLORIDE 10 MG/1
10 TABLET ORAL EVERY 4 HOURS PRN
Qty: 42 TABLET | Refills: 0 | Status: SHIPPED | OUTPATIENT
Start: 2019-12-26 | End: 2020-01-23 | Stop reason: SDUPTHER

## 2019-12-26 RX ORDER — DIAZEPAM 5 MG/1
5 TABLET ORAL EVERY 6 HOURS PRN
Qty: 28 TABLET | Refills: 0 | Status: SHIPPED | OUTPATIENT
Start: 2019-12-26 | End: 2020-01-23 | Stop reason: SDUPTHER

## 2020-01-03 ENCOUNTER — HOSPITAL ENCOUNTER (EMERGENCY)
Age: 35
Discharge: HOME | End: 2020-01-03
Attending: EMERGENCY MEDICINE
Payer: COMMERCIAL

## 2020-01-03 ENCOUNTER — APPOINTMENT (OUTPATIENT)
Dept: CT IMAGING | Age: 35
End: 2020-01-03
Payer: COMMERCIAL

## 2020-01-03 VITALS
DIASTOLIC BLOOD PRESSURE: 47 MMHG | BODY MASS INDEX: 38.45 KG/M2 | OXYGEN SATURATION: 100 % | TEMPERATURE: 97.4 F | RESPIRATION RATE: 18 BRPM | HEIGHT: 63 IN | HEART RATE: 64 BPM | SYSTOLIC BLOOD PRESSURE: 113 MMHG | WEIGHT: 217 LBS

## 2020-01-03 LAB
ALBUMIN SERPL-MCNC: 3.9 G/DL (ref 3.5–5.2)
ALP BLD-CCNC: 94 U/L (ref 35–104)
ALT SERPL-CCNC: 20 U/L (ref 0–32)
ANION GAP SERPL CALCULATED.3IONS-SCNC: 14 MMOL/L (ref 7–16)
AST SERPL-CCNC: 33 U/L (ref 0–31)
BASOPHILS ABSOLUTE: 0.03 E9/L (ref 0–0.2)
BASOPHILS RELATIVE PERCENT: 0.3 % (ref 0–2)
BILIRUB SERPL-MCNC: 0.3 MG/DL (ref 0–1.2)
BUN BLDV-MCNC: 11 MG/DL (ref 6–20)
CALCIUM SERPL-MCNC: 9.7 MG/DL (ref 8.6–10.2)
CHLORIDE BLD-SCNC: 103 MMOL/L (ref 98–107)
CO2: 25 MMOL/L (ref 22–29)
CREAT SERPL-MCNC: 0.8 MG/DL (ref 0.5–1)
EOSINOPHILS ABSOLUTE: 0.32 E9/L (ref 0.05–0.5)
EOSINOPHILS RELATIVE PERCENT: 3.4 % (ref 0–6)
GFR AFRICAN AMERICAN: >60
GFR NON-AFRICAN AMERICAN: >60 ML/MIN/1.73
GLUCOSE BLD-MCNC: 92 MG/DL (ref 74–99)
HCG, URINE, POC: NEGATIVE
HCT VFR BLD CALC: 40.6 % (ref 34–48)
HEMOGLOBIN: 12.5 G/DL (ref 11.5–15.5)
IMMATURE GRANULOCYTES #: 0.03 E9/L
IMMATURE GRANULOCYTES %: 0.3 % (ref 0–5)
LYMPHOCYTES ABSOLUTE: 2.52 E9/L (ref 1.5–4)
LYMPHOCYTES RELATIVE PERCENT: 26.9 % (ref 20–42)
Lab: NORMAL
MCH RBC QN AUTO: 28.7 PG (ref 26–35)
MCHC RBC AUTO-ENTMCNC: 30.8 % (ref 32–34.5)
MCV RBC AUTO: 93.1 FL (ref 80–99.9)
MONOCYTES ABSOLUTE: 0.62 E9/L (ref 0.1–0.95)
MONOCYTES RELATIVE PERCENT: 6.6 % (ref 2–12)
NEGATIVE QC PASS/FAIL: NORMAL
NEUTROPHILS ABSOLUTE: 5.85 E9/L (ref 1.8–7.3)
NEUTROPHILS RELATIVE PERCENT: 62.5 % (ref 43–80)
PDW BLD-RTO: 13.6 FL (ref 11.5–15)
PLATELET # BLD: 400 E9/L (ref 130–450)
PMV BLD AUTO: 10.3 FL (ref 7–12)
POSITIVE QC PASS/FAIL: NORMAL
POTASSIUM SERPL-SCNC: 4.8 MMOL/L (ref 3.5–5)
RBC # BLD: 4.36 E12/L (ref 3.5–5.5)
SODIUM BLD-SCNC: 142 MMOL/L (ref 132–146)
TOTAL PROTEIN: 8.1 G/DL (ref 6.4–8.3)
WBC # BLD: 9.4 E9/L (ref 4.5–11.5)

## 2020-01-03 PROCEDURE — 80053 COMPREHEN METABOLIC PANEL: CPT

## 2020-01-03 PROCEDURE — 85025 COMPLETE CBC W/AUTO DIFF WBC: CPT

## 2020-01-03 PROCEDURE — 99284 EMERGENCY DEPT VISIT MOD MDM: CPT

## 2020-01-03 PROCEDURE — 6360000002 HC RX W HCPCS: Performed by: EMERGENCY MEDICINE

## 2020-01-03 PROCEDURE — 72125 CT NECK SPINE W/O DYE: CPT

## 2020-01-03 PROCEDURE — 72128 CT CHEST SPINE W/O DYE: CPT

## 2020-01-03 PROCEDURE — 96374 THER/PROPH/DIAG INJ IV PUSH: CPT

## 2020-01-03 PROCEDURE — 96376 TX/PRO/DX INJ SAME DRUG ADON: CPT

## 2020-01-03 PROCEDURE — 36415 COLL VENOUS BLD VENIPUNCTURE: CPT

## 2020-01-03 PROCEDURE — 2580000003 HC RX 258: Performed by: EMERGENCY MEDICINE

## 2020-01-03 PROCEDURE — 72131 CT LUMBAR SPINE W/O DYE: CPT

## 2020-01-03 RX ORDER — FENTANYL CITRATE 50 UG/ML
25 INJECTION, SOLUTION INTRAMUSCULAR; INTRAVENOUS ONCE
Status: COMPLETED | OUTPATIENT
Start: 2020-01-03 | End: 2020-01-03

## 2020-01-03 RX ORDER — 0.9 % SODIUM CHLORIDE 0.9 %
1000 INTRAVENOUS SOLUTION INTRAVENOUS ONCE
Status: COMPLETED | OUTPATIENT
Start: 2020-01-03 | End: 2020-01-03

## 2020-01-03 RX ADMIN — FENTANYL CITRATE 25 MCG: 50 INJECTION, SOLUTION INTRAMUSCULAR; INTRAVENOUS at 16:33

## 2020-01-03 RX ADMIN — SODIUM CHLORIDE 1000 ML: 9 INJECTION, SOLUTION INTRAVENOUS at 16:32

## 2020-01-03 RX ADMIN — FENTANYL CITRATE 25 MCG: 50 INJECTION, SOLUTION INTRAMUSCULAR; INTRAVENOUS at 19:42

## 2020-01-03 ASSESSMENT — PAIN SCALES - GENERAL
PAINLEVEL_OUTOF10: 10

## 2020-01-03 ASSESSMENT — PAIN DESCRIPTION - PAIN TYPE: TYPE: ACUTE PAIN

## 2020-01-03 ASSESSMENT — PAIN DESCRIPTION - LOCATION: LOCATION: BACK

## 2020-01-03 NOTE — ED NOTES
Bed: 14B-14  Expected date:   Expected time:   Means of arrival:   Comments:  ems     Aby Brady RN  01/03/20 3018
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
## 2020-01-09 ENCOUNTER — HOSPITAL ENCOUNTER (OUTPATIENT)
Age: 35
Discharge: HOME | End: 2020-01-09
Payer: COMMERCIAL

## 2020-01-09 LAB
C-REACTIVE PROTEIN: 0.2 MG/DL (ref 0–0.4)
SEDIMENTATION RATE, ERYTHROCYTE: 28 MM/HR (ref 0–20)

## 2020-01-09 PROCEDURE — 36415 COLL VENOUS BLD VENIPUNCTURE: CPT

## 2020-01-09 PROCEDURE — 86140 C-REACTIVE PROTEIN: CPT

## 2020-01-09 PROCEDURE — 85651 RBC SED RATE NONAUTOMATED: CPT

## 2020-01-23 ENCOUNTER — HOSPITAL ENCOUNTER (OUTPATIENT)
Age: 35
Discharge: HOME | End: 2020-01-25
Payer: COMMERCIAL

## 2020-01-23 ENCOUNTER — HOSPITAL ENCOUNTER (OUTPATIENT)
Dept: GENERAL RADIOLOGY | Age: 35
Discharge: HOME | End: 2020-01-25
Payer: COMMERCIAL

## 2020-01-23 ENCOUNTER — OFFICE VISIT (OUTPATIENT)
Dept: NEUROSURGERY | Age: 35
End: 2020-01-23

## 2020-01-23 VITALS
WEIGHT: 218 LBS | HEART RATE: 58 BPM | SYSTOLIC BLOOD PRESSURE: 108 MMHG | HEIGHT: 63 IN | DIASTOLIC BLOOD PRESSURE: 62 MMHG | BODY MASS INDEX: 38.62 KG/M2

## 2020-01-23 PROCEDURE — 72040 X-RAY EXAM NECK SPINE 2-3 VW: CPT

## 2020-01-23 PROCEDURE — 99024 POSTOP FOLLOW-UP VISIT: CPT | Performed by: PHYSICIAN ASSISTANT

## 2020-01-23 RX ORDER — OXYCODONE HYDROCHLORIDE 10 MG/1
10 TABLET ORAL EVERY 4 HOURS PRN
Qty: 42 TABLET | Refills: 0 | Status: SHIPPED | OUTPATIENT
Start: 2020-01-23 | End: 2020-01-30

## 2020-01-23 RX ORDER — DIAZEPAM 5 MG/1
5 TABLET ORAL EVERY 6 HOURS PRN
Qty: 28 TABLET | Refills: 0 | Status: SHIPPED | OUTPATIENT
Start: 2020-01-23 | End: 2020-01-30

## 2020-01-28 ENCOUNTER — HOSPITAL ENCOUNTER (OUTPATIENT)
Dept: PHYSICAL THERAPY | Age: 35
Setting detail: THERAPIES SERIES
Discharge: HOME | End: 2020-01-28
Payer: COMMERCIAL

## 2020-01-28 PROCEDURE — 97161 PT EVAL LOW COMPLEX 20 MIN: CPT | Performed by: PHYSICAL THERAPIST

## 2020-01-28 ASSESSMENT — PAIN DESCRIPTION - ORIENTATION: ORIENTATION: RIGHT;LEFT

## 2020-01-28 ASSESSMENT — PAIN SCALES - GENERAL: PAINLEVEL_OUTOF10: 7

## 2020-01-28 ASSESSMENT — PAIN DESCRIPTION - PAIN TYPE: TYPE: SURGICAL PAIN

## 2020-01-28 ASSESSMENT — PAIN DESCRIPTION - DESCRIPTORS: DESCRIPTORS: ACHING;CONSTANT

## 2020-01-28 ASSESSMENT — PAIN - FUNCTIONAL ASSESSMENT: PAIN_FUNCTIONAL_ASSESSMENT: PREVENTS OR INTERFERES WITH MANY ACTIVE NOT PASSIVE ACTIVITIES

## 2020-01-28 ASSESSMENT — PAIN DESCRIPTION - LOCATION: LOCATION: NECK

## 2020-01-31 ENCOUNTER — HOSPITAL ENCOUNTER (OUTPATIENT)
Dept: PHYSICAL THERAPY | Age: 35
Setting detail: THERAPIES SERIES
Discharge: HOME | End: 2020-01-31
Payer: COMMERCIAL

## 2020-01-31 ENCOUNTER — APPOINTMENT (OUTPATIENT)
Dept: PHYSICAL THERAPY | Age: 35
End: 2020-01-31
Payer: COMMERCIAL

## 2020-01-31 PROCEDURE — 97110 THERAPEUTIC EXERCISES: CPT | Performed by: PHYSICAL THERAPIST

## 2020-01-31 PROCEDURE — G0283 ELEC STIM OTHER THAN WOUND: HCPCS | Performed by: PHYSICAL THERAPIST

## 2020-02-05 ENCOUNTER — HOSPITAL ENCOUNTER (OUTPATIENT)
Dept: PHYSICAL THERAPY | Age: 35
Setting detail: THERAPIES SERIES
Discharge: HOME | End: 2020-02-05
Payer: COMMERCIAL

## 2020-02-05 NOTE — PROGRESS NOTES
[facility]                Phone: [phone]  Fax: [phone]    Physical Therapy  Cancellation/No-show Note  Patient Name:  Rachel Jolley  :  1985   Date:  2020    For today's appointment patient:  [x]  Cancelled  []  Rescheduled appointment  []  No-show     Reason given by patient:  []  Patient ill  []  Conflicting appointment  [x]  No transportation    []  Conflict with work  []  No reason given  []  Other:     Comments:      Electronically signed by:   Aamir Harley

## 2020-02-07 ENCOUNTER — HOSPITAL ENCOUNTER (OUTPATIENT)
Dept: PHYSICAL THERAPY | Age: 35
Setting detail: THERAPIES SERIES
Discharge: HOME | End: 2020-02-07
Payer: COMMERCIAL

## 2020-02-07 PROCEDURE — G0283 ELEC STIM OTHER THAN WOUND: HCPCS | Performed by: PHYSICAL THERAPIST

## 2020-02-07 PROCEDURE — 97110 THERAPEUTIC EXERCISES: CPT | Performed by: PHYSICAL THERAPIST

## 2020-02-07 NOTE — PROGRESS NOTES
[facility]                Phone: [phone]   Fax: [phone]    Physical Therapy Daily Treatment Note  Date:  2020    Patient Name:  Jovan Archuleta    :  1985  MRN: [id number]    Evaluating therapist:  COSMO Lane               (20)  Restrictions/Precautions:    Diagnosis:  s/p cervical laminectomy/fusion C3-T1                (19)  Treatment Diagnosis:    Insurance/Certification information:  [address] [place], [place] [address]  Referring Practitioner:  Henry [place] of care signed (Y/N):    Visit# / total visits:  3/12  Pain level: 5/10   Time In:  933  Time Out:  1029    Subjective:      Exercises:  Exercise/Equipment Resistance/Repetitions Other comments   UBE   3 min F/B            corner st 3x20s    upper trap st 3x20s    chest st 3x20s    thoracic st 3x20s           shrugs 15x3s    scap ret 15x3s             cervical:  flex/ext    15x3                    rot  15x3s    pulleys for flex 5 min                                                 Other Therapeutic Activities:      Home Exercise Program:  provided 20    Manual Treatments:      Modalities:  IFC/MH to neck/upper back x 15 min     Timed Code Treatment Minutes: Total Treatment Minutes:      Treatment/Activity Tolerance:  [] Patient tolerated treatment well [] Patient limited by fatique  [] Patient limited by pain  [] Patient limited by other medical complications  [] Other:     Prognosis: [] Good [] Fair  [] Poor    Patient Requires Follow-up: [] Yes  [] No    Plan:   [] Continue per plan of care [] Alter current plan (see comments)  [] Plan of care initiated [] Hold pending MD visit [] Discharge  Plan for Next Session:      See Weekly Progress Note: []  Yes  []  No  Next due:        Electronically signed by:   Kaylin Tamayo

## 2020-02-12 ENCOUNTER — HOSPITAL ENCOUNTER (OUTPATIENT)
Dept: PHYSICAL THERAPY | Age: 35
Setting detail: THERAPIES SERIES
Discharge: HOME | End: 2020-02-12
Payer: COMMERCIAL

## 2020-02-12 PROCEDURE — 97110 THERAPEUTIC EXERCISES: CPT | Performed by: PHYSICAL THERAPIST

## 2020-02-12 PROCEDURE — G0283 ELEC STIM OTHER THAN WOUND: HCPCS | Performed by: PHYSICAL THERAPIST

## 2020-02-12 NOTE — PROGRESS NOTES
[address] [facility]                Phone: [phone]   Fax: [phone]    Physical Therapy Daily Treatment Note  Date:  2020    Patient Name:  Dionna Murillo    :  1985  MRN: [id number]    Evaluating therapist:  COSMO Farah               (20)  Restrictions/Precautions:    Diagnosis:  s/p cervical laminectomy/fusion C3-T1                (19)  Treatment Diagnosis:    Insurance/Certification information:  [address] [place], [place] [address]  Referring Practitioner:  Lennox Colt  care signed (Y/N):    Visit# / total visits:    Pain level: 5/10   Time In:  849  Time Out:  947    Subjective:      Exercises:  Exercise/Equipment Resistance/Repetitions Other comments   UBE   3 min F/B            corner st 3x20s    upper trap st 3x20s    chest st 3x20s    thoracic st 3x20s           shrugs 15x3s    scap ret 15x3s             cervical:  flex/ext    15x3                    rot  15x3s    pulleys for flex 5 min                                                 Other Therapeutic Activities:      Home Exercise Program:  provided 20    Manual Treatments:      Modalities:  IFC/MH to neck/upper back x 15 min     Timed Code Treatment Minutes: Total Treatment Minutes:      Treatment/Activity Tolerance:  [] Patient tolerated treatment well [] Patient limited by fatique  [] Patient limited by pain  [] Patient limited by other medical complications  [] Other:     Prognosis: [] Good [] Fair  [] Poor    Patient Requires Follow-up: [] Yes  [] No    Plan:   [] Continue per plan of care [] Alter current plan (see comments)  [] Plan of care initiated [] Hold pending MD visit [] Discharge  Plan for Next Session:      See Weekly Progress Note: []  Yes  []  No  Next due:        Electronically signed by:   [facility]

## 2020-02-13 ENCOUNTER — OFFICE VISIT (OUTPATIENT)
Dept: PAIN MANAGEMENT | Age: 35
End: 2020-02-13
Payer: COMMERCIAL

## 2020-02-13 ENCOUNTER — HOSPITAL ENCOUNTER (OUTPATIENT)
Age: 35
Discharge: HOME | End: 2020-02-15
Payer: COMMERCIAL

## 2020-02-13 VITALS
WEIGHT: 215 LBS | OXYGEN SATURATION: 93 % | RESPIRATION RATE: 16 BRPM | TEMPERATURE: 98.3 F | HEIGHT: 63 IN | DIASTOLIC BLOOD PRESSURE: 72 MMHG | HEART RATE: 60 BPM | SYSTOLIC BLOOD PRESSURE: 128 MMHG | BODY MASS INDEX: 38.09 KG/M2

## 2020-02-13 LAB — SPECIFIC GRAVITY UA: 1.02 (ref 1–1.03)

## 2020-02-13 PROCEDURE — 80307 DRUG TEST PRSMV CHEM ANLYZR: CPT

## 2020-02-13 PROCEDURE — G8427 DOCREV CUR MEDS BY ELIG CLIN: HCPCS | Performed by: ANESTHESIOLOGY

## 2020-02-13 PROCEDURE — G0480 DRUG TEST DEF 1-7 CLASSES: HCPCS

## 2020-02-13 PROCEDURE — G8484 FLU IMMUNIZE NO ADMIN: HCPCS | Performed by: ANESTHESIOLOGY

## 2020-02-13 PROCEDURE — 1036F TOBACCO NON-USER: CPT | Performed by: ANESTHESIOLOGY

## 2020-02-13 PROCEDURE — 99205 OFFICE O/P NEW HI 60 MIN: CPT | Performed by: ANESTHESIOLOGY

## 2020-02-13 PROCEDURE — G8417 CALC BMI ABV UP PARAM F/U: HCPCS | Performed by: ANESTHESIOLOGY

## 2020-02-13 RX ORDER — DOXYCYCLINE HYCLATE 100 MG
TABLET ORAL
COMMUNITY
Start: 2020-01-13 | End: 2020-10-19

## 2020-02-13 RX ORDER — GABAPENTIN 300 MG/1
CAPSULE ORAL
Qty: 90 CAPSULE | Refills: 1 | Status: SHIPPED
Start: 2020-02-13 | End: 2020-04-27 | Stop reason: SDUPTHER

## 2020-02-13 RX ORDER — CYCLOBENZAPRINE HCL 5 MG
5 TABLET ORAL 2 TIMES DAILY PRN
Qty: 30 TABLET | Refills: 1 | Status: SHIPPED | OUTPATIENT
Start: 2020-02-13 | End: 2020-03-14

## 2020-02-13 RX ORDER — DIAZEPAM 5 MG/1
TABLET ORAL
COMMUNITY
End: 2021-03-01 | Stop reason: ALTCHOICE

## 2020-02-13 RX ORDER — OXYCODONE HYDROCHLORIDE 10 MG/1
TABLET ORAL
COMMUNITY
End: 2020-04-27 | Stop reason: ALTCHOICE

## 2020-02-13 NOTE — PROGRESS NOTES
[facility]        [address] [facility], [facility] [place]  Dept: [phone]          Consult Note      Patient:  Dimas Coles  1985    Date of Service:  20     Requesting Physician:  MIKY Marroquin    Reason for Consult:      Patient presents with complaints of neck pain. She is S/p Posterior cervical fusion in 2019. HISTORY OF PRESENT ILLNESS:      Ms. Dimas Coles is a 30 y.o. female presented today to [facility] for evaluation of  Chronic neck pain. She is s/p C spein surgery on 2019 by Dr. Sharron Caroures:    Bilateral C3, C4, C5, C6, and C7 laminectomy. Bilateral segmental arthrodesis and fusion from C3 to T1 with use of bilateral C3, C4, C5, and C6 lateral mass screws and bilateral T1 pedicle screws. Later on reexploration and repair of CSF leak on 2019. Was treated with Antibiotics for superficial infection - had followed with ID- currently is on Doxycycline. Has been taking pain meds for post op pain. Currently taking Oxycodone 10 mg bid prn. Also taking Diazepam 5 mg po bid for muscle spasm. Has started PT recently and cervical collar is Dc'd. She is using a Bone stimulator. The surgery has helped the upper extremity pain. Prior to the surgery, patient was taking Gabapentin, flexeril. Pain is constant and is described as aching, stabbing and throbbing. She  has tingling and numbness of the upper extremities - which has improved since the surgery. Patient does not have bladder or bowel dysfunction. Alleviating factors include: rest.  Aggravating factors include: movement, bending, lifting. Pain causes functional limitations/ limits Adl's : Yes    Nursing notes and details of the pain history reviewed. Please see intake notes for details.     Previous treatments:   Physical Therapy : yes, has been doing PT since last month     Medications: - NSAID's : yes  MG per tablet Take 1 tablet by mouth every 4 hours as needed for Pain. Historical Provider, MD   gabapentin (NEURONTIN) 400 MG capsule Take 1 capsule by mouth 3 times daily for 30 days.  5/31/19 1/23/20  Den Keita MD       Allergies   Allergen Reactions    Latex     Bactrim [Sulfamethoxazole-Trimethoprim] Hives    Sulfamethoxazole-Trimethoprim Hives    Fish-Derived Products Swelling     Shrimp and Pirch    Ibuprofen Hives    Iodine      Fish derived products    Naproxen      headache    Percocet [Oxycodone-Acetaminophen] Hives       Social History     Socioeconomic History    Marital status:      Spouse name: Not on file    Number of children: Not on file    Years of education: Not on file    Highest education level: Not on file   Occupational History     Employer: [employer]   Social Needs    Financial resource strain: Not on file    Food insecurity:     Worry: Not on file     Inability: Not on file    Transportation needs:     Medical: Not on file     Non-medical: Not on file   Tobacco Use    Smoking status: Former Smoker     Types: Cigarettes, Cigars     Last attempt to quit: 7/10/2016     Years since quitting: 3.5    Smokeless tobacco: Never Used    Tobacco comment: quite cigars   Substance and Sexual Activity    Alcohol use: Not Currently     Alcohol/week: 0.0 standard drinks     Comment: occasionally    Drug use: Not Currently     Types: Marijuana     Comment: stopped 7/10/19    Sexual activity: Yes     Partners: Male   Lifestyle    Physical activity:     Days per week: Not on file     Minutes per session: Not on file    Stress: Not on file   Relationships    Social connections:     Talks on phone: Not on file     Gets together: Not on file     Attends [employer] service: Not on file     Active member of club or organization: Not on file     Attends meetings of clubs or organizations: Not on file     Relationship status: Not on file    Intimate partner violence:     Fear of current or ex partner: Not on file     Emotionally abused: Not on file     Physically abused: Not on file     Forced sexual activity: Not on file   Other Topics Concern    Not on file   Social History Narrative    Not on file       Family History   Problem Relation Age of Onset    Diabetes Mother     High Blood Pressure Mother     Cancer Mother     Ovarian Cancer Neg Hx     Uterine Cancer Neg Hx     Breast Cancer Neg Hx     Colon Cancer Neg Hx        REVIEW OF SYSTEMS:     Patient specifically denies fever/chills, chest pain, shortness of breath, new bowel or bladder complaints. All other review of systems was negative.   General ROS: negative for - chills, fever, night sweats or weight loss  Psychological ROS: positive for - anxiety and depression- follows with psych  Ophthalmic ROS: negative  ENT ROS: negative  Allergy and Immunology ROS: negative  Hematological and Lymphatic ROS: negative  Endocrine ROS: negative  Respiratory ROS: no cough, shortness of breath, or wheezing  Cardiovascular ROS: no chest pain or dyspnea on exertion  Gastrointestinal ROS: no abdominal pain, change in bowel habits, or black or bloody stools  Genito-Urinary ROS: no dysuria, trouble voiding, or hematuria  Musculoskeletal ROS: see HPI  Neurological ROS: no TIA or stroke symptoms  Dermatological ROS: negative     PHYSICAL EXAMINATION:      /72   Pulse 60   Temp 98.3 °F (36.8 °C) (Oral)   Resp 16   Ht 5' 3\" (1.6 m)   Wt 215 lb (97.5 kg)   LMP 05/19/2014   SpO2 93%   BMI 38.09 kg/m²     General:      General appearance:  Pleasant and well-hydrated, in no distress and A & O x 3  Build:Obese  Function: Rises from seated position easily and Moves about room without difficulty    HEENT:    Head:normocephalic, atraumatic  Pupils:regular, round, equal  Sclera: icterus absent    Lungs:    Breathing:normal breathing pattern     CVS:     RRR    Abdomen:    Shape:non-distended and normal  Tenderness:none  Guarding:none    Cervical spine:    Inspection:posterior scar noted- healed well  Palpation:tenderness paravertebral muscles, tenderness trapezium, left, right and positive. Range of motion:Significantly reduced ROM flexion, extension, rotation bilaterally and is moderately painful. Spurling's: negative bilaterally    Thoracic spine:     Spine inspection:normal   Palpation:No tenderness over the midline and paraspinal area, bilaterally  Range of motion:normal in flexion, extension rotation bilateral and is not painful. Lumbar spine:    Spine inspection: Normal   Palpation: Tenderness paravertebral muscles No bilaterally  Range of motion: Decreased,  Sacroiliac joint tenderness No bilaterally  SLR : negative bilaterally  Trochanteric bursa tenderness: negative bilaterally  CVA tenderness:No     Musculoskeletal:    Trigger points in trapezius: Yes bilaterally  Trigger points in rhomboids: Yes bilaterally  Trigger points in Paravertebral: Yes bilaterally    Extremities:    No edema    Knee:      Neurological:    Sensory: Normal to light touch     Motor:   NO focal motor deficits. Reflexes:    Bilaterally equal    Gait:normal Yes    Dermatology:    Skin:no rashes or lesions noted    Assessment/Plan:     Diagnosis Orders   1. Cervical stenosis of spinal canal     2. DDD (degenerative disc disease), cervical     3. S/P cervical spinal fusion     4. Chronic myofascial pain     5. Cervicalgia         30 y.o. female with H/O cervical spine stenosis , S/p C3, C4, C5, C6, and C7 laminectomy and C3-T1 fusion by Dr. Eliana Liao in Nov 2019, post op CSF leak which was repaired 10 days later and currently on antibiotics followed by ID. Recovering slowly. Started PT. Upper extremity symptoms has improved after surgery. Still needing Pain meds (She was not on chronic opioids prior to the surgery). Continue PT. Flexeril for muscle spasm (to replace Valium when it is completed).     She is Please do not hesitate to contact me if you have any questions regarding her care.     Randy Chatman MD      CC:    [facility]  [address] [place] Krishna [facility] [address]  [address] [facility] [address]

## 2020-02-14 ENCOUNTER — HOSPITAL ENCOUNTER (OUTPATIENT)
Dept: PHYSICAL THERAPY | Age: 35
Setting detail: THERAPIES SERIES
Discharge: HOME | End: 2020-02-14
Payer: COMMERCIAL

## 2020-02-14 PROCEDURE — G0283 ELEC STIM OTHER THAN WOUND: HCPCS | Performed by: PHYSICAL THERAPIST

## 2020-02-14 PROCEDURE — 97110 THERAPEUTIC EXERCISES: CPT | Performed by: PHYSICAL THERAPIST

## 2020-02-14 NOTE — PROGRESS NOTES
S:  pt presents to therapy for two of two scheduled visits this week; at week's end she  reports that her neck/upper back are improving but remain stiff;  pain level steady at  5/10 but seems less limiting in nature;  no c/o N/T into B UE's; HEP going well; continues to use bone stimulator as well as home TENS for pain relief     O:  performed the exercises/treatments as written in the flowsheet for the week ending 2/14/20;  cervical AROM grossly 35%WNL for all ranges; strength across B UE's grossly  5/5 for all planes    A:  toyin tx well; pt able to perform all requested tasks with good form and slow pacing noted; cervical AROM again shows slight improvement in both quantity/quality while B UE strength remains stable; sit/stand postures remain somewhat rigid and deliberate; endurance for all prolonged activities is GOOD-    P:  cont with POC of stretching/strengthening for neck/upper back with modalities as needed

## 2020-02-14 NOTE — PROGRESS NOTES
[address] [facility]                Phone: [phone]   Fax: [phone]    Physical Therapy Daily Treatment Note  Date:  2020    Patient Name:  Suzy Avelar    :  1985  MRN: [id number]    Evaluating therapist:  COSMO Calderón               (20)  Restrictions/Precautions:    Diagnosis:  s/p cervical laminectomy/fusion C3-T1                (19)  Treatment Diagnosis:    Insurance/Certification information:  [address] [place], [place] [address]  Referring Practitioner:  Sheila Rebollar signed (Y/N):    Visit# / total visits:    Pain level: 5/10   Time In:  855  Time Out:  943    Subjective:      Exercises:  Exercise/Equipment Resistance/Repetitions Other comments   UBE   3 min F/B            corner st 3x20s    upper trap st 3x20s    chest st 3x20s    thoracic st 3x20s           shrugs 15x3s    scap ret 15x3s             cervical:  flex/ext    15x3                    rot  15x3s    pulleys for flex 5 min                                                 Other Therapeutic Activities:      Home Exercise Program:  provided 20    Manual Treatments:      Modalities:  IFC/MH to neck/upper back x 15 min     Timed Code Treatment Minutes: Total Treatment Minutes:      Treatment/Activity Tolerance:  [] Patient tolerated treatment well [] Patient limited by fatique  [] Patient limited by pain  [] Patient limited by other medical complications  [] Other:     Prognosis: [] Good [] Fair  [] Poor    Patient Requires Follow-up: [] Yes  [] No    Plan:   [] Continue per plan of care [] Alter current plan (see comments)  [] Plan of care initiated [] Hold pending MD visit [] Discharge  Plan for Next Session:      See Weekly Progress Note: []  Yes  []  No  Next due:        Electronically signed by:   Mihir Dumont

## 2020-02-16 LAB
6AM URINE: <10 NG/ML
7-AMINOCLONAZEPAM, URINE: <5 NG/ML
ALPHA-HYDROXYALPRAZOLAM, URINE: <5 NG/ML
ALPHA-HYDROXYMIDAZOLAM, URINE: <20 NG/ML
ALPRAZOLAM, URINE: <5 NG/ML
CHLORDIAZEPOXIDE, URINE: <20 NG/ML
CLONAZEPAM, URINE: <5 NG/ML
CODEINE, URINE: <20 NG/ML
DIAZEPAM, URINE: <20 NG/ML
HYDROCODONE, URINE: <20 NG/ML
HYDROMORPHONE, URINE: <20 NG/ML
LORAZEPAM, URINE: <20 NG/ML
MIDAZOLAM, URINE: <20 NG/ML
MORPHINE URINE: <20 NG/ML
NORDIAZEPAM, URINE: 688 NG/ML
NORHYDROCODONE, URINE: <20 NG/ML
NOROXYCODONE, URINE: 2353 NG/ML
NOROXYMORPHONE, URINE: 344 NG/ML
OXAZEPAM, URINE: 2201 NG/ML
OXYCODONE, URINE CONFIRMATION: 540 NG/ML
OXYMORPHONE, URINE: 24 NG/ML
TEMAZEPAM, URINE: 1815 NG/ML

## 2020-02-18 LAB
Lab: NORMAL
REPORT: NORMAL
THIS TEST SENT TO: NORMAL

## 2020-02-19 ENCOUNTER — HOSPITAL ENCOUNTER (OUTPATIENT)
Dept: PHYSICAL THERAPY | Age: 35
Setting detail: THERAPIES SERIES
Discharge: HOME | End: 2020-02-19
Payer: COMMERCIAL

## 2020-02-19 PROCEDURE — 97110 THERAPEUTIC EXERCISES: CPT | Performed by: PHYSICAL THERAPIST

## 2020-02-19 PROCEDURE — G0283 ELEC STIM OTHER THAN WOUND: HCPCS | Performed by: PHYSICAL THERAPIST

## 2020-02-21 ENCOUNTER — HOSPITAL ENCOUNTER (OUTPATIENT)
Dept: PHYSICAL THERAPY | Age: 35
Setting detail: THERAPIES SERIES
Discharge: HOME | End: 2020-02-21
Payer: COMMERCIAL

## 2020-02-26 ENCOUNTER — HOSPITAL ENCOUNTER (OUTPATIENT)
Dept: PHYSICAL THERAPY | Age: 35
Setting detail: THERAPIES SERIES
Discharge: HOME | End: 2020-02-26
Payer: COMMERCIAL

## 2020-02-26 PROCEDURE — 97110 THERAPEUTIC EXERCISES: CPT | Performed by: PHYSICAL THERAPIST

## 2020-02-26 PROCEDURE — G0283 ELEC STIM OTHER THAN WOUND: HCPCS | Performed by: PHYSICAL THERAPIST

## 2020-02-26 NOTE — PROGRESS NOTES
[facility]                Phone: [phone]   Fax: [phone]    Physical Therapy Daily Treatment Note  Date:  2020    Patient Name:  Wilver Grace    :  1985  MRN: [id number]    Evaluating therapist:  COSMO Estes               (20)  Restrictions/Precautions:    Diagnosis:  s/p cervical laminectomy/fusion C3-T1                (19)  Treatment Diagnosis:    Insurance/Certification information:  [address] [place], [place] [address]  Referring Practitioner:  Corry Pappas  care signed (Y/N):    Visit# / total visits:    Pain level: 5/10   Time In:  907  Time Out:  1005  Subjective:      Exercises:  Exercise/Equipment Resistance/Repetitions Other comments   UBE   3 min F/B            corner st 4x20s    upper trap st 4x20s    chest st 4x20s    thoracic st 4x20s           shrugs 15x3s    scap ret 15x3s             cervical:  flex/ext    15x3                    rot  15x3s    pulleys for flex 5 min            airdyne  (arms only) 5 min                                   Other Therapeutic Activities:      Home Exercise Program:  provided 20    Manual Treatments:      Modalities:  IFC/MH to neck/upper back x 15 min     Timed Code Treatment Minutes: Total Treatment Minutes:      Treatment/Activity Tolerance:  [] Patient tolerated treatment well [] Patient limited by fatique  [] Patient limited by pain  [] Patient limited by other medical complications  [] Other:     Prognosis: [] Good [] Fair  [] Poor    Patient Requires Follow-up: [] Yes  [] No    Plan:   [] Continue per plan of care [] Alter current plan (see comments)  [] Plan of care initiated [] Hold pending MD visit [] Discharge  Plan for Next Session:      See Weekly Progress Note: []  Yes  []  No  Next due:        Electronically signed by:   Maria Elena Gomez

## 2020-02-28 ENCOUNTER — HOSPITAL ENCOUNTER (OUTPATIENT)
Dept: PHYSICAL THERAPY | Age: 35
Setting detail: THERAPIES SERIES
Discharge: HOME | End: 2020-02-28
Payer: COMMERCIAL

## 2020-02-28 PROCEDURE — G0283 ELEC STIM OTHER THAN WOUND: HCPCS | Performed by: PHYSICAL THERAPIST

## 2020-02-28 PROCEDURE — 97110 THERAPEUTIC EXERCISES: CPT | Performed by: PHYSICAL THERAPIST

## 2020-02-28 NOTE — PROGRESS NOTES
[facility]                Phone: [phone]   Fax: [phone]    Physical Therapy Daily Treatment Note  Date:  2020    Patient Name:  Alison Whitlock    :  1985  MRN: [id number]    Evaluating therapist:  COSMO King               (20)  Restrictions/Precautions:    Diagnosis:  s/p cervical laminectomy/fusion C3-T1                (19)  Treatment Diagnosis:    Insurance/Certification information:  [address] [place], [place] [address]  Referring Practitioner:  March Houlton of care signed (Y/N):    Visit# / total visits:    Pain level: 5/10   Time In:  848  Time Out:  938  Subjective:      Exercises:  Exercise/Equipment Resistance/Repetitions Other comments   UBE   3 min F/B            corner st 4x20s    upper trap st 4x20s    chest st 4x20s    thoracic st 4x20s           shrugs 15x3s    scap ret 15x3s             cervical:  flex/ext    15x3                    rot  15x3s    pulleys for flex 5 min            airdyne  (arms only) 5 min                                   Other Therapeutic Activities:      Home Exercise Program:  provided 20    Manual Treatments:      Modalities:  IFC/MH to neck/upper back x 15 min     Timed Code Treatment Minutes: Total Treatment Minutes:      Treatment/Activity Tolerance:  [] Patient tolerated treatment well [] Patient limited by fatique  [] Patient limited by pain  [] Patient limited by other medical complications  [] Other:     Prognosis: [] Good [] Fair  [] Poor    Patient Requires Follow-up: [] Yes  [] No    Plan:   [] Continue per plan of care [] Alter current plan (see comments)  [] Plan of care initiated [] Hold pending MD visit [] Discharge  Plan for Next Session:      See Weekly Progress Note: []  Yes  []  No  Next due:        Electronically signed by:   Vadim Ford

## 2020-02-28 NOTE — PROGRESS NOTES
S:  pt presents to therapy for two of two scheduled visits this week; at week's end she reports  that her neck/upper back continue to feel like they were improving; stiffness across both persists and  pain level steady at  5/10 but seems less limiting in nature;  no c/o N/T into B UE's; HEP going well; continues to use bone stimulator as well as home TENS for pain relief     O:  performed the exercises/treatments as written in the flowsheet for the week ending 2/28/20;  cervical AROM grossly 50%WNL for all ranges; strength across B UE's grossly  5/5 for all planes    A:  toyin tx well; pt able to perform all requested tasks with good form and slow pacing noted; cervical AROM again shows slight improvement in both quantity/quality while B UE strength remains stable; sit/stand postures remain somewhat rigid and deliberate; endurance for all prolonged activities is GOOD-    P:  cont with POC of stretching/strengthening for neck/upper back with modalities as needed

## 2020-03-04 ENCOUNTER — HOSPITAL ENCOUNTER (OUTPATIENT)
Dept: PHYSICAL THERAPY | Age: 35
Setting detail: THERAPIES SERIES
Discharge: HOME | End: 2020-03-04
Payer: COMMERCIAL

## 2020-03-04 PROCEDURE — G0283 ELEC STIM OTHER THAN WOUND: HCPCS | Performed by: PHYSICAL THERAPIST

## 2020-03-04 PROCEDURE — 97110 THERAPEUTIC EXERCISES: CPT | Performed by: PHYSICAL THERAPIST

## 2020-03-04 NOTE — PROGRESS NOTES
[address] [facility]                Phone: [phone]   Fax: [phone]    Physical Therapy Daily Treatment Note  Date:  3/4/2020    Patient Name:  Blake Allen    :  1985  MRN: [id number]    Evaluating therapist:  COSMO Anthony               (20)  Restrictions/Precautions:    Diagnosis:  s/p cervical laminectomy/fusion C3-T1                (19)  Treatment Diagnosis:    Insurance/Certification information:  [id number] [place], [place] [address]  Referring Practitioner:  Grupo Stauffer of care signed (Y/N):    Visit# / total visits:    Pain level: 510   Time In:  910  Time Out:  1002  Subjective:      Exercises:  Exercise/Equipment Resistance/Repetitions Other comments   UBE   3 min F/B            corner st 4x20s    upper trap st 4x20s    chest st 4x20s    thoracic st 4x20s           shrugs 15x3s    scap ret 15x3s             cervical:  flex/ext    15x3                    rot  15x3s    pulleys for flex 5 min            airdyne  (arms only) 5 min                                   Other Therapeutic Activities:      Home Exercise Program:  provided 20    Manual Treatments:      Modalities:  IFC/MH to neck/upper back x 15 min     Timed Code Treatment Minutes: Total Treatment Minutes:      Treatment/Activity Tolerance:  [] Patient tolerated treatment well [] Patient limited by fatique  [] Patient limited by pain  [] Patient limited by other medical complications  [] Other:     Prognosis: [] Good [] Fair  [] Poor    Patient Requires Follow-up: [] Yes  [] No    Plan:   [] Continue per plan of care [] Alter current plan (see comments)  [] Plan of care initiated [] Hold pending MD visit [] Discharge  Plan for Next Session:      See Weekly Progress Note: []  Yes  []  No  Next due:        Electronically signed by:   Nato Guzmán

## 2020-03-06 ENCOUNTER — HOSPITAL ENCOUNTER (OUTPATIENT)
Dept: PHYSICAL THERAPY | Age: 35
Setting detail: THERAPIES SERIES
Discharge: HOME | End: 2020-03-06
Payer: COMMERCIAL

## 2020-03-06 PROCEDURE — G0283 ELEC STIM OTHER THAN WOUND: HCPCS | Performed by: PHYSICAL THERAPIST

## 2020-03-06 PROCEDURE — 97110 THERAPEUTIC EXERCISES: CPT | Performed by: PHYSICAL THERAPIST

## 2020-03-06 NOTE — PROGRESS NOTES
S:  pt presents to therapy for two of two scheduled visits this week; at week's end she reports  that her neck/upper back continue to feel better; stiffness across both persists and  pain level steady at  4/10 but seems less limiting in nature;  no c/o N/T into B UE's; HEP going well; continues to use bone stimulator as well as home TENS for pain relief     O:  performed the exercises/treatments as written in the flowsheet for the week ending 3/6/20;  cervical AROM grossly 60%WNL for all ranges; strength across B UE's grossly  5/5 for all planes    A:  toyin tx well; pt able to perform all requested tasks with good form and slow pacing noted; cervical AROM again shows slight improvement in both quantity/quality while B UE strength remains stable; sit/stand postures remain somewhat rigid and deliberate; endurance for all prolonged activities is GOOD-    P:  cont with POC of stretching/strengthening for neck/upper back with modalities as needed

## 2020-03-09 ENCOUNTER — OFFICE VISIT (OUTPATIENT)
Dept: PAIN MANAGEMENT | Age: 35
End: 2020-03-09
Payer: COMMERCIAL

## 2020-03-09 VITALS
RESPIRATION RATE: 18 BRPM | WEIGHT: 218 LBS | HEIGHT: 63 IN | HEART RATE: 88 BPM | TEMPERATURE: 98.6 F | DIASTOLIC BLOOD PRESSURE: 72 MMHG | BODY MASS INDEX: 38.62 KG/M2 | SYSTOLIC BLOOD PRESSURE: 122 MMHG | OXYGEN SATURATION: 98 %

## 2020-03-09 PROBLEM — Z98.1 S/P CERVICAL SPINAL FUSION: Status: ACTIVE | Noted: 2020-03-09

## 2020-03-09 PROBLEM — M50.30 DDD (DEGENERATIVE DISC DISEASE), CERVICAL: Status: ACTIVE | Noted: 2020-03-09

## 2020-03-09 PROCEDURE — G8417 CALC BMI ABV UP PARAM F/U: HCPCS | Performed by: ANESTHESIOLOGY

## 2020-03-09 PROCEDURE — 99214 OFFICE O/P EST MOD 30 MIN: CPT | Performed by: ANESTHESIOLOGY

## 2020-03-09 PROCEDURE — G8427 DOCREV CUR MEDS BY ELIG CLIN: HCPCS | Performed by: ANESTHESIOLOGY

## 2020-03-09 PROCEDURE — G8484 FLU IMMUNIZE NO ADMIN: HCPCS | Performed by: ANESTHESIOLOGY

## 2020-03-09 PROCEDURE — 1036F TOBACCO NON-USER: CPT | Performed by: ANESTHESIOLOGY

## 2020-03-09 RX ORDER — LIDOCAINE 36 MG/1
PATCH TOPICAL
Qty: 30 PATCH | Refills: 2 | Status: SHIPPED | OUTPATIENT
Start: 2020-03-09

## 2020-03-09 NOTE — PROGRESS NOTES
[facility]        [address], [address]  Dept: [phone]        Follow up Note      Obey Cooper Edgar     Date of Visit:  3/9/2020    CC:  Patient presents for follow up   Chief Complaint   Patient presents with    Pain     neck and back        HPI:  H/o Chronic neck pain.     She is s/p C spein surgery on 11/1/2019 by Dr. Mario Puente:    Bilateral C3, C4, C5, C6, and C7 laminectomy. Bilateral segmental arthrodesis and fusion from C3 to T1 with use of bilateral C3, C4, C5, and C6 lateral mass screws and bilateral T1 pedicle screws.     Later on reexploration and repair of CSF leak on 11/11/2019.     Was treated with Antibiotics for superficial infection - had followed with ID- currently is on Doxycycline.     Has been taking pain meds for post op pain. Takes Oxycodone only prn for severe  Pain. Last script for # 42 tablets given on 1/23/2020 by [facility] team.      Also was taking Diazepam 5 mg po bid for muscle spasm which has been discontinued and she Currently takes Cyclobenzaprine.     Has started PT recently and cervical collar is Dc'd. She is using a Bone stimulator.     The surgery has helped the upper extremity pain.     Prior to the surgery, patient was taking Gabapentin, flexeril.     Pain is improving. Change in quality of symptoms:no. Medication side effects:none. Recent diagnostic testing:none. Recent interventional procedures:none. She  has tingling and numbness of the upper extremities - which has improved since the surgery.     Patient does not have bladder or bowel dysfunction.     Alleviating factors include: rest.  Aggravating factors include: movement, bending, lifting.      Pain causes functional limitations/ limits Adl's : Yes     Nursing notes and details of the pain history reviewed.  Please see intake notes for details.     Previous treatments:   Physical Therapy : yes, has been doing PT since Jan 2020     Medications: - NSAID's : yes - Membrane stabilizers : yes -                        - Opioids : yes,for  post op pain                       - Adjuvants or Others : yes, muscle relaxant/ valium     TENS Unit: no     Surgeries: yes : posterior cervical fusion as detailed above by Dr. Iam Galvan in Nov 2019     Interventional Pain procedures/ nerve blocks: no     She has not been on anticoagulation medications no.     She has not been on herbal supplements.       She is not diabetic.     H/O Smoking: quit  H/O alcohol abuse : denies  H/O Illicit drug use : H/O THC use + occasionally- about once a week     Employment: off work since May 2019     Labs: BUN/ Creatinine- normal.     Imaging:   CT Cervical spien: on 1/3/2020:     Patient status post posterior cervical fusion from C3 to T1. There is   a multilevel laminectomy defect. Posterior spurs are seen. There are moderate degenerative changes of the spine present. There are moderate degenerative changes of the facets. There appears to be a focal flexion point at C2-3. This is stable when   compared to previous       Impression   Degenerative changes   Sequela of a C3-T1 posterior fusion with laminectomy defect         X ray C spine: 1/23/2020:  Redemonstration of posterior cervical fusion delfino and pedicle screws   noted from C3 to T1. There is evidence of a laminectomy defect. Alignment of the vertebral bodies appears to be near-anatomic   No fracture or foreign body is identified. The disc spaces demonstrate moderate degenerative changes. There is no significant loss of the vertebral body height   There are moderate degenerative changes involving the facets.           Impression   Stable posterior cervical fusion hardware of C3-T1. Findings consistent with moderate degenerative disc disease and   moderate degenerative facet disease.        Potential Aberrant Drug-Related Behavior:  H/o THC use +    Urine Drug Screening:  On 2/13/2020 reviewed; + for Oxycodone, + benzo and + for [facility]    OARRS report[de-identified]  3/9/2020; Consistent. Past Medical History:   Diagnosis Date    Anxiety     Asthma     Depression     Ectopic pregnancy, tubal 2012    Gall stone 2005    Heart murmur     Hernia 9199    umbilical     History of blood transfusion     Postoperative anemia        Past Surgical History:   Procedure Laterality Date    ABDOMINAL WALL SURGERY  [id number]    CERVICAL FUSION N/A 11/1/2019    C3-C7  CERVICAL LAMINECTOMY AND POSTERIOR C3-T1 FUSION -- NEEDS JOSH TABLE, CELL SAVER, PLATELET GEL, PLATES, SCREWS -- GLOBUS performed by Stefano Mclean MD at [address] N/A 11/11/2019    POSTERIOR CERVICAL WOUND EXPLORATION - WANTS TF performed by Stefano Mclean MD at [address] [place] [address] N/A 12/26/2017    robotic assisted ,   BSO    HYSTERECTOMY, TOTAL ABDOMINAL  8/20/14    LAPAROSCOPY  9/28/2012    left salpingectomy    LAPAROSCOPY  [id number]    RASHEEDA    OTHER SURGICAL HISTORY  3/25/2014    D&C;LEEP    TUBAL LIGATION         Prior to Admission medications    Medication Sig Start Date End Date Taking? Authorizing Provider   oxyCODONE HCl (OXY-IR) 10 MG immediate release tablet Take by mouth. Yes Historical Provider, MD   diazepam (VALIUM) 5 MG tablet Take by mouth.    Yes Historical Provider, MD   doxycycline hyclate (VIBRA-TABS) 100 MG tablet take 1 tablet by mouth every 12 hours 1/13/20  Yes Historical Provider, MD   gabapentin (NEURONTIN) 300 MG capsule qHS x 3 days then BID x 3 days then TID thereafter (increase as tolerated) 2/13/20 3/14/20 Yes Galdino Mcdonald MD   cyclobenzaprine (FLEXERIL) 5 MG tablet Take 1 tablet by mouth 2 times daily as needed for Muscle spasms 2/13/20 3/14/20 Yes Galdino Mcdonald MD   FLUoxetine (PROZAC) 40 MG capsule  11/20/19  Yes Historical Provider, MD   NARCAN 4 MG/0.1ML LIQD nasal spray ADMINISTER A SINGLE spray INTO ONE of clubs or organizations: Not on file     Relationship status: Not on file    Intimate partner violence     Fear of current or ex partner: Not on file     Emotionally abused: Not on file     Physically abused: Not on file     Forced sexual activity: Not on file   Other Topics Concern    Not on file   Social History Narrative    Not on file       Family History   Problem Relation Age of Onset    Diabetes Mother     High Blood Pressure Mother     Cancer Mother     Ovarian Cancer Neg Hx     Uterine Cancer Neg Hx     Breast Cancer Neg Hx     Colon Cancer Neg Hx        REVIEW OF SYSTEMS:     Sherryle Rack denies fever/chills, chest pain, shortness of breath, new bowel or bladder complaints. All other review of systems was negative. PHYSICAL EXAMINATION:      /72   Pulse 88   Temp 98.6 °F (37 °C)   Resp 18   Ht 5' 3\" (1.6 m)   Wt 218 lb (98.9 kg)   LMP 05/19/2014   SpO2 98%   BMI 38.62 kg/m²   General:       General appearance:  Pleasant and well-hydrated, in no distress and A & O x 3  Build:Obese  Function: Rises from seated position easily and Moves about room without difficulty     HEENT:     Head:normocephalic, atraumatic  Pupils:regular, round, equal  Sclera: icterus absent     Lungs:     Breathing:normal breathing pattern      CVS:     RRR     Abdomen:     Shape:non-distended and normal  Tenderness:none  Guarding:none     Cervical spine:     Inspection:posterior scar noted- healed well  Palpation:tenderness paravertebral muscles, tenderness trapezium, left, right and positive. Range of motion:Significantly reduced ROM flexion, extension, rotation bilaterally and is moderately painful.   Spurling's: negative bilaterally     Thoracic spine:                Spine inspection:normal   Palpation:No tenderness over the midline and paraspinal area, bilaterally  Range of motion:normal in flexion, extension rotation bilateral and is not painful.     Lumbar spine:     Spine inspection: Normal Palpation: Tenderness paravertebral muscles No bilaterally  Range of motion: Decreased,  Sacroiliac joint tenderness No bilaterally  SLR : negative bilaterally  Trochanteric bursa tenderness: negative bilaterally  CVA tenderness:No      Musculoskeletal:     Trigger points in trapezius: Yes bilaterally  Trigger points in rhomboids: Yes bilaterally  Trigger points in Paravertebral: Yes bilaterally     Extremities:     No edema     Knee:        Neurological:     Sensory: Normal to light touch      Motor:   NO focal motor deficits.     Reflexes:    Bilaterally equal     Gait:normal Yes     Dermatology:     Skin:no rashes or lesions noted      Assessment/Plan:     Diagnosis Orders   1. DDD (degenerative disc disease), cervical     2. Cervical stenosis of spine     3. S/P cervical spinal fusion     4. History of marijuana use     5. Cervicalgia     6. Chronic myofascial pain       30 y.o. female with H/O cervical spine stenosis , S/p C3, C4, C5, C6, and C7 laminectomy and C3-T1 fusion by Dr. Wally Wilkes in Nov 2019, post op CSF leak which was repaired 10 days later and currently on antibiotics followed by ID.     Recovering slowly.     Has been doing PT regularly - slow improvement.     Upper extremity symptoms has improved after surgery.     Takes Pain meds only on prn basis. Still has meds prescribed in Jan 2020 (She was not on chronic opioids prior to the surgery).     Continue PT.     Flexeril for muscle spasm (Has stopped Valium).     She is takes Oxycodone only prn. She has been doing PT and good analgesia will help her to participate in PT effectively. Pain medication given to help with pain and functionality- since she is recovering an extensive surgery as detailed above. She takes it only prn for bad days. We do not intend to continue this for long period. Plan to DC soon. Discussed this with the patient and she is in agreement.     Taking Gabapentin 300 mg tid.  Helping moderately.     UDS on 2/13/2020:

## 2020-03-09 NOTE — PROGRESS NOTES
Obey Hernández presents to the [facility] on 3/9/2020. Nathan Baer is complaining of pain neck and back . The pain is constant. The pain is described as pressure . Pain is rated on her best day at a 10, on her worst day at a 10, and on average at a 10 on the VAS scale. She took her last dose of flexeril neurontin  . Any procedures since your last visit: No, with non e % relief. Pacemaker or defibrilator: No managed by none. She is not on NSAIDS and is not on anticoagulation medication  /72   Pulse 88   Temp 98.6 °F (37 °C)   Resp 18   Ht 5' 3\" (1.6 m)   Wt 218 lb (98.9 kg)   LMP 05/19/2014   SpO2 98%   BMI 38.62 kg/m²      Patient's last menstrual period was 05/19/2014.

## 2020-03-11 ENCOUNTER — OFFICE VISIT (OUTPATIENT)
Dept: OBGYN | Age: 35
End: 2020-03-11
Payer: COMMERCIAL

## 2020-03-11 ENCOUNTER — HOSPITAL ENCOUNTER (OUTPATIENT)
Dept: PHYSICAL THERAPY | Age: 35
Setting detail: THERAPIES SERIES
Discharge: HOME | End: 2020-03-11
Payer: COMMERCIAL

## 2020-03-11 ENCOUNTER — HOSPITAL ENCOUNTER (OUTPATIENT)
Age: 35
Discharge: HOME | End: 2020-03-11
Payer: COMMERCIAL

## 2020-03-11 ENCOUNTER — HOSPITAL ENCOUNTER (OUTPATIENT)
Age: 35
Discharge: HOME | End: 2020-03-13
Payer: COMMERCIAL

## 2020-03-11 VITALS
DIASTOLIC BLOOD PRESSURE: 81 MMHG | WEIGHT: 209 LBS | TEMPERATURE: 98.4 F | RESPIRATION RATE: 16 BRPM | HEIGHT: 63 IN | BODY MASS INDEX: 37.03 KG/M2 | HEART RATE: 85 BPM | SYSTOLIC BLOOD PRESSURE: 125 MMHG

## 2020-03-11 LAB
C-REACTIVE PROTEIN: 0.2 MG/DL (ref 0–0.4)
CLUE CELLS: NORMAL
SEDIMENTATION RATE, ERYTHROCYTE: 14 MM/HR (ref 0–20)
SOURCE WET PREP: NORMAL
TRICHOMONAS PREP: NORMAL
YEAST WET PREP: NORMAL

## 2020-03-11 PROCEDURE — G8484 FLU IMMUNIZE NO ADMIN: HCPCS | Performed by: NURSE PRACTITIONER

## 2020-03-11 PROCEDURE — G8417 CALC BMI ABV UP PARAM F/U: HCPCS | Performed by: NURSE PRACTITIONER

## 2020-03-11 PROCEDURE — 87491 CHLMYD TRACH DNA AMP PROBE: CPT

## 2020-03-11 PROCEDURE — G8427 DOCREV CUR MEDS BY ELIG CLIN: HCPCS | Performed by: NURSE PRACTITIONER

## 2020-03-11 PROCEDURE — 1036F TOBACCO NON-USER: CPT | Performed by: NURSE PRACTITIONER

## 2020-03-11 PROCEDURE — 85651 RBC SED RATE NONAUTOMATED: CPT

## 2020-03-11 PROCEDURE — 87591 N.GONORRHOEAE DNA AMP PROB: CPT

## 2020-03-11 PROCEDURE — 87210 SMEAR WET MOUNT SALINE/INK: CPT

## 2020-03-11 PROCEDURE — 99213 OFFICE O/P EST LOW 20 MIN: CPT | Performed by: NURSE PRACTITIONER

## 2020-03-11 PROCEDURE — 97110 THERAPEUTIC EXERCISES: CPT | Performed by: PHYSICAL THERAPIST

## 2020-03-11 PROCEDURE — 36415 COLL VENOUS BLD VENIPUNCTURE: CPT

## 2020-03-11 PROCEDURE — 86140 C-REACTIVE PROTEIN: CPT

## 2020-03-11 PROCEDURE — G0283 ELEC STIM OTHER THAN WOUND: HCPCS | Performed by: PHYSICAL THERAPIST

## 2020-03-11 ASSESSMENT — ENCOUNTER SYMPTOMS
GASTROINTESTINAL NEGATIVE: 1
RESPIRATORY NEGATIVE: 1

## 2020-03-11 NOTE — PROGRESS NOTES
[address] [facility]                Phone: [phone]   Fax: [phone]    Physical Therapy Daily Treatment Note  Date:  3/11/2020    Patient Name:  Suzy Avelar    :  1985  MRN: [id number]    Evaluating therapist:  COSMO Calderón               (20)  Restrictions/Precautions:    Diagnosis:  s/p cervical laminectomy/fusion C3-T1                (19)  Treatment Diagnosis:    Insurance/Certification information:  [address] [place], [place] [address]  Referring Practitioner:  Sheila Rebollar signed (Y/N):    Visit# / total visits:    Pain level: 4/10   Time In:  930  Time Out:  1025    Subjective:      Exercises:  Exercise/Equipment Resistance/Repetitions Other comments   UBE   3 min F/B            corner st 4x20s    upper trap st 4x20s    chest st 4x20s    thoracic st 4x20s           shrugs 15x3s    scap ret 15x3s             cervical:  flex/ext    15x3                    rot  15x3s    pulleys for flex 5 min            airdyne  (arms only) 5 min                                   Other Therapeutic Activities:      Home Exercise Program:  provided 20    Manual Treatments:      Modalities:  IFC/MH to neck/upper back x 15 min     Timed Code Treatment Minutes: Total Treatment Minutes:      Treatment/Activity Tolerance:  [] Patient tolerated treatment well [] Patient limited by fatique  [] Patient limited by pain  [] Patient limited by other medical complications  [] Other:     Prognosis: [] Good [] Fair  [] Poor    Patient Requires Follow-up: [] Yes  [] No    Plan:   [] Continue per plan of care [] Alter current plan (see comments)  [] Plan of care initiated [] Hold pending MD visit [] Discharge  Plan for Next Session:      See Weekly Progress Note: []  Yes  []  No  Next due:        Electronically signed by:   Mihir Dumont

## 2020-03-12 ENCOUNTER — TELEPHONE (OUTPATIENT)
Dept: OBGYN | Age: 35
End: 2020-03-12

## 2020-03-13 ENCOUNTER — HOSPITAL ENCOUNTER (OUTPATIENT)
Dept: PHYSICAL THERAPY | Age: 35
Setting detail: THERAPIES SERIES
Discharge: HOME | End: 2020-03-13
Payer: COMMERCIAL

## 2020-03-13 PROCEDURE — 97110 THERAPEUTIC EXERCISES: CPT | Performed by: PHYSICAL THERAPIST

## 2020-03-13 PROCEDURE — G0283 ELEC STIM OTHER THAN WOUND: HCPCS | Performed by: PHYSICAL THERAPIST

## 2020-03-13 NOTE — PROGRESS NOTES
S:  pt presents to therapy for two of two scheduled visits this week; at week's end she reports  that her neck/upper back feel OK with report of some spasms throughout yesterday;  stiffness across both persists and  pain level steady at  4/10 but seems less limiting in nature;  no c/o N/T into B UE's; HEP going well; continues to use bone stimulator as well as home TENS for pain relief     O:  performed the exercises/treatments as written in the flowsheet for the week ending 3/13/20;  cervical AROM grossly 60%WNL for all ranges; strength across B UE's grossly  5/5 for all planes    A:  toyin tx well; pt able to perform all requested tasks with good form and slow pacing noted; cervical AROM stable across all ranges; B UE strength remains stable; sit/stand postures remain somewhat rigid and deliberate; endurance for all prolonged activities is GOOD    P:  PT will look to extend treatment for another 2 weeks to cont with POC of stretching/strengthening for neck/upper back with modalities as needed

## 2020-03-13 NOTE — PROGRESS NOTES
[facility]                Phone: [phone]   Fax: [phone]    Physical Therapy Daily Treatment Note  Date:  3/13/2020    Patient Name:  Luis Ramey    :  1985  MRN: [id number]    Evaluating therapist:  COSMO Palacios               (20)  Restrictions/Precautions:    Diagnosis:  s/p cervical laminectomy/fusion C3-T1                (19)  Treatment Diagnosis:    Insurance/Certification information:  [id number] [place] [address],[address]  Referring Practitioner:  Velasquez Dangelo of care signed (Y/N):    Visit# / total visits:    Pain level: 4/10   Time In:  937  Time Out:  1030    Subjective:      Exercises:  Exercise/Equipment Resistance/Repetitions Other comments   UBE   3 min F/B            corner st 4x20s    upper trap st 4x20s    chest st 4x20s    thoracic st 4x20s           shrugs 15x3s    scap ret 15x3s             cervical:  flex/ext    15x3                    rot  15x3s    pulleys for flex 5 min            airdyne  (arms only) 5 min                                   Other Therapeutic Activities:      Home Exercise Program:  provided 20    Manual Treatments:      Modalities:  IFC/MH to neck/upper back x 15 min     Timed Code Treatment Minutes: Total Treatment Minutes:      Treatment/Activity Tolerance:  [] Patient tolerated treatment well [] Patient limited by fatique  [] Patient limited by pain  [] Patient limited by other medical complications  [] Other:     Prognosis: [] Good [] Fair  [] Poor    Patient Requires Follow-up: [] Yes  [] No    Plan:   [] Continue per plan of care [] Alter current plan (see comments)  [] Plan of care initiated [] Hold pending MD visit [] Discharge  Plan for Next Session:      See Weekly Progress Note: []  Yes  []  No  Next due:        Electronically signed by:   Maritza Nova

## 2020-03-16 LAB
C TRACH DNA GENITAL QL NAA+PROBE: NEGATIVE
N. GONORRHOEAE DNA: NEGATIVE
SOURCE: NORMAL

## 2020-03-18 ENCOUNTER — HOSPITAL ENCOUNTER (OUTPATIENT)
Dept: PHYSICAL THERAPY | Age: 35
Setting detail: THERAPIES SERIES
Discharge: HOME | End: 2020-03-18
Payer: COMMERCIAL

## 2020-03-18 PROCEDURE — 97110 THERAPEUTIC EXERCISES: CPT | Performed by: PHYSICAL THERAPIST

## 2020-03-18 PROCEDURE — G0283 ELEC STIM OTHER THAN WOUND: HCPCS | Performed by: PHYSICAL THERAPIST

## 2020-03-20 ENCOUNTER — HOSPITAL ENCOUNTER (OUTPATIENT)
Dept: PHYSICAL THERAPY | Age: 35
Setting detail: THERAPIES SERIES
Discharge: HOME | End: 2020-03-20
Payer: COMMERCIAL

## 2020-03-20 PROCEDURE — 97110 THERAPEUTIC EXERCISES: CPT | Performed by: PHYSICAL THERAPIST

## 2020-03-20 PROCEDURE — G0283 ELEC STIM OTHER THAN WOUND: HCPCS | Performed by: PHYSICAL THERAPIST

## 2020-04-27 ENCOUNTER — VIRTUAL VISIT (OUTPATIENT)
Dept: PAIN MANAGEMENT | Age: 35
End: 2020-04-27
Payer: COMMERCIAL

## 2020-04-27 PROCEDURE — 99214 OFFICE O/P EST MOD 30 MIN: CPT | Performed by: ANESTHESIOLOGY

## 2020-04-27 PROCEDURE — G8427 DOCREV CUR MEDS BY ELIG CLIN: HCPCS | Performed by: ANESTHESIOLOGY

## 2020-04-27 RX ORDER — GABAPENTIN 300 MG/1
300 CAPSULE ORAL 3 TIMES DAILY
Qty: 90 CAPSULE | Refills: 1 | Status: SHIPPED
Start: 2020-04-27 | End: 2021-11-15

## 2020-04-27 RX ORDER — CYCLOBENZAPRINE HCL 5 MG
5 TABLET ORAL NIGHTLY PRN
Qty: 30 TABLET | Refills: 1 | Status: SHIPPED | OUTPATIENT
Start: 2020-04-27 | End: 2020-06-26

## 2020-04-27 NOTE — PROGRESS NOTES
[address] [facility], [address] [place] [address] [place]  [phone]    Virtual/Tele health follow up Note      Nathan Jerome     Date of Visit:  4/27/2020    CC:  Tele health follow up   Chief Complaint   Patient presents with    Follow-up    Back Pain       Consent:  Tele health Visit due to Josesitoanna 19 pandemic  The patient and/or health care decision maker is aware that he/she may receive a bill for this tele-health service [employer], depending on his insurance coverage, and has provided verbal consent to proceed: Yes  I have considered the risks of abuse, dependence, addiction and diversion. My patient is aware that they will need a follow-up visit (in-person or virtually) at the appropriate time indicated for continued medications. Further, my patient is aware that when this acute crisis has lifted, they will be expected to return for an in-person visit and all elements of standard local and hospital guidelines in order to continue this medication. Patient Location:Home   Physician Location: Home     HPI:  H/o Chronic neck pain.     She is s/p C spein surgery on 11/1/2019 by Dr. Cassie Patel  Bilateral C3, C4, C5, C6, and C7 laminectomy. Bilateral segmental arthrodesis and fusion from C3 to T1 with use of bilateral C3, C4, C5, and C6 lateral mass screws and bilateral T1 pedicle screws.     Later on reexploration and repair of CSF leak on 11/11/2019.     Was treated with Antibiotics for superficial infection - had followed with ID.     Was on Oxycodone only prn for severe Pain for post op pain. Last script for # 42 tablets given on 1/23/2020 by [facility] team.      Also was taking Diazepam 5 mg po bid for muscle spasm which has been discontinued and she Currently takes Cyclobenzaprine prn for muscle spasm.     Taking Gabapentin- helping her.     Has started PT recently and cervical collar is Dc'd. She is using a Bone stimulator.     The surgery has helped the upper your coughs and sneezes           I affirm this is a Patient Initiated Episode with an established Patient who has not had a related appointment within my department in the past 7 days or scheduled within the next 24 hours.     Total Time: 26 mins    Sharron Dumont MD

## 2020-06-16 ENCOUNTER — HOSPITAL ENCOUNTER (OUTPATIENT)
Age: 35
Discharge: HOME | End: 2020-06-16
Payer: COMMERCIAL

## 2020-06-16 LAB
ALBUMIN SERPL-MCNC: 4.2 G/DL (ref 3.5–5.2)
ALP BLD-CCNC: 102 U/L (ref 35–104)
ALT SERPL-CCNC: 16 U/L (ref 0–32)
ANION GAP SERPL CALCULATED.3IONS-SCNC: 12 MMOL/L (ref 7–16)
AST SERPL-CCNC: 23 U/L (ref 0–31)
BILIRUB SERPL-MCNC: <0.2 MG/DL (ref 0–1.2)
BUN BLDV-MCNC: 10 MG/DL (ref 6–20)
C-REACTIVE PROTEIN: 0.1 MG/DL (ref 0–0.4)
CALCIUM SERPL-MCNC: 9.6 MG/DL (ref 8.6–10.2)
CHLORIDE BLD-SCNC: 104 MMOL/L (ref 98–107)
CO2: 24 MMOL/L (ref 22–29)
CREAT SERPL-MCNC: 0.9 MG/DL (ref 0.5–1)
GFR AFRICAN AMERICAN: >60
GFR NON-AFRICAN AMERICAN: >60 ML/MIN/1.73
GLUCOSE BLD-MCNC: 92 MG/DL (ref 74–99)
HCT VFR BLD CALC: 39.4 % (ref 34–48)
HEMOGLOBIN: 12.7 G/DL (ref 11.5–15.5)
MCH RBC QN AUTO: 30 PG (ref 26–35)
MCHC RBC AUTO-ENTMCNC: 32.2 % (ref 32–34.5)
MCV RBC AUTO: 93.1 FL (ref 80–99.9)
PDW BLD-RTO: 13.5 FL (ref 11.5–15)
PLATELET # BLD: 308 E9/L (ref 130–450)
PMV BLD AUTO: 10.5 FL (ref 7–12)
POTASSIUM SERPL-SCNC: 4.3 MMOL/L (ref 3.5–5)
RBC # BLD: 4.23 E12/L (ref 3.5–5.5)
SEDIMENTATION RATE, ERYTHROCYTE: 9 MM/HR (ref 0–20)
SODIUM BLD-SCNC: 140 MMOL/L (ref 132–146)
TOTAL PROTEIN: 8 G/DL (ref 6.4–8.3)
WBC # BLD: 9 E9/L (ref 4.5–11.5)

## 2020-06-16 PROCEDURE — 86140 C-REACTIVE PROTEIN: CPT

## 2020-06-16 PROCEDURE — 80053 COMPREHEN METABOLIC PANEL: CPT

## 2020-06-16 PROCEDURE — 85651 RBC SED RATE NONAUTOMATED: CPT

## 2020-06-16 PROCEDURE — 85027 COMPLETE CBC AUTOMATED: CPT

## 2020-06-16 PROCEDURE — 36415 COLL VENOUS BLD VENIPUNCTURE: CPT

## 2020-06-27 ENCOUNTER — HOSPITAL ENCOUNTER (EMERGENCY)
Age: 35
Discharge: HOME | End: 2020-06-27
Attending: EMERGENCY MEDICINE
Payer: COMMERCIAL

## 2020-06-27 VITALS
BODY MASS INDEX: 37.21 KG/M2 | HEIGHT: 63 IN | WEIGHT: 210 LBS | DIASTOLIC BLOOD PRESSURE: 82 MMHG | RESPIRATION RATE: 16 BRPM | OXYGEN SATURATION: 98 % | TEMPERATURE: 97.1 F | SYSTOLIC BLOOD PRESSURE: 120 MMHG | HEART RATE: 55 BPM

## 2020-06-27 LAB
ALBUMIN SERPL-MCNC: 4.4 G/DL (ref 3.5–5.2)
ALP BLD-CCNC: 104 U/L (ref 35–104)
ALT SERPL-CCNC: 15 U/L (ref 0–32)
ANION GAP SERPL CALCULATED.3IONS-SCNC: 8 MMOL/L (ref 7–16)
AST SERPL-CCNC: 23 U/L (ref 0–31)
BASOPHILS ABSOLUTE: 0.03 E9/L (ref 0–0.2)
BASOPHILS RELATIVE PERCENT: 0.3 % (ref 0–2)
BILIRUB SERPL-MCNC: 0.3 MG/DL (ref 0–1.2)
BILIRUBIN URINE: NEGATIVE
BLOOD, URINE: NEGATIVE
BUN BLDV-MCNC: 9 MG/DL (ref 6–20)
CALCIUM SERPL-MCNC: 9.4 MG/DL (ref 8.6–10.2)
CHLORIDE BLD-SCNC: 103 MMOL/L (ref 98–107)
CLARITY: CLEAR
CLUE CELLS: ABNORMAL
CO2: 26 MMOL/L (ref 22–29)
COLOR: YELLOW
CREAT SERPL-MCNC: 0.8 MG/DL (ref 0.5–1)
EOSINOPHILS ABSOLUTE: 0.2 E9/L (ref 0.05–0.5)
EOSINOPHILS RELATIVE PERCENT: 2.2 % (ref 0–6)
GFR AFRICAN AMERICAN: >60
GFR NON-AFRICAN AMERICAN: >60 ML/MIN/1.73
GLUCOSE BLD-MCNC: 100 MG/DL (ref 74–99)
GLUCOSE URINE: NEGATIVE MG/DL
HCT VFR BLD CALC: 38.2 % (ref 34–48)
HEMOGLOBIN: 12.6 G/DL (ref 11.5–15.5)
IMMATURE GRANULOCYTES #: 0.03 E9/L
IMMATURE GRANULOCYTES %: 0.3 % (ref 0–5)
KETONES, URINE: NEGATIVE MG/DL
LACTIC ACID: 1.2 MMOL/L (ref 0.5–2.2)
LEUKOCYTE ESTERASE, URINE: NEGATIVE
LIPASE: 19 U/L (ref 13–60)
LYMPHOCYTES ABSOLUTE: 2.45 E9/L (ref 1.5–4)
LYMPHOCYTES RELATIVE PERCENT: 26.4 % (ref 20–42)
MCH RBC QN AUTO: 30.1 PG (ref 26–35)
MCHC RBC AUTO-ENTMCNC: 33 % (ref 32–34.5)
MCV RBC AUTO: 91.4 FL (ref 80–99.9)
MONOCYTES ABSOLUTE: 0.73 E9/L (ref 0.1–0.95)
MONOCYTES RELATIVE PERCENT: 7.9 % (ref 2–12)
NEUTROPHILS ABSOLUTE: 5.85 E9/L (ref 1.8–7.3)
NEUTROPHILS RELATIVE PERCENT: 62.9 % (ref 43–80)
NITRITE, URINE: NEGATIVE
PDW BLD-RTO: 13.2 FL (ref 11.5–15)
PH UA: 6.5 (ref 5–9)
PLATELET # BLD: 306 E9/L (ref 130–450)
PMV BLD AUTO: 10.3 FL (ref 7–12)
POTASSIUM REFLEX MAGNESIUM: 4 MMOL/L (ref 3.5–5)
PROTEIN UA: NEGATIVE MG/DL
RBC # BLD: 4.18 E12/L (ref 3.5–5.5)
SODIUM BLD-SCNC: 137 MMOL/L (ref 132–146)
SOURCE WET PREP: ABNORMAL
SPECIFIC GRAVITY UA: 1.02 (ref 1–1.03)
TOTAL PROTEIN: 8.1 G/DL (ref 6.4–8.3)
TRICHOMONAS PREP: ABNORMAL
UROBILINOGEN, URINE: 0.2 E.U./DL
WBC # BLD: 9.3 E9/L (ref 4.5–11.5)
YEAST WET PREP: ABNORMAL

## 2020-06-27 PROCEDURE — 83690 ASSAY OF LIPASE: CPT

## 2020-06-27 PROCEDURE — 87491 CHLMYD TRACH DNA AMP PROBE: CPT

## 2020-06-27 PROCEDURE — 87591 N.GONORRHOEAE DNA AMP PROB: CPT

## 2020-06-27 PROCEDURE — 6360000002 HC RX W HCPCS: Performed by: EMERGENCY MEDICINE

## 2020-06-27 PROCEDURE — 83605 ASSAY OF LACTIC ACID: CPT

## 2020-06-27 PROCEDURE — 85025 COMPLETE CBC W/AUTO DIFF WBC: CPT

## 2020-06-27 PROCEDURE — 81003 URINALYSIS AUTO W/O SCOPE: CPT

## 2020-06-27 PROCEDURE — 2580000003 HC RX 258

## 2020-06-27 PROCEDURE — 99284 EMERGENCY DEPT VISIT MOD MDM: CPT

## 2020-06-27 PROCEDURE — 80053 COMPREHEN METABOLIC PANEL: CPT

## 2020-06-27 PROCEDURE — 6370000000 HC RX 637 (ALT 250 FOR IP): Performed by: EMERGENCY MEDICINE

## 2020-06-27 PROCEDURE — 96372 THER/PROPH/DIAG INJ SC/IM: CPT

## 2020-06-27 PROCEDURE — 87210 SMEAR WET MOUNT SALINE/INK: CPT

## 2020-06-27 RX ORDER — AZITHROMYCIN 250 MG/1
1000 TABLET, FILM COATED ORAL ONCE
Status: COMPLETED | OUTPATIENT
Start: 2020-06-27 | End: 2020-06-27

## 2020-06-27 RX ORDER — CEFTRIAXONE SODIUM 250 MG/1
250 INJECTION, POWDER, FOR SOLUTION INTRAMUSCULAR; INTRAVENOUS ONCE
Status: COMPLETED | OUTPATIENT
Start: 2020-06-27 | End: 2020-06-27

## 2020-06-27 RX ORDER — METRONIDAZOLE 500 MG/1
500 TABLET ORAL 2 TIMES DAILY
Qty: 14 TABLET | Refills: 0 | Status: SHIPPED | OUTPATIENT
Start: 2020-06-27 | End: 2020-07-04

## 2020-06-27 RX ADMIN — AZITHROMYCIN DIHYDRATE 1000 MG: 250 TABLET, FILM COATED ORAL at 13:10

## 2020-06-27 RX ADMIN — CEFTRIAXONE SODIUM 250 MG: 250 INJECTION, POWDER, FOR SOLUTION INTRAMUSCULAR; INTRAVENOUS at 13:10

## 2020-06-27 RX ADMIN — WATER 10 ML: 1 INJECTION INTRAMUSCULAR; INTRAVENOUS; SUBCUTANEOUS at 13:11

## 2020-06-27 ASSESSMENT — PAIN DESCRIPTION - DESCRIPTORS: DESCRIPTORS: CRAMPING

## 2020-06-27 ASSESSMENT — ENCOUNTER SYMPTOMS
ABDOMINAL DISTENTION: 0
VOMITING: 0
COUGH: 0
SHORTNESS OF BREATH: 0
WHEEZING: 0
NAUSEA: 0
BACK PAIN: 0
DIARRHEA: 0
ABDOMINAL PAIN: 1

## 2020-06-27 ASSESSMENT — PAIN DESCRIPTION - LOCATION: LOCATION: ABDOMEN

## 2020-06-27 ASSESSMENT — PAIN SCALES - GENERAL: PAINLEVEL_OUTOF10: 8

## 2020-06-27 ASSESSMENT — PAIN DESCRIPTION - PAIN TYPE: TYPE: ACUTE PAIN

## 2020-06-27 NOTE — ED PROVIDER NOTES
pH, UA 6.5 5.0 - 9.0    Protein, UA Negative Negative mg/dL    Urobilinogen, Urine 0.2 <2.0 E.U./dL    Nitrite, Urine Negative Negative    Leukocyte Esterase, Urine Negative Negative       Radiology:  No orders to display       ------------------------- NURSING NOTES AND VITALS REVIEWED ---------------------------  Date / Time Roomed:  6/27/2020 10:04 AM  ED Bed Assignment:  08/08    The nursing notes within the ED encounter and vital signs as below have been reviewed. /82   Pulse 55   Temp 97.1 °F (36.2 °C)   Resp 16   Ht 5' 3\" (1.6 m)   Wt 210 lb (95.3 kg)   LMP 05/19/2014   SpO2 98%   BMI 37.20 kg/m²   Oxygen Saturation Interpretation: Normal      ------------------------------------------ PROGRESS NOTES ------------------------------------------  ED COURSE MEDICATIONS:                Medications   cefTRIAXone (ROCEPHIN) injection 250 mg (250 mg Intramuscular Given 6/27/20 1310)   azithromycin (ZITHROMAX) tablet 1,000 mg (1,000 mg Oral Given 6/27/20 1310)   sterile water injection (10 mLs  Given 6/27/20 1311)       I have spoken with the patient and discussed todays results, in addition to providing specific details for the plan of care and counseling regarding the diagnosis and prognosis. Their questions are answered at this time and they are agreeable with the plan. I discussed at length with them reasons for immediate return here for re evaluation. They will followup with primary care by calling their office tomorrow. --------------------------------- ADDITIONAL PROVIDER NOTES ---------------------------------  At this time the patient is without objective evidence of an acute process requiring hospitalization or inpatient management. They have remained hemodynamically stable throughout their entire ED visit and are stable for discharge with outpatient follow-up.      The plan has been discussed in detail and they are aware of the specific conditions for emergent return, as well as

## 2020-07-01 LAB
C TRACH DNA GENITAL QL NAA+PROBE: NEGATIVE
N. GONORRHOEAE DNA: NEGATIVE
SOURCE: NORMAL

## 2020-07-30 ENCOUNTER — TELEPHONE (OUTPATIENT)
Dept: NEUROSURGERY | Age: 35
End: 2020-07-30

## 2020-07-30 RX ORDER — NAPROXEN 500 MG/1
500 TABLET ORAL 2 TIMES DAILY WITH MEALS
Qty: 60 TABLET | Refills: 3 | Status: SHIPPED
Start: 2020-07-30 | End: 2020-10-19

## 2020-07-30 NOTE — TELEPHONE ENCOUNTER
Pt called and stated she is having pain in shoulder blade area and neck. Pt is wondering if there is some type of brace to she could get to help with this.  Pt's phone # [phone]

## 2020-08-07 ENCOUNTER — TELEPHONE (OUTPATIENT)
Dept: NEUROSURGERY | Age: 35
End: 2020-08-07

## 2020-08-07 NOTE — TELEPHONE ENCOUNTER
Unable to write letter to keep patient off work since it has been over 3 months since surgery. Referral for physical therapy made.

## 2020-08-07 NOTE — TELEPHONE ENCOUNTER
Luis Armando Ruiz stopped in stating she tried to go back to work but was unable to due to her pain and numbness. She is requesting a letter keeping her off of work indefinately. Informed patient we are unable to write a letter. The only time we are able to keep patients off of work is for 3 months after surgery. Patient is coming in 8/13 to have update testing ordered but is requesting a PT script to be sent to [facility].  Pts #[phone]

## 2020-08-13 ENCOUNTER — OFFICE VISIT (OUTPATIENT)
Dept: NEUROSURGERY | Age: 35
End: 2020-08-13
Payer: COMMERCIAL

## 2020-08-13 ENCOUNTER — HOSPITAL ENCOUNTER (OUTPATIENT)
Age: 35
Discharge: HOME | End: 2020-08-15
Payer: COMMERCIAL

## 2020-08-13 ENCOUNTER — HOSPITAL ENCOUNTER (OUTPATIENT)
Dept: GENERAL RADIOLOGY | Age: 35
Discharge: HOME | End: 2020-08-15
Payer: COMMERCIAL

## 2020-08-13 VITALS
WEIGHT: 203 LBS | HEART RATE: 65 BPM | BODY MASS INDEX: 35.97 KG/M2 | TEMPERATURE: 97 F | HEIGHT: 63 IN | DIASTOLIC BLOOD PRESSURE: 76 MMHG | SYSTOLIC BLOOD PRESSURE: 127 MMHG

## 2020-08-13 PROCEDURE — 99213 OFFICE O/P EST LOW 20 MIN: CPT | Performed by: PHYSICIAN ASSISTANT

## 2020-08-13 PROCEDURE — 1036F TOBACCO NON-USER: CPT | Performed by: PHYSICIAN ASSISTANT

## 2020-08-13 PROCEDURE — G8427 DOCREV CUR MEDS BY ELIG CLIN: HCPCS | Performed by: PHYSICIAN ASSISTANT

## 2020-08-13 PROCEDURE — G8417 CALC BMI ABV UP PARAM F/U: HCPCS | Performed by: PHYSICIAN ASSISTANT

## 2020-08-13 PROCEDURE — 72040 X-RAY EXAM NECK SPINE 2-3 VW: CPT

## 2020-08-13 NOTE — PROGRESS NOTES
Post Operative Follow-up    Patient is status post: cervical fusion 10 month ago. C/o severe, progressive neck pain. She c/o bilateral hand numbness and intermittent left arm pain. No improvement with PT. Physical Exam  Alert and Oriented X 3  PERRLA, EOMI  PERERA 5/5  Pain with ROM and palpation of the cervical spine  Wound: C/D/I    A/P: patient is s/p PCF 10 months ago. cervical x-rays are stable. Will order PT, EMG, and cervical CT.

## 2020-08-17 ENCOUNTER — OFFICE VISIT (OUTPATIENT)
Dept: PAIN MANAGEMENT | Age: 35
End: 2020-08-17
Payer: COMMERCIAL

## 2020-08-17 VITALS
SYSTOLIC BLOOD PRESSURE: 120 MMHG | DIASTOLIC BLOOD PRESSURE: 78 MMHG | WEIGHT: 203 LBS | HEIGHT: 63 IN | TEMPERATURE: 97.4 F | OXYGEN SATURATION: 98 % | HEART RATE: 58 BPM | RESPIRATION RATE: 18 BRPM | BODY MASS INDEX: 35.97 KG/M2

## 2020-08-17 PROCEDURE — G8417 CALC BMI ABV UP PARAM F/U: HCPCS | Performed by: ANESTHESIOLOGY

## 2020-08-17 PROCEDURE — 1036F TOBACCO NON-USER: CPT | Performed by: ANESTHESIOLOGY

## 2020-08-17 PROCEDURE — G8427 DOCREV CUR MEDS BY ELIG CLIN: HCPCS | Performed by: ANESTHESIOLOGY

## 2020-08-17 PROCEDURE — 99213 OFFICE O/P EST LOW 20 MIN: CPT | Performed by: ANESTHESIOLOGY

## 2020-08-17 NOTE — PROGRESS NOTES
[facility] Pain Management        [address] [facility] [address] [facility], [address]  Dept: [phone]        Follow up Note      Sherlyn Abdi     Date of Visit:  8/17/2020    CC:  Patient presents for follow up   Chief Complaint   Patient presents with    Pain     upper back        HPI:  H/o Chronic neck pain.     She is s/p C spein surgery on 11/1/2019 by Dr. Rebeca Cason  Bilateral C3, C4, C5, C6, and C7 laminectomy. Bilateral segmental arthrodesis and fusion from C3 to T1 with use of bilateral C3, C4, C5, and C6 lateral mass screws and bilateral T1 pedicle screws.   Later on reexploration and repair of CSF leak on 11/11/2019. Was on Oxycodone only prn for severe Pain for post op pain. Last script for # 42 tablets given on 1/23/2020 by [facility] team.    Currently off opioids. Taking Gabapentin- helping her. Prior to the surgery, patient was taking Gabapentin, flexeril. She has been evaluated by neurosurgery team recently on 8/13/2020 and has ordered CT spine, EMG, PT. Her pain is mainly over the shoulder blade trapezius area. Pain causes functional limitations/ limits Adl's : Yes     Previous notes and details of the pain history reviewed. Nursing notes and details of the pain history reviewed.  Please see intake notes for details.     Previous treatments:   Physical Therapy : yes, has been doing PT/ HEP since Jan 2020     Medications: - NSAID's : yes                        - Membrane stabilizers : yes                        - Opioids : yes,for  post op pain                       - Adjuvants or Others : yes, muscle relaxant     TENS Unit: no     Surgeries: yes : posterior cervical fusion as detailed above by Dr. Christiano Mehta in Nov 2019     Interventional Pain procedures/ nerve blocks: no     She has not been on anticoagulation medications no.     She has not been on herbal supplements.       She is not diabetic.     H/O Smoking: quit  H/O alcohol abuse : denies  H/O Illicit drug use : H/O THC use + HYSTERECTOMY, TOTAL ABDOMINAL  8/20/14    LAPAROSCOPY  9/28/2012    left salpingectomy    LAPAROSCOPY  [id number]    RASHEEDA    OTHER SURGICAL HISTORY  3/25/2014    D&C;LEEP    TUBAL LIGATION         Prior to Admission medications    Medication Sig Start Date End Date Taking? Authorizing Provider   naproxen (NAPROXEN) 500 MG EC tablet Take 1 tablet by mouth 2 times daily (with meals) 7/30/20  Yes MIKY Fulton   naproxen (NAPROSYN) 500 MG tablet Take 1 tablet by mouth 2 times daily (with meals) 7/30/20  Yes MIKY Fulton   diazepam (VALIUM) 5 MG tablet Take by mouth. Yes Historical Provider, MD   doxycycline hyclate (VIBRA-TABS) 100 MG tablet take 1 tablet by mouth every 12 hours 1/13/20  Yes Historical Provider, MD   FLUoxetine (PROZAC) 40 MG capsule  11/20/19  Yes Historical Provider, MD   NARCAN 4 MG/0.1ML LIQD nasal spray ADMINISTER A SINGLE spray INTO ONE NOSTRIL CALL 911 MAY REPEAT ONCE 11/20/19  Yes Historical Provider, MD   vitamin D (CHOLECALCIFEROL) 1000 UNIT TABS tablet Take 1 tablet by mouth daily 6/1/19  Yes Abundio Springer MD   albuterol sulfate  (90 Base) MCG/ACT inhaler Inhale 2 puffs into the lungs every 6 hours as needed for Wheezing   Yes Historical Provider, MD   doxycycline hyclate (VIBRA-TABS) 100 MG tablet Take 1 tablet by mouth 2 times daily  Patient not taking: Reported on 8/17/2020 6/15/20 12/12/20  CHRIS Padron - TED   gabapentin (NEURONTIN) 300 MG capsule Take 1 capsule by mouth 3 times daily for 60 days.  4/27/20 6/26/20  Amado Perkins MD   Lidocaine (ZTLIDO) 1.8 % PTCH 12 hours on 12 hours off  Patient not taking: Reported on 8/17/2020 3/9/20   Amado Perkins MD       Allergies   Allergen Reactions    Latex     Bactrim [Sulfamethoxazole-Trimethoprim] Hives    Sulfamethoxazole-Trimethoprim Hives    Fish-Derived Products Swelling     Shrimp and Pirch    Ibuprofen Hives    Iodine      Fish derived products    Naproxen      headache    Percocet [Oxycodone-Acetaminophen] Hives       Social History     Socioeconomic History    Marital status: Single     Spouse name: Not on file    Number of children: Not on file    Years of education: Not on file    Highest education level: Not on file   Occupational History     Employer: [employer]   Social Needs    Financial resource strain: Not on file    Food insecurity     Worry: Not on file     Inability: Not on file    Transportation needs     Medical: Not on file     Non-medical: Not on file   Tobacco Use    Smoking status: Former Smoker     Types: Cigarettes, Cigars     Last attempt to quit: 7/10/2016     Years since quittin.1    Smokeless tobacco: Never Used    Tobacco comment: quite cigars   Substance and Sexual Activity    Alcohol use:  Yes     Alcohol/week: 0.0 standard drinks     Comment: occasionally    Drug use: Not Currently     Comment: 3/10/2020    Sexual activity: Yes     Partners: Male   Lifestyle    Physical activity     Days per week: Not on file     Minutes per session: Not on file    Stress: Not on file   Relationships    Social connections     Talks on phone: Not on file     Gets together: Not on file     Attends [employer]ism service: Not on file     Active member of club or organization: Not on file     Attends meetings of clubs or organizations: Not on file     Relationship status: Not on file    Intimate partner violence     Fear of current or ex partner: Not on file     Emotionally abused: Not on file     Physically abused: Not on file     Forced sexual activity: Not on file   Other Topics Concern    Not on file   Social History Narrative    Not on file       Family History   Problem Relation Age of Onset    Diabetes Mother     High Blood Pressure Mother     Cancer Mother     No Known Problems Father     No Known Problems Brother     No Known Problems Sister     Ovarian Cancer Neg Hx     Uterine Cancer Neg Hx     Breast Cancer Neg Hx     Colon Cancer Neg Hx        REVIEW OF SYSTEMS:     Ghassan Madison denies fever/chills, chest pain, shortness of breath, new bowel or bladder complaints. All other review of systems was negative. PHYSICAL EXAMINATION:      /78   Pulse 58   Temp 97.4 °F (36.3 °C)   Resp 18   Ht 5' 3\" (1.6 m)   Wt 203 lb (92.1 kg)   LMP 05/19/2014   SpO2 98%   BMI 35.96 kg/m²   General:       General appearance:  Pleasant and well-hydrated, in no distress and A & O x 3  Build:Obese  Function: Rises from seated position easily and Moves about room without difficulty     HEENT:     Head:normocephalic, atraumatic  Pupils:regular, round, equal  Sclera: icterus absent     Lungs:     Breathing:normal breathing pattern      CVS:     RRR     Abdomen:     Shape:non-distended and normal  Tenderness:none  Guarding:none     Cervical spine:     Inspection:posterior scar noted- healed well  Palpation:tenderness paravertebral muscles, tenderness trapezium, left, right and positive. Range of motion:Significantly reduced ROM flexion, extension, rotation bilaterally and is moderately painful.   Spurling's: negative bilaterally     Thoracic spine:                Spine inspection:normal   Palpation:No tenderness over the midline and paraspinal area, bilaterally  Range of motion:normal in flexion, extension rotation bilateral and is not painful.     Lumbar spine:     Spine inspection: Normal   Palpation: Tenderness paravertebral muscles No bilaterally  Range of motion: Decreased,  Sacroiliac joint tenderness No bilaterally  SLR : negative bilaterally  Trochanteric bursa tenderness: negative bilaterally  CVA tenderness:No      Musculoskeletal:     Trigger points in trapezius: Yes bilaterally  Trigger points in rhomboids: Yes bilaterally  Trigger points in Paravertebral: Yes bilaterally     Extremities:     No edema     Knee:        Neurological:     Sensory: Normal to light touch      Motor:   NO focal motor deficits.     Reflexes:    Bilaterally equal     Gait:normal Yes     Dermatology:     Skin:no rashes or lesions noted       Assessment/Plan:    1. DDD (degenerative disc disease), cervical      2. Cervical stenosis of spine      3. S/P cervical spinal fusion      4. History of marijuana use      5. Cervicalgia      6. Chronic myofascial pain       30 y.o. female with H/O cervical spine stenosis , S/p C3, C4, C5, C6, and C7 laminectomy and C3-T1 fusion by Dr. Lynn Hirsch in Nov 2019, post op CSF leak which was repaired 10 days later . Upper extremity symptoms has improved after surgery. Taking Gabapentin 300 mg tid. Helping moderately. Neck pain and shoulder blade pain- features of myofacial pain over the paraspinal and trapezius area. Has been evaluated by NSG recently - recommended CT spine/ EMG/ PT.     H/O  THC use +: Last use on 8/16/2020. Detailed discussion to stop THC use. Informed our policy of no narcotics if on THC. Will plan to do trigger point injection to b/l paraspinal muscles and trapezius muscles under USG guidance. Will prescribe Local compound cream.   Formula #1: Diclofenac, Gabapentin, Baclofen, Lidocaine, Doxepin. Apply 1-2 grams TID over the painful area. Dispense #240 gram with X 2 refills. Use instructions and side effects explained. She apparently made this visit for paperwork about her disability and she was asking for disability paperwork. Recommend evaluation by PMR/ occupational medicine for disability evaluation and the necessary paperwork. This can be coordinated by her PCP. Recommend to complete the work up suggested by Yuri. Counseling done: carlos Steele MD

## 2020-08-17 NOTE — PROGRESS NOTES
Do you currently have any of the following:    Fever: No  Headache:  No  Cough: No  Shortness of breath: No  Exposed to anyone with these symptoms: No         Lewis Sil presents to the [facility] on 8/17/2020. Harsha Ceballos is complaining of pain in the upper back. The pain is constant. The pain is described as aching and pressure. Pain is rated on her best day at a 8, on her worst day at a 10, and on average at a 10 on the VAS scale. She took her last dose of Neurontin naproxen  flexeril . Any procedures since your last visit: No,     Pacemaker or defibrilator: No managed by none. She is  on NSAIDS and is not on anticoagulation medications to include none /78   Pulse 58   Temp 97.4 °F (36.3 °C)   Resp 18   Ht 5' 3\" (1.6 m)   Wt 203 lb (92.1 kg)   LMP 05/19/2014   SpO2 98%   BMI 35.96 kg/m²      Patient's last menstrual period was 05/19/2014.

## 2020-08-28 ENCOUNTER — HOSPITAL ENCOUNTER (OUTPATIENT)
Dept: PHYSICAL THERAPY | Age: 35
Setting detail: THERAPIES SERIES
Discharge: HOME | End: 2020-08-28
Payer: COMMERCIAL

## 2020-08-28 PROCEDURE — 97161 PT EVAL LOW COMPLEX 20 MIN: CPT | Performed by: PHYSICAL THERAPIST

## 2020-08-28 ASSESSMENT — PAIN - FUNCTIONAL ASSESSMENT: PAIN_FUNCTIONAL_ASSESSMENT: PREVENTS OR INTERFERES WITH MANY ACTIVE NOT PASSIVE ACTIVITIES

## 2020-08-28 ASSESSMENT — PAIN DESCRIPTION - LOCATION: LOCATION: BACK;NECK

## 2020-08-28 ASSESSMENT — PAIN DESCRIPTION - DESCRIPTORS: DESCRIPTORS: ACHING;CONSTANT

## 2020-08-28 ASSESSMENT — PAIN DESCRIPTION - PAIN TYPE: TYPE: CHRONIC PAIN;SURGICAL PAIN

## 2020-08-28 ASSESSMENT — PAIN DESCRIPTION - ORIENTATION: ORIENTATION: RIGHT;LEFT

## 2020-08-28 ASSESSMENT — PAIN SCALES - GENERAL: PAINLEVEL_OUTOF10: 10

## 2020-08-28 NOTE — PROGRESS NOTES
Physical Therapy  Initial Assessment  Date: 2020  Patient Name: Kathi Ash  MRN: [id number]  : 1985        Subjective   General  Chart Reviewed: Yes  Referring Practitioner: Gurinder Zafar   Referral Date : 20  Diagnosis: neck pain s/p cervical fusion/laminectomy C3-T1  PT Visit Information  Onset Date: 19  PT Insurance Information: [id number] [facility] Content[facility]  Total # of Visits Approved: 10  Total # of Visits to Date: 1  Subjective  Subjective: pt presents to therapy with c/o pain/stiffness across neck/upper back since sx on 19; had previous PT with some relief noted; tells PT she continues to do HEP fairly faithfully and is using stimulator as well; c/o N/T at times across L UE at times; buckling noted across R LE at times; sleep hampered due to aching; MEDS help and pt continues with formal pain management; sleep hampered due to symptoms; RTD for follow-up in October  Pain Screening  Patient Currently in Pain: Yes  Pain Assessment  Pain Assessment: 0-10  Pain Level: 10  Pain Type: Chronic pain;Surgical pain  Pain Location: Back;Neck  Pain Orientation: Right;Left  Pain Descriptors: Aching;Constant  Functional Pain Assessment: Prevents or interferes with many active not passive activities  Vital Signs  Patient Currently in Pain: Yes    Objective     Observation/Palpation  Palpation: discomfort persists across B sides neck/upper back to level of T7 into B shoulder blades and  upper traps  Observation: posture:  forward head/rounded shoulders/B protracted scapulae    AROM RLE (degrees)  RLE AROM: WNL  AROM LLE (degrees)  LLE AROM : WNL  AROM RUE (degrees)  RUE AROM : WFL  AROM LUE (degrees)  LUE AROM : WFL  Spine  Cervical: limited ~ 75% WNL for all ranges with no c/o radiculopathy    Strength Other  Other: grossly 4/5 across B UE's for all ranges     Additional Measures  Other: endurance for all prolonged activities is FAIR+/GOOD-  Sensation  Overall Sensation Status: [facility] Ambulation  Ambulation?: Yes  Ambulation 1  Surface: level tile  Device: No Device  Assistance: Independent  Quality of Gait: slow/guarded mindy with normal mechanics noted B LE's/trunk  Balance  Comments: static/dynamic balance is GOOD/GOOD+     Assessment   Conditions Requiring Skilled Therapeutic Intervention  Body structures, Functions, Activity limitations: Decreased ROM; Decreased strength;Decreased endurance  Assessment: pain noted across B sides neck/upper back with all prolonged activities, 10/10  Prognosis: Fair  Decision Making: Low Complexity  Activity Tolerance  Activity Tolerance: Patient Tolerated treatment well       Plan   Plan  Times per week: pt to be seen 2x/week/5 weeks  Current Treatment Recommendations: Strengthening, ROM, Endurance Training, Modalities, Home Exercise Program    Goals  Time Frame for goals: 5 weeks  goal 1: decrease pain across B sides neck/upper back to 0-5/10  goal 2: increase cervical AROM to grossly 50% WNL for all ranges  goal 3: increase strength across B UE's to grossly 4+, 5/5 for all ranges  goal 4: improve endurance for all prolonged activities to GOOD/GOOD+  goal 5: assure I with HEP for home management of condition    Patient goals : to get rid of the pain across her neck/upper back and move easier       Jimmie Blanchard, PT

## 2020-08-28 NOTE — PROGRESS NOTES
[address] [facility]                Phone: [phone]   Fax: [phone]    Physical Therapy Daily Treatment Note  Date:  2020    Patient Name:  Danelle Chisholm    :  1985  MRN: [id number]    Evaluating therapist:  COSMO Swift               (20)  Restrictions/Precautions:    Diagnosis:  s/p cervical laminectomy/fusion C3-T1                (19)  Treatment Diagnosis:    Insurance/Certification information:  [address] [place], [place] [address]  Referring Practitioner:  Meri Ahmadi of care signed (Y/N):    Visit# / total visits:  -10  Pain level: 10/10   Time In:    Time Out:      Subjective:      Exercises:  Exercise/Equipment Resistance/Repetitions Other comments   StepOne with arms             corner st     upper trap st            shrugs     scap ret              cervical:  flex/ext                        rot      pulleys for flex            airdyne  (arms only)            seated hip abd                        flex                 knee ext          sit/stand      step ups            Other Therapeutic Activities:      Home Exercise Program:      Manual Treatments:      Modalities:  IFC/MH to neck/upper back    Timed Code Treatment Minutes: Total Treatment Minutes:      Treatment/Activity Tolerance:  [] Patient tolerated treatment well [] Patient limited by fatique  [] Patient limited by pain  [] Patient limited by other medical complications  [] Other:     Prognosis: [] Good [] Fair  [] Poor    Patient Requires Follow-up: [] Yes  [] No    Plan:   [] Continue per plan of care [] Alter current plan (see comments)  [] Plan of care initiated [] Hold pending MD visit [] Discharge  Plan for Next Session:      See Weekly Progress Note: []  Yes  []  No  Next due:        Electronically signed by:   Cristi Conde

## 2020-08-31 ENCOUNTER — PROCEDURE VISIT (OUTPATIENT)
Dept: PAIN MANAGEMENT | Age: 35
End: 2020-08-31
Payer: COMMERCIAL

## 2020-08-31 ENCOUNTER — HOSPITAL ENCOUNTER (OUTPATIENT)
Dept: PHYSICAL THERAPY | Age: 35
Setting detail: THERAPIES SERIES
Discharge: HOME | End: 2020-08-31
Payer: COMMERCIAL

## 2020-08-31 VITALS
SYSTOLIC BLOOD PRESSURE: 120 MMHG | HEIGHT: 63 IN | WEIGHT: 203 LBS | HEART RATE: 84 BPM | OXYGEN SATURATION: 97 % | DIASTOLIC BLOOD PRESSURE: 72 MMHG | RESPIRATION RATE: 18 BRPM | BODY MASS INDEX: 35.97 KG/M2 | TEMPERATURE: 98 F

## 2020-08-31 PROCEDURE — 20553 NJX 1/MLT TRIGGER POINTS 3/>: CPT | Performed by: ANESTHESIOLOGY

## 2020-08-31 PROCEDURE — G0283 ELEC STIM OTHER THAN WOUND: HCPCS | Performed by: PHYSICAL THERAPIST

## 2020-08-31 PROCEDURE — 97530 THERAPEUTIC ACTIVITIES: CPT | Performed by: PHYSICAL THERAPIST

## 2020-08-31 PROCEDURE — 97110 THERAPEUTIC EXERCISES: CPT | Performed by: PHYSICAL THERAPIST

## 2020-08-31 PROCEDURE — 76942 ECHO GUIDE FOR BIOPSY: CPT | Performed by: ANESTHESIOLOGY

## 2020-08-31 RX ORDER — METHYLPREDNISOLONE ACETATE 40 MG/ML
40 INJECTION, SUSPENSION INTRA-ARTICULAR; INTRALESIONAL; INTRAMUSCULAR; SOFT TISSUE ONCE
Status: COMPLETED | OUTPATIENT
Start: 2020-08-31 | End: 2020-08-31

## 2020-08-31 RX ORDER — BUPIVACAINE HYDROCHLORIDE 2.5 MG/ML
10 INJECTION, SOLUTION EPIDURAL; INFILTRATION; INTRACAUDAL ONCE
Status: COMPLETED | OUTPATIENT
Start: 2020-08-31 | End: 2020-08-31

## 2020-08-31 RX ADMIN — METHYLPREDNISOLONE ACETATE 40 MG: 40 INJECTION, SUSPENSION INTRA-ARTICULAR; INTRALESIONAL; INTRAMUSCULAR; SOFT TISSUE at 15:20

## 2020-08-31 RX ADMIN — BUPIVACAINE HYDROCHLORIDE 25 MG: 2.5 INJECTION, SOLUTION EPIDURAL; INFILTRATION; INTRACAUDAL at 15:20

## 2020-08-31 NOTE — PROGRESS NOTES
2020    Patient: Lewis Simms  :  1985  Age:  30 y.o. Sex:  female     PRE-PROCEDURE DIAGNOSIS: Myofascial pain. POST-PROCEDURE DIAGNOSIS: Same. PROCEDURE: Bilateral  Cervical paraspinal and trapezius  trigger point injections under ultrasound guidance. Ultrasound was used to avoid deep tissue injection and to place the needle in the correct muscle plane. SURGEON:   Obed Elaine MD    ANESTHESIA: Local    ESTIMATED BLOOD LOSS:  None.  ______________________________________________________________________    BRIEF HISTORY: Lewis Simms comes in today for  trigger point steroid injection. After discussing the potential risks and benefits of the procedure with the patient. Harsha Ceballos did request that we proceed. A complete History & Physical was reviewed and it is unchanged. DESCRIPTION OF PROCEDURE:   Each trigger points were marked with patient input, prepped with chloraprep and draped, a #25-gauge, 1.5-inch needle was passed into the area of greatest tenderness under ultrasound guidance. .  A total of 8 trigger point injections were performed. Each trigger point received 1 cc of 0.25% Marcaine and a total of 30 mg DepoMedrol after negative aspiration of blood, air or paresthesia with ultrasound visualization of solution spread. The needle was removed intact and Band-Aid was applied. Disposition the patient tolerated the procedure well and there were no complications . Harsha Ceballos will follow up in our comprehensive [facility] as scheduled. She was encouraged to call with questions, concerns or if worsening of symptoms occurs.     Obed Elaine MD

## 2020-09-02 ENCOUNTER — HOSPITAL ENCOUNTER (OUTPATIENT)
Dept: PHYSICAL THERAPY | Age: 35
Setting detail: THERAPIES SERIES
Discharge: HOME | End: 2020-09-02
Payer: COMMERCIAL

## 2020-09-02 PROCEDURE — 97110 THERAPEUTIC EXERCISES: CPT | Performed by: PHYSICAL THERAPIST

## 2020-09-02 PROCEDURE — G0283 ELEC STIM OTHER THAN WOUND: HCPCS | Performed by: PHYSICAL THERAPIST

## 2020-09-02 NOTE — PROGRESS NOTES
[address] [facility]                Phone: [phone]   Fax: [phone]    Physical Therapy Daily Treatment Note  Date:  2020    Patient Name:  Abeba Pina    :  1985  MRN: [id number]    Evaluating therapist:  COSMO Bernabe               (20)  Restrictions/Precautions:    Diagnosis:  s/p cervical laminectomy/fusion C3-T1                (19)  Treatment Diagnosis:    Insurance/Certification information:  [address] [place], [place] [address]  Referring Practitioner:  Garland Irving of care signed (Y/N):    Visit# / total visits:  3/8-10  Pain level: 10/10   Time In:  943  Time Out:  1054    Subjective:      Exercises:  Exercise/Equipment Resistance/Repetitions Other comments   StepOne with arms  10 min            corner st 3x20s    upper trap st 3x20s           shrugs 15x3s    scap ret 15x3s             cervical:  flex/ext    15x3s                    rot  15x3s    pulleys for flex 5 min            airdyne  (arms only) 5 min            seated hip abd  [id number]                      flex [id number]                knee ext [id number]         toe raises  2x15    step ups 2x10x6\"           Other Therapeutic Activities:      Home Exercise Program:  provided 20    Manual Treatments:      Modalities:  IFC/MH to neck/upper back x 15 min     Timed Code Treatment Minutes: Total Treatment Minutes:      Treatment/Activity Tolerance:  [] Patient tolerated treatment well [] Patient limited by fatique  [] Patient limited by pain  [] Patient limited by other medical complications  [] Other:     Prognosis: [] Good [] Fair  [] Poor    Patient Requires Follow-up: [] Yes  [] No    Plan:   [] Continue per plan of care [] Alter current plan (see comments)  [] Plan of care initiated [] Hold pending MD visit [] Discharge  Plan for Next Session:      See Weekly Progress Note: []  Yes  []  No  Next due:        Electronically signed by:   Alvarez Levin

## 2020-09-02 NOTE — PROGRESS NOTES
S:  pt presents to therapy for two of two scheduled visits; at week's end she reports that her neck/upper back continues to ache with most prolonged activities/postures; pain level given as 10/10 and is constant in nature; tells PT that she feels stiff across her upper back hampering all movement; tells PT that B LE's seem more stable with no c/o buckling or LOB over last week's time; no c/o N/T into UE's; HEP going well and does provide some relief    O:  performed the exercises/treatments as written in the flowsheet for the week ending 9/4/20; initiated HEP for home management of condition; cervical AROM grossly 25-30% WNL for all ranges; strength across B UE's grossly 4/5 for all ranges     A:  toyin tx well; pt able to perform all requested tasks with good form and somewhat slower pacing; cervical AROM remains stable since eval; movement across neck/UE's remains slow/guarded due to tightness/aching; LE strength stable as is gait; balance is GOOD; endurance for all prolonged activities is FAIR+/GOOD-    P:  cont with POC of stretching/strengthening for neck/upper back/B LE's with modalities as needed

## 2020-09-09 ENCOUNTER — HOSPITAL ENCOUNTER (OUTPATIENT)
Dept: PHYSICAL THERAPY | Age: 35
Setting detail: THERAPIES SERIES
Discharge: HOME | End: 2020-09-09
Payer: COMMERCIAL

## 2020-09-09 PROCEDURE — 97110 THERAPEUTIC EXERCISES: CPT | Performed by: PHYSICAL THERAPIST

## 2020-09-09 PROCEDURE — G0283 ELEC STIM OTHER THAN WOUND: HCPCS | Performed by: PHYSICAL THERAPIST

## 2020-09-11 ENCOUNTER — HOSPITAL ENCOUNTER (OUTPATIENT)
Age: 35
Discharge: HOME | End: 2020-09-13
Payer: COMMERCIAL

## 2020-09-11 ENCOUNTER — NURSE ONLY (OUTPATIENT)
Dept: PRIMARY CARE CLINIC | Age: 35
End: 2020-09-11

## 2020-09-11 ENCOUNTER — HOSPITAL ENCOUNTER (OUTPATIENT)
Dept: PHYSICAL THERAPY | Age: 35
Setting detail: THERAPIES SERIES
Discharge: HOME | End: 2020-09-11
Payer: COMMERCIAL

## 2020-09-11 PROCEDURE — G0283 ELEC STIM OTHER THAN WOUND: HCPCS | Performed by: PHYSICAL THERAPIST

## 2020-09-11 PROCEDURE — U0003 INFECTIOUS AGENT DETECTION BY NUCLEIC ACID (DNA OR RNA); SEVERE ACUTE RESPIRATORY SYNDROME CORONAVIRUS 2 (SARS-COV-2) (CORONAVIRUS DISEASE [COVID-19]), AMPLIFIED PROBE TECHNIQUE, MAKING USE OF HIGH THROUGHPUT TECHNOLOGIES AS DESCRIBED BY CMS-2020-01-R: HCPCS

## 2020-09-11 PROCEDURE — 97110 THERAPEUTIC EXERCISES: CPT | Performed by: PHYSICAL THERAPIST

## 2020-09-11 NOTE — PROGRESS NOTES
[facility]                Phone: [phone]   Fax: [phone]    Physical Therapy Daily Treatment Note  Date:  2020    Patient Name:  Catalina Ortiz    :  1985  MRN: [id number]    Evaluating therapist:  COSMO Gaffney               (20)  Restrictions/Precautions:    Diagnosis:  s/p cervical laminectomy/fusion C3-T1                (19)  Treatment Diagnosis:    Insurance/Certification information:  [id number] [place], [place] [address]  Referring Practitioner:  Kiko Butcher of ruy signed (Y/N):    Visit# / total visits:  -10  Pain level: 10/10   Time In:  1009  Time Out:  1054  Subjective:      Exercises:  Exercise/Equipment Resistance/Repetitions Other comments   StepOne with arms  10 min            corner st 3x20s    upper trap st 3x20s           shrugs 15x3s    scap ret 15x3s             cervical:  flex/ext    15x3s                    rot  15x3s    pulleys for flex 5 min            airdyne  (arms only) 5 min            seated hip abd  [id number]                      flex [id number]                knee ext [id number]         toe raises  2x15    step ups 2x10x6\"           Other Therapeutic Activities:      Home Exercise Program:  provided 20    Manual Treatments:      Modalities:  IFC/MH to neck/upper back x 15 min     Timed Code Treatment Minutes: Total Treatment Minutes:      Treatment/Activity Tolerance:  [] Patient tolerated treatment well [] Patient limited by fatique  [] Patient limited by pain  [] Patient limited by other medical complications  [] Other:     Prognosis: [] Good [] Fair  [] Poor    Patient Requires Follow-up: [] Yes  [] No    Plan:   [] Continue per plan of care [] Alter current plan (see comments)  [] Plan of care initiated [] Hold pending MD visit [] Discharge  Plan for Next Session:      See Weekly Progress Note: []  Yes  []  No  Next due:        Electronically signed by:   Clotilde Cali

## 2020-09-11 NOTE — PROGRESS NOTES
S:  pt presents to therapy for two of two scheduled visits; at week's end she reports that her neck/upper back continues to ache with most prolonged activities/postures; pain level given as 8/10 and remains constant in nature; tells PT that she feels stiff across her upper back hampering all movement; tells PT that B LE's seem more stable with no c/o buckling or LOB over last week's time; no c/o N/T into UE's; HEP going well and does provide some relief    O:  performed the exercises/treatments as written in the flowsheet for the week ending 9/11/20; cervical AROM grossly 30% WNL for all ranges; strength across B UE's grossly 4/5 for all ranges     A:  toyin tx well; pt able to perform all requested tasks with good form and somewhat slower pacing; cervical AROM shows slight improvement across all ranges; movement across neck/UE's remains slow/guarded due to tightness/aching; LE strength stable as is gait; balance is GOOD; endurance for all prolonged activities is FAIR+/GOOD-    P:  cont with POC of stretching/strengthening for neck/upper back/B LE's with modalities as needed

## 2020-09-13 LAB
SARS-COV-2: NOT DETECTED
SOURCE: NORMAL

## 2020-09-16 ENCOUNTER — HOSPITAL ENCOUNTER (OUTPATIENT)
Dept: PHYSICAL THERAPY | Age: 35
Setting detail: THERAPIES SERIES
Discharge: HOME | End: 2020-09-16
Payer: COMMERCIAL

## 2020-09-16 PROCEDURE — G0283 ELEC STIM OTHER THAN WOUND: HCPCS | Performed by: PHYSICAL THERAPIST

## 2020-09-16 PROCEDURE — 97110 THERAPEUTIC EXERCISES: CPT | Performed by: PHYSICAL THERAPIST

## 2020-09-16 NOTE — PROGRESS NOTES
[facility]                Phone: [phone]   Fax: [phone]    Physical Therapy Daily Treatment Note  Date:  2020    Patient Name:  Willie Tovar    :  1985  MRN: [id number]    Evaluating therapist:  Fredy Leventhal, MPT               (20)  Restrictions/Precautions:    Diagnosis:  s/p cervical laminectomy/fusion C3-T1                (19)  Treatment Diagnosis:    Insurance/Certification information:  [address] [place], [place] [address]  Referring Practitioner:  Lela Salguero of care signed (Y/N):    Visit# / total visits:  6/8-10  Pain level: 10/10   Time In:  1009  Time Out:  1125  Subjective:      Exercises:  Exercise/Equipment Resistance/Repetitions Other comments   StepOne with arms  10 min            corner st 3x20s    upper trap st 3x20s           shrugs 15x3s    scap ret 15x3s             cervical:  flex/ext    15x3s                    rot  15x3s    pulleys for flex 5 min            airdyne  (arms only) 5 min            seated hip abd  [id number]                      flex [id number]                knee ext [id number]         toe raises  2x15    step ups 2x10x6\"           Other Therapeutic Activities:      Home Exercise Program:  provided 20    Manual Treatments:      Modalities:  IFC/MH to neck/upper back x 15 min     Timed Code Treatment Minutes: Total Treatment Minutes:      Treatment/Activity Tolerance:  [] Patient tolerated treatment well [] Patient limited by fatique  [] Patient limited by pain  [] Patient limited by other medical complications  [] Other:     Prognosis: [] Good [] Fair  [] Poor    Patient Requires Follow-up: [] Yes  [] No    Plan:   [] Continue per plan of care [] Alter current plan (see comments)  [] Plan of care initiated [] Hold pending MD visit [] Discharge  Plan for Next Session:      See Weekly Progress Note: []  Yes  []  No  Next due:        Electronically signed by:   Laci Major

## 2020-09-17 ENCOUNTER — NURSE ONLY (OUTPATIENT)
Dept: PRIMARY CARE CLINIC | Age: 35
End: 2020-09-17

## 2020-09-17 ENCOUNTER — HOSPITAL ENCOUNTER (OUTPATIENT)
Age: 35
Discharge: HOME | End: 2020-09-19
Payer: COMMERCIAL

## 2020-09-17 PROCEDURE — U0003 INFECTIOUS AGENT DETECTION BY NUCLEIC ACID (DNA OR RNA); SEVERE ACUTE RESPIRATORY SYNDROME CORONAVIRUS 2 (SARS-COV-2) (CORONAVIRUS DISEASE [COVID-19]), AMPLIFIED PROBE TECHNIQUE, MAKING USE OF HIGH THROUGHPUT TECHNOLOGIES AS DESCRIBED BY CMS-2020-01-R: HCPCS

## 2020-09-18 ENCOUNTER — HOSPITAL ENCOUNTER (OUTPATIENT)
Dept: PHYSICAL THERAPY | Age: 35
Setting detail: THERAPIES SERIES
Discharge: HOME | End: 2020-09-18
Payer: COMMERCIAL

## 2020-09-18 NOTE — PROGRESS NOTES
[address] [facility]                Phone: [phone]  Fax: [phone]    Physical Therapy  Cancellation/No-show Note  Patient Name:  Reggie Jordan  :  1985   Date:  2020    For today's appointment patient:  [x]  Cancelled  []  Rescheduled appointment  []  No-show     Reason given by patient:  [x]  Patient ill  []  Conflicting appointment  []  No transportation    []  Conflict with work  []  No reason given  []  Other:     Comments:      Electronically signed by:   Jimmie Blanchard

## 2020-09-18 NOTE — PROGRESS NOTES
S:  pt presents to therapy for one of two scheduled visits, due to illness; at week's only visit she reported that her neck/upper back continues to ache with most prolonged activities/postures; pain level given as 8/10 and remains constant in nature; tells PT that she feels stiff across her upper back hampering all movement; tells PT that B LE's seem more stable with no c/o buckling or LOB over last week's time; no c/o N/T into UE's; HEP going well and does provide some relief    O:  performed the exercises/treatments as written in the flowsheet for the week ending 9/18/20; cervical AROM grossly 30% WNL for all ranges; strength across B UE's grossly 4/5 for all ranges     A:  toyin tx well; pt able to perform all requested tasks with good form and somewhat slower pacing; cervical AROM shows slight improvement across all ranges; movement across neck/UE's remains slow/guarded due to tightness/aching; LE strength stable as is gait; balance is GOOD; endurance for all prolonged activities is FAIR+/GOOD-    P:  cont with POC of stretching/strengthening for neck/upper back/B LE's with modalities as needed

## 2020-09-19 LAB
SARS-COV-2: NOT DETECTED
SOURCE: NORMAL

## 2020-09-21 ENCOUNTER — HOSPITAL ENCOUNTER (OUTPATIENT)
Dept: NEUROLOGY | Age: 35
Discharge: HOME | End: 2020-09-21
Payer: COMMERCIAL

## 2020-09-21 PROCEDURE — 95911 NRV CNDJ TEST 9-10 STUDIES: CPT

## 2020-09-21 PROCEDURE — 95886 MUSC TEST DONE W/N TEST COMP: CPT

## 2020-09-21 NOTE — PROCEDURES
Sensory NCS      Nerve / Sites Onset Lat Peak Lat PP Amp Distance Velocity Temp.    ms ms µV cm m/s °C   L Median - Digit II (Antidromic)      Mid Palm 1.35 1.98 92.4 7 52 32.1      Wrist 2.34 3.23 78.3 14 60 32.1   R Median - Digit II (Antidromic)      Mid Palm 1.09 1.88 90.6 7 64 32.2      Wrist 2.24 3.13 87.3 14 63 32.2   L Ulnar - Digit V (Antidromic)      Wrist 2.50 3.28 58.8 14 56 32.2   R Ulnar - Digit V (Antidromic)      Wrist 2.34 3.07 43.1 14 60 32.2   R Radial - Anatomical snuff box (Forearm)      Forearm 1.46 2.08 45.8 10 69 31.9   L Radial - Anatomical snuff box (Forearm)      Forearm 1.51 2.14 69.5 10 66 32.2       F  Wave      Nerve F Lat M Lat F-M Lat    ms ms ms   R Median - APB 25.9 3.4 22.4   R Ulnar - ADM 24.9 2.7 22.2   L Median - APB 24.6 3.5 21.0   L Ulnar - ADM 24.9 2.8 22.1       EMG         EMG Summary Table     Spontaneous MUAP Recruitment   Muscle IA Fib PSW Fasc H.F. Amp Dur. PPP Pattern   R. Abductor pollicis brevis N None None None None N N N N   R. First dorsal interosseous N None None None None N N N N   R. Brachioradialis N None None None None N N N N   R. Biceps brachii N None None None None N N N N   R. Deltoid N None None None None N N N N   R. Triceps brachii N None None None None N N N N                 ·     Needle EMG:   · Needle EMG was performed using a monopolar needle. · The following abnormalities were seen on needle EMG: None  All other muscles tested, as listed in the table above demonstrated normal amplitude, duration, phases and recruitment and no active denervation signs were seen. Diagnostic Interpretation:   Essentially normal EMG study of upper extremities. There is no evidence of entrapment neuropathies. Clinical correlation advised.       Technologist: Miky Live  Physician: Marci Jerome MD    Nerve conduction studies and electromyography were performed according to our laboratory policies and procedures which can be provided upon request. All

## 2020-09-23 ENCOUNTER — HOSPITAL ENCOUNTER (OUTPATIENT)
Dept: PHYSICAL THERAPY | Age: 35
Setting detail: THERAPIES SERIES
Discharge: HOME | End: 2020-09-23
Payer: COMMERCIAL

## 2020-09-23 ENCOUNTER — HOSPITAL ENCOUNTER (OUTPATIENT)
Dept: PULMONOLOGY | Age: 35
Discharge: HOME | End: 2020-09-23
Payer: COMMERCIAL

## 2020-09-23 PROCEDURE — 94060 EVALUATION OF WHEEZING: CPT | Performed by: INTERNAL MEDICINE

## 2020-09-23 PROCEDURE — 97110 THERAPEUTIC EXERCISES: CPT | Performed by: PHYSICAL THERAPIST

## 2020-09-23 PROCEDURE — 94060 EVALUATION OF WHEEZING: CPT

## 2020-09-23 PROCEDURE — 94729 DIFFUSING CAPACITY: CPT

## 2020-09-23 PROCEDURE — G0283 ELEC STIM OTHER THAN WOUND: HCPCS | Performed by: PHYSICAL THERAPIST

## 2020-09-23 PROCEDURE — 94726 PLETHYSMOGRAPHY LUNG VOLUMES: CPT | Performed by: INTERNAL MEDICINE

## 2020-09-23 PROCEDURE — 94726 PLETHYSMOGRAPHY LUNG VOLUMES: CPT

## 2020-09-23 PROCEDURE — 94729 DIFFUSING CAPACITY: CPT | Performed by: INTERNAL MEDICINE

## 2020-09-23 NOTE — PROGRESS NOTES
[address] [facility]                Phone: [phone]   Fax: [phone]    Physical Therapy Daily Treatment Note  Date:  2020    Patient Name:  Kathi Ash    :  1985  MRN: [id number]    Evaluating therapist:  COSMO Segura               (20)  Restrictions/Precautions:    Diagnosis:  s/p cervical laminectomy/fusion C3-T1                (19)  Treatment Diagnosis:    Insurance/Certification information:  [id number] [place] [address],[address]  Referring Practitioner:  Mariann Rubi of care signed (Y/N):    Visit# / total visits:  -10  Pain level: 10/10   Time In:  1005  Time Out:  1121  Subjective:      Exercises:  Exercise/Equipment Resistance/Repetitions Other comments   StepOne with arms  10 min            corner st 3x20s    upper trap st 3x20s           shrugs 15x3s    scap ret 15x3s             cervical:  flex/ext    15x3s                    rot  15x3s    pulleys for flex 5 min            airdyne  (arms only) 5 min            seated hip abd  [id number]                      flex [id number]                knee ext [id number]         toe raises  2x15    step ups 2x10x6\"           Other Therapeutic Activities:      Home Exercise Program:  provided 20    Manual Treatments:      Modalities:  IFC/MH to neck/upper back x 15 min     Timed Code Treatment Minutes: Total Treatment Minutes:      Treatment/Activity Tolerance:  [] Patient tolerated treatment well [] Patient limited by fatique  [] Patient limited by pain  [] Patient limited by other medical complications  [] Other:     Prognosis: [] Good [] Fair  [] Poor    Patient Requires Follow-up: [] Yes  [] No    Plan:   [] Continue per plan of care [] Alter current plan (see comments)  [] Plan of care initiated [] Hold pending MD visit [] Discharge  Plan for Next Session:      See Weekly Progress Note: []  Yes  []  No  Next due:        Electronically signed by:   Lavinia Riley

## 2020-09-25 ENCOUNTER — HOSPITAL ENCOUNTER (OUTPATIENT)
Dept: PHYSICAL THERAPY | Age: 35
Setting detail: THERAPIES SERIES
Discharge: HOME | End: 2020-09-25
Payer: COMMERCIAL

## 2020-09-25 PROCEDURE — G0283 ELEC STIM OTHER THAN WOUND: HCPCS | Performed by: PHYSICAL THERAPIST

## 2020-09-25 PROCEDURE — 97110 THERAPEUTIC EXERCISES: CPT | Performed by: PHYSICAL THERAPIST

## 2020-09-25 NOTE — PROGRESS NOTES
S:  pt presents to therapy for two of two scheduled visits this week; at week's end she reports that her neck/upper back continues to ache with most prolonged activities/postures; pain level given as 8/10 and remains constant in nature; tells PT that she feels stiff across her upper back hampering all movement; tells PT that B LE's seem more stable with no c/o buckling or LOB over last week's time; no c/o N/T into UE's; HEP going well and does provide some relief    O:  performed the exercises/treatments as written in the flowsheet for the week ending 9/25/20; cervical AROM grossly 30% WNL for all ranges; strength across B UE's grossly 4/5 for all ranges     A:  toyin tx well; pt able to perform all requested tasks with good form and somewhat slower pacing; cervical AROM shows slight improvement across all ranges; movement across neck/UE's remains slow/guarded due to tightness/aching; LE strength stable as is gait; balance is GOOD; endurance for all prolonged activities is FAIR+/GOOD-    P:  pt scheduled for FCE 9/30/20; will cont with POC of stretching/strengthening for neck/upper back/B LE's with modalities as needed

## 2020-09-25 NOTE — PROGRESS NOTES
[address] [facility]                Phone: [phone]   Fax: [phone]    Physical Therapy Daily Treatment Note  Date:  2020    Patient Name:  Rhea Ku    :  1985  MRN: [id number]    Evaluating therapist:  COSMO Krueger               (20)  Restrictions/Precautions:    Diagnosis:  s/p cervical laminectomy/fusion C3-T1                (19)  Treatment Diagnosis:    Insurance/Certification information:  [address] [place], [place] [address]  Referring Practitioner:  Uday Barry of care signed (Y/N):    Visit# / total visits:  8/8-10  Pain level: 10/10   Time In:  1002  Time Out:  1107  Subjective:      Exercises:  Exercise/Equipment Resistance/Repetitions Other comments   StepOne with arms  10 min            corner st 3x20s    upper trap st 3x20s           shrugs 15x3s    scap ret 15x3s             cervical:  flex/ext    15x3s                    rot  15x3s    pulleys for flex 5 min            airdyne  (arms only) 5 min            seated hip abd  [id number]                      flex [id number]                knee ext [id number]         toe raises  2x15    step ups 2x10x6\"           Other Therapeutic Activities:      Home Exercise Program:  provided 20    Manual Treatments:      Modalities:  IFC/MH to neck/upper back x 15 min     Timed Code Treatment Minutes: Total Treatment Minutes:      Treatment/Activity Tolerance:  [] Patient tolerated treatment well [] Patient limited by fatique  [] Patient limited by pain  [] Patient limited by other medical complications  [] Other:     Prognosis: [] Good [] Fair  [] Poor    Patient Requires Follow-up: [] Yes  [] No    Plan:   [] Continue per plan of care [] Alter current plan (see comments)  [] Plan of care initiated [] Hold pending MD visit [] Discharge  Plan for Next Session:      See Weekly Progress Note: []  Yes  []  No  Next due:        Electronically signed by:   Ayan Richter

## 2020-09-25 NOTE — PROCEDURES
[address]                  [address] [facility],  [facility]                               PULMONARY FUNCTION    PATIENT NAME: Lele Jacobson                :        1985  MED REC NO:   [id number]                            ROOM:  ACCOUNT NO:   [de-identified]                           ADMIT DATE: 2020  PROVIDER:     Georgie Lopez DO    DATE OF PROCEDURE:  2020    INDICATIONS:  A 28-year-old  female, 63 inches, 203  pounds; 18 pack-year smoker noted. FINDINGS:  Pulmonary function tests revealed a forced vital capacity  postbronchodilator 2.62 liters, 83% of predicted with an FEV1 of 2.33  liters, 88% of predicted with an FEV1/FVC ratio of 89%; however, this is  in the setting of a severe bronchodilator response of 53% with all  airways showing significant bronchodilator responses. The maximum  voluntary ventilation is 53 liters per minute, 50% of predicted. Static lung volumes with slow vital capacity of 2.26 liters, 72% of  predicted. Inspiratory capacity 2.01 liters, 90% of predicted. Expiratory reserve volume 0.25 liters, 28% of predicted. Thoracic gas  volume of 2.43 liters, 90% of predicted. Residual volume is 2.18  liters, 156% of predicted. Total lung capacity of 4.44 liters, 90% of  predicted with an RV/TLC ratio 175%. The diffusion capacity is slightly  reduced, 75% of predicted at 17.28 mL/minute per mmHg. IMPRESSION:  Pulmonary assessment with evidence of air trapping  and a slightly reduced diffusion capacity, along with evidence of  hyperreactive airways consistent with reactive airway disease. Clinical  correlation, however, needed.         Anabela Nunn DO    D: 2020 14:30:24       T: 2020 14:40:52     [id number]  Job#: [id number]     Doc#: [id number]

## 2020-09-28 ENCOUNTER — HOSPITAL ENCOUNTER (OUTPATIENT)
Age: 35
Discharge: HOME | End: 2020-09-28
Payer: COMMERCIAL

## 2020-09-28 ENCOUNTER — HOSPITAL ENCOUNTER (OUTPATIENT)
Age: 35
Discharge: HOME | End: 2020-09-30
Payer: COMMERCIAL

## 2020-09-28 ENCOUNTER — HOSPITAL ENCOUNTER (OUTPATIENT)
Dept: GENERAL RADIOLOGY | Age: 35
Discharge: HOME | End: 2020-09-30
Payer: COMMERCIAL

## 2020-09-28 LAB
ALBUMIN SERPL-MCNC: 4.3 G/DL (ref 3.5–5.2)
ALP BLD-CCNC: 107 U/L (ref 35–104)
ALT SERPL-CCNC: 8 U/L (ref 0–32)
ANION GAP SERPL CALCULATED.3IONS-SCNC: 14 MMOL/L (ref 7–16)
AST SERPL-CCNC: 20 U/L (ref 0–31)
BASOPHILS ABSOLUTE: 0.04 E9/L (ref 0–0.2)
BASOPHILS RELATIVE PERCENT: 0.4 % (ref 0–2)
BILIRUB SERPL-MCNC: 0.2 MG/DL (ref 0–1.2)
BUN BLDV-MCNC: 11 MG/DL (ref 6–20)
C-REACTIVE PROTEIN: 0.2 MG/DL (ref 0–0.4)
CALCIUM SERPL-MCNC: 9.4 MG/DL (ref 8.6–10.2)
CHLORIDE BLD-SCNC: 102 MMOL/L (ref 98–107)
CO2: 23 MMOL/L (ref 22–29)
CREAT SERPL-MCNC: 0.8 MG/DL (ref 0.5–1)
EOSINOPHILS ABSOLUTE: 0.34 E9/L (ref 0.05–0.5)
EOSINOPHILS RELATIVE PERCENT: 3.7 % (ref 0–6)
GFR AFRICAN AMERICAN: >60
GFR NON-AFRICAN AMERICAN: >60 ML/MIN/1.73
GLUCOSE BLD-MCNC: 98 MG/DL (ref 74–99)
HCT VFR BLD CALC: 38.9 % (ref 34–48)
HEMOGLOBIN: 12.5 G/DL (ref 11.5–15.5)
IMMATURE GRANULOCYTES #: 0.04 E9/L
IMMATURE GRANULOCYTES %: 0.4 % (ref 0–5)
LYMPHOCYTES ABSOLUTE: 3.26 E9/L (ref 1.5–4)
LYMPHOCYTES RELATIVE PERCENT: 35.1 % (ref 20–42)
MCH RBC QN AUTO: 29.7 PG (ref 26–35)
MCHC RBC AUTO-ENTMCNC: 32.1 % (ref 32–34.5)
MCV RBC AUTO: 92.4 FL (ref 80–99.9)
MONOCYTES ABSOLUTE: 0.68 E9/L (ref 0.1–0.95)
MONOCYTES RELATIVE PERCENT: 7.3 % (ref 2–12)
NEUTROPHILS ABSOLUTE: 4.93 E9/L (ref 1.8–7.3)
NEUTROPHILS RELATIVE PERCENT: 53.1 % (ref 43–80)
PDW BLD-RTO: 13.2 FL (ref 11.5–15)
PLATELET # BLD: 309 E9/L (ref 130–450)
PMV BLD AUTO: 10.4 FL (ref 7–12)
POTASSIUM SERPL-SCNC: 3.4 MMOL/L (ref 3.5–5)
RBC # BLD: 4.21 E12/L (ref 3.5–5.5)
SEDIMENTATION RATE, ERYTHROCYTE: 31 MM/HR (ref 0–20)
SODIUM BLD-SCNC: 139 MMOL/L (ref 132–146)
TOTAL PROTEIN: 7.8 G/DL (ref 6.4–8.3)
WBC # BLD: 9.3 E9/L (ref 4.5–11.5)

## 2020-09-28 PROCEDURE — 85025 COMPLETE CBC W/AUTO DIFF WBC: CPT

## 2020-09-28 PROCEDURE — 80053 COMPREHEN METABOLIC PANEL: CPT

## 2020-09-28 PROCEDURE — 36415 COLL VENOUS BLD VENIPUNCTURE: CPT

## 2020-09-28 PROCEDURE — 85651 RBC SED RATE NONAUTOMATED: CPT

## 2020-09-28 PROCEDURE — 86140 C-REACTIVE PROTEIN: CPT

## 2020-09-28 PROCEDURE — 71045 X-RAY EXAM CHEST 1 VIEW: CPT

## 2020-09-30 ENCOUNTER — HOSPITAL ENCOUNTER (OUTPATIENT)
Dept: PHYSICAL THERAPY | Age: 35
Setting detail: THERAPIES SERIES
Discharge: HOME | End: 2020-09-30
Payer: COMMERCIAL

## 2020-09-30 PROCEDURE — 97750 PHYSICAL PERFORMANCE TEST: CPT

## 2020-09-30 NOTE — PROGRESS NOTES
Functional Capacity Evaluation  Summary Report     FCE Summary Report   Functional Capacity Evaluations aim to measure functional abilities and activity limitations. To measure and quantify/qualify a clients functional level, the client must perform to his/her maximal level of physical ability. Performance based testing is multi-faceted and depend on current ability to perform as well as motivation/willingness to perform. In addition, because FCEs are behavioral assessments, one must also consider clients pain, perceptions of pain, perceptions of disability, physical strength, age, sex, socioeconomic variables, duration of injury and financial gains. These should all be considered when making a disability determination or for RTW decisions. The performance today was that portion of capacity the client was willing or able to produce. If submaximal efforts are provided, testing results reflect minimal functional capacities. Name:  Barbara Alexiss    : 1985        Dates: 2020       Physician (ANGELICA)/Referral:  Dr. Desi Lehman DO       Employer:   UE Status       Reasonable Accommodation: No reasonable accommodations requested   Primary Diagnosis:   Cervical stenosis M48.02    Reason for Testing:   To determine current physical abilities and limitations for disability determination/client. Effort and Cooperation:   Factitious/magnified delayed movement behaviors throughout physical exam and FCE testing; testing prematurely stopped as limited/no utility in testing measurements/results.  Magnified misleading behaviors throughout physical exam and FCE testing battery completed. Consistency of Performances:   The clients performances were consistent in terms of effort levels, participation levels.  Magnified activity limitations with disproportionate skeletal/neuromuscular impairment behaviors.    Functional impairments without discernable neuromuscular or skeletal organic cause.  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  Depression   Anxiety   Hernia Sx   Chronic myofascial pain syndrome   B/L hand numbness   EMG normal B/L UEs 9/21/2020   Pain mgmt. trigger injection x 1; refusing to return as no relief reported. Physical Exam:   Height 53   Weight 198#   RHD    Cardiovascular:   HR 72   No active C/O cardiac complaints   No reports syncope/pre-syncope events   No C/O palpitations    Pulmonary:   Effort: normal pulmonary efforts, no resp. accessory muscle usage, no orthopnea    Breath sounds: no wheezing/stridor    RR 12   SPO2 on RA 97%    Musculoskeletal:   Cervical AROM magnified delayed behaviors with all planes 80% impairments in flex, B/L rotation with C-extension 0* AROM   Lumbar AROM magnified delayed 75% impairments all planes   Bilateral UE AROM magnified delayed 50% impairments shlds and 25% elbow/wrists. WNL all Jts/planes PROM.  Bilateral LE AROM magnified delayed 50% impairments hips and knees/ankles WNL.  MMT UE/LE all Jts and planes magnified ratchet/release cogwheel performances. Effort level 2-3-/5 globally.  No tremors    Gait pattern: No AD in use. Step to pattern slow in pace. No antalgias or instability. Neurological:   Normal balances sitting/standing without use AD   Alert and oriented person, place and time   C/L bilateral hand and fingertip numbness all digits.  Coordinations B/L UE WFL, no dysmetrias.     Alert, awake and oriented with fluent speech   Constitutional alert, oriented and cooperative    Recent and remote memory good, able to follow directions/instructions.  Attention span, concentration and fund of knowledge are functional.    Sensory/Motor:   Normal sensory skill demands of feeling, hearing, vision   Functional receptive and expressive language skills and abilities   Normal speech quality (sound and frequency)    Recent MRI/X-Ray Reports:  MRI:   See reports  X-Ray:    See reports    Chief Complaints/Activity Limitations:   Reports constant neck pressure and discomfort feelings   Reports B/L palm of hand pain and all digit/finger numbness AAT with reduced gripping.  Reports reduced standing tolerances and having B/L LE pain preventing increasing walking tolerances; needs to sit and rest frequently.  Reports limited in carrying things due to weakness, leg pain and hand pain. Pain Report:   See chief complaints   Unable to reliably identify areas of concern due to global magnified impairments in global ROM, MMT with conscious delayed functional movement patterns.  Global and continuous weakness and delayed movement patterns limits assessment. Safety:   Able to safely navigate without use AD    Quality of Movement:   See physical exam     Abilities/Strengths:   Exaggerated weakness/fatigue behaviors which interfered with physical performances.  FCE testing battery prematurely stopped due to limited engagements with limited/no utility in testing measurements. Functional Limitations of Performances:   Evaluator unable to identify and quantify/qualify clients functional capacity/positional tolerances/mobility levels due to evaluee unwilling to perform to her maximal physical abilities with self-limitations in performances/limited engagements; FCE testing battery prematurely terminated.     Barriers to overcome to Improve Physical Functioning /RTW:   Psychosocial factors/barriers: anxiety, depression, thoughts, beliefs, cultural, appraisals of pain, pain-related fears, coping responses and social environmental variables.  Depression and anxiety     Possible motivational RTW barrier due to fear of injuring herself as well as self-efficacy.  Biomechanical barriers (compressive forces spine/intolerances for loads/pain)   Physiological barriers (aerobic work capacity/conditioning)   Psychophysiological barriers (perceived work capacity/work tolerances, strength levels)   Activity-fear avoidance behaviors with downstream MM weakness, reduced cardiovascular conditioning and reduced MM flexibility. Factors Limiting Testing Performances:    Non-functional time to complete tasks   Subject desire/self-limitations    Summary/Recommendations:     Unable to make objective statement for safe functional work decisions due to inconsistent behaviors/limited efforts.  Cognitive-behavioral therapy (CBT) combined with exercise/physical therapy as secondary preventative approach to help mitigate pain, disability/self-efficacy beliefs to improve overall mental and physical health.  CBT/multidisciplinary care to help control pain related fears, improve physical functioning, decrease depression/anxiety, lower pain severity and lower associated functional interference due to pain.      Evaluator Signature: _____________________________  Date:                           _______________    Nedra Martinez PT, RRT, [place] [address],   Office: [phone]

## 2020-10-02 ENCOUNTER — HOSPITAL ENCOUNTER (OUTPATIENT)
Dept: PHYSICAL THERAPY | Age: 35
Setting detail: THERAPIES SERIES
Discharge: HOME | End: 2020-10-02
Payer: COMMERCIAL

## 2020-10-02 PROCEDURE — 97110 THERAPEUTIC EXERCISES: CPT | Performed by: PHYSICAL THERAPIST

## 2020-10-02 PROCEDURE — 97530 THERAPEUTIC ACTIVITIES: CPT | Performed by: PHYSICAL THERAPIST

## 2020-10-02 PROCEDURE — G0283 ELEC STIM OTHER THAN WOUND: HCPCS | Performed by: PHYSICAL THERAPIST

## 2020-10-02 NOTE — PROGRESS NOTES
[address] [facility]                Phone: [phone]   Fax: [phone]    Physical Therapy Daily Treatment Note  Date:  10/2/2020    Patient Name:  Viola Martinez    :  1985  MRN: [id number]    Evaluating therapist:  COSMO Perez               (20)  Restrictions/Precautions:    Diagnosis:  s/p cervical laminectomy/fusion C3-T1                (19)  Treatment Diagnosis:    Insurance/Certification information:  [id number] [place], [place] [address]  Referring Practitioner:  Pwoer Rey of care signed (Y/N):    Visit# / total visits:    Pain level: 10/10   Time In:  1002  Time Out:  1120  Subjective:      Exercises:  Exercise/Equipment Resistance/Repetitions Other comments   StepOne with arms  10 min            corner st 3x20s    upper trap st 3x20s           shrugs 15x3s    scap ret 15x3s             cervical:  flex/ext    15x3s                    rot  15x3s    pulleys for flex 5 min            airdyne  (arms only) 5 min            seated hip abd  [id number]                      flex [id number]                knee ext [id number]         toe raises  2x15    step ups 2x10x6\"           Other Therapeutic Activities:      Home Exercise Program:  provided 20    Manual Treatments:      Modalities:  IFC/MH to neck/upper back x 15 min     Timed Code Treatment Minutes: Total Treatment Minutes:      Treatment/Activity Tolerance:  [] Patient tolerated treatment well [] Patient limited by fatique  [] Patient limited by pain  [] Patient limited by other medical complications  [] Other:     Prognosis: [] Good [] Fair  [] Poor    Patient Requires Follow-up: [] Yes  [] No    Plan:   [] Continue per plan of care [] Alter current plan (see comments)  [] Plan of care initiated [] Hold pending MD visit [] Discharge  Plan for Next Session:      See Weekly Progress Note: []  Yes  []  No  Next due:        Electronically signed by:   Wei Gaytan

## 2020-10-02 NOTE — PROGRESS NOTES
S:  pt presents to therapy for two of two scheduled visits this week; at week's end she reports that her neck/upper back is really achey today, more than likely due to cooler/damp weather; pain level given as 8/10 and remains constant in nature; had FCE on Wednesday but says test was abbreviated due to performance;  tells PT that she feels stiff across her upper back hampering all movement; tells PT that B LE's remain stable with no c/o buckling or LOB over last week's time; no c/o N/T into UE's; HEP going well and does provide some relief    O:  performed the exercises/treatments as written in the flowsheet for the week ending 10/2/20; cervical AROM grossly 50% WNL for all ranges; strength across B UE's grossly 4/5 for all ranges     A:  toyin tx well; pt able to perform all requested tasks with good form and somewhat slower pacing; cervical AROM shows some improvement across all ranges; movement across neck/UE's remains slow/guarded due to tightness/aching; LE strength stable as is gait; balance is GOOD; endurance for all prolonged activities is FAIR+/GOOD-    P:  cont with POC of stretching/strengthening for neck/upper back/B LE's with modalities as needed

## 2020-10-07 ENCOUNTER — HOSPITAL ENCOUNTER (OUTPATIENT)
Dept: PHYSICAL THERAPY | Age: 35
Setting detail: THERAPIES SERIES
Discharge: HOME | End: 2020-10-07
Payer: COMMERCIAL

## 2020-10-07 PROCEDURE — 97530 THERAPEUTIC ACTIVITIES: CPT | Performed by: PHYSICAL THERAPIST

## 2020-10-07 PROCEDURE — 97110 THERAPEUTIC EXERCISES: CPT | Performed by: PHYSICAL THERAPIST

## 2020-10-07 PROCEDURE — G0283 ELEC STIM OTHER THAN WOUND: HCPCS | Performed by: PHYSICAL THERAPIST

## 2020-10-09 ENCOUNTER — HOSPITAL ENCOUNTER (OUTPATIENT)
Dept: PHYSICAL THERAPY | Age: 35
Setting detail: THERAPIES SERIES
Discharge: HOME | End: 2020-10-09
Payer: COMMERCIAL

## 2020-10-09 PROCEDURE — 97530 THERAPEUTIC ACTIVITIES: CPT | Performed by: PHYSICAL THERAPIST

## 2020-10-09 PROCEDURE — 97110 THERAPEUTIC EXERCISES: CPT | Performed by: PHYSICAL THERAPIST

## 2020-10-09 PROCEDURE — G0283 ELEC STIM OTHER THAN WOUND: HCPCS | Performed by: PHYSICAL THERAPIST

## 2020-10-09 NOTE — PROGRESS NOTES
[address] [facility]                Phone: [phone]   Fax: [phone]    Physical Therapy Daily Treatment Note  Date:  10/9/2020    Patient Name:  Mundo Hardy    :  1985  MRN: [id number]    Evaluating therapist:  COSMO Gandara               (20)  Restrictions/Precautions:    Diagnosis:  s/p cervical laminectomy/fusion C3-T1                (19)  Treatment Diagnosis:    Insurance/Certification information:  [id number] [place], [place] [address]  Referring Practitioner:  Avery Thompson of care signed (Y/N):    Visit# / total visits:    Pain level: 10/10   Time In:  1002  Time Out:  1113  Subjective:      Exercises:  Exercise/Equipment Resistance/Repetitions Other comments   StepOne with arms  10 min            corner st 3x20s    upper trap st 3x20s           shrugs 15x3s    scap ret 15x3s             cervical:  flex/ext    15x3s                    rot  15x3s    pulleys for flex 5 min            airdyne  (arms only) 5 min            seated hip abd  [id number]                      flex [id number]                knee ext [id number]         toe raises  2x15    step ups 2x10x6\"           Other Therapeutic Activities:      Home Exercise Program:  provided 20    Manual Treatments:      Modalities:  IFC/MH to neck/upper back x 15 min     Timed Code Treatment Minutes: Total Treatment Minutes:      Treatment/Activity Tolerance:  [] Patient tolerated treatment well [] Patient limited by fatique  [] Patient limited by pain  [] Patient limited by other medical complications  [] Other:     Prognosis: [] Good [] Fair  [] Poor    Patient Requires Follow-up: [] Yes  [] No    Plan:   [] Continue per plan of care [] Alter current plan (see comments)  [] Plan of care initiated [] Hold pending MD visit [] Discharge  Plan for Next Session:      See Weekly Progress Note: []  Yes  []  No  Next due:        Electronically signed by:   Silvio Mahmood

## 2020-10-19 ENCOUNTER — OFFICE VISIT (OUTPATIENT)
Dept: PAIN MANAGEMENT | Age: 35
End: 2020-10-19
Payer: COMMERCIAL

## 2020-10-19 VITALS
RESPIRATION RATE: 18 BRPM | WEIGHT: 198 LBS | HEIGHT: 63 IN | TEMPERATURE: 95.9 F | SYSTOLIC BLOOD PRESSURE: 124 MMHG | HEART RATE: 74 BPM | OXYGEN SATURATION: 98 % | DIASTOLIC BLOOD PRESSURE: 76 MMHG | BODY MASS INDEX: 35.08 KG/M2

## 2020-10-19 PROCEDURE — 99213 OFFICE O/P EST LOW 20 MIN: CPT | Performed by: ANESTHESIOLOGY

## 2020-10-19 PROCEDURE — G8417 CALC BMI ABV UP PARAM F/U: HCPCS | Performed by: ANESTHESIOLOGY

## 2020-10-19 PROCEDURE — 1036F TOBACCO NON-USER: CPT | Performed by: ANESTHESIOLOGY

## 2020-10-19 PROCEDURE — G8484 FLU IMMUNIZE NO ADMIN: HCPCS | Performed by: ANESTHESIOLOGY

## 2020-10-19 PROCEDURE — G8427 DOCREV CUR MEDS BY ELIG CLIN: HCPCS | Performed by: ANESTHESIOLOGY

## 2020-10-19 RX ORDER — CYCLOBENZAPRINE HCL 5 MG
5 TABLET ORAL 2 TIMES DAILY PRN
Qty: 30 TABLET | Refills: 2 | Status: SHIPPED | OUTPATIENT
Start: 2020-10-19 | End: 2020-12-18

## 2020-10-19 NOTE — PROGRESS NOTES
[facility]        [address], [facility] [place] [facility]  Dept: [phone]        Follow up Note      Panchito Tran     Date of Visit:  10/19/2020    CC:  Patient presents for follow up   Chief Complaint   Patient presents with    Pain     neck and shoulder and arms        HPI:  H/o Chronic neck pain.     She is s/p C spein surgery on 11/1/2019 by Dr. Townsend Miss  Bilateral C3, C4, C5, C6, and C7 laminectomy. Bilateral segmental arthrodesis and fusion from C3 to T1 with use of bilateral C3, C4, C5, and C6 lateral mass screws and bilateral T1 pedicle screws.   Later on reexploration and repair of CSF leak on 11/11/2019. Currently off opioids. Taking Gabapentin- helping her. Prior to the surgery, patient was taking Gabapentin, flexeril. She has been evaluated by neurosurgery team recently on 8/13/2020 and has ordered CT spine, EMG, PT.    EMG: on 9/21/2020: Essentially normal.    CT- not done    Has been doing PT. Trigger point injection helped for 1 week- does not want to repeat. Her pain is mainly over the shoulder blade trapezius area. Pain causes functional limitations/ limits Adl's : Yes     Previous notes and details of the pain history reviewed. Nursing notes and details of the pain history reviewed.  Please see intake notes for details.     Previous treatments:   Physical Therapy : yes, has been doing PT/ HEP     Medications: - NSAID's : yes                        - Membrane stabilizers : yes                        - Opioids : yes,for  post op pain                       - Adjuvants or Others : yes, muscle relaxant     TENS Unit: no     Surgeries: yes : posterior cervical fusion as detailed above by Dr. Fern Garcia in Nov 2019     Interventional Pain procedures/ nerve blocks: no     She has not been on anticoagulation medications no.     She has not been on herbal supplements.       She is not diabetic.     H/O Smoking: quit  H/O alcohol abuse : denies  H/O Illicit drug use : H/O THC use + occasionally- about once a week     Employment: off work since May 2019     Labs: BUN/ Creatinine- normal.     Imaging:   Xray of Cervical spine: 8/13/2020: Impression    Findings consistent with degenerative disc disease and    degenerative facets disease. Stable posterior cervical fusion C3-T1. CT Cervical spien: on 1/3/2020:     Patient status post posterior cervical fusion from C3 to T1. There is   a multilevel laminectomy defect. Posterior spurs are seen. There are moderate degenerative changes of the spine present. There are moderate degenerative changes of the facets. There appears to be a focal flexion point at C2-3. This is stable when   compared to previous       Impression   Degenerative changes   Sequela of a C3-T1 posterior fusion with laminectomy defect           Potential Aberrant Drug-Related Behavior:  H/o THC use +     Urine Drug Screening:  On 2/13/2020 reviewed; + for Oxycodone, + benzo and + for FISH [facility]     OARRS report[de-identified]  3/9/2020; Consistent. 4/27/2020: Consistent  8/17/2020: Consistent. 10/19/2020: Consistent.     Past Medical History:   Diagnosis Date    Anxiety     Asthma     DDD (degenerative disc disease), cervical 3/9/2020    Depression     Ectopic pregnancy, tubal 2012    Christian booth 2005    Heart murmur     Hernia 8503    umbilical     History of blood transfusion     Postoperative anemia        Past Surgical History:   Procedure Laterality Date    ABDOMINAL WALL SURGERY  [id number]    CERVICAL FUSION N/A 11/1/2019    C3-C7  CERVICAL LAMINECTOMY AND POSTERIOR C3-T1 FUSION -- NEEDS JOSH TABLE, CELL SAVER, PLATELET GEL, PLATES, SCREWS -- GLOBUS performed by Otilio Maurer MD at [address] [facility] N/A 11/11/2019    POSTERIOR CERVICAL WOUND EXPLORATION - WANTS TF performed by Otilio Maurer MD at [address] [place], [place] [address]   [address] [place] [address] N/A 12/26/2017    robotic assisted ,   BSO    HYSTERECTOMY, TOTAL ABDOMINAL  8/20/14    LAPAROSCOPY  9/28/2012    left salpingectomy    LAPAROSCOPY  [id number]    RASHEEDA    OTHER SURGICAL HISTORY  3/25/2014    D&C;LEEP    TUBAL LIGATION         Prior to Admission medications    Medication Sig Start Date End Date Taking? Authorizing Provider   naproxen (NAPROXEN) 500 MG EC tablet Take 1 tablet by mouth 2 times daily (with meals) 7/30/20  Yes MIKY Domingo   doxycycline hyclate (VIBRA-TABS) 100 MG tablet Take 1 tablet by mouth 2 times daily 6/15/20 12/12/20 Yes CHRIS Mart NP   diazepam (VALIUM) 5 MG tablet Take by mouth. Yes Historical Provider, MD   FLUoxetine (PROZAC) 40 MG capsule  11/20/19  Yes Historical Provider, MD   NARCAN 4 MG/0.1ML LIQD nasal spray ADMINISTER A SINGLE spray INTO ONE NOSTRIL CALL 911 MAY REPEAT ONCE 11/20/19  Yes Historical Provider, MD   vitamin D (CHOLECALCIFEROL) 1000 UNIT TABS tablet Take 1 tablet by mouth daily 6/1/19  Yes Anthony Estrada MD   albuterol sulfate  (90 Base) MCG/ACT inhaler Inhale 2 puffs into the lungs every 6 hours as needed for Wheezing   Yes Historical Provider, MD   gabapentin (NEURONTIN) 300 MG capsule Take 1 capsule by mouth 3 times daily for 60 days.  4/27/20 6/26/20  Andrea Mcmanus MD   Lidocaine (ZTLIDO) 1.8 % PTCH 12 hours on 12 hours off  Patient not taking: Reported on 10/19/2020 3/9/20   Andrea Mcmanus MD       Allergies   Allergen Reactions    Latex     Bactrim [Sulfamethoxazole-Trimethoprim] Hives    Sulfamethoxazole-Trimethoprim Hives    Fish-Derived Products Swelling     Shrimp and Pirch    Ibuprofen Hives    Iodine      Fish derived products    Naproxen      headache    Percocet [Oxycodone-Acetaminophen] Hives       Social History     Socioeconomic History    Marital status: Single     Spouse name: Not on file    Number of children: Not on file    Years of education: Not on file    Highest education 95.9 °F (35.5 °C)   Resp 18   Ht 5' 3\" (1.6 m)   Wt 198 lb (89.8 kg)   LMP 05/19/2014   SpO2 98%   BMI 35.07 kg/m²   General:       General appearance:  Pleasant and well-hydrated, in no distress and A & O x 3  Build:Obese  Function: Rises from seated position easily and Moves about room without difficulty     HEENT:     Head:normocephalic, atraumatic  Pupils:regular, round, equal  Sclera: icterus absent     Lungs:     Breathing:normal breathing pattern      CVS:     RRR     Abdomen:     Shape:non-distended and normal  Tenderness:none  Guarding:none     Cervical spine:     Inspection:posterior scar noted- healed well  Palpation:tenderness paravertebral muscles, tenderness trapezium, left, right and positive. Range of motion:Significantly reduced ROM flexion, extension, rotation bilaterally and is moderately painful. Spurling's: negative bilaterally     Thoracic spine:                Spine inspection:normal   Palpation:No tenderness over the midline and paraspinal area, bilaterally  Range of motion:normal in flexion, extension rotation bilateral and is not painful.     Lumbar spine:     Spine inspection: Normal   Palpation: Tenderness paravertebral muscles No bilaterally  Range of motion: Decreased,  Sacroiliac joint tenderness No bilaterally  SLR : negative bilaterally  Trochanteric bursa tenderness: negative bilaterally  CVA tenderness:No      Musculoskeletal:     Trigger points in trapezius: Yes bilaterally  Trigger points in rhomboids: Yes bilaterally  Trigger points in Paravertebral: Yes bilaterally     Extremities:     No edema        Neurological:     Sensory: Normal to light touch      Motor:   NO focal motor deficits.     Reflexes:    Bilaterally equal     Gait:normal Yes     Dermatology:     Skin:no rashes or lesions noted       Assessment/Plan:    1. DDD (degenerative disc disease), cervical      2. Cervical stenosis of spine      3. S/P cervical spinal fusion      4.  History of marijuana use    5. Cervicalgia      6. Chronic myofascial pain       30 y.o. female with H/O cervical spine stenosis , S/p C3, C4, C5, C6, and C7 laminectomy and C3-T1 fusion by Dr. Suzy Christianson in Nov 2019, post op CSF leak which was repaired 10 days later . Upper extremity symptoms has improved after surgery. Taking Gabapentin 300 mg ( takes only prn). Recommended to take regularly. Helping moderately. Neck pain and shoulder blade pain- features of myofacial pain over the paraspinal and trapezius area. Has been evaluated by NSG - recommended CT spine- pending. EMG- no abnormalities. Doing PT.    H/O  THC use +:  Informed our policy of no narcotics if on THC. Trigger point injection- short term relief does not want to repeat. Compound cream- local reaction. Will change to diclofenac gel. Dosing and side effects explained. Muscle relaxant for prn use. Flexeril. Dosing and side effects explained. Was asking about Medical Marijuana can consult an MD who specializes in medical Marijuana. Recommend to complete the work up suggested by Yuri. Counseling done: reg HEP. She has done FCE for disability eval.    F/u prn.       Miki Walter MD

## 2020-10-19 NOTE — PROGRESS NOTES
Do you currently have any of the following:    Fever: No  Headache:  No  Cough: No  Shortness of breath: No  Exposed to anyone with these symptoms: No         Vasquez Herr presents to the [facility] on 10/19/2020. Junior Bailey is complaining of pain in the neck and shoulder . The pain is constant. The pain is described as aching, throbbing and pressure  . Pain is rated on her best day at a 10, on her worst day at a 10, and on average at a 10 on the VAS scale. She took her last dose of neurontin naproxen . Any procedures since your last visit: yes in office injection  Only lasted about a week  Pacemaker or defibrilator: No managed by none. She is not on NSAIDS and is not on anticoagulation medicationBP 124/76   Pulse 74   Temp 95.9 °F (35.5 °C)   Resp 18   Ht 5' 3\" (1.6 m)   Wt 198 lb (89.8 kg)   LMP 05/19/2014   SpO2 98%   BMI 35.07 kg/m²      Patient's last menstrual period was 05/19/2014.

## 2020-10-29 ENCOUNTER — HOSPITAL ENCOUNTER (OUTPATIENT)
Dept: GENERAL RADIOLOGY | Age: 35
Discharge: HOME | End: 2020-10-31
Payer: COMMERCIAL

## 2020-10-29 ENCOUNTER — HOSPITAL ENCOUNTER (OUTPATIENT)
Age: 35
Discharge: HOME | End: 2020-10-31
Payer: COMMERCIAL

## 2020-10-29 ENCOUNTER — OFFICE VISIT (OUTPATIENT)
Dept: NEUROSURGERY | Age: 35
End: 2020-10-29
Payer: COMMERCIAL

## 2020-10-29 VITALS
WEIGHT: 198 LBS | BODY MASS INDEX: 35.08 KG/M2 | HEART RATE: 70 BPM | DIASTOLIC BLOOD PRESSURE: 71 MMHG | SYSTOLIC BLOOD PRESSURE: 111 MMHG | HEIGHT: 63 IN | TEMPERATURE: 97.2 F

## 2020-10-29 PROCEDURE — G8427 DOCREV CUR MEDS BY ELIG CLIN: HCPCS | Performed by: PHYSICIAN ASSISTANT

## 2020-10-29 PROCEDURE — G8417 CALC BMI ABV UP PARAM F/U: HCPCS | Performed by: PHYSICIAN ASSISTANT

## 2020-10-29 PROCEDURE — 99213 OFFICE O/P EST LOW 20 MIN: CPT | Performed by: PHYSICIAN ASSISTANT

## 2020-10-29 PROCEDURE — 1036F TOBACCO NON-USER: CPT | Performed by: PHYSICIAN ASSISTANT

## 2020-10-29 PROCEDURE — 72040 X-RAY EXAM NECK SPINE 2-3 VW: CPT

## 2020-10-29 PROCEDURE — G8484 FLU IMMUNIZE NO ADMIN: HCPCS | Performed by: PHYSICIAN ASSISTANT

## 2020-10-29 NOTE — PROGRESS NOTES
.Post Operative Follow-up    Patient is status post: cervical fusion 12 month ago. C/o severe, progressive neck pain. She c/o bilateral hand numbness and intermittent left arm pain. No improvement with PT. Physical Exam  Alert and Oriented X 3  PERRLA, EOMI  PERERA 5/5  Pain with ROM and palpation of the cervical spine  Wound: C/D/I    A/P: patient is s/p PCF 12 months ago. no improvement with PT.  EMG is normal.  Cervical x-rays are stable. Will order cervical CT.

## 2020-11-04 ENCOUNTER — OFFICE VISIT (OUTPATIENT)
Dept: OBGYN | Age: 35
End: 2020-11-04
Payer: COMMERCIAL

## 2020-11-04 VITALS
SYSTOLIC BLOOD PRESSURE: 116 MMHG | BODY MASS INDEX: 35.96 KG/M2 | HEART RATE: 84 BPM | DIASTOLIC BLOOD PRESSURE: 73 MMHG | WEIGHT: 203 LBS

## 2020-11-04 PROCEDURE — 1036F TOBACCO NON-USER: CPT | Performed by: OBSTETRICS & GYNECOLOGY

## 2020-11-04 PROCEDURE — G8484 FLU IMMUNIZE NO ADMIN: HCPCS | Performed by: OBSTETRICS & GYNECOLOGY

## 2020-11-04 PROCEDURE — G8417 CALC BMI ABV UP PARAM F/U: HCPCS | Performed by: OBSTETRICS & GYNECOLOGY

## 2020-11-04 PROCEDURE — 99212 OFFICE O/P EST SF 10 MIN: CPT | Performed by: OBSTETRICS & GYNECOLOGY

## 2020-11-04 PROCEDURE — G8427 DOCREV CUR MEDS BY ELIG CLIN: HCPCS | Performed by: OBSTETRICS & GYNECOLOGY

## 2020-11-04 PROCEDURE — 99213 OFFICE O/P EST LOW 20 MIN: CPT | Performed by: OBSTETRICS & GYNECOLOGY

## 2020-11-04 NOTE — PROGRESS NOTES
Patient is here today for possible yeast infection. A pelvic exam was performed and a wet prep was taken and sent to the lab STAT.

## 2020-11-04 NOTE — PROGRESS NOTES
Patient is here today for a possible vaginal infection. Long history of recurrent vaginal infections. Basically just itching, no real discharge. Using [place]. On some antibiotics for respiratory, recently used. Pelvic:Really do not see much of a discharge. Wet prep done.       Call tomorrow AM for results    See prn

## 2020-11-10 ENCOUNTER — TELEPHONE (OUTPATIENT)
Dept: NEUROSURGERY | Age: 35
End: 2020-11-10

## 2020-11-10 ENCOUNTER — HOSPITAL ENCOUNTER (OUTPATIENT)
Dept: CT IMAGING | Age: 35
Discharge: HOME | End: 2020-11-12
Payer: COMMERCIAL

## 2020-11-10 PROCEDURE — 72125 CT NECK SPINE W/O DYE: CPT

## 2020-11-19 ENCOUNTER — HOSPITAL ENCOUNTER (OUTPATIENT)
Dept: PHYSICAL THERAPY | Age: 35
Setting detail: THERAPIES SERIES
Discharge: HOME | End: 2020-11-19
Payer: COMMERCIAL

## 2020-11-19 NOTE — PROGRESS NOTES
[address] [facility]                Phone: [phone]   Fax: [phone]    To: ANANTH Ivy       From: Richard Weaverraji      Patient: Boone Amezquita       : 1985  Diagnosis:       Date: 2020  Treatment Diagnosis: s/p cervical laminectomy/fusion C3-T1     Physical Therapy Discharge Note    Total Visits to date:   6 Cancels/No-shows to date:  1 cancellation    Plan of Care/Treatment to date:  [x] Therapeutic Exercise    [x] Modalities:  [x] Therapeutic Activity     [] Ultrasound  [x] Electrical Stimulation  [] Gait Training      [] Cervical Traction   [] Lumbar Traction  [] Neuromuscular Re-education  [x] Cold/hotpack [] Iontophoresis  [x] Instruction in HEP      Other:  [] Manual Therapy       []    [] Aquatic Therapy       []                    ? Significant Findings At Last Visit/Comments:    · pain across neck/upper back decreased to 4/10 with all prolonged activities  · AROM across cervical spine improved to grossly 60% WNL for all ranges  · strength across B UE's grossly 5/5 for all planes   · endurance for all prolonged activities improved to GOOD  · pt I with HEP for home management of condition       Progress towards goals:   pt completed script and was I with HEP for home management of condition       Goal Status:  [] Achieved [x] Partially Achieved  [] Not Achieved     Patient Status: [] Continue per initial plan of Care     [] Patient now discharged     [] Additional visits requested, Please re-certify for additional visits:          Electronically signed by: COSMO Berman     If you have any questions or concerns, please don't hesitate to call.   Thank you for your referral.

## 2020-11-30 ENCOUNTER — TELEPHONE (OUTPATIENT)
Dept: NEUROSURGERY | Age: 35
End: 2020-11-30

## 2020-12-18 ENCOUNTER — TELEPHONE (OUTPATIENT)
Dept: NEUROSURGERY | Age: 35
End: 2020-12-18

## 2020-12-18 NOTE — TELEPHONE ENCOUNTER
Pt called states that she never received a call back from you. I let her no you left a messaged on 12/1. She would like you to call her back with CT results and has others concerns.  [address] [facility]

## 2021-01-13 ENCOUNTER — APPOINTMENT (OUTPATIENT)
Dept: GENERAL RADIOLOGY | Age: 36
End: 2021-01-13
Payer: COMMERCIAL

## 2021-01-13 ENCOUNTER — HOSPITAL ENCOUNTER (EMERGENCY)
Age: 36
Discharge: HOME | End: 2021-01-13
Attending: EMERGENCY MEDICINE
Payer: COMMERCIAL

## 2021-01-13 VITALS
TEMPERATURE: 97 F | HEART RATE: 86 BPM | SYSTOLIC BLOOD PRESSURE: 107 MMHG | DIASTOLIC BLOOD PRESSURE: 75 MMHG | OXYGEN SATURATION: 98 % | RESPIRATION RATE: 16 BRPM

## 2021-01-13 DIAGNOSIS — R07.9 CHEST PAIN, UNSPECIFIED TYPE: ICD-10-CM

## 2021-01-13 DIAGNOSIS — R09.81 NASAL CONGESTION: ICD-10-CM

## 2021-01-13 DIAGNOSIS — J02.9 ACUTE PHARYNGITIS, UNSPECIFIED ETIOLOGY: Primary | ICD-10-CM

## 2021-01-13 DIAGNOSIS — Z20.822 ENCOUNTER FOR LABORATORY TESTING FOR COVID-19 VIRUS: ICD-10-CM

## 2021-01-13 LAB
ALBUMIN SERPL-MCNC: 4 G/DL (ref 3.5–5.2)
ALP BLD-CCNC: 112 U/L (ref 35–104)
ALT SERPL-CCNC: 13 U/L (ref 0–32)
ANION GAP SERPL CALCULATED.3IONS-SCNC: 8 MMOL/L (ref 7–16)
AST SERPL-CCNC: 21 U/L (ref 0–31)
BASOPHILS ABSOLUTE: 0.04 E9/L (ref 0–0.2)
BASOPHILS RELATIVE PERCENT: 0.5 % (ref 0–2)
BILIRUB SERPL-MCNC: 0.3 MG/DL (ref 0–1.2)
BUN BLDV-MCNC: 10 MG/DL (ref 6–20)
CALCIUM SERPL-MCNC: 9.1 MG/DL (ref 8.6–10.2)
CHLORIDE BLD-SCNC: 105 MMOL/L (ref 98–107)
CO2: 27 MMOL/L (ref 22–29)
CREAT SERPL-MCNC: 0.8 MG/DL (ref 0.5–1)
EKG ATRIAL RATE: 59 BPM
EKG P AXIS: -6 DEGREES
EKG P-R INTERVAL: 154 MS
EKG Q-T INTERVAL: 380 MS
EKG QRS DURATION: 84 MS
EKG QTC CALCULATION (BAZETT): 376 MS
EKG R AXIS: 19 DEGREES
EKG T AXIS: 2 DEGREES
EKG VENTRICULAR RATE: 59 BPM
EOSINOPHILS ABSOLUTE: 0.35 E9/L (ref 0.05–0.5)
EOSINOPHILS RELATIVE PERCENT: 4.2 % (ref 0–6)
GFR AFRICAN AMERICAN: >60
GFR NON-AFRICAN AMERICAN: >60 ML/MIN/1.73
GLUCOSE BLD-MCNC: 85 MG/DL (ref 74–99)
HCT VFR BLD CALC: 39.1 % (ref 34–48)
HEMOGLOBIN: 12 G/DL (ref 11.5–15.5)
IMMATURE GRANULOCYTES #: 0.03 E9/L
IMMATURE GRANULOCYTES %: 0.4 % (ref 0–5)
LYMPHOCYTES ABSOLUTE: 2.43 E9/L (ref 1.5–4)
LYMPHOCYTES RELATIVE PERCENT: 29.3 % (ref 20–42)
MCH RBC QN AUTO: 29.1 PG (ref 26–35)
MCHC RBC AUTO-ENTMCNC: 30.7 % (ref 32–34.5)
MCV RBC AUTO: 94.7 FL (ref 80–99.9)
MONOCYTES ABSOLUTE: 0.72 E9/L (ref 0.1–0.95)
MONOCYTES RELATIVE PERCENT: 8.7 % (ref 2–12)
NEUTROPHILS ABSOLUTE: 4.73 E9/L (ref 1.8–7.3)
NEUTROPHILS RELATIVE PERCENT: 56.9 % (ref 43–80)
PDW BLD-RTO: 13.3 FL (ref 11.5–15)
PLATELET # BLD: 368 E9/L (ref 130–450)
PMV BLD AUTO: 10.3 FL (ref 7–12)
POTASSIUM REFLEX MAGNESIUM: 4.2 MMOL/L (ref 3.5–5)
RBC # BLD: 4.13 E12/L (ref 3.5–5.5)
REASON FOR REJECTION: NORMAL
REJECTED TEST: NORMAL
SODIUM BLD-SCNC: 140 MMOL/L (ref 132–146)
STREP GRP A PCR: NEGATIVE
TOTAL PROTEIN: 7.3 G/DL (ref 6.4–8.3)
TROPONIN: <0.01 NG/ML (ref 0–0.03)
WBC # BLD: 8.3 E9/L (ref 4.5–11.5)

## 2021-01-13 PROCEDURE — 93010 ELECTROCARDIOGRAM REPORT: CPT | Performed by: INTERNAL MEDICINE

## 2021-01-13 PROCEDURE — 71046 X-RAY EXAM CHEST 2 VIEWS: CPT

## 2021-01-13 PROCEDURE — 93005 ELECTROCARDIOGRAM TRACING: CPT | Performed by: NURSE PRACTITIONER

## 2021-01-13 PROCEDURE — 99283 EMERGENCY DEPT VISIT LOW MDM: CPT

## 2021-01-13 PROCEDURE — 80053 COMPREHEN METABOLIC PANEL: CPT

## 2021-01-13 PROCEDURE — 87880 STREP A ASSAY W/OPTIC: CPT

## 2021-01-13 PROCEDURE — 36415 COLL VENOUS BLD VENIPUNCTURE: CPT

## 2021-01-13 PROCEDURE — 84484 ASSAY OF TROPONIN QUANT: CPT

## 2021-01-13 PROCEDURE — 85025 COMPLETE CBC W/AUTO DIFF WBC: CPT

## 2021-01-13 PROCEDURE — U0003 INFECTIOUS AGENT DETECTION BY NUCLEIC ACID (DNA OR RNA); SEVERE ACUTE RESPIRATORY SYNDROME CORONAVIRUS 2 (SARS-COV-2) (CORONAVIRUS DISEASE [COVID-19]), AMPLIFIED PROBE TECHNIQUE, MAKING USE OF HIGH THROUGHPUT TECHNOLOGIES AS DESCRIBED BY CMS-2020-01-R: HCPCS

## 2021-01-13 RX ORDER — FLUTICASONE PROPIONATE 50 MCG
2 SPRAY, SUSPENSION (ML) NASAL DAILY
Qty: 1 BOTTLE | Refills: 0 | Status: SHIPPED | OUTPATIENT
Start: 2021-01-13 | End: 2021-01-18

## 2021-01-13 NOTE — ED PROVIDER NOTES
ED Attending  CC: Nicole Pedroza  Department of Emergency Medicine   ED  Encounter Note  Admit Date/RoomTime: 2021  9:12 AM  ED Room:     NAME: Feliberto Barton  : 1985  MRN: [id number]     Chief Complaint:  Pharyngitis, Hemoptysis, and Chest Pain    History of Present Illness          Feliberto Barton is a 37 y.o. old female who presents to the emergency department by private vehicle, with gradual onset substernal discomfort described as aching beginning 2 week(s) prior to arrival.  The pain does not  radiate to neck, back or abdomen. Duration of symptoms: to the present time. Symptom(s) now is moderate. Her symptoms are associated with cough and sore throat. The symptoms are worsened by nothing and relieved by nothing. There has been No shortness of breath, No dyspnea on exertion, No orthopnea, No paroxysmal nocturnal dyspnea, No edema, No palpitations, No syncope and Positive for chest pain associated with complaint. She takes no blood thinning agents. Her cardiac risk factors are obesity (BMI >= 30 kg/m2) and smoking/ tobacco exposure. Care prior to arrival consisted of nothing, with no relief. Patient takes doxycycline twice a day by infectious disease due to hardware from cervical surgery. She denies any fevers, chills, loss of taste or smell, shortness of breath, abdominal pain, lightheadedness or dizziness. Patient states she had increased postnasal drainage that when she coughs that up has small amount of what looks like blood. HISTORY:0  EC  AGE:2  RISK FACTORS:1 (smoking and obesity)   TROPONIN: 0    TOTAL SCORE: 1    ROS   Pertinent positives and negatives are stated within HPI, all other systems reviewed and are negative.     Past Medical History:  has a past medical history of Anxiety, Asthma, DDD (degenerative disc disease), cervical, Depression, Ectopic pregnancy, tubal, Gall stone, Heart murmur, Hernia, History of blood transfusion, and Postoperative anemia. Surgical History:  has a past surgical history that includes  section; laparoscopy (2012); Ectopic pregnancy surgery; Tubal ligation; laparoscopy ([id number]); other surgical history (3/25/2014); Hysterectomy, total abdominal (14); Abdominal wall surgery ([id number]); Cholecystectomy; Hysterectomy (N/A, 2017); hernia repair; cervical fusion (N/A, 2019); and cervical fusion (N/A, 2019). Social History:  reports that she quit smoking about 4 years ago. Her smoking use included cigarettes and cigars. She has never used smokeless tobacco. She reports current alcohol use. She reports previous drug use. Family History: family history includes Cancer in her mother; Diabetes in her mother; High Blood Pressure in her mother; No Known Problems in her brother, father, and sister. Allergies: Latex, Bactrim [sulfamethoxazole-trimethoprim], Sulfamethoxazole-trimethoprim, Fish-derived products, Ibuprofen, Iodine, Naproxen, and Percocet [oxycodone-acetaminophen]    Physical Exam   Oxygen Saturation Interpretation: Normal.        ED Triage Vitals   BP Temp Temp src Pulse Resp SpO2 Height Weight   21 0912 21 0910 -- 21 0910 21 0910 21 0910 -- --   107/75 97 °F (36.1 °C)  86 16 98 %           General Appearance/Constitutional:  Alert, development consistent with age, NAD. HEENT:  NC/NT. PERRLA. Airway patent. 2+ tonsillar hypertrophy with moderate erythema bilateral.  No exudates. Nasal drainage noted  Neck:  Normal ROM. Supple. Anterior cervical lymph nodes tenderness. Respiratory:  Clear to auscultation and breath sounds equal.  CV:  Regular rate and rhythm, normal heart sounds. Chest:  Symmetrical without visible rash or tenderness. GI:  Abdomen Soft, nontender, good bowel sounds. No firm or pulsatile mass. Back:  No costovertebral tenderness.   No paraspinal or midline tenderness  Extremities: No tenderness or edema noted. Lymphatics: no lymphadenopathy noted  Integument:  Normal turgor. Warm, dry, without visible rash, unless noted elsewhere. Neurological:  Oriented. Motor functions intact.    Psychiatric:  Affect normal.    Lab / Imaging Results   (All laboratory and radiology results have been personally reviewed by myself)  Labs:  Results for orders placed or performed during the hospital encounter of 01/13/21   Strep Screen Group A Throat    Specimen: Throat   Result Value Ref Range    Strep Grp A PCR Negative Negative   CBC Auto Differential   Result Value Ref Range    WBC 8.3 4.5 - 11.5 E9/L    RBC 4.13 3.50 - 5.50 E12/L    Hemoglobin 12.0 11.5 - 15.5 g/dL    Hematocrit 39.1 34.0 - 48.0 %    MCV 94.7 80.0 - 99.9 fL    MCH 29.1 26.0 - 35.0 pg    MCHC 30.7 (L) 32.0 - 34.5 %    RDW 13.3 11.5 - 15.0 fL    Platelets 893 485 - 751 E9/L    MPV 10.3 7.0 - 12.0 fL    Neutrophils % 56.9 43.0 - 80.0 %    Immature Granulocytes % 0.4 0.0 - 5.0 %    Lymphocytes % 29.3 20.0 - 42.0 %    Monocytes % 8.7 2.0 - 12.0 %    Eosinophils % 4.2 0.0 - 6.0 %    Basophils % 0.5 0.0 - 2.0 %    Neutrophils Absolute 4.73 1.80 - 7.30 E9/L    Immature Granulocytes # 0.03 E9/L    Lymphocytes Absolute 2.43 1.50 - 4.00 E9/L    Monocytes Absolute 0.72 0.10 - 0.95 E9/L    Eosinophils Absolute 0.35 0.05 - 0.50 E9/L    Basophils Absolute 0.04 0.00 - 0.20 E9/L   Comprehensive Metabolic Panel w/ Reflex to MG   Result Value Ref Range    Sodium 140 132 - 146 mmol/L    Potassium reflex Magnesium 4.2 3.5 - 5.0 mmol/L    Chloride 105 98 - 107 mmol/L    CO2 27 22 - 29 mmol/L    Anion Gap 8 7 - 16 mmol/L    Glucose 85 74 - 99 mg/dL    BUN 10 6 - 20 mg/dL    CREATININE 0.8 0.5 - 1.0 mg/dL    GFR Non-African American >60 >=60 mL/min/1.73    GFR African American >60     Calcium 9.1 8.6 - 10.2 mg/dL    Total Protein 7.3 6.4 - 8.3 g/dL    Alb 4.0 3.5 - 5.2 g/dL    Total Bilirubin 0.3 0.0 - 1.2 mg/dL    Alkaline Phosphatase 112 (H) 35 - 104 U/L    ALT 13 0 - 32 U/L    AST 21 0 - 31 U/L   Troponin   Result Value Ref Range    Troponin <0.01 0.00 - 0.03 ng/mL   SPECIMEN REJECTION   Result Value Ref Range    Rejected Test CMPX, TROP     Reason for Rejection see below    Covid-19 Ambulatory   Result Value Ref Range    Source OP swab    EKG 12 Lead   Result Value Ref Range    Ventricular Rate 59 BPM    Atrial Rate 59 BPM    P-R Interval 154 ms    QRS Duration 84 ms    Q-T Interval 380 ms    QTc Calculation (Bazett) 376 ms    P Axis -6 degrees    R Axis 19 degrees    T Axis 2 degrees       Imaging: All Radiology results interpreted by Radiologist unless otherwise noted. XR CHEST (2 VW)   Final Result   No acute process. EKG #1:  Interpreted by emergency department physician unless otherwise noted. Time:  1001    Rate: 59 bpm  Rhythm: Sinus oseas rhythm. Interpretation: Sinus bradycardia with sinus arrhythmia. No STEMI. ED Course / Medical Decision Making   Medications - No data to display     Re-Evaluations:  1/13/21      Time: 1225- Dr. Tl Davila at bedside reviewing results with patient     1240-reevaluated patient. She has no complaints at this time. Discussed appropriate follow-up care. Discussed with patient COVID-19 tests are pending at this time and to self quarantine from 10 days and the onset of symptoms, no fever 24 hours without using fever reducing medication overall improvement of symptoms. Patient discharged home with Flonase due to nasal congestion. Discussed appropriate use and potential side effects. Patient states verbal understanding. Patients condition is improving. Consultations:             None    Procedures:   none    MDM:  Patient presents to the ED for chest pain sore throat and nasal congestion. Differential diagnoses included but not limited to pneumonia versus COVID-19 versus strep versus viral illness workup in the ED revealed CMP was unremarkable. Troponin was less than 0.01. EKG was sinus bradycardia.   COVID-19 test is pending at this time. CBC was unremarkable. Strep was negative. Chest x-ray revealed no acute processes. Heart score was at 1. She was not short of breath or hypoxic. She was not tachycardic. She states the postnasal drip makes her cough and she coughs up drainage. She is afebrile. Patient continues to be non-toxic on re-evaluation. Findings were discussed with the patient and reasons to immediately return to the ED were articulated to them. They will follow-up with their PMD.  Patient was given Flonase for nasal congestion. Plan of Care/Counseling:  I reviewed today's visit with the patient in addition to providing specific details for the plan of care and counseling regarding the diagnosis and prognosis. Questions are answered at this time and are agreeable with the plan. Assessment      1. Acute pharyngitis, unspecified etiology    2. Chest pain, unspecified type    3. Encounter for laboratory testing for COVID-19 virus    4. Nasal congestion      This patient's ED course included: a personal history and physicial examination, re-evaluation prior to disposition, multiple bedside re-evaluations, cardiac monitoring and continuous pulse oximetry  This patient has remained hemodynamically stable during their ED course. Plan   Discharged home. Patient condition is stable. New Medications     Discharge Medication List as of 1/13/2021 12:32 PM      START taking these medications    Details   fluticasone (FLONASE) 50 MCG/ACT nasal spray 2 sprays by Nasal route daily for 5 days, Disp-1 Bottle, R-0Print           Electronically signed by CHRIS Sinha CNP   DD: 1/13/21  **This report was transcribed using voice recognition software. Every effort was made to ensure accuracy; however, inadvertent computerized transcription errors may be present.   END OF PROVIDER NOTE       CHRIS Sinha CNP  01/13/21 5539

## 2021-01-14 ENCOUNTER — CARE COORDINATION (OUTPATIENT)
Dept: CARE COORDINATION | Age: 36
End: 2021-01-14

## 2021-01-14 LAB — SARS-COV-2, PCR: NOT DETECTED

## 2021-01-14 NOTE — CARE COORDINATION
Patient contacted regarding Meño Hooper. Discussed COVID-19 related testing which was pending at this time. Test results were pending. Patient informed of results, if available? No    Care Transition Nurse/ Ambulatory Care Manager contacted the patient by telephone to perform post discharge assessment. Call within 2 business days of discharge: Yes. Verified name and  with patient as identifiers. Provided introduction to self, and explanation of the CTN/ACM role, and reason for call due to risk factors for infection and/or exposure to COVID-19. Symptoms reviewed with patient who verbalized the following symptoms: no new symptoms, no worsening symptoms and sore throat, chest discomfort. Due to no new or worsening symptoms encounter was not routed to provider for escalation. Discussed follow-up appointments. If no appointment was previously scheduled, appointment scheduling offered: Yes  [facility] follow up appointment(s):   Future Appointments   Date Time Provider Suzi Santos   2021  1:15 PM Everardo Muñoz APRN - CNP [facility]     [facility] follow up appointment(s): Reports that she will contact her PCP in the next day or 2 to schedule f/u appt. Non-face-to-face services provided:  Obtained and reviewed discharge summary and/or continuity of care documents, educated pt on self-isolation precautions, rest, fluid intake, Tylenol or Ibuprofen for any fevers, headaches, or body aches, and worsening s/sx to monitor for. Advance Care Planning:   Does patient have an Advance Directive:  decision maker updated. Patient has following risk factors of: asthma. CTN/ACM reviewed discharge instructions, medical action plan and red flags such as increased shortness of breath, increasing fever and signs of decompensation with patient who verbalized understanding. Discussed exposure protocols and quarantine with CDC Guidelines What to do if you are sick with coronavirus disease 2019.  Patient was given an opportunity for questions and concerns. The patient agrees to contact the [facility] exposure line [phone], local [facility] department [place] [facility]: ([phone]) and PCP office for questions related to their healthcare. CTN/ACM provided contact information for future needs. Reviewed and educated patient on any new and changed medications related to discharge diagnosis, reports that she picked up the medication that was prescribed in ED. Patient/family/caregiver given information for [employer] and agrees to enroll yes  Patient's preferred e-mail: N/A   Patient's preferred phone number: [phone]  Based on Loop alert triggers, patient will be contacted by nurse care manager for worsening symptoms. Pt will be further monitored by COVID Loop Team based on severity of symptoms and risk factors.

## 2021-03-01 ENCOUNTER — HOSPITAL ENCOUNTER (EMERGENCY)
Age: 36
Discharge: HOME | End: 2021-03-01
Attending: EMERGENCY MEDICINE
Payer: COMMERCIAL

## 2021-03-01 ENCOUNTER — APPOINTMENT (OUTPATIENT)
Dept: CT IMAGING | Age: 36
End: 2021-03-01
Payer: COMMERCIAL

## 2021-03-01 VITALS
RESPIRATION RATE: 16 BRPM | WEIGHT: 198 LBS | TEMPERATURE: 98.1 F | HEART RATE: 62 BPM | HEIGHT: 63 IN | DIASTOLIC BLOOD PRESSURE: 66 MMHG | SYSTOLIC BLOOD PRESSURE: 120 MMHG | OXYGEN SATURATION: 100 % | BODY MASS INDEX: 35.08 KG/M2
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
## 2021-03-01 DIAGNOSIS — S16.1XXA ACUTE STRAIN OF NECK MUSCLE, INITIAL ENCOUNTER: ICD-10-CM

## 2021-03-01 PROCEDURE — 72125 CT NECK SPINE W/O DYE: CPT

## 2021-03-01 PROCEDURE — 99283 EMERGENCY DEPT VISIT LOW MDM: CPT

## 2021-03-01 PROCEDURE — 6370000000 HC RX 637 (ALT 250 FOR IP): Performed by: EMERGENCY MEDICINE

## 2021-03-01 RX ORDER — DIAZEPAM 5 MG/1
10 TABLET ORAL EVERY 8 HOURS PRN
Qty: 15 TABLET | Refills: 0 | Status: SHIPPED | OUTPATIENT
Start: 2021-03-01 | End: 2021-03-04

## 2021-03-01 RX ORDER — ACETAMINOPHEN 325 MG/1
650 TABLET ORAL ONCE
Status: COMPLETED | OUTPATIENT
Start: 2021-03-01 | End: 2021-03-01

## 2021-03-01 RX ADMIN — ACETAMINOPHEN 650 MG: 325 TABLET ORAL at 09:13

## 2021-03-01 ASSESSMENT — PAIN DESCRIPTION - PAIN TYPE: TYPE: ACUTE PAIN
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
## 2021-03-08 ENCOUNTER — HOSPITAL ENCOUNTER (OUTPATIENT)
Age: 36
Discharge: HOME | End: 2021-03-08
Payer: COMMERCIAL

## 2021-03-08 LAB
C-REACTIVE PROTEIN: 0.3 MG/DL (ref 0–0.4)
SEDIMENTATION RATE, ERYTHROCYTE: 12 MM/HR (ref 0–20)

## 2021-03-08 PROCEDURE — 85651 RBC SED RATE NONAUTOMATED: CPT

## 2021-03-08 PROCEDURE — 86140 C-REACTIVE PROTEIN: CPT

## 2021-03-08 PROCEDURE — 36415 COLL VENOUS BLD VENIPUNCTURE: CPT

## 2021-04-13 ENCOUNTER — TELEPHONE (OUTPATIENT)
Dept: NEUROSURGERY | Age: 36
End: 2021-04-13

## 2021-04-13 RX ORDER — METHYLPREDNISOLONE 4 MG/1
TABLET ORAL
Qty: 1 KIT | Refills: 0 | Status: SHIPPED | OUTPATIENT
Start: 2021-04-13 | End: 2021-04-19

## 2021-04-15 ENCOUNTER — HOSPITAL ENCOUNTER (EMERGENCY)
Age: 36
Discharge: HOME | End: 2021-04-16
Attending: EMERGENCY MEDICINE
Payer: COMMERCIAL

## 2021-04-15 DIAGNOSIS — R10.30 LOWER ABDOMINAL PAIN: Primary | ICD-10-CM

## 2021-04-15 PROCEDURE — 99284 EMERGENCY DEPT VISIT MOD MDM: CPT

## 2021-04-15 ASSESSMENT — PAIN DESCRIPTION - ORIENTATION: ORIENTATION: LOWER

## 2021-04-15 ASSESSMENT — PAIN DESCRIPTION - LOCATION: LOCATION: ABDOMEN

## 2021-04-15 ASSESSMENT — PAIN DESCRIPTION - FREQUENCY: FREQUENCY: CONTINUOUS

## 2021-04-16 VITALS
SYSTOLIC BLOOD PRESSURE: 122 MMHG | TEMPERATURE: 97.1 F | DIASTOLIC BLOOD PRESSURE: 75 MMHG | RESPIRATION RATE: 15 BRPM | OXYGEN SATURATION: 99 % | HEART RATE: 62 BPM | HEIGHT: 63 IN | WEIGHT: 185 LBS | BODY MASS INDEX: 32.78 KG/M2

## 2021-04-16 LAB
ALBUMIN SERPL-MCNC: 4.2 G/DL (ref 3.5–5.2)
ALP BLD-CCNC: 100 U/L (ref 35–104)
ALT SERPL-CCNC: 15 U/L (ref 0–32)
ANION GAP SERPL CALCULATED.3IONS-SCNC: 9 MMOL/L (ref 7–16)
AST SERPL-CCNC: 23 U/L (ref 0–31)
BASOPHILS ABSOLUTE: 0.02 E9/L (ref 0–0.2)
BASOPHILS RELATIVE PERCENT: 0.2 % (ref 0–2)
BILIRUB SERPL-MCNC: <0.2 MG/DL (ref 0–1.2)
BILIRUBIN URINE: NEGATIVE
BLOOD, URINE: NEGATIVE
BUN BLDV-MCNC: 16 MG/DL (ref 6–20)
CALCIUM SERPL-MCNC: 9.5 MG/DL (ref 8.6–10.2)
CHLORIDE BLD-SCNC: 101 MMOL/L (ref 98–107)
CLARITY: CLEAR
CLUE CELLS: NORMAL
CO2: 27 MMOL/L (ref 22–29)
COLOR: YELLOW
CREAT SERPL-MCNC: 0.8 MG/DL (ref 0.5–1)
EOSINOPHILS ABSOLUTE: 0.03 E9/L (ref 0.05–0.5)
EOSINOPHILS RELATIVE PERCENT: 0.3 % (ref 0–6)
GFR AFRICAN AMERICAN: >60
GFR NON-AFRICAN AMERICAN: >60 ML/MIN/1.73
GLUCOSE BLD-MCNC: 133 MG/DL (ref 74–99)
GLUCOSE URINE: NEGATIVE MG/DL
HCG, URINE, POC: NEGATIVE
HCT VFR BLD CALC: 36.5 % (ref 34–48)
HEMOGLOBIN: 11.7 G/DL (ref 11.5–15.5)
IMMATURE GRANULOCYTES #: 0.03 E9/L
IMMATURE GRANULOCYTES %: 0.3 % (ref 0–5)
KETONES, URINE: NEGATIVE MG/DL
LEUKOCYTE ESTERASE, URINE: NEGATIVE
LIPASE: 25 U/L (ref 13–60)
LYMPHOCYTES ABSOLUTE: 1.94 E9/L (ref 1.5–4)
LYMPHOCYTES RELATIVE PERCENT: 18.5 % (ref 20–42)
Lab: NORMAL
MCH RBC QN AUTO: 29.9 PG (ref 26–35)
MCHC RBC AUTO-ENTMCNC: 32.1 % (ref 32–34.5)
MCV RBC AUTO: 93.4 FL (ref 80–99.9)
MONOCYTES ABSOLUTE: 0.52 E9/L (ref 0.1–0.95)
MONOCYTES RELATIVE PERCENT: 5 % (ref 2–12)
NEGATIVE QC PASS/FAIL: NORMAL
NEUTROPHILS ABSOLUTE: 7.94 E9/L (ref 1.8–7.3)
NEUTROPHILS RELATIVE PERCENT: 75.7 % (ref 43–80)
NITRITE, URINE: NEGATIVE
PDW BLD-RTO: 14.1 FL (ref 11.5–15)
PH UA: 5.5 (ref 5–9)
PLATELET # BLD: 300 E9/L (ref 130–450)
PMV BLD AUTO: 10.4 FL (ref 7–12)
POSITIVE QC PASS/FAIL: NORMAL
POTASSIUM REFLEX MAGNESIUM: 4 MMOL/L (ref 3.5–5)
PROTEIN UA: NEGATIVE MG/DL
RBC # BLD: 3.91 E12/L (ref 3.5–5.5)
SODIUM BLD-SCNC: 137 MMOL/L (ref 132–146)
SOURCE WET PREP: NORMAL
SPECIFIC GRAVITY UA: >=1.03 (ref 1–1.03)
TOTAL PROTEIN: 8 G/DL (ref 6.4–8.3)
TRICHOMONAS PREP: NORMAL
UROBILINOGEN, URINE: 0.2 E.U./DL
WBC # BLD: 10.5 E9/L (ref 4.5–11.5)
YEAST WET PREP: NORMAL

## 2021-04-16 PROCEDURE — 85025 COMPLETE CBC W/AUTO DIFF WBC: CPT

## 2021-04-16 PROCEDURE — 6370000000 HC RX 637 (ALT 250 FOR IP): Performed by: EMERGENCY MEDICINE

## 2021-04-16 PROCEDURE — 81003 URINALYSIS AUTO W/O SCOPE: CPT

## 2021-04-16 PROCEDURE — 87491 CHLMYD TRACH DNA AMP PROBE: CPT

## 2021-04-16 PROCEDURE — 87210 SMEAR WET MOUNT SALINE/INK: CPT

## 2021-04-16 PROCEDURE — 87591 N.GONORRHOEAE DNA AMP PROB: CPT

## 2021-04-16 PROCEDURE — 80053 COMPREHEN METABOLIC PANEL: CPT

## 2021-04-16 PROCEDURE — 83690 ASSAY OF LIPASE: CPT

## 2021-04-16 PROCEDURE — 87088 URINE BACTERIA CULTURE: CPT

## 2021-04-16 RX ORDER — FLUCONAZOLE 100 MG/1
200 TABLET ORAL ONCE
Status: COMPLETED | OUTPATIENT
Start: 2021-04-16 | End: 2021-04-16

## 2021-04-16 RX ADMIN — FLUCONAZOLE 200 MG: 100 TABLET ORAL at 03:17

## 2021-04-16 NOTE — ED PROVIDER NOTES
HPI:  4/15/21,   Time: 11:45 PM EDT       Leisa Carmen is a 37 y.o. female presenting to the ED for abdominal pain, beginning 1 week ago. The complaint has been persistent, moderate in severity, and worsened by nothing. The patient states over the last week she has been having cramping pain in her lower abdomen. She states it is clear across her lower abdomen but worse in the suprapubic region. She otherwise has no other acute symptoms. Denies any nausea vomiting diarrhea. No fevers or chills. No chest pain shortness of breath. Denies urinary symptoms. She does states she has a white discharge. No vaginal bleeding. Review of Systems:   Pertinent positives and negatives are stated within HPI, all other systems reviewed and are negative.          --------------------------------------------- PAST HISTORY ---------------------------------------------  Past Medical History:  has a past medical history of Anxiety, Asthma, DDD (degenerative disc disease), cervical, Depression, Ectopic pregnancy, tubal, Gall stone, Heart murmur, Hernia, History of blood transfusion, and Postoperative anemia. Past Surgical History:  has a past surgical history that includes  section; laparoscopy (2012); Ectopic pregnancy surgery; Tubal ligation; laparoscopy ([id number]); other surgical history (3/25/2014); Hysterectomy, total abdominal (14); Abdominal wall surgery ([id number]); Cholecystectomy; Hysterectomy (N/A, 2017); hernia repair; cervical fusion (N/A, 2019); and cervical fusion (N/A, 2019). Social History:  reports that she quit smoking about 4 years ago. Her smoking use included cigarettes and cigars. She has never used smokeless tobacco. She reports previous alcohol use. She reports current drug use. Drug: Marijuana.     Family History: family history includes Cancer in her mother; Diabetes in her mother; High Blood Pressure in her mother; No Known Problems in her brother, father, and sister. The patients home medications have been reviewed. Allergies: Latex, Bactrim [sulfamethoxazole-trimethoprim], Sulfamethoxazole-trimethoprim, Fish-derived products, Ibuprofen, Iodine, Naproxen, and Percocet [oxycodone-acetaminophen]        ---------------------------------------------------PHYSICAL EXAM--------------------------------------    Constitutional/General: Alert and oriented x3, well appearing, non toxic in NAD  Head: Normocephalic and atraumatic  Eyes: PERRL, EOMI, conjunctive normal, sclera non icteric  Mouth: Oropharynx clear, handling secretions, no trismus, no asymmetry of the posterior oropharynx or uvular edema  Neck: Supple, full ROM, non tender to palpation in the midline, no stridor, no crepitus, no meningeal signs  Respiratory: Lungs clear to auscultation bilaterally, no wheezes, rales, or rhonchi. Not in respiratory distress  Cardiovascular:  Regular rate. Regular rhythm. No murmurs, gallops, or rubs. 2+ distal pulses  GI:  Abdomen Soft, Non tender, Non distended. +BS. No organomegaly, no palpable masses,  No rebound, guarding, or rigidity. Pelvic: Chaperoned by RN, no adnexal tenderness. No cervical motion tenderness. White discharge noted. External exam within normal limits. No masses or bleeding. Musculoskeletal: Moves all extremities x 4. Warm and well perfused, no clubbing, cyanosis, or edema. Capillary refill <3 seconds  Integument: skin warm and dry. No rashes. Neurologic: GCS 15, no focal deficits, symmetric strength 5/5 in the upper and lower extremities bilaterally  Psychiatric: Normal Affect    -------------------------------------------------- RESULTS -------------------------------------------------  I have personally reviewed all laboratory and imaging results for this patient. Results are listed below.      LABS:  Results for orders placed or performed during the hospital encounter of 04/15/21   C.trachomatis N.gonorrhoeae DNA    Specimen: Cervix   Result Value Ref Range    Source Endocervix    Wet prep, genital    Specimen: Vaginal   Result Value Ref Range    Trichomonas Prep None Seen     Yeast, Wet Prep None Seen     Clue Cells, Wet Prep None Seen     Source Wet Prep VAGINAL    CBC Auto Differential   Result Value Ref Range    WBC 10.5 4.5 - 11.5 E9/L    RBC 3.91 3.50 - 5.50 E12/L    Hemoglobin 11.7 11.5 - 15.5 g/dL    Hematocrit 36.5 34.0 - 48.0 %    MCV 93.4 80.0 - 99.9 fL    MCH 29.9 26.0 - 35.0 pg    MCHC 32.1 32.0 - 34.5 %    RDW 14.1 11.5 - 15.0 fL    Platelets 336 855 - 625 E9/L    MPV 10.4 7.0 - 12.0 fL    Neutrophils % 75.7 43.0 - 80.0 %    Immature Granulocytes % 0.3 0.0 - 5.0 %    Lymphocytes % 18.5 (L) 20.0 - 42.0 %    Monocytes % 5.0 2.0 - 12.0 %    Eosinophils % 0.3 0.0 - 6.0 %    Basophils % 0.2 0.0 - 2.0 %    Neutrophils Absolute 7.94 (H) 1.80 - 7.30 E9/L    Immature Granulocytes # 0.03 E9/L    Lymphocytes Absolute 1.94 1.50 - 4.00 E9/L    Monocytes Absolute 0.52 0.10 - 0.95 E9/L    Eosinophils Absolute 0.03 (L) 0.05 - 0.50 E9/L    Basophils Absolute 0.02 0.00 - 0.20 E9/L   Comprehensive Metabolic Panel w/ Reflex to MG   Result Value Ref Range    Sodium 137 132 - 146 mmol/L    Potassium reflex Magnesium 4.0 3.5 - 5.0 mmol/L    Chloride 101 98 - 107 mmol/L    CO2 27 22 - 29 mmol/L    Anion Gap 9 7 - 16 mmol/L    Glucose 133 (H) 74 - 99 mg/dL    BUN 16 6 - 20 mg/dL    CREATININE 0.8 0.5 - 1.0 mg/dL    GFR Non-African American >60 >=60 mL/min/1.73    GFR African American >60     Calcium 9.5 8.6 - 10.2 mg/dL    Total Protein 8.0 6.4 - 8.3 g/dL    Albumin 4.2 3.5 - 5.2 g/dL    Total Bilirubin <0.2 0.0 - 1.2 mg/dL    Alkaline Phosphatase 100 35 - 104 U/L    ALT 15 0 - 32 U/L    AST 23 0 - 31 U/L   Lipase   Result Value Ref Range    Lipase 25 13 - 60 U/L   Urinalysis, reflex to microscopic   Result Value Ref Range    Color, UA Yellow Straw/Yellow    Clarity, UA Clear Clear    Glucose, Ur Negative Negative mg/dL    Bilirubin Urine Negative Negative    Ketones, Urine Negative Negative mg/dL    Specific Gravity, UA >=1.030 1.005 - 1.030    Blood, Urine Negative Negative    pH, UA 5.5 5.0 - 9.0    Protein, UA Negative Negative mg/dL    Urobilinogen, Urine 0.2 <2.0 E.U./dL    Nitrite, Urine Negative Negative    Leukocyte Esterase, Urine Negative Negative   POC Pregnancy Urine   Result Value Ref Range    HCG, Urine, POC Negative Negative    Lot Number [id number]     Positive QC Pass/Fail Pass     Negative QC Pass/Fail Pass        RADIOLOGY:  Interpreted by Radiologist.  No orders to display         ------------------------- NURSING NOTES AND VITALS REVIEWED ---------------------------   The nursing notes within the ED encounter and vital signs as below have been reviewed by myself. /75   Pulse 62   Temp 97.1 °F (36.2 °C) (Infrared)   Resp 15   Ht 5' 3\" (1.6 m)   Wt 185 lb (83.9 kg)   LMP 05/19/2014   SpO2 99%   BMI 32.77 kg/m²   Oxygen Saturation Interpretation: Normal    The patients available past medical records and past encounters were reviewed. ------------------------------ ED COURSE/MEDICAL DECISION MAKING----------------------  Medications   fluconazole (DIFLUCAN) tablet 200 mg (200 mg Oral Given 4/16/21 0317)         ED COURSE:       Medical Decision Making: This is a 68-year-old female presented to the ED for abdominal pain. Patient underwent laboratory work-up which showed a normal CBC. Normal chemistry except for glucose of 133. Normal lipase. Urinalysis and pregnancy test unremarkable. Pelvic exam did show white discharge but wet prep was unremarkable. The patient states she has presented this way with yeast infections in the past therefore the patient was given a one-time dose of Diflucan. Abdomen soft and nontender on reevaluation. She appears nontoxic. She will be discharged home with close follow-up. Return precautions given. Patient agrees with plan.     I, Dr. Nish Borden, am the primary provider for this encounter    This patient's ED course included: a personal history and physicial examination, re-evaluation prior to disposition and multiple bedside re-evaluations    This patient has remained hemodynamically stable during their ED course. Re-Evaluations:             Re-evaluation. Patients symptoms are improving      Counseling: The emergency provider has spoken with the patient and discussed todays results, in addition to providing specific details for the plan of care and counseling regarding the diagnosis and prognosis. Questions are answered at this time and they are agreeable with the plan.       --------------------------------- IMPRESSION AND DISPOSITION ---------------------------------    IMPRESSION  1. Lower abdominal pain        DISPOSITION  Disposition: Discharge to home  Patient condition is stable    NOTE: This report was transcribed using voice recognition software.  Every effort was made to ensure accuracy; however, inadvertent computerized transcription errors may be present        Chava Saavedra DO  04/16/21 4729

## 2021-04-18 LAB — URINE CULTURE, ROUTINE: NORMAL

## 2021-04-19 LAB
C TRACH DNA GENITAL QL NAA+PROBE: NEGATIVE
N. GONORRHOEAE DNA: NEGATIVE
SOURCE: NORMAL

## 2021-05-02 ENCOUNTER — HOSPITAL ENCOUNTER (EMERGENCY)
Age: 36
Discharge: HOME | End: 2021-05-02
Payer: COMMERCIAL

## 2021-05-02 VITALS
TEMPERATURE: 97.3 F | BODY MASS INDEX: 33.66 KG/M2 | RESPIRATION RATE: 14 BRPM | DIASTOLIC BLOOD PRESSURE: 56 MMHG | HEIGHT: 63 IN | HEART RATE: 93 BPM | WEIGHT: 190 LBS | OXYGEN SATURATION: 99 % | SYSTOLIC BLOOD PRESSURE: 114 MMHG

## 2021-05-02 DIAGNOSIS — L30.9 DERMATITIS DUE TO UNKNOWN CAUSE: Primary | ICD-10-CM

## 2021-05-02 PROCEDURE — 6370000000 HC RX 637 (ALT 250 FOR IP): Performed by: NURSE PRACTITIONER

## 2021-05-02 PROCEDURE — 99284 EMERGENCY DEPT VISIT MOD MDM: CPT

## 2021-05-02 PROCEDURE — 6360000002 HC RX W HCPCS: Performed by: NURSE PRACTITIONER

## 2021-05-02 PROCEDURE — 96372 THER/PROPH/DIAG INJ SC/IM: CPT

## 2021-05-02 RX ORDER — LORATADINE 10 MG/1
10 TABLET ORAL DAILY
Qty: 10 TABLET | Refills: 0 | Status: SHIPPED | OUTPATIENT
Start: 2021-05-02 | End: 2021-05-12

## 2021-05-02 RX ORDER — METHYLPREDNISOLONE 4 MG/1
TABLET ORAL
Qty: 1 KIT | Status: SHIPPED | OUTPATIENT
Start: 2021-05-02 | End: 2021-05-08

## 2021-05-02 RX ORDER — DEXAMETHASONE SODIUM PHOSPHATE 10 MG/ML
10 INJECTION, SOLUTION INTRAMUSCULAR; INTRAVENOUS ONCE
Status: COMPLETED | OUTPATIENT
Start: 2021-05-02 | End: 2021-05-02

## 2021-05-02 RX ORDER — FAMOTIDINE 20 MG/1
20 TABLET, FILM COATED ORAL ONCE
Status: COMPLETED | OUTPATIENT
Start: 2021-05-02 | End: 2021-05-02

## 2021-05-02 RX ADMIN — FAMOTIDINE 20 MG: 20 TABLET ORAL at 10:38

## 2021-05-02 RX ADMIN — DEXAMETHASONE SODIUM PHOSPHATE 10 MG: 10 INJECTION, SOLUTION INTRAMUSCULAR; INTRAVENOUS at 10:38

## 2021-05-02 NOTE — ED PROVIDER NOTES
[facility]  Department of Emergency Medicine   ED  Encounter Note  Admit Date/RoomTime: 2021  9:44 AM  ED Room: 33    NAME: Miki Avina  : 1985  MRN: [id number]     Chief Complaint:  Rash (pt having itching rash around neck area for over one week. )    History of Present Illness       Miki Avina is a 37 y.o. old female who presents to the emergency department by private vehicle, for gradual onset of red, raised and itchy area on lower chin and neck region  which began 10 day(s) prior to arrival.  Patient reports she usually shaves her neck and lower chin and has not changed any shaving creams, lotions or any new foods or medications. She states she does wear a necklace to bed and sometimes she sweats and does not know if it has nickel and states she took it off. She denies any chest pain, wheezing or shortness of breath. The symptoms were caused by unknown cause. Since onset the symptoms have been persistent and gradually worsening. Prior history of similar episodes: No.   Her symptoms are associated with itching and relieved by nothing applied Benadryl topically and taken Benadryl last night with no resolution of symptoms. .  She denies any hives, welts, difficulty breathing, difficulty swallowing, wheezing, throat tightness, hoarseness, stridor, lightheadedness, dizziness, facial swelling, lip swelling, tongue swelling, fever, chills, chest pain, abd pain, drainage, increased redness or increased swelling. ROS   Pertinent positives and negatives are stated within HPI, all other systems reviewed and are negative. Past Medical History:  has a past medical history of Anxiety, Asthma, DDD (degenerative disc disease), cervical, Depression, Ectopic pregnancy, tubal, Gall stone, Heart murmur, Hernia, History of blood transfusion, and Postoperative anemia.     Surgical History:  has a past surgical history that includes  section; laparoscopy (9/28/2012); Ectopic pregnancy surgery; Tubal ligation; laparoscopy ([id number]); other surgical history (3/25/2014); Hysterectomy, total abdominal (8/20/14); Abdominal wall surgery ([id number]); Cholecystectomy; Hysterectomy (N/A, 12/26/2017); hernia repair; cervical fusion (N/A, 11/1/2019); and cervical fusion (N/A, 11/11/2019). Social History:  reports that she quit smoking about 4 years ago. Her smoking use included cigarettes and cigars. She has never used smokeless tobacco. She reports previous alcohol use. She reports current drug use. Drug: Marijuana. Family History: family history includes Cancer in her mother; Diabetes in her mother; High Blood Pressure in her mother; No Known Problems in her brother, father, and sister. Allergies: Latex, Bactrim [sulfamethoxazole-trimethoprim], Sulfamethoxazole-trimethoprim, Fish-derived products, Ibuprofen, Iodine, Naproxen, and Percocet [oxycodone-acetaminophen]    Physical Exam   Oxygen Saturation Interpretation: Normal.        ED Triage Vitals   BP Temp Temp Source Pulse Resp SpO2 Height Weight   05/02/21 0947 05/02/21 0942 05/02/21 0942 05/02/21 0942 05/02/21 0942 05/02/21 0942 05/02/21 0942 05/02/21 0942   (!) 114/56 97.3 °F (36.3 °C) Temporal 93 14 99 % 5' 3\" (1.6 m) 190 lb (86.2 kg)         Constitutional:  Alert, development consistent with age. HEENT:  NC/NT. Airway patent. Eyes:  PERRL, EOMI, no discharge. Ears:  TMs without perforation, injection, or bulging. External canals clear without exudate. Mouth:  Mucous membranes moist without lesions, tongue and gums normal.  Throat:  Pharynx without injection, exudate, or tonsillar hypertrophy. Airway patient. Neck:  Supple. No lymphadenopathy. Respiratory:  Clear to auscultation and breath sounds equal.  CV:  Regular rate and rhythm. GI:  Abdomen Soft, nontender, +BS. Integument:  Skin turgor: Normal.   Neurological:  Orientation age-appropriate unless noted elseware.   Motor functions intact. Physical Exam  Neck:         Lab / Imaging Results   (All laboratory and radiology results have been personally reviewed by myself)  Labs:  No results found for this visit on 05/02/21. Imaging: All Radiology results interpreted by Radiologist unless otherwise noted. No orders to display       ED Course / Medical Decision Making     Medications   dexamethasone (PF) (DECADRON) injection 10 mg (10 mg Intramuscular Given 5/2/21 1038)   famotidine (PEPCID) tablet 20 mg (20 mg Oral Given 5/2/21 1038)        Consults:   None    Procedures:   none    MDM:   This is a 81-year-old female who presents today with a rash that is ongoing for the past 10 days around the cervical region. She denies any new medications, lotions or foods. She has no airway involved has oxygen saturation of 99%. She denies any sore throat, wheezing, chest pain or shortness of breath. Patient does admit to wearing a necklace to bed and has taken it off and states she will sweat at night. She also shaves the area and has not changed the shaving cream and the area does not look like a folliculitis. She appears to have a contact dermatitis and has tried Benadryl and topical Benadryl. Patient was given a Decadron shot and Pepcid in the ED and did not want Benadryl because it makes her sleepy and has to drive. Will give her a short course of steroids following with Claritin which may cause less drowsiness. Patient advised on signs and symptoms warranting immediate return to the ED for reevaluation. Started on cool compresses to the area and to avoid wearing the necklace. Plan of Care/Counseling:  I reviewed today's visit with the patient in addition to providing specific details for the plan of care and counseling regarding the diagnosis and prognosis. Questions are answered at this time and are agreeable with the plan. Assessment      1. Dermatitis due to unknown cause      Plan   Discharge home.   Patient condition is

## 2021-06-08 DIAGNOSIS — B37.9 MONILIA INFECTION: Primary | ICD-10-CM

## 2021-07-21 ENCOUNTER — OFFICE VISIT (OUTPATIENT)
Dept: OBGYN | Age: 36
End: 2021-07-21
Payer: COMMERCIAL

## 2021-07-21 VITALS
WEIGHT: 192 LBS | SYSTOLIC BLOOD PRESSURE: 134 MMHG | BODY MASS INDEX: 34.02 KG/M2 | HEART RATE: 84 BPM | DIASTOLIC BLOOD PRESSURE: 82 MMHG | HEIGHT: 63 IN

## 2021-07-21 DIAGNOSIS — N89.8 VAGINAL IRRITATION: ICD-10-CM

## 2021-07-21 DIAGNOSIS — B37.9 MONILIA INFECTION: Primary | ICD-10-CM

## 2021-07-21 PROCEDURE — 99213 OFFICE O/P EST LOW 20 MIN: CPT | Performed by: OBSTETRICS & GYNECOLOGY

## 2021-07-21 PROCEDURE — 1036F TOBACCO NON-USER: CPT | Performed by: OBSTETRICS & GYNECOLOGY

## 2021-07-21 PROCEDURE — G8417 CALC BMI ABV UP PARAM F/U: HCPCS | Performed by: OBSTETRICS & GYNECOLOGY

## 2021-07-21 PROCEDURE — G8427 DOCREV CUR MEDS BY ELIG CLIN: HCPCS | Performed by: OBSTETRICS & GYNECOLOGY

## 2021-07-21 RX ORDER — DOXYCYCLINE HYCLATE 100 MG
100 TABLET ORAL 2 TIMES DAILY
COMMUNITY
End: 2021-11-09 | Stop reason: SDUPTHER

## 2021-07-21 NOTE — PROGRESS NOTES
Here for vaginal irritation, used a latex condom but she has been noted to have a possible latex allergy and thinks this may have caused an irritation or yeast infection. May problem is irritation. Some external itching. May be taking Doxycyckine for her Heart every day. She will check on that medication and call use back later today with the proper name for sure. Pelvic:  Vaginally has definitely Monilia. .  This may be from her using an antibiotic daily. Rx: Terazol vaginal at [facility] and I put some refills on that as well.

## 2021-08-19 ENCOUNTER — TELEPHONE (OUTPATIENT)
Dept: NEUROSURGERY | Age: 36
End: 2021-08-19

## 2021-08-19 DIAGNOSIS — Z98.1 S/P CERVICAL SPINAL FUSION: Primary | ICD-10-CM

## 2021-08-19 NOTE — TELEPHONE ENCOUNTER
Patient came in - states increased neck pain that is radiating down her back. She is requesting a new xray, order for [facility], and [facility].  Last seen Oct 2020, does she need office visit first? [phone]

## 2021-09-07 ENCOUNTER — HOSPITAL ENCOUNTER (OUTPATIENT)
Age: 36
Discharge: HOME | End: 2021-09-09
Payer: COMMERCIAL

## 2021-09-07 ENCOUNTER — HOSPITAL ENCOUNTER (OUTPATIENT)
Dept: GENERAL RADIOLOGY | Age: 36
Discharge: HOME | End: 2021-09-09
Payer: COMMERCIAL

## 2021-09-07 DIAGNOSIS — Z98.1 S/P CERVICAL SPINAL FUSION: ICD-10-CM

## 2021-09-07 PROCEDURE — 72040 X-RAY EXAM NECK SPINE 2-3 VW: CPT

## 2021-10-03 ENCOUNTER — APPOINTMENT (OUTPATIENT)
Dept: GENERAL RADIOLOGY | Age: 36
End: 2021-10-03
Payer: COMMERCIAL

## 2021-10-03 VITALS
BODY MASS INDEX: 33.66 KG/M2 | SYSTOLIC BLOOD PRESSURE: 132 MMHG | HEIGHT: 63 IN | OXYGEN SATURATION: 98 % | WEIGHT: 190 LBS | HEART RATE: 69 BPM | DIASTOLIC BLOOD PRESSURE: 61 MMHG | TEMPERATURE: 98 F | RESPIRATION RATE: 20 BRPM

## 2021-10-03 PROCEDURE — 71046 X-RAY EXAM CHEST 2 VIEWS: CPT

## 2021-10-03 PROCEDURE — 80053 COMPREHEN METABOLIC PANEL: CPT

## 2021-10-03 PROCEDURE — 85025 COMPLETE CBC W/AUTO DIFF WBC: CPT

## 2021-10-03 PROCEDURE — U0005 INFEC AGEN DETEC AMPLI PROBE: HCPCS

## 2021-10-03 PROCEDURE — U0003 INFECTIOUS AGENT DETECTION BY NUCLEIC ACID (DNA OR RNA); SEVERE ACUTE RESPIRATORY SYNDROME CORONAVIRUS 2 (SARS-COV-2) (CORONAVIRUS DISEASE [COVID-19]), AMPLIFIED PROBE TECHNIQUE, MAKING USE OF HIGH THROUGHPUT TECHNOLOGIES AS DESCRIBED BY CMS-2020-01-R: HCPCS

## 2021-10-03 PROCEDURE — 99282 EMERGENCY DEPT VISIT SF MDM: CPT

## 2021-10-03 PROCEDURE — 84484 ASSAY OF TROPONIN QUANT: CPT

## 2021-10-03 ASSESSMENT — PAIN DESCRIPTION - DESCRIPTORS: DESCRIPTORS: PRESSURE

## 2021-10-03 ASSESSMENT — PAIN SCALES - GENERAL: PAINLEVEL_OUTOF10: 9

## 2021-10-03 ASSESSMENT — PAIN DESCRIPTION - LOCATION: LOCATION: NECK

## 2021-10-03 ASSESSMENT — PAIN DESCRIPTION - PAIN TYPE: TYPE: ACUTE PAIN

## 2021-10-03 ASSESSMENT — PAIN DESCRIPTION - ONSET: ONSET: ON-GOING

## 2021-10-03 ASSESSMENT — PAIN DESCRIPTION - ORIENTATION: ORIENTATION: ANTERIOR

## 2021-10-04 ENCOUNTER — HOSPITAL ENCOUNTER (EMERGENCY)
Age: 36
Discharge: HOME | End: 2021-10-04
Payer: COMMERCIAL

## 2021-10-04 DIAGNOSIS — R07.9 CHEST PAIN, UNSPECIFIED TYPE: ICD-10-CM

## 2021-10-04 DIAGNOSIS — M54.2 NECK PAIN: Primary | ICD-10-CM

## 2021-10-04 LAB
ALBUMIN SERPL-MCNC: 4.4 G/DL (ref 3.5–5.2)
ALP BLD-CCNC: 125 U/L (ref 35–104)
ALT SERPL-CCNC: 15 U/L (ref 0–32)
ANION GAP SERPL CALCULATED.3IONS-SCNC: 10 MMOL/L (ref 7–16)
AST SERPL-CCNC: 24 U/L (ref 0–31)
BASOPHILS ABSOLUTE: 0.04 E9/L (ref 0–0.2)
BASOPHILS RELATIVE PERCENT: 0.4 % (ref 0–2)
BILIRUB SERPL-MCNC: <0.2 MG/DL (ref 0–1.2)
BUN BLDV-MCNC: 12 MG/DL (ref 6–20)
CALCIUM SERPL-MCNC: 9.8 MG/DL (ref 8.6–10.2)
CHLORIDE BLD-SCNC: 103 MMOL/L (ref 98–107)
CO2: 26 MMOL/L (ref 22–29)
CREAT SERPL-MCNC: 0.9 MG/DL (ref 0.5–1)
EKG ATRIAL RATE: 52 BPM
EKG P AXIS: 7 DEGREES
EKG P-R INTERVAL: 152 MS
EKG Q-T INTERVAL: 418 MS
EKG QRS DURATION: 90 MS
EKG QTC CALCULATION (BAZETT): 388 MS
EKG R AXIS: 27 DEGREES
EKG T AXIS: 7 DEGREES
EKG VENTRICULAR RATE: 52 BPM
EOSINOPHILS ABSOLUTE: 0.3 E9/L (ref 0.05–0.5)
EOSINOPHILS RELATIVE PERCENT: 2.7 % (ref 0–6)
GFR AFRICAN AMERICAN: >60
GFR NON-AFRICAN AMERICAN: >60 ML/MIN/1.73
GLUCOSE BLD-MCNC: 97 MG/DL (ref 74–99)
HCT VFR BLD CALC: 40.2 % (ref 34–48)
HEMOGLOBIN: 12.7 G/DL (ref 11.5–15.5)
IMMATURE GRANULOCYTES #: 0.05 E9/L
IMMATURE GRANULOCYTES %: 0.5 % (ref 0–5)
LYMPHOCYTES ABSOLUTE: 3.61 E9/L (ref 1.5–4)
LYMPHOCYTES RELATIVE PERCENT: 32.5 % (ref 20–42)
MCH RBC QN AUTO: 29.3 PG (ref 26–35)
MCHC RBC AUTO-ENTMCNC: 31.6 % (ref 32–34.5)
MCV RBC AUTO: 92.8 FL (ref 80–99.9)
MONOCYTES ABSOLUTE: 0.71 E9/L (ref 0.1–0.95)
MONOCYTES RELATIVE PERCENT: 6.4 % (ref 2–12)
NEUTROPHILS ABSOLUTE: 6.4 E9/L (ref 1.8–7.3)
NEUTROPHILS RELATIVE PERCENT: 57.5 % (ref 43–80)
PDW BLD-RTO: 13.5 FL (ref 11.5–15)
PLATELET # BLD: 357 E9/L (ref 130–450)
PMV BLD AUTO: 10.3 FL (ref 7–12)
POTASSIUM SERPL-SCNC: 4.1 MMOL/L (ref 3.5–5)
RBC # BLD: 4.33 E12/L (ref 3.5–5.5)
SARS-COV-2, PCR: NOT DETECTED
SODIUM BLD-SCNC: 139 MMOL/L (ref 132–146)
TOTAL PROTEIN: 8.2 G/DL (ref 6.4–8.3)
TROPONIN, HIGH SENSITIVITY: <6 NG/L (ref 0–9)
WBC # BLD: 11.1 E9/L (ref 4.5–11.5)

## 2021-10-04 PROCEDURE — 93005 ELECTROCARDIOGRAM TRACING: CPT | Performed by: EMERGENCY MEDICINE

## 2021-10-04 PROCEDURE — 93010 ELECTROCARDIOGRAM REPORT: CPT | Performed by: INTERNAL MEDICINE

## 2021-10-04 NOTE — ED PROVIDER NOTES
[address] [facility]  Department of Emergency Medicine   ED  Encounter Note  Admit Date/RoomTime: No admission date for patient encounter. ED Room: Room/bed info not found    NAME: Maia Griffin  : 1985  MRN: [id number]     Chief Complaint:  Shortness of Breath (started 1 week ago, states \"tight in my throat and into my  chest but not truly chest pain', hx of asthma), Neck Pain (anterior, stabbing/pressure), and Chest Pain    History of Present Illness          Maai Griffin is a 37 y.o. old female who presents to the emergency department by private vehicle, with gradual onset, persistent central chest discomfort described as tightness beginning 1 week(s) prior to arrival.  The pain does not radiate. Duration of symptoms: to the present time. Symptom(s) at worst was 8 /10. Her symptoms are associated with nothing addtiional.   The symptoms are worsened by palpation of chest and twisting neck and relieved by nothing and Patient reports she has been using Tylenol muscle relaxers. .  There has been No orthopnea, No paroxysmal nocturnal dyspnea, No edema, No palpitations and No syncope associated with complaint. She takes no blood thinning agents. Her cardiac risk factors are obesity (BMI >= 30 kg/m2). .  ROS   Pertinent positives and negatives are stated within HPI, all other systems reviewed and are negative. Past Medical History:  has a past medical history of Anxiety, Asthma, DDD (degenerative disc disease), cervical, Depression, Ectopic pregnancy, tubal, Gall stone, Heart murmur, Hernia, History of blood transfusion, and Postoperative anemia. Surgical History:  has a past surgical history that includes  section; laparoscopy (2012); Ectopic pregnancy surgery; Tubal ligation; laparoscopy ([id number]); other surgical history (3/25/2014); Hysterectomy, total abdominal (14); Abdominal wall surgery ([id number]);  Cholecystectomy; Hysterectomy (N/A, 12/26/2017); hernia repair; cervical fusion (N/A, 11/1/2019); and cervical fusion (N/A, 11/11/2019). Social History:  reports that she quit smoking about 5 years ago. Her smoking use included cigarettes and cigars. She has never used smokeless tobacco. She reports previous alcohol use. She reports current drug use. Drug: Marijuana. Family History: family history includes Cancer in her mother; Diabetes in her mother; High Blood Pressure in her mother; No Known Problems in her brother, father, and sister. Allergies: Latex, Bactrim [sulfamethoxazole-trimethoprim], Sulfamethoxazole-trimethoprim, Fish-derived products, Ibuprofen, Iodine, Naproxen, and Percocet [oxycodone-acetaminophen]    Physical Exam   Oxygen Saturation Interpretation: Normal.        ED Triage Vitals   BP Temp Temp Source Pulse Resp SpO2 Height Weight   10/03/21 2130 10/03/21 2130 10/03/21 2130 10/03/21 2130 10/03/21 2130 10/03/21 2118 10/03/21 2130 10/03/21 2130   132/61 98 °F (36.7 °C) Oral 69 20 95 % 5' 3\" (1.6 m) 190 lb (86.2 kg)       General Appearance/Constitutional:  Alert, development consistent with age, NAD. HEENT:  NC/NT. PERRLA. Airway patent. History of pharynx unremarkable. Tympanic membrane is normal in appearance bilaterally. Neck:  Normal ROM. Supple. Tender to palpation on the left side of anterior neck. No swelling or lesions noted  Respiratory:  Clear to auscultation and breath sounds equal.  CV:  Regular rate and rhythm, normal heart sounds, without pathological murmurs, ectopy, gallops, or rubs. Chest:  Symmetrical without visible rash or tenderness. GI:  Abdomen Soft, nontender, good bowel sounds. No firm or pulsatile mass. Back:  No costovertebral tenderness. Extremities: No tenderness or edema noted. Lymphatics: no lymphadenopathy noted  Integument:  Normal turgor. Warm, dry, without visible rash, unless noted elsewhere. Neurological:  Oriented. Motor functions intact. Psychiatric:  Affect normal.    Lab / Imaging Results   (All laboratory and radiology results have been personally reviewed by myself)  Labs:  Results for orders placed or performed during the hospital encounter of 10/03/21   CBC Auto Differential   Result Value Ref Range    WBC 11.1 4.5 - 11.5 E9/L    RBC 4.33 3.50 - 5.50 E12/L    Hemoglobin 12.7 11.5 - 15.5 g/dL    Hematocrit 40.2 34.0 - 48.0 %    MCV 92.8 80.0 - 99.9 fL    MCH 29.3 26.0 - 35.0 pg    MCHC 31.6 (L) 32.0 - 34.5 %    RDW 13.5 11.5 - 15.0 fL    Platelets 822 992 - 278 E9/L    MPV 10.3 7.0 - 12.0 fL    Neutrophils % 57.5 43.0 - 80.0 %    Immature Granulocytes % 0.5 0.0 - 5.0 %    Lymphocytes % 32.5 20.0 - 42.0 %    Monocytes % 6.4 2.0 - 12.0 %    Eosinophils % 2.7 0.0 - 6.0 %    Basophils % 0.4 0.0 - 2.0 %    Neutrophils Absolute 6.40 1.80 - 7.30 E9/L    Immature Granulocytes # 0.05 E9/L    Lymphocytes Absolute 3.61 1.50 - 4.00 E9/L    Monocytes Absolute 0.71 0.10 - 0.95 E9/L    Eosinophils Absolute 0.30 0.05 - 0.50 E9/L    Basophils Absolute 0.04 0.00 - 0.20 E9/L   Comprehensive Metabolic Panel   Result Value Ref Range    Sodium 139 132 - 146 mmol/L    Potassium 4.1 3.5 - 5.0 mmol/L    Chloride 103 98 - 107 mmol/L    CO2 26 22 - 29 mmol/L    Anion Gap 10 7 - 16 mmol/L    Glucose 97 74 - 99 mg/dL    BUN 12 6 - 20 mg/dL    CREATININE 0.9 0.5 - 1.0 mg/dL    GFR Non-African American >60 >=60 mL/min/1.73    GFR African American >60     Calcium 9.8 8.6 - 10.2 mg/dL    Total Protein 8.2 6.4 - 8.3 g/dL    Albumin 4.4 3.5 - 5.2 g/dL    Total Bilirubin <0.2 0.0 - 1.2 mg/dL    Alkaline Phosphatase 125 (H) 35 - 104 U/L    ALT 15 0 - 32 U/L    AST 24 0 - 31 U/L   Troponin   Result Value Ref Range    Troponin, High Sensitivity <6 0 - 9 ng/L   EKG 12 Lead   Result Value Ref Range    Ventricular Rate 52 BPM    Atrial Rate 52 BPM    P-R Interval 152 ms    QRS Duration 90 ms    Q-T Interval 418 ms    QTc Calculation (Bazett) 388 ms    P Axis 7 degrees    R Axis 27 degrees    T Axis 7 degrees       Imaging: All Radiology results interpreted by Radiologist unless otherwise noted. XR CHEST (2 VW)   Final Result      There is no acute abnormality seen. EKG #1:  Interpreted by emergency department attending physician unless otherwise noted. 10/4/21  Time: 0006    Rhythm: normal sinus   Rate: bradycardia  Axis: normal  Conduction: normal  ST Segments: normal  T Waves: no acute change    Clinical Impression: sinus bradycardia  Comparison to Prior tracings:  Today's ECG is unchanged from previous tracings. ED Course / Medical Decision Making   Medications - No data to display       Consultations:             None    Procedures:   none    MDM:    Patient presents to emergency with left-sided neck pain and central chest pain and shortness of breath for the past 5 to 6 days. She recently had a friend who was diagnosed with Covid who ultimately  who she has been in a lot of contact with recently. She herself reports she has not been coughing. She also reports she has had neck surgery in the past and has a lot of hardware in her neck. She denies any new numbness or tingling in her extremities. No new traumas. Patient's EKG shows a rate sinus bradycardia with a rate of 52 and blood work unremarkable. Covid test was performed and patient is advised to await this outpatient, work slip was provided until patient finds it whether her Covid is negative or positive. Follow-up with primary care if negative for any additional studies such as echocardiogram which may be warranted. Plan of Care/Counseling:  Laila Gomez PA-C reviewed today's visit with the patient in addition to providing specific details for the plan of care and counseling regarding the diagnosis and prognosis. Questions are answered at this time and are agreeable with the plan. Assessment      1. Neck pain    2.  Chest pain, unspecified type      This patient's ED course included: a personal history and physicial examination and re-evaluation prior to disposition      This patient has remained hemodynamically stable during their ED course. Plan   Discharged home. Patient condition is good. New Medications     Current Discharge Medication List        Electronically signed by Laila Gomez PA-C   DD: 10/4/21  **This report was transcribed using voice recognition software. Every effort was made to ensure accuracy; however, inadvertent computerized transcription errors may be present.   END OF PROVIDER NOTE       Laila Gomez PA-C  10/04/21 033

## 2021-10-06 ENCOUNTER — OFFICE VISIT (OUTPATIENT)
Dept: OBGYN | Age: 36
End: 2021-10-06
Payer: COMMERCIAL

## 2021-10-06 VITALS
DIASTOLIC BLOOD PRESSURE: 70 MMHG | BODY MASS INDEX: 34.73 KG/M2 | SYSTOLIC BLOOD PRESSURE: 114 MMHG | HEART RATE: 78 BPM | WEIGHT: 196 LBS | RESPIRATION RATE: 16 BRPM | HEIGHT: 63 IN

## 2021-10-06 DIAGNOSIS — Z90.710 VAGINAL PAP SMEAR FOLLOWING HYSTERECTOMY FOR MALIGNANCY: ICD-10-CM

## 2021-10-06 DIAGNOSIS — N87.1 CIN II (CERVICAL INTRAEPITHELIAL NEOPLASIA II): ICD-10-CM

## 2021-10-06 DIAGNOSIS — Z01.419 WELL WOMAN EXAM WITH ROUTINE GYNECOLOGICAL EXAM: Primary | ICD-10-CM

## 2021-10-06 DIAGNOSIS — Z08 VAGINAL PAP SMEAR FOLLOWING HYSTERECTOMY FOR MALIGNANCY: ICD-10-CM

## 2021-10-06 PROCEDURE — 99395 PREV VISIT EST AGE 18-39: CPT | Performed by: OBSTETRICS & GYNECOLOGY

## 2021-10-06 PROCEDURE — 99213 OFFICE O/P EST LOW 20 MIN: CPT | Performed by: OBSTETRICS & GYNECOLOGY

## 2021-10-06 PROCEDURE — 1036F TOBACCO NON-USER: CPT | Performed by: OBSTETRICS & GYNECOLOGY

## 2021-10-06 PROCEDURE — G8484 FLU IMMUNIZE NO ADMIN: HCPCS | Performed by: OBSTETRICS & GYNECOLOGY

## 2021-10-06 PROCEDURE — G8427 DOCREV CUR MEDS BY ELIG CLIN: HCPCS | Performed by: OBSTETRICS & GYNECOLOGY

## 2021-10-06 PROCEDURE — G8417 CALC BMI ABV UP PARAM F/U: HCPCS | Performed by: OBSTETRICS & GYNECOLOGY

## 2021-10-11 LAB
HPV SAMPLE: NORMAL
HPV TYPE 16: NOT DETECTED
HPV TYPE 18: NOT DETECTED
HPV, HIGH RISK OTHER: NOT DETECTED
INTERPRETATION: NORMAL
SOURCE: NORMAL

## 2021-11-08 ENCOUNTER — HOSPITAL ENCOUNTER (OUTPATIENT)
Age: 36
Discharge: HOME | End: 2021-11-08
Payer: COMMERCIAL

## 2021-11-08 DIAGNOSIS — T81.49XA POSTOPERATIVE WOUND INFECTION: ICD-10-CM

## 2021-11-08 DIAGNOSIS — T81.49XA WOUND INFECTION AFTER SURGERY: ICD-10-CM

## 2021-11-08 LAB
ALBUMIN SERPL-MCNC: 3.8 G/DL (ref 3.5–5.2)
ALP BLD-CCNC: 109 U/L (ref 35–104)
ALT SERPL-CCNC: 18 U/L (ref 0–32)
ANION GAP SERPL CALCULATED.3IONS-SCNC: 15 MMOL/L (ref 7–16)
AST SERPL-CCNC: 23 U/L (ref 0–31)
BASOPHILS ABSOLUTE: 0.03 E9/L (ref 0–0.2)
BASOPHILS RELATIVE PERCENT: 0.3 % (ref 0–2)
BILIRUB SERPL-MCNC: <0.2 MG/DL (ref 0–1.2)
BUN BLDV-MCNC: 10 MG/DL (ref 6–20)
C-REACTIVE PROTEIN: 0.3 MG/DL (ref 0–0.4)
CALCIUM SERPL-MCNC: 9.1 MG/DL (ref 8.6–10.2)
CHLORIDE BLD-SCNC: 108 MMOL/L (ref 98–107)
CO2: 22 MMOL/L (ref 22–29)
CREAT SERPL-MCNC: 0.9 MG/DL (ref 0.5–1)
EOSINOPHILS ABSOLUTE: 0.22 E9/L (ref 0.05–0.5)
EOSINOPHILS RELATIVE PERCENT: 2.1 % (ref 0–6)
GFR AFRICAN AMERICAN: >60
GFR NON-AFRICAN AMERICAN: >60 ML/MIN/1.73
GLUCOSE BLD-MCNC: 109 MG/DL (ref 74–99)
HCT VFR BLD CALC: 37.6 % (ref 34–48)
HEMOGLOBIN: 11.9 G/DL (ref 11.5–15.5)
IMMATURE GRANULOCYTES #: 0.03 E9/L
IMMATURE GRANULOCYTES %: 0.3 % (ref 0–5)
LYMPHOCYTES ABSOLUTE: 3.11 E9/L (ref 1.5–4)
LYMPHOCYTES RELATIVE PERCENT: 29.3 % (ref 20–42)
MCH RBC QN AUTO: 28.7 PG (ref 26–35)
MCHC RBC AUTO-ENTMCNC: 31.6 % (ref 32–34.5)
MCV RBC AUTO: 90.8 FL (ref 80–99.9)
MONOCYTES ABSOLUTE: 0.69 E9/L (ref 0.1–0.95)
MONOCYTES RELATIVE PERCENT: 6.5 % (ref 2–12)
NEUTROPHILS ABSOLUTE: 6.53 E9/L (ref 1.8–7.3)
NEUTROPHILS RELATIVE PERCENT: 61.5 % (ref 43–80)
PDW BLD-RTO: 13.9 FL (ref 11.5–15)
PLATELET # BLD: 330 E9/L (ref 130–450)
PMV BLD AUTO: 9.9 FL (ref 7–12)
POTASSIUM SERPL-SCNC: 3.5 MMOL/L (ref 3.5–5)
RBC # BLD: 4.14 E12/L (ref 3.5–5.5)
SEDIMENTATION RATE, ERYTHROCYTE: 30 MM/HR (ref 0–20)
SODIUM BLD-SCNC: 145 MMOL/L (ref 132–146)
TOTAL PROTEIN: 7.2 G/DL (ref 6.4–8.3)
WBC # BLD: 10.6 E9/L (ref 4.5–11.5)

## 2021-11-08 PROCEDURE — 80053 COMPREHEN METABOLIC PANEL: CPT

## 2021-11-08 PROCEDURE — 36415 COLL VENOUS BLD VENIPUNCTURE: CPT

## 2021-11-08 PROCEDURE — 86140 C-REACTIVE PROTEIN: CPT

## 2021-11-08 PROCEDURE — 85025 COMPLETE CBC W/AUTO DIFF WBC: CPT

## 2021-11-08 PROCEDURE — 85651 RBC SED RATE NONAUTOMATED: CPT

## 2021-11-15 ENCOUNTER — OFFICE VISIT (OUTPATIENT)
Dept: PAIN MANAGEMENT | Age: 36
End: 2021-11-15
Payer: COMMERCIAL

## 2021-11-15 DIAGNOSIS — M48.02 CERVICAL STENOSIS OF SPINAL CANAL: ICD-10-CM

## 2021-11-15 DIAGNOSIS — M79.18 CHRONIC MYOFASCIAL PAIN: ICD-10-CM

## 2021-11-15 DIAGNOSIS — M54.2 CERVICALGIA: ICD-10-CM

## 2021-11-15 DIAGNOSIS — Z98.1 S/P CERVICAL SPINAL FUSION: Primary | ICD-10-CM

## 2021-11-15 DIAGNOSIS — G89.29 CHRONIC MYOFASCIAL PAIN: ICD-10-CM

## 2021-11-15 DIAGNOSIS — M50.30 DDD (DEGENERATIVE DISC DISEASE), CERVICAL: ICD-10-CM

## 2021-11-15 DIAGNOSIS — F12.91 HISTORY OF MARIJUANA USE: ICD-10-CM

## 2021-11-15 PROCEDURE — 1036F TOBACCO NON-USER: CPT | Performed by: ANESTHESIOLOGY

## 2021-11-15 PROCEDURE — G8484 FLU IMMUNIZE NO ADMIN: HCPCS | Performed by: ANESTHESIOLOGY

## 2021-11-15 PROCEDURE — G8417 CALC BMI ABV UP PARAM F/U: HCPCS | Performed by: ANESTHESIOLOGY

## 2021-11-15 PROCEDURE — G8427 DOCREV CUR MEDS BY ELIG CLIN: HCPCS | Performed by: ANESTHESIOLOGY

## 2021-11-15 PROCEDURE — 99214 OFFICE O/P EST MOD 30 MIN: CPT | Performed by: ANESTHESIOLOGY

## 2021-11-15 PROCEDURE — 99213 OFFICE O/P EST LOW 20 MIN: CPT | Performed by: ANESTHESIOLOGY

## 2021-11-15 RX ORDER — CYCLOBENZAPRINE HCL 5 MG
5 TABLET ORAL DAILY PRN
Qty: 30 TABLET | Refills: 2 | Status: SHIPPED | OUTPATIENT
Start: 2021-11-15

## 2021-11-15 RX ORDER — CYCLOBENZAPRINE HCL 5 MG
5 TABLET ORAL DAILY PRN
COMMUNITY
End: 2021-11-15 | Stop reason: SDUPTHER

## 2021-11-15 RX ORDER — METHYLPREDNISOLONE 4 MG/1
TABLET ORAL
Qty: 1 KIT | Refills: 0 | Status: SHIPPED | OUTPATIENT
Start: 2021-11-15 | End: 2021-11-21

## 2021-11-15 NOTE — PROGRESS NOTES
Do you currently have any of the following:    Fever: No  Headache:  No  Cough: No  Shortness of breath: No  Exposed to anyone with these symptoms: No         Zenaida Campos presents to the [address] [place] [address] on 11/15/2021. Terrence Mcburney is complaining of pain lower back, both hands, both arms, arms and the neck. The pain is constant. The pain is described as pressure. Pain is rated on her best day at a 7, on her worst day at a 10, and on average at a 10 on the VAS scale. She took her last dose of flexeril PRN. Any procedures since your last visit: No, with  % relief. Pacemaker or defibrillator: No managed by . She is  on NSAIDS and is not on anticoagulation medications to include none and is managed by . Medication Contract and Consent for Opioid Use Documents Filed     Patient Documents     Type of Document Status Date Received Received By Description    Medication Contract Received 10/17/2019 12:24 PM Ronnie Love 10/8/19 Opioid/Narcotic Med Contract for NeuroSurgery    Medication Contract Signed 2/13/2020 11:58 AM JT MATA medication contract                LMP 05/19/2014      Patient's last menstrual period was 05/19/2014.

## 2021-11-15 NOTE — PROGRESS NOTES
[facility]   Marshall, Saida Elizabeth  Dept: [phone]    Follow up Note      Cesar Campo     Date of Visit:  11/15/2021    CC:  Patient presents for follow up   Chief Complaint   Patient presents with    Follow-up    Back Pain    Neck Pain    Arm Pain     both arms    Hand Pain     both hands       HPI:  H/o Chronic neck pain.     She is s/p C spein surgery on 11/1/2019 by Dr. Juany Morgan  Bilateral C3, C4, C5, C6, and C7 laminectomy. Bilateral segmental arthrodesis and fusion from C3 to T1 with use of bilateral C3, C4, C5, and C6 lateral mass screws and bilateral T1 pedicle screws.   Later on reexploration and repair of CSF leak on 11/11/2019. Prior to the surgery, patient was taking Gabapentin, flexeril. EMG: on 9/21/2020: Essentially normal.    Has been doing PT/ HEP. Trigger point injection helped for 1 week- does not want to repeat. Her pain is mainly over the shoulder blade trapezius area. Pain causes functional limitations/ limits Adl's : Yes    Nursing notes and details of the pain history reviewed. Please see intake notes for details.     Previous treatments:   Physical Therapy : yes, has been doing PT/ HEP     Medications: - NSAID's : yes                        - Membrane stabilizers : yes                        - Opioids : yes,for  post op pain                       - Adjuvants or Others : yes, muscle relaxant     Surgeries: yes : posterior cervical fusion as detailed above by Dr. Alyssia Chávez in Nov 2019     She has not been on anticoagulation medications no.     She has not been on herbal supplements.       She is not diabetic.     H/O Smoking: had quit- has started intermittently.   H/O alcohol abuse : denies  H/O Illicit drug use : H/O THC use +     Employment: off work since May 2019     Labs: BUN/ Creatinine- normal.     Imaging:     CT cervical spine: 3/1/2021:     Impression   No evidence of acute fracture or dislocation.       Status post posterior fusion from C3 through T1.     X-ray cervical spine: 9/7/2021:  FINDINGS:   Posterior cervical fusion with intact hardware from C3-T1.  Normal alignment. Normal soft tissues.  No new abnormal findings.           Impression   Cervicothoracic spinal fusion with normal alignment and no new abnormal   findings. Potential Aberrant Drug-Related Behavior:  H/o THC use +     Urine Drug Screening:  On 2/13/2020 reviewed; + for Oxycodone, + benzo and + for FISH [facility]     OARRS report[de-identified]  OARRS reviewed- today. Past Medical History:   Diagnosis Date    Anxiety     Asthma     DDD (degenerative disc disease), cervical 3/9/2020    Depression     Ectopic pregnancy, tubal 2012    Ermlorenz[employer] Billанна stone 2005    Heart murmur     Hernia 2011    umbilical     History of blood transfusion     Postoperative anemia        Past Surgical History:   Procedure Laterality Date    ABDOMINAL WALL SURGERY  [id number]    CERVICAL FUSION N/A 11/1/2019    C3-C7  CERVICAL LAMINECTOMY AND POSTERIOR C3-T1 FUSION -- NEEDS JOSH TABLE, CELL SAVER, PLATELET GEL, PLATES, SCREWS -- GLOBUS performed by Glorya Sicard, MD at [facility] N/A 11/11/2019    POSTERIOR CERVICAL WOUND EXPLORATION - WANTS TF performed by Glorya Sicard, MD at [address] [place], [place] [address]   [address] [place] [address] N/A 12/26/2017    robotic assisted ,   BSO    HYSTERECTOMY, TOTAL ABDOMINAL  8/20/14    LAPAROSCOPY  9/28/2012    left salpingectomy    LAPAROSCOPY  [id number]    RASHEEDA    OTHER SURGICAL HISTORY  3/25/2014    D&C;LEEP    TUBAL LIGATION         Prior to Admission medications    Medication Sig Start Date End Date Taking?  Authorizing Provider   cyclobenzaprine (FLEXERIL) 5 MG tablet Take 5 mg by mouth daily as needed for Muscle spasms   Yes Historical Provider, MD   doxycycline hyclate (VIBRA-TABS) 100 MG tablet Take 1 tablet by mouth 2 times daily 11/9/21 2/7/22 Yes Theodore Henry, APRN - CNP   diclofenac sodium (VOLTAREN) 1 % GEL Apply 4 g topically 3 times daily as needed for Pain 10/19/20  Yes Chandni Hernandez MD   NARCAN 4 MG/0.1ML LIQD nasal spray ADMINISTER A SINGLE spray INTO ONE NOSTRIL CALL 911 MAY REPEAT ONCE 19  Yes Historical Provider, MD   albuterol sulfate  (90 Base) MCG/ACT inhaler Inhale 2 puffs into the lungs every 6 hours as needed for Wheezing   Yes Historical Provider, MD   fluticasone (FLONASE) 50 MCG/ACT nasal spray 2 sprays by Nasal route daily for 5 days 21  CHRIS Swift - CNP   naproxen (NAPROXEN) 500 MG EC tablet Take 1 tablet by mouth 2 times daily (with meals) 7/30/20 10/4/21  MIKY De   gabapentin (NEURONTIN) 300 MG capsule Take 1 capsule by mouth 3 times daily for 60 days.  20  Chandni Hernandez MD   Lidocaine (ZTLIDO) 1.8 % PTCH 12 hours on 12 hours off  Patient not taking: Reported on 11/15/2021 3/9/20   Chandni Hernandez MD   FLUoxetine (PROZAC) 40 MG capsule  19   Historical Provider, MD   vitamin D (CHOLECALCIFEROL) 1000 UNIT TABS tablet Take 1 tablet by mouth daily  Patient not taking: Reported on 10/6/2021 6/1/19   Elia Vergara MD       Allergies   Allergen Reactions    Latex     Bactrim [Sulfamethoxazole-Trimethoprim] Hives    Sulfamethoxazole-Trimethoprim Hives    Fish-Derived Products Swelling     Shrimp and Pirch    Ibuprofen Hives    Iodine      Fish derived products    Naproxen      headache    Percocet [Oxycodone-Acetaminophen] Hives       Social History     Socioeconomic History    Marital status: Single     Spouse name: Not on file    Number of children: Not on file    Years of education: Not on file    Highest education level: Not on file   Occupational History     Employer: [employer]   Tobacco Use    Smoking status: Former Smoker     Types: Cigarettes, Cigars     Quit date: 7/10/2016     Years since quittin.3    Smokeless tobacco: Never Used    Tobacco comment: quite cigars   Vaping Use    Vaping Use: Never used   Substance and Sexual Activity    Alcohol use: Not Currently     Alcohol/week: 0.0 standard drinks    Drug use: Yes     Types: Marijuana Brandy Phong)    Sexual activity: Yes     Partners: Male   Other Topics Concern    Not on file   Social History Narrative    Not on file     Social Determinants of Health     Financial Resource Strain:     Difficulty of Paying Living Expenses: Not on file   Food Insecurity:     Worried About Running Out of Food in the Last Year: Not on file    Mayito of Food in the Last Year: Not on file   Transportation Needs:     Lack of Transportation (Medical): Not on file    Lack of Transportation (Non-Medical):  Not on file   Physical Activity:     Days of Exercise per Week: Not on file    Minutes of Exercise per Session: Not on file   Stress:     Feeling of Stress : Not on file   Social Connections:     Frequency of Communication with Friends and Family: Not on file    Frequency of Social Gatherings with Friends and Family: Not on file    Attends Sikh Services: Not on file    Active Member of Clinicient Group or Organizations: Not on file    Attends Club or Organization Meetings: Not on file    Marital Status: Not on file   Intimate Partner Violence:     Fear of Current or Ex-Partner: Not on file    Emotionally Abused: Not on file    Physically Abused: Not on file    Sexually Abused: Not on file   Housing Stability:     Unable to Pay for Housing in the Last Year: Not on file    Number of [place] in the Last Year: Not on file    Unstable Housing in the Last Year: Not on file       Family History   Problem Relation Age of Onset    Diabetes Mother     High Blood Pressure Mother     Cancer Mother     No Known Problems Father     No Known Problems Brother     No Known Problems Sister     Ovarian Cancer Neg Hx     Uterine Cancer Neg Hx     Breast Cancer Neg Hx     Colon Cancer Neg Hx        REVIEW OF 6. History of marijuana use         37 y.o. female with H/O cervical spine stenosis , S/p C3, C4, C5, C6, and C7 laminectomy and C3-T1 fusion by Dr. Saundra Henriquez in Nov 2019, post op CSF leak which was repaired 10 days later . Upper extremity symptoms has improved after surgery. Neck pain and shoulder blade pain- features of myofacial pain over the paraspinal and trapezius area. Has been evaluated by NSG - CT C spine/ Xray C spine reviewed. EMG- no abnormalities. Recent image findings reviewed. Doing PT/ HEP. H/O  THC use +:  Informed our policy regarding controlled meds if she takes THC. Trigger point injection- short term relief does not want to repeat. Compound cream- local reaction. Medrol dose pack. Use instructions reviewed. Muscle relaxant for prn use. Flexeril. Dosing and side effects explained. Script given today. Counseling done: reg HEP. F/u prn.     Amita Pate MD

## 2021-12-06 ENCOUNTER — NURSE ONLY (OUTPATIENT)
Dept: PRIMARY CARE CLINIC | Age: 36
End: 2021-12-06

## 2021-12-06 DIAGNOSIS — Z11.52 ENCOUNTER FOR SCREENING FOR COVID-19: ICD-10-CM

## 2021-12-06 DIAGNOSIS — Z11.52 ENCOUNTER FOR SCREENING FOR COVID-19: Primary | ICD-10-CM

## 2021-12-07 LAB — SARS-COV-2, PCR: NOT DETECTED

## 2021-12-24 NOTE — CARE COORDINATION
Patient placed here under observation for Neck pain, r/o discitis vs. osteomylitis vs. Cervical disc prolapse vs. ?MS. Neurosurgery has been consulted. Await input. Cm/Sw following for any discharge needs.   Jhony Swan RN CM 87 yo F h/o CVA with residual L-sided weakness, dysphagia s/p PEG (2017), breast ca, MI, COPD presenting from rehab with fever and SOB with Staph epi bacteremia, currently being treated for pneumonia. Surgery consulted for CT showing for perforated appendicitis with 7.3cm intraabdominal collection. Patient hemodynamically stable, however with persistent oxygen requirements. IR drained 100 cc of pus from abbesses  .      Plan:    -Diet: NPO with tube feeds.   - No acute surgical intervention given perforated appendicitis with large associated abscess  - Blood cultures 12/22: preliminary growth gram positive cocci in clusters.  - CT: RLQ abscess is larger.  - Continue antibiotics        Green Team Surgery   3489 87 yo F h/o CVA with residual L-sided weakness, dysphagia s/p PEG (2017), breast ca, MI, COPD presenting from rehab with fever and SOB with Staph epi bacteremia, currently being treated for pneumonia. Surgery consulted for CT showing for perforated appendicitis with 7.3cm intraabdominal collection. Patient hemodynamically stable, however with persistent oxygen requirements. IR drained 100 cc of pus from abscesses    Plan:    -Diet: NPO with tube feeds.   - No acute surgical intervention given perforated appendicitis with large associated abscess  - Blood cultures 12/22: preliminary growth gram positive cocci in clusters.  - CT: RLQ abscess is larger.  - Continue antibiotics         86y F h/o CVA w/residual L-sided weakness, dysphagia s/p PEG (2017), breast cancer, MI, COPD presenting from rehab with fever and SOB with Staph epi bacteremia, currently being treated for pneumonia. Surgery consulted for CT showing for perforated appendicitis with 7.3cm intraabdominal collection. Patient hemodynamically stable, however with persistent oxygen requirements. IR drained 100 cc of pus from abscesses    Plan:  - No acute surgical intervention given perforated appendicitis with large associated abscess  - Blood cultures 12/22: preliminary growth gram positive cocci in clusters.  - Continue antibiotics      Surgery will sign off. Please reconsult PRN.       GREEN  [phone]

## 2022-01-20 ENCOUNTER — OFFICE VISIT (OUTPATIENT)
Dept: PRIMARY CARE CLINIC | Age: 37
End: 2022-01-20
Payer: COMMERCIAL

## 2022-01-20 VITALS
HEIGHT: 63 IN | TEMPERATURE: 96.7 F | OXYGEN SATURATION: 98 % | BODY MASS INDEX: 33.66 KG/M2 | DIASTOLIC BLOOD PRESSURE: 79 MMHG | WEIGHT: 190 LBS | RESPIRATION RATE: 18 BRPM | SYSTOLIC BLOOD PRESSURE: 112 MMHG | HEART RATE: 89 BPM

## 2022-01-20 DIAGNOSIS — U07.1 COVID-19: Primary | ICD-10-CM

## 2022-01-20 DIAGNOSIS — R05.9 COUGH: ICD-10-CM

## 2022-01-20 DIAGNOSIS — R19.7 DIARRHEA, UNSPECIFIED TYPE: ICD-10-CM

## 2022-01-20 DIAGNOSIS — R52 GENERALIZED BODY ACHES: ICD-10-CM

## 2022-01-20 LAB
Lab: ABNORMAL
PERFORMING INSTRUMENT: ABNORMAL
QC PASS/FAIL: ABNORMAL
SARS-COV-2, POC: DETECTED

## 2022-01-20 PROCEDURE — 87426 SARSCOV CORONAVIRUS AG IA: CPT | Performed by: NURSE PRACTITIONER

## 2022-01-20 PROCEDURE — 99213 OFFICE O/P EST LOW 20 MIN: CPT | Performed by: NURSE PRACTITIONER

## 2022-01-20 PROCEDURE — G8417 CALC BMI ABV UP PARAM F/U: HCPCS | Performed by: NURSE PRACTITIONER

## 2022-01-20 PROCEDURE — G8484 FLU IMMUNIZE NO ADMIN: HCPCS | Performed by: NURSE PRACTITIONER

## 2022-01-20 PROCEDURE — G8427 DOCREV CUR MEDS BY ELIG CLIN: HCPCS | Performed by: NURSE PRACTITIONER

## 2022-01-20 PROCEDURE — 1036F TOBACCO NON-USER: CPT | Performed by: NURSE PRACTITIONER

## 2022-01-20 NOTE — PATIENT INSTRUCTIONS
[address] [facility] [place]  [address] [place] [id number]  Dept: [phone]  Dept Fax: [phone] [facility] CHRIS Bliss CNP      1/20/2022     Patient: Amanda Chaparro   YOB: 1985       To Whom It May Concern: It is my medical opinion that Amanda Chaparro should remain out of work while acutely ill and positive COVID-19 test results. Return to work with no retesting should be followed  as outlined by CDC/ODH:   a. No fever without the use of fever reducers for 24 hours  b. Improvement in symptoms     If tests positive for COVID-19, needs minimum of 5 days strict quarantine from onset of symptoms followed by 5 days of strict masking,  improvement of symptoms and 24 hours fever free without fever reducing medications. If symptoms have not improved then quarantine needs to be extended until symptoms improve. If you have any questions or concerns, please don't hesitate to call.     Sincerely,        CHRIS Medina CNP     Use over the counter medications as needed to treat your symptoms    Recommended vitamins:  Vitamin c - 500 mg 2 x day  Vitamin D3 - 1000 iu daily   Zinc - 50 mg daily    New CDC guidelines are 5 days of strict quarantine from onset of symptoms followed by 5 days of strict mask wearing while out    Follow with your PCP for complications of covid lasting greater than 10 days

## 2022-01-20 NOTE — PROGRESS NOTES
products, Ibuprofen, Iodine, Naproxen, and Percocet [oxycodone-acetaminophen]    Physical Exam      VS:  /79   Pulse 89   Temp 96.7 °F (35.9 °C)   Resp 18   Ht 5' 3\" (1.6 m)   Wt 190 lb (86.2 kg)   LMP 05/19/2014   SpO2 98%   BMI 33.66 kg/m²    Oxygen Saturation Interpretation: Normal.    Constitutional:  Alert, development consistent with age. NAD. Head:  NC/NT. Airway patent. Ears: TMs wnl bilaterally. Canals without exudate or swelling bilaterally. Mouth: Posterior pharynx with mild erythema and clear postnasal drip. no tonsillar hypertrophy or exudate. Neck:  Normal ROM. Supple. no anterior cervical adenopathy noted. Lungs: CTAB without wheezes, rales, or rhonchi. CV:  Regular rate and rhythm, normal heart sounds, without pathological murmurs, ectopy, gallops, or rubs. Skin:  Normal turgor. Warm, dry, without visible rash. Lymphatic: No lymphangitis or adenopathy noted. Neurological:  Oriented. Motor functions intact. Lab / Imaging Results   (All laboratory and radiology results have been personally reviewed by myself)  Labs:  No results found for this visit on 01/20/22. Imaging: All Radiology results interpreted by Radiologist unless otherwise noted. No results found. Medical Decision Making   Pt non-toxic, in no apparent distress and stable at time of discharge. Assessment/Plan   Melinda was seen today for cough, nasal congestion, headache, fever, generalized body aches and diarrhea. Diagnoses and all orders for this visit:    COVID-19    Generalized body aches  -     POCT COVID-19, Antigen    Cough  -     POCT COVID-19, Antigen    Diarrhea, unspecified type  -     POCT COVID-19, Antigen         Increase fluids and rest. Symptomatic relief discussed including Tylenol prn pain/fever. Schedule f/u with PCP in 7-10 days if symptoms persist. ED sooner if symptoms worsen or change.  ED immediately with high or refractory fever, progressive SOB, dyspnea, CP, calf pain/swelling, shaking chills, vomiting, abdominal pain, lethargy, flank pain, or decreased urinary output. Pt verbalizes understanding and is in agreement with plan of care. All questions answered. CHRIS Grier CNP    This visit was provided as a focused evaluation during the COVID -19 pandemic/national emergency. A comprehensive review of all previous patient history and testing was not conducted. Pertinent findings were elicited during the visit.

## 2022-02-14 ENCOUNTER — OFFICE VISIT (OUTPATIENT)
Dept: OBGYN | Age: 37
End: 2022-02-14
Payer: COMMERCIAL

## 2022-02-14 VITALS
HEART RATE: 72 BPM | HEIGHT: 63 IN | SYSTOLIC BLOOD PRESSURE: 112 MMHG | WEIGHT: 209 LBS | BODY MASS INDEX: 37.03 KG/M2 | DIASTOLIC BLOOD PRESSURE: 55 MMHG

## 2022-02-14 DIAGNOSIS — B96.89 BV (BACTERIAL VAGINOSIS): ICD-10-CM

## 2022-02-14 DIAGNOSIS — B37.9 MONILIA INFECTION: ICD-10-CM

## 2022-02-14 DIAGNOSIS — N89.8 VAGINAL IRRITATION: Primary | ICD-10-CM

## 2022-02-14 DIAGNOSIS — N76.0 BV (BACTERIAL VAGINOSIS): ICD-10-CM

## 2022-02-14 PROCEDURE — 99213 OFFICE O/P EST LOW 20 MIN: CPT | Performed by: OBSTETRICS & GYNECOLOGY

## 2022-02-14 PROCEDURE — G8484 FLU IMMUNIZE NO ADMIN: HCPCS | Performed by: OBSTETRICS & GYNECOLOGY

## 2022-02-14 PROCEDURE — 1036F TOBACCO NON-USER: CPT | Performed by: OBSTETRICS & GYNECOLOGY

## 2022-02-14 PROCEDURE — G8428 CUR MEDS NOT DOCUMENT: HCPCS | Performed by: OBSTETRICS & GYNECOLOGY

## 2022-02-14 PROCEDURE — G8417 CALC BMI ABV UP PARAM F/U: HCPCS | Performed by: OBSTETRICS & GYNECOLOGY

## 2022-02-14 RX ORDER — METRONIDAZOLE 500 MG/1
500 TABLET ORAL 2 TIMES DAILY
Qty: 14 TABLET | Refills: 0 | Status: SHIPPED | OUTPATIENT
Start: 2022-02-14 | End: 2022-02-21

## 2022-02-14 NOTE — PROGRESS NOTES
Patient alert and pleasant with concerns about vaginal irritation   Assisted with pelvic exam, no specimens obtained. Discharge instructions have been discussed with the patient. Patient advised to call our office with any questions or concerns. Voiced understanding.

## 2022-02-14 NOTE — PROGRESS NOTES
Here for vaginal irritation today. Not a lot of discharge. Uses dove soap. Did use sopme disinfectent wipe recently one time.     Pelvic:  Has some BV as well as what appears to be some monilia    Treat with flagyl as well as Terazol cream    See back prn

## 2022-02-28 NOTE — PROGRESS NOTES
[facility]                Phone: [phone]   Fax: [phone]    Physical Therapy Daily Treatment Note  Date:  2020    Patient Name:  Luis Ramey    :  1985  MRN: [id number]    Evaluating therapist:  COSMO Palacios               (20)  Restrictions/Precautions:    Diagnosis:  s/p cervical laminectomy/fusion C3-T1                (19)  Treatment Diagnosis:    Insurance/Certification information:  [address] [place], [place] [address]  Referring Practitioner:  Velasquez Dangelo of care signed (Y/N):    Visit# / total visits:    Pain level: 7/10   Time In:  Time Out:    Subjective:      Exercises:  Exercise/Equipment Resistance/Repetitions Other comments   UBE              corner st     upper trap st     chest st     thoracic st            shrugs     scap ret                                                                                Other Therapeutic Activities:      Home Exercise Program:      Manual Treatments:      Modalities:  IFC/MH to neck/upper back     Timed Code Treatment Minutes: Total Treatment Minutes:      Treatment/Activity Tolerance:  [] Patient tolerated treatment well [] Patient limited by fatique  [] Patient limited by pain  [] Patient limited by other medical complications  [] Other:     Prognosis: [] Good [] Fair  [] Poor    Patient Requires Follow-up: [] Yes  [] No    Plan:   [] Continue per plan of care [] Alter current plan (see comments)  [] Plan of care initiated [] Hold pending MD visit [] Discharge  Plan for Next Session:      See Weekly Progress Note: []  Yes  []  No  Next due:        Electronically signed by:   Maritza Nova
Physical Therapy  Initial Assessment  Date: 2020  Patient Name: Saida Khoury  MRN: [id number]  : 1985          Subjective   General  Chart Reviewed: Yes  Referring Practitioner: Heather Lunsford   Referral Date : 20  Diagnosis: s/p cervical laminectomy/fusion C3-T1  PT Visit Information  Onset Date: 19  PT Insurance Information: [id number]ides[address] [id number]  Total # of Visits Approved: 12  Total # of Visits to Date: 1  Subjective  Subjective: pt presents to therapy s/p cervical laminectomy/fusion C3-T1 done 19 and wound exploration done 19; previous PT with little relief prior to sx; presents to therapy wearing bone stimulator, telling PT she is wearing it for 4 hrs per day; MEDS helping with pain; c/o slight N/T into B UE's but this is much better since sx; no c/o clicking or popping in her neck/shoulders; sleep is fine with MEDS; RTD for follow-up in October   Pain Screening  Patient Currently in Pain: Yes  Pain Assessment  Pain Assessment: 0-10  Pain Level: 7  Pain Type: Surgical pain  Pain Location: Neck  Pain Orientation: Right;Left  Pain Descriptors: Aching;Constant  Functional Pain Assessment: Prevents or interferes with many active not passive activities  Vital Signs  Patient Currently in Pain: Yes    Objective     Observation/Palpation  Palpation: discomfort and tightness felt across all cervical  paraspinals C2-T4 into B upper traps and scap retractors  Observation: posture:  slightly forward head/rounded shoulders/B protracted scapuale     AROM RLE (degrees)  RLE AROM: WNL  AROM LLE (degrees)  LLE AROM : WNL  AROM RUE (degrees)  RUE AROM : WNL  AROM LUE (degrees)  LUE AROM : WNL  Spine  Cervical: limited ~ 80% WNL for all ranges with no c/o radiculopathy noted   Special Tests: deferred due to dx     Strength Other  Other: grossly 4+, 5/5 for all ranges B UE/LE's      Additional Measures  Other: endurance for all prolonged activities is FAIR/FAIR+  Sensation  Overall
positive S1/positive S2

## 2022-05-23 ENCOUNTER — HOSPITAL ENCOUNTER (OUTPATIENT)
Age: 37
Discharge: HOME | End: 2022-05-23
Payer: COMMERCIAL

## 2022-05-23 LAB
ALBUMIN SERPL-MCNC: 4.2 G/DL (ref 3.5–5.2)
ALP BLD-CCNC: 125 U/L (ref 35–104)
ALT SERPL-CCNC: 21 U/L (ref 0–32)
ANION GAP SERPL CALCULATED.3IONS-SCNC: 13 MMOL/L (ref 7–16)
AST SERPL-CCNC: 27 U/L (ref 0–31)
BASOPHILS ABSOLUTE: 0.06 E9/L (ref 0–0.2)
BASOPHILS RELATIVE PERCENT: 0.5 % (ref 0–2)
BILIRUB SERPL-MCNC: <0.2 MG/DL (ref 0–1.2)
BUN BLDV-MCNC: 9 MG/DL (ref 6–20)
C-REACTIVE PROTEIN: 0.4 MG/DL (ref 0–0.4)
CALCIUM SERPL-MCNC: 9.7 MG/DL (ref 8.6–10.2)
CHLORIDE BLD-SCNC: 105 MMOL/L (ref 98–107)
CO2: 25 MMOL/L (ref 22–29)
CREAT SERPL-MCNC: 0.9 MG/DL (ref 0.5–1)
EOSINOPHILS ABSOLUTE: 0.44 E9/L (ref 0.05–0.5)
EOSINOPHILS RELATIVE PERCENT: 3.9 % (ref 0–6)
GFR AFRICAN AMERICAN: >60
GFR NON-AFRICAN AMERICAN: >60 ML/MIN/1.73
GLUCOSE BLD-MCNC: 96 MG/DL (ref 74–99)
HCT VFR BLD CALC: 40.1 % (ref 34–48)
HEMOGLOBIN: 13.2 G/DL (ref 11.5–15.5)
IMMATURE GRANULOCYTES #: 0.04 E9/L
IMMATURE GRANULOCYTES %: 0.4 % (ref 0–5)
LYMPHOCYTES ABSOLUTE: 2.23 E9/L (ref 1.5–4)
LYMPHOCYTES RELATIVE PERCENT: 19.9 % (ref 20–42)
MCH RBC QN AUTO: 29.9 PG (ref 26–35)
MCHC RBC AUTO-ENTMCNC: 32.9 % (ref 32–34.5)
MCV RBC AUTO: 90.7 FL (ref 80–99.9)
MONOCYTES ABSOLUTE: 0.77 E9/L (ref 0.1–0.95)
MONOCYTES RELATIVE PERCENT: 6.9 % (ref 2–12)
NEUTROPHILS ABSOLUTE: 7.64 E9/L (ref 1.8–7.3)
NEUTROPHILS RELATIVE PERCENT: 68.4 % (ref 43–80)
PDW BLD-RTO: 13.3 FL (ref 11.5–15)
PLATELET # BLD: 355 E9/L (ref 130–450)
PMV BLD AUTO: 9.8 FL (ref 7–12)
POTASSIUM SERPL-SCNC: 3.7 MMOL/L (ref 3.5–5)
RBC # BLD: 4.42 E12/L (ref 3.5–5.5)
SEDIMENTATION RATE, ERYTHROCYTE: 42 MM/HR (ref 0–20)
SODIUM BLD-SCNC: 143 MMOL/L (ref 132–146)
TOTAL PROTEIN: 7.8 G/DL (ref 6.4–8.3)
WBC # BLD: 11.2 E9/L (ref 4.5–11.5)

## 2022-05-23 PROCEDURE — 86140 C-REACTIVE PROTEIN: CPT

## 2022-05-23 PROCEDURE — 36415 COLL VENOUS BLD VENIPUNCTURE: CPT

## 2022-05-23 PROCEDURE — 85025 COMPLETE CBC W/AUTO DIFF WBC: CPT

## 2022-05-23 PROCEDURE — 85651 RBC SED RATE NONAUTOMATED: CPT

## 2022-05-23 PROCEDURE — 80053 COMPREHEN METABOLIC PANEL: CPT

## 2022-08-09 ENCOUNTER — HOSPITAL ENCOUNTER (OUTPATIENT)
Age: 37
Discharge: HOME | End: 2022-08-09
Payer: COMMERCIAL

## 2022-08-09 DIAGNOSIS — T81.49XA POSTOPERATIVE WOUND INFECTION: ICD-10-CM

## 2022-08-09 LAB
ALBUMIN SERPL-MCNC: 4.2 G/DL (ref 3.5–5.2)
ALP BLD-CCNC: 111 U/L (ref 35–104)
ALT SERPL-CCNC: 17 U/L (ref 0–32)
ANION GAP SERPL CALCULATED.3IONS-SCNC: 13 MMOL/L (ref 7–16)
AST SERPL-CCNC: 22 U/L (ref 0–31)
BASOPHILS ABSOLUTE: 0.04 E9/L (ref 0–0.2)
BASOPHILS RELATIVE PERCENT: 0.4 % (ref 0–2)
BILIRUB SERPL-MCNC: <0.2 MG/DL (ref 0–1.2)
BUN BLDV-MCNC: 8 MG/DL (ref 6–20)
C-REACTIVE PROTEIN: 0.3 MG/DL (ref 0–0.4)
CALCIUM SERPL-MCNC: 9.4 MG/DL (ref 8.6–10.2)
CHLORIDE BLD-SCNC: 104 MMOL/L (ref 98–107)
CO2: 25 MMOL/L (ref 22–29)
CREAT SERPL-MCNC: 0.8 MG/DL (ref 0.5–1)
EOSINOPHILS ABSOLUTE: 0.24 E9/L (ref 0.05–0.5)
EOSINOPHILS RELATIVE PERCENT: 2.6 % (ref 0–6)
GFR AFRICAN AMERICAN: >60
GFR NON-AFRICAN AMERICAN: >60 ML/MIN/1.73
GLUCOSE BLD-MCNC: 77 MG/DL (ref 74–99)
HCT VFR BLD CALC: 37.9 % (ref 34–48)
HEMOGLOBIN: 12.2 G/DL (ref 11.5–15.5)
IMMATURE GRANULOCYTES #: 0.02 E9/L
IMMATURE GRANULOCYTES %: 0.2 % (ref 0–5)
LYMPHOCYTES ABSOLUTE: 3.52 E9/L (ref 1.5–4)
LYMPHOCYTES RELATIVE PERCENT: 38.7 % (ref 20–42)
MCH RBC QN AUTO: 29.5 PG (ref 26–35)
MCHC RBC AUTO-ENTMCNC: 32.2 % (ref 32–34.5)
MCV RBC AUTO: 91.8 FL (ref 80–99.9)
MONOCYTES ABSOLUTE: 0.64 E9/L (ref 0.1–0.95)
MONOCYTES RELATIVE PERCENT: 7 % (ref 2–12)
NEUTROPHILS ABSOLUTE: 4.63 E9/L (ref 1.8–7.3)
NEUTROPHILS RELATIVE PERCENT: 51.1 % (ref 43–80)
PDW BLD-RTO: 13.4 FL (ref 11.5–15)
PLATELET # BLD: 270 E9/L (ref 130–450)
PMV BLD AUTO: 11.4 FL (ref 7–12)
POTASSIUM SERPL-SCNC: 3.6 MMOL/L (ref 3.5–5)
RBC # BLD: 4.13 E12/L (ref 3.5–5.5)
SEDIMENTATION RATE, ERYTHROCYTE: 22 MM/HR (ref 0–20)
SODIUM BLD-SCNC: 142 MMOL/L (ref 132–146)
TOTAL PROTEIN: 7.7 G/DL (ref 6.4–8.3)
WBC # BLD: 9.1 E9/L (ref 4.5–11.5)

## 2022-08-09 PROCEDURE — 80053 COMPREHEN METABOLIC PANEL: CPT

## 2022-08-09 PROCEDURE — 86140 C-REACTIVE PROTEIN: CPT

## 2022-08-09 PROCEDURE — 36415 COLL VENOUS BLD VENIPUNCTURE: CPT

## 2022-08-09 PROCEDURE — 85651 RBC SED RATE NONAUTOMATED: CPT

## 2022-08-09 PROCEDURE — 85025 COMPLETE CBC W/AUTO DIFF WBC: CPT

## 2022-09-12 ENCOUNTER — HOSPITAL ENCOUNTER (OUTPATIENT)
Age: 37
Discharge: HOME | End: 2022-09-12
Payer: COMMERCIAL

## 2022-09-12 ENCOUNTER — OFFICE VISIT (OUTPATIENT)
Dept: OBGYN | Age: 37
End: 2022-09-12
Payer: COMMERCIAL

## 2022-09-12 VITALS
WEIGHT: 211 LBS | HEART RATE: 60 BPM | SYSTOLIC BLOOD PRESSURE: 117 MMHG | BODY MASS INDEX: 37.38 KG/M2 | DIASTOLIC BLOOD PRESSURE: 70 MMHG

## 2022-09-12 DIAGNOSIS — N30.90 CYSTITIS: ICD-10-CM

## 2022-09-12 DIAGNOSIS — B37.9 MONILIA INFECTION: ICD-10-CM

## 2022-09-12 DIAGNOSIS — R35.1 NOCTURIA MORE THAN TWICE PER NIGHT: ICD-10-CM

## 2022-09-12 DIAGNOSIS — N30.90 CYSTITIS: Primary | ICD-10-CM

## 2022-09-12 LAB
ALBUMIN SERPL-MCNC: 4.4 G/DL (ref 3.5–5.2)
ALP BLD-CCNC: 126 U/L (ref 35–104)
ALT SERPL-CCNC: 20 U/L (ref 0–32)
AMYLASE: 124 U/L (ref 20–100)
ANION GAP SERPL CALCULATED.3IONS-SCNC: 17 MMOL/L (ref 7–16)
AST SERPL-CCNC: 27 U/L (ref 0–31)
BASOPHILS ABSOLUTE: 0.05 E9/L (ref 0–0.2)
BASOPHILS RELATIVE PERCENT: 0.5 % (ref 0–2)
BILIRUB SERPL-MCNC: 0.2 MG/DL (ref 0–1.2)
BUN BLDV-MCNC: 6 MG/DL (ref 6–20)
CALCIUM SERPL-MCNC: 9.8 MG/DL (ref 8.6–10.2)
CHLORIDE BLD-SCNC: 104 MMOL/L (ref 98–107)
CHOLESTEROL, TOTAL: 207 MG/DL (ref 0–199)
CO2: 21 MMOL/L (ref 22–29)
CREAT SERPL-MCNC: 0.8 MG/DL (ref 0.5–1)
EOSINOPHILS ABSOLUTE: 0.45 E9/L (ref 0.05–0.5)
EOSINOPHILS RELATIVE PERCENT: 4.7 % (ref 0–6)
GFR AFRICAN AMERICAN: >60
GFR NON-AFRICAN AMERICAN: >60 ML/MIN/1.73
GLUCOSE BLD-MCNC: 88 MG/DL (ref 74–99)
HBA1C MFR BLD: 5.9 % (ref 4–5.6)
HCT VFR BLD CALC: 37.7 % (ref 34–48)
HDLC SERPL-MCNC: 49 MG/DL
HEMOGLOBIN: 12.2 G/DL (ref 11.5–15.5)
IMMATURE GRANULOCYTES #: 0.03 E9/L
IMMATURE GRANULOCYTES %: 0.3 % (ref 0–5)
LDL CHOLESTEROL CALCULATED: 117 MG/DL (ref 0–99)
LIPASE: 19 U/L (ref 13–60)
LYMPHOCYTES ABSOLUTE: 2.93 E9/L (ref 1.5–4)
LYMPHOCYTES RELATIVE PERCENT: 30.9 % (ref 20–42)
MCH RBC QN AUTO: 29 PG (ref 26–35)
MCHC RBC AUTO-ENTMCNC: 32.4 % (ref 32–34.5)
MCV RBC AUTO: 89.8 FL (ref 80–99.9)
MONOCYTES ABSOLUTE: 0.81 E9/L (ref 0.1–0.95)
MONOCYTES RELATIVE PERCENT: 8.5 % (ref 2–12)
NEUTROPHILS ABSOLUTE: 5.21 E9/L (ref 1.8–7.3)
NEUTROPHILS RELATIVE PERCENT: 55.1 % (ref 43–80)
PDW BLD-RTO: 13.8 FL (ref 11.5–15)
PLATELET # BLD: 322 E9/L (ref 130–450)
PMV BLD AUTO: 10 FL (ref 7–12)
POTASSIUM SERPL-SCNC: 3.4 MMOL/L (ref 3.5–5)
RBC # BLD: 4.2 E12/L (ref 3.5–5.5)
SODIUM BLD-SCNC: 142 MMOL/L (ref 132–146)
T4 FREE: 1.28 NG/DL (ref 0.93–1.7)
TOTAL PROTEIN: 8.2 G/DL (ref 6.4–8.3)
TRIGL SERPL-MCNC: 205 MG/DL (ref 0–149)
TSH SERPL DL<=0.05 MIU/L-ACNC: 1.44 UIU/ML (ref 0.27–4.2)
VLDLC SERPL CALC-MCNC: 41 MG/DL
WBC # BLD: 9.5 E9/L (ref 4.5–11.5)

## 2022-09-12 PROCEDURE — G8427 DOCREV CUR MEDS BY ELIG CLIN: HCPCS | Performed by: OBSTETRICS & GYNECOLOGY

## 2022-09-12 PROCEDURE — 84439 ASSAY OF FREE THYROXINE: CPT

## 2022-09-12 PROCEDURE — 82150 ASSAY OF AMYLASE: CPT

## 2022-09-12 PROCEDURE — 80053 COMPREHEN METABOLIC PANEL: CPT

## 2022-09-12 PROCEDURE — 86703 HIV-1/HIV-2 1 RESULT ANTBDY: CPT

## 2022-09-12 PROCEDURE — 83690 ASSAY OF LIPASE: CPT

## 2022-09-12 PROCEDURE — 85025 COMPLETE CBC W/AUTO DIFF WBC: CPT

## 2022-09-12 PROCEDURE — 86592 SYPHILIS TEST NON-TREP QUAL: CPT

## 2022-09-12 PROCEDURE — 83036 HEMOGLOBIN GLYCOSYLATED A1C: CPT

## 2022-09-12 PROCEDURE — 86803 HEPATITIS C AB TEST: CPT

## 2022-09-12 PROCEDURE — 99213 OFFICE O/P EST LOW 20 MIN: CPT | Performed by: OBSTETRICS & GYNECOLOGY

## 2022-09-12 PROCEDURE — 36415 COLL VENOUS BLD VENIPUNCTURE: CPT

## 2022-09-12 PROCEDURE — 80061 LIPID PANEL: CPT

## 2022-09-12 PROCEDURE — 1036F TOBACCO NON-USER: CPT | Performed by: OBSTETRICS & GYNECOLOGY

## 2022-09-12 PROCEDURE — 84443 ASSAY THYROID STIM HORMONE: CPT

## 2022-09-12 PROCEDURE — G8417 CALC BMI ABV UP PARAM F/U: HCPCS | Performed by: OBSTETRICS & GYNECOLOGY

## 2022-09-12 RX ORDER — NITROFURANTOIN 25; 75 MG/1; MG/1
100 CAPSULE ORAL 2 TIMES DAILY
Qty: 14 CAPSULE | Refills: 0 | Status: SHIPPED | OUTPATIENT
Start: 2022-09-12 | End: 2022-09-19

## 2022-09-12 NOTE — PROGRESS NOTES
Patient alert and pleasant, here today with c/o a UTI and or Yeast infection. Assisted with pelvic exam, no specimens obtained. Clean catch urine obtained for cultures, labeled and sent to lab. Discharge instructions have been discussed with the patient. Patient advised to call our office with any questions or concerns. Voiced understanding.

## 2022-09-12 NOTE — PROGRESS NOTES
Here today for check to see if has a bladder infection as well as possible yeast infection. Feels irritated, Nocturia times 4 at this time. Itching inside. Pelvic: Vulva:Clear   Vagina:Monilia present   Cx:absent   Ut:not palpated   Matthew:No tenderness or masses. Urine dip loade with leukocytes, positve nitrites. IMP: Cystitis. Monilia    Plan: Macrobid for 7 days. , terazol 7    See prn

## 2022-09-13 LAB
HEPATITIS C ANTIBODY INTERPRETATION: NORMAL
HIV-1 AND HIV-2 ANTIBODIES: NORMAL
RPR: NORMAL

## 2022-09-15 LAB — URINE CULTURE, ROUTINE: NORMAL

## 2022-09-29 ENCOUNTER — HOSPITAL ENCOUNTER (EMERGENCY)
Age: 37
Discharge: HOME | End: 2022-09-29
Payer: COMMERCIAL

## 2022-09-29 ENCOUNTER — APPOINTMENT (OUTPATIENT)
Dept: CT IMAGING | Age: 37
End: 2022-09-29
Payer: COMMERCIAL

## 2022-09-29 VITALS
OXYGEN SATURATION: 100 % | WEIGHT: 208 LBS | BODY MASS INDEX: 36.86 KG/M2 | RESPIRATION RATE: 16 BRPM | DIASTOLIC BLOOD PRESSURE: 92 MMHG | TEMPERATURE: 98.4 F | HEART RATE: 79 BPM | HEIGHT: 63 IN | SYSTOLIC BLOOD PRESSURE: 129 MMHG

## 2022-09-29 DIAGNOSIS — M54.9 UPPER BACK PAIN: Primary | ICD-10-CM

## 2022-09-29 DIAGNOSIS — M54.2 NECK PAIN: ICD-10-CM

## 2022-09-29 PROCEDURE — 99284 EMERGENCY DEPT VISIT MOD MDM: CPT

## 2022-09-29 PROCEDURE — 6360000002 HC RX W HCPCS: Performed by: PHYSICIAN ASSISTANT

## 2022-09-29 PROCEDURE — 72125 CT NECK SPINE W/O DYE: CPT

## 2022-09-29 PROCEDURE — 96372 THER/PROPH/DIAG INJ SC/IM: CPT

## 2022-09-29 RX ORDER — HYDROCODONE BITARTRATE AND ACETAMINOPHEN 5; 325 MG/1; MG/1
1 TABLET ORAL EVERY 6 HOURS PRN
Qty: 6 TABLET | Refills: 0 | Status: SHIPPED | OUTPATIENT
Start: 2022-09-29 | End: 2022-10-02

## 2022-09-29 RX ORDER — ORPHENADRINE CITRATE 30 MG/ML
60 INJECTION INTRAMUSCULAR; INTRAVENOUS ONCE
Status: COMPLETED | OUTPATIENT
Start: 2022-09-29 | End: 2022-09-29

## 2022-09-29 RX ORDER — MORPHINE SULFATE 4 MG/ML
6 INJECTION, SOLUTION INTRAMUSCULAR; INTRAVENOUS ONCE
Status: COMPLETED | OUTPATIENT
Start: 2022-09-29 | End: 2022-09-29

## 2022-09-29 RX ADMIN — ORPHENADRINE CITRATE 60 MG: 30 INJECTION INTRAMUSCULAR; INTRAVENOUS at 11:43

## 2022-09-29 RX ADMIN — MORPHINE SULFATE 6 MG: 4 INJECTION, SOLUTION INTRAMUSCULAR; INTRAVENOUS at 11:43

## 2022-09-29 ASSESSMENT — PAIN SCALES - GENERAL: PAINLEVEL_OUTOF10: 10

## 2022-09-29 ASSESSMENT — PAIN DESCRIPTION - ORIENTATION: ORIENTATION: LOWER

## 2022-09-29 ASSESSMENT — PAIN DESCRIPTION - LOCATION: LOCATION: BACK

## 2022-09-29 ASSESSMENT — PAIN - FUNCTIONAL ASSESSMENT: PAIN_FUNCTIONAL_ASSESSMENT: 0-10

## 2022-09-29 NOTE — ED PROVIDER NOTES
other systems reviewed and are negative      -------------------------------- PAST HISTORY ----------------------------------  Past Medical History:  has a past medical history of Anxiety, Asthma, DDD (degenerative disc disease), cervical, Depression, Ectopic pregnancy, tubal, Gall stone, Heart murmur, Hernia, History of blood transfusion, and Postoperative anemia. Past Surgical History:  has a past surgical history that includes  section; laparoscopy (2012); Ectopic pregnancy surgery; Tubal ligation; laparoscopy ([id number]); other surgical history (3/25/2014); Hysterectomy, total abdominal (14); Abdominal wall surgery ([id number]); Cholecystectomy; Hysterectomy (N/A, 2017); hernia repair; cervical fusion (N/A, 2019); and cervical fusion (N/A, 2019). Social History:  reports that she quit smoking about 6 years ago. Her smoking use included cigarettes and cigars. She has never used smokeless tobacco. She reports that she does not currently use alcohol. She reports current drug use. Drug: Marijuana Marcy Montesinos). Family History: family history includes Cancer in her mother; Diabetes in her mother; High Blood Pressure in her mother; No Known Problems in her brother, father, and sister. The patients home medications have been reviewed. Allergies: Latex, Bactrim [sulfamethoxazole-trimethoprim], Sulfamethoxazole-trimethoprim, Fish-derived products, Ibuprofen, Iodine, Naproxen, and Percocet [oxycodone-acetaminophen]    --------------------------------- RESULTS ------------------------------------------  All laboratory and radiology results have been personally reviewed by myself   LABS:  No results found for this visit on 22. RADIOLOGY:  Interpreted by Radiologist.  Heather Eilzabeth   Final Result   1. There is no acute abnormality of the cervical spine   2. Status post transpedicular fusion of the cervical spine from C3 through   T1.   There is no hardware complication or malalignment   3. No significant foraminal stenosis is noted. ----------------- NURSING NOTES AND VITALS REVIEWED ---------------   The nursing notes within the ED encounter and vital signs as below have been reviewed. BP (!) 129/92   Pulse 79   Temp 98.4 °F (36.9 °C)   Resp 16   Ht 5' 3\" (1.6 m)   Wt 208 lb (94.3 kg)   LMP 05/19/2014   SpO2 100%   BMI 36.85 kg/m²   Oxygen Saturation Interpretation: Normal      --------------------------------PHYSICAL EXAM------------------------------------      Constitutional/General: Alert and oriented x3, well appearing, non toxic in NAD  Head: NC/AT  Eyes: PERRL, EOMI  Mouth: Oropharynx clear, handling secretions, no trismus  Neck: Supple, midline scar noted. No redness, swelling or deformity. UE ROM and strength intact.  strength equal.    Sensation intact to light touch. Pulmonary: Lungs clear to auscultation bilaterally, no wheezes, rales, or rhonchi. Not in respiratory distress  Cardiovascular:  Regular rate and rhythm, no murmurs, gallops, or rubs. 2+ distal pulses  Abdomen: Soft, + BS. No distension. Nontender. No palpable rigidity, rebound or guarding  Extremities: Moves all extremities x 4. Warm and well perfused  Skin: warm and dry without rash  Neurologic: GCS 15,  Intact. No focal deficits  Psych: Normal Affect      ------------------------ ED COURSE/MEDICAL DECISION MAKING----------------------  Medications   orphenadrine (NORFLEX) injection 60 mg (60 mg IntraMUSCular Given 9/29/22 1143)   morphine sulfate (PF) injection 6 mg (6 mg IntraMUSCular Given 9/29/22 1143)         Medical Decision Making:    Pt given meds for pain as well as muscle relaxor. She is feeling some better at this time. CT scan cervical spine unremarkable. Hardware intact and in good position. No obvious cervical stenosis.    While waiting in the department the patient did make an appointment tomorrow with pain management and Monday with Dr. Haylee Puri. I gave her a slip to be off work until that time. I did give her a few meds for pain in the form of #6 Norco only. She has a muscle relaxer at home already. Follow-up as discussed above. Patient is aware and agreeable to this plan       Counseling: The emergency provider has spoken with the patient and discussed todays results, in addition to providing specific details for the plan of care and counseling regarding the diagnosis and prognosis. Questions are answered at this time and they are agreeable with the plan.      ------------------------ IMPRESSION AND DISPOSITION -------------------------------    IMPRESSION  1. Upper back pain    2.  Neck pain        DISPOSITION  Disposition: Discharge to home  Patient condition is stable                    Kylee Monroe PA-C  09/29/22 [id number]

## 2022-09-29 NOTE — Clinical Note
Krystin Stover was seen and treated in our emergency department on 9/29/2022. She may return to work on 10/03/2022. If you have any questions or concerns, please don't hesitate to call.       Mari Maria PA-C

## 2022-10-03 ENCOUNTER — OFFICE VISIT (OUTPATIENT)
Dept: NEUROSURGERY | Age: 37
End: 2022-10-03
Payer: COMMERCIAL

## 2022-10-03 ENCOUNTER — HOSPITAL ENCOUNTER (OUTPATIENT)
Age: 37
Discharge: HOME | End: 2022-10-03
Payer: COMMERCIAL

## 2022-10-03 VITALS
DIASTOLIC BLOOD PRESSURE: 68 MMHG | RESPIRATION RATE: 18 BRPM | TEMPERATURE: 97.2 F | HEIGHT: 63 IN | OXYGEN SATURATION: 98 % | SYSTOLIC BLOOD PRESSURE: 107 MMHG | HEART RATE: 81 BPM | WEIGHT: 208 LBS | BODY MASS INDEX: 36.86 KG/M2

## 2022-10-03 DIAGNOSIS — M54.40 ACUTE RIGHT-SIDED LOW BACK PAIN WITH SCIATICA, SCIATICA LATERALITY UNSPECIFIED: Primary | ICD-10-CM

## 2022-10-03 DIAGNOSIS — M54.2 NECK PAIN OF OVER 3 MONTHS DURATION: ICD-10-CM

## 2022-10-03 LAB
ALBUMIN SERPL-MCNC: 4.7 G/DL (ref 3.5–5.2)
ALP BLD-CCNC: 143 U/L (ref 35–104)
ALT SERPL-CCNC: 27 U/L (ref 0–32)
AMYLASE: 164 U/L (ref 20–100)
ANION GAP SERPL CALCULATED.3IONS-SCNC: 16 MMOL/L (ref 7–16)
AST SERPL-CCNC: 32 U/L (ref 0–31)
BILIRUB SERPL-MCNC: <0.2 MG/DL (ref 0–1.2)
BUN BLDV-MCNC: 11 MG/DL (ref 6–20)
CALCIUM SERPL-MCNC: 10.3 MG/DL (ref 8.6–10.2)
CHLORIDE BLD-SCNC: 103 MMOL/L (ref 98–107)
CO2: 22 MMOL/L (ref 22–29)
CREAT SERPL-MCNC: 0.8 MG/DL (ref 0.5–1)
GFR AFRICAN AMERICAN: >60
GFR NON-AFRICAN AMERICAN: >60 ML/MIN/1.73
GLUCOSE BLD-MCNC: 83 MG/DL (ref 74–99)
HCT VFR BLD CALC: 46.7 % (ref 34–48)
HEMOGLOBIN: 15 G/DL (ref 11.5–15.5)
LIPASE: 39 U/L (ref 13–60)
MCH RBC QN AUTO: 29.2 PG (ref 26–35)
MCHC RBC AUTO-ENTMCNC: 32.1 % (ref 32–34.5)
MCV RBC AUTO: 90.9 FL (ref 80–99.9)
PDW BLD-RTO: 13.5 FL (ref 11.5–15)
PLATELET # BLD: 308 E9/L (ref 130–450)
PMV BLD AUTO: 10.4 FL (ref 7–12)
POTASSIUM SERPL-SCNC: 4 MMOL/L (ref 3.5–5)
RBC # BLD: 5.14 E12/L (ref 3.5–5.5)
SODIUM BLD-SCNC: 141 MMOL/L (ref 132–146)
TOTAL PROTEIN: 9.2 G/DL (ref 6.4–8.3)
WBC # BLD: 9.9 E9/L (ref 4.5–11.5)

## 2022-10-03 PROCEDURE — 80053 COMPREHEN METABOLIC PANEL: CPT

## 2022-10-03 PROCEDURE — 99212 OFFICE O/P EST SF 10 MIN: CPT

## 2022-10-03 PROCEDURE — G8484 FLU IMMUNIZE NO ADMIN: HCPCS | Performed by: PHYSICIAN ASSISTANT

## 2022-10-03 PROCEDURE — G8417 CALC BMI ABV UP PARAM F/U: HCPCS | Performed by: PHYSICIAN ASSISTANT

## 2022-10-03 PROCEDURE — G8427 DOCREV CUR MEDS BY ELIG CLIN: HCPCS | Performed by: PHYSICIAN ASSISTANT

## 2022-10-03 PROCEDURE — 82150 ASSAY OF AMYLASE: CPT

## 2022-10-03 PROCEDURE — 99213 OFFICE O/P EST LOW 20 MIN: CPT | Performed by: PHYSICIAN ASSISTANT

## 2022-10-03 PROCEDURE — 36415 COLL VENOUS BLD VENIPUNCTURE: CPT

## 2022-10-03 PROCEDURE — 85027 COMPLETE CBC AUTOMATED: CPT

## 2022-10-03 PROCEDURE — 1036F TOBACCO NON-USER: CPT | Performed by: PHYSICIAN ASSISTANT

## 2022-10-03 PROCEDURE — 83690 ASSAY OF LIPASE: CPT

## 2022-10-03 ASSESSMENT — ENCOUNTER SYMPTOMS
RESPIRATORY NEGATIVE: 1
EYES NEGATIVE: 1
ALLERGIC/IMMUNOLOGIC NEGATIVE: 1
GASTROINTESTINAL NEGATIVE: 1

## 2022-10-03 NOTE — PROGRESS NOTES
Subjective:      Patient ID: Gerardo Saldaña is a 31 y.o. female. Neck Pain   This is a chronic problem. The current episode started more than 1 year ago. The problem occurs constantly. The problem has been gradually worsening. The pain is present in the midline (and mid back pain). The quality of the pain is described as aching and shooting. The pain is at a severity of 8/10. Associated symptoms comments: Intermittent hand/arm numbness. . She has tried acetaminophen, home exercises, ice, heat and NSAIDs for the symptoms. The treatment provided mild relief. Review of Systems   Constitutional: Negative. HENT: Negative. Eyes: Negative. Respiratory: Negative. Cardiovascular: Negative. Gastrointestinal: Negative. Endocrine: Negative. Genitourinary: Negative. Musculoskeletal:  Positive for neck pain. Skin: Negative. Allergic/Immunologic: Negative. Neurological: Negative. Hematological: Negative. Psychiatric/Behavioral: Negative. Objective:   Physical Exam  Constitutional:       Appearance: Normal appearance. HENT:      Head: Normocephalic and atraumatic. Nose: Nose normal.   Eyes:      Pupils: Pupils are equal, round, and reactive to light. Pulmonary:      Effort: Pulmonary effort is normal.   Abdominal:      General: There is no distension. Skin:     General: Skin is warm and dry. Neurological:      Mental Status: She is alert. GCS: GCS eye subscore is 4. GCS verbal subscore is 5. GCS motor subscore is 6. Cranial Nerves: Cranial nerves 2-12 are intact. Sensory: Sensation is intact. Motor: Motor function is intact. Coordination: Coordination is intact. Deep Tendon Reflexes: Reflexes are normal and symmetric. Psychiatric:         Mood and Affect: Mood normal.       Assessment:      31year old female with chronic neck pain. She had a C3-T1 PCF years ago. Cervical CT reveals normal alignment without hardware failure.   She also c/o severe low back and bilateral leg pain or numbness. Plan:      She will follow up with her pain management and physical therapy. If conservative measures fail, we will order a lumbar MRI.   She         MIKY Altamirano

## 2022-11-04 ENCOUNTER — HOSPITAL ENCOUNTER (OUTPATIENT)
Age: 37
Discharge: HOME | End: 2022-11-04
Payer: COMMERCIAL

## 2022-11-04 DIAGNOSIS — T81.49XA SURGICAL WOUND INFECTION: ICD-10-CM

## 2022-11-04 LAB
ALBUMIN SERPL-MCNC: 4.2 G/DL (ref 3.5–5.2)
ALP BLD-CCNC: 109 U/L (ref 35–104)
ALT SERPL-CCNC: 17 U/L (ref 0–32)
ANION GAP SERPL CALCULATED.3IONS-SCNC: 11 MMOL/L (ref 7–16)
AST SERPL-CCNC: 24 U/L (ref 0–31)
BASOPHILS ABSOLUTE: 0.02 E9/L (ref 0–0.2)
BASOPHILS RELATIVE PERCENT: 0.2 % (ref 0–2)
BILIRUB SERPL-MCNC: <0.2 MG/DL (ref 0–1.2)
BUN BLDV-MCNC: 11 MG/DL (ref 6–20)
C-REACTIVE PROTEIN: 0.3 MG/DL (ref 0–0.4)
CALCIUM SERPL-MCNC: 9.9 MG/DL (ref 8.6–10.2)
CHLORIDE BLD-SCNC: 106 MMOL/L (ref 98–107)
CO2: 28 MMOL/L (ref 22–29)
CREAT SERPL-MCNC: 0.9 MG/DL (ref 0.5–1)
EOSINOPHILS ABSOLUTE: 0.3 E9/L (ref 0.05–0.5)
EOSINOPHILS RELATIVE PERCENT: 2.9 % (ref 0–6)
GFR SERPL CREATININE-BSD FRML MDRD: >60 ML/MIN/1.73
GLUCOSE BLD-MCNC: 82 MG/DL (ref 74–99)
HCT VFR BLD CALC: 37.5 % (ref 34–48)
HEMOGLOBIN: 12.1 G/DL (ref 11.5–15.5)
IMMATURE GRANULOCYTES #: 0.03 E9/L
IMMATURE GRANULOCYTES %: 0.3 % (ref 0–5)
LYMPHOCYTES ABSOLUTE: 3.23 E9/L (ref 1.5–4)
LYMPHOCYTES RELATIVE PERCENT: 31.5 % (ref 20–42)
MCH RBC QN AUTO: 29.9 PG (ref 26–35)
MCHC RBC AUTO-ENTMCNC: 32.3 % (ref 32–34.5)
MCV RBC AUTO: 92.6 FL (ref 80–99.9)
MONOCYTES ABSOLUTE: 0.67 E9/L (ref 0.1–0.95)
MONOCYTES RELATIVE PERCENT: 6.5 % (ref 2–12)
NEUTROPHILS ABSOLUTE: 6.01 E9/L (ref 1.8–7.3)
NEUTROPHILS RELATIVE PERCENT: 58.6 % (ref 43–80)
PDW BLD-RTO: 13.2 FL (ref 11.5–15)
PLATELET # BLD: 350 E9/L (ref 130–450)
PMV BLD AUTO: 10.2 FL (ref 7–12)
POTASSIUM SERPL-SCNC: 3.4 MMOL/L (ref 3.5–5)
RBC # BLD: 4.05 E12/L (ref 3.5–5.5)
SEDIMENTATION RATE, ERYTHROCYTE: 34 MM/HR (ref 0–20)
SODIUM BLD-SCNC: 145 MMOL/L (ref 132–146)
TOTAL PROTEIN: 7.7 G/DL (ref 6.4–8.3)
WBC # BLD: 10.3 E9/L (ref 4.5–11.5)

## 2022-11-04 PROCEDURE — 85025 COMPLETE CBC W/AUTO DIFF WBC: CPT

## 2022-11-04 PROCEDURE — 85651 RBC SED RATE NONAUTOMATED: CPT

## 2022-11-04 PROCEDURE — 80053 COMPREHEN METABOLIC PANEL: CPT

## 2022-11-04 PROCEDURE — 86140 C-REACTIVE PROTEIN: CPT

## 2022-11-04 PROCEDURE — 36415 COLL VENOUS BLD VENIPUNCTURE: CPT

## 2022-11-10 ENCOUNTER — HOSPITAL ENCOUNTER (OUTPATIENT)
Age: 37
Discharge: HOME | End: 2022-11-10
Payer: COMMERCIAL

## 2022-11-10 LAB
ALBUMIN SERPL-MCNC: 4.5 G/DL (ref 3.5–5.2)
ALP BLD-CCNC: 116 U/L (ref 35–104)
ALT SERPL-CCNC: 17 U/L (ref 0–32)
AMYLASE: 133 U/L (ref 20–100)
ANION GAP SERPL CALCULATED.3IONS-SCNC: 14 MMOL/L (ref 7–16)
AST SERPL-CCNC: 24 U/L (ref 0–31)
BASOPHILS ABSOLUTE: 0.04 E9/L (ref 0–0.2)
BASOPHILS RELATIVE PERCENT: 0.4 % (ref 0–2)
BILIRUB SERPL-MCNC: 0.3 MG/DL (ref 0–1.2)
BUN BLDV-MCNC: 9 MG/DL (ref 6–20)
CALCIUM SERPL-MCNC: 9.8 MG/DL (ref 8.6–10.2)
CHLORIDE BLD-SCNC: 100 MMOL/L (ref 98–107)
CO2: 25 MMOL/L (ref 22–29)
CREAT SERPL-MCNC: 0.9 MG/DL (ref 0.5–1)
EOSINOPHILS ABSOLUTE: 0.28 E9/L (ref 0.05–0.5)
EOSINOPHILS RELATIVE PERCENT: 2.5 % (ref 0–6)
GFR SERPL CREATININE-BSD FRML MDRD: >60 ML/MIN/1.73
GLUCOSE BLD-MCNC: 64 MG/DL (ref 74–99)
HCT VFR BLD CALC: 40.5 % (ref 34–48)
HEMOGLOBIN: 12.7 G/DL (ref 11.5–15.5)
IMMATURE GRANULOCYTES #: 0.03 E9/L
IMMATURE GRANULOCYTES %: 0.3 % (ref 0–5)
LIPASE: 23 U/L (ref 13–60)
LYMPHOCYTES ABSOLUTE: 3.72 E9/L (ref 1.5–4)
LYMPHOCYTES RELATIVE PERCENT: 33.8 % (ref 20–42)
MCH RBC QN AUTO: 29.5 PG (ref 26–35)
MCHC RBC AUTO-ENTMCNC: 31.4 % (ref 32–34.5)
MCV RBC AUTO: 94 FL (ref 80–99.9)
MONOCYTES ABSOLUTE: 0.7 E9/L (ref 0.1–0.95)
MONOCYTES RELATIVE PERCENT: 6.4 % (ref 2–12)
NEUTROPHILS ABSOLUTE: 6.23 E9/L (ref 1.8–7.3)
NEUTROPHILS RELATIVE PERCENT: 56.6 % (ref 43–80)
PDW BLD-RTO: 13.3 FL (ref 11.5–15)
PLATELET # BLD: 355 E9/L (ref 130–450)
PMV BLD AUTO: 10.4 FL (ref 7–12)
POTASSIUM SERPL-SCNC: 3.3 MMOL/L (ref 3.5–5)
RBC # BLD: 4.31 E12/L (ref 3.5–5.5)
SODIUM BLD-SCNC: 139 MMOL/L (ref 132–146)
TOTAL PROTEIN: 8.1 G/DL (ref 6.4–8.3)
WBC # BLD: 11 E9/L (ref 4.5–11.5)

## 2022-11-10 PROCEDURE — 86376 MICROSOMAL ANTIBODY EACH: CPT

## 2022-11-10 PROCEDURE — 86707 HEPATITIS BE ANTIBODY: CPT

## 2022-11-10 PROCEDURE — 82103 ALPHA-1-ANTITRYPSIN TOTAL: CPT

## 2022-11-10 PROCEDURE — 87340 HEPATITIS B SURFACE AG IA: CPT

## 2022-11-10 PROCEDURE — 86803 HEPATITIS C AB TEST: CPT

## 2022-11-10 PROCEDURE — 82150 ASSAY OF AMYLASE: CPT

## 2022-11-10 PROCEDURE — 87350 HEPATITIS BE AG IA: CPT

## 2022-11-10 PROCEDURE — 83690 ASSAY OF LIPASE: CPT

## 2022-11-10 PROCEDURE — 80053 COMPREHEN METABOLIC PANEL: CPT

## 2022-11-10 PROCEDURE — 86706 HEP B SURFACE ANTIBODY: CPT

## 2022-11-10 PROCEDURE — 86704 HEP B CORE ANTIBODY TOTAL: CPT

## 2022-11-10 PROCEDURE — 83516 IMMUNOASSAY NONANTIBODY: CPT

## 2022-11-10 PROCEDURE — 86038 ANTINUCLEAR ANTIBODIES: CPT

## 2022-11-10 PROCEDURE — 82390 ASSAY OF CERULOPLASMIN: CPT

## 2022-11-10 PROCEDURE — 36415 COLL VENOUS BLD VENIPUNCTURE: CPT

## 2022-11-10 PROCEDURE — 85025 COMPLETE CBC W/AUTO DIFF WBC: CPT

## 2022-11-10 PROCEDURE — 86255 FLUORESCENT ANTIBODY SCREEN: CPT

## 2022-11-10 PROCEDURE — 82784 ASSAY IGA/IGD/IGG/IGM EACH: CPT

## 2022-11-11 LAB
ANTI-MITOCHONDRIAL AB, IFA: NEGATIVE
ANTI-NUCLEAR ANTIBODY (ANA): NEGATIVE
IGA: 146 MG/DL (ref 70–400)
SMOOTH MUSCLE ANTIBODY: NEGATIVE

## 2022-11-12 LAB
CERULOPLASMIN: 32 MG/DL (ref 16–45)
HEPATITIS B CORE TOTAL ANTIBODY: NONREACTIVE
HEPATITIS BE ANTIBODY: NEGATIVE

## 2022-11-13 LAB
ALPHA-1 ANTITRYPSIN: 124 MG/DL (ref 90–200)
HEPATITIS BE ANTIGEN: NEGATIVE
LIVER-KIDNEY MICROSOME-1 AB IGG: 1.1 U (ref 0–24.9)

## 2022-11-14 LAB
TISSUE TRANSGLUTAMINASE ANTIBODY IGG: 1.3 U/ML
TISSUE TRANSGLUTAMINASE IGA: 0.4 U/ML

## 2022-11-16 LAB
HBV SURFACE AB TITR SER: NORMAL {TITER}
HEPATITIS B SURFACE ANTIGEN INTERPRETATION: NORMAL
HEPATITIS C ANTIBODY INTERPRETATION: NORMAL

## 2022-11-30 ENCOUNTER — OFFICE VISIT (OUTPATIENT)
Dept: PAIN MANAGEMENT | Age: 37
End: 2022-11-30
Payer: COMMERCIAL

## 2022-11-30 VITALS
DIASTOLIC BLOOD PRESSURE: 57 MMHG | RESPIRATION RATE: 16 BRPM | OXYGEN SATURATION: 97 % | HEIGHT: 63 IN | BODY MASS INDEX: 37.21 KG/M2 | HEART RATE: 61 BPM | WEIGHT: 210 LBS | TEMPERATURE: 97 F | SYSTOLIC BLOOD PRESSURE: 93 MMHG

## 2022-11-30 DIAGNOSIS — G89.29 CHRONIC MYOFASCIAL PAIN: ICD-10-CM

## 2022-11-30 DIAGNOSIS — M79.18 CHRONIC MYOFASCIAL PAIN: ICD-10-CM

## 2022-11-30 DIAGNOSIS — M96.1 CERVICAL POSTLAMINECTOMY SYNDROME: Primary | ICD-10-CM

## 2022-11-30 DIAGNOSIS — M47.817 LUMBOSACRAL SPONDYLOSIS WITHOUT MYELOPATHY: ICD-10-CM

## 2022-11-30 DIAGNOSIS — M51.36 DDD (DEGENERATIVE DISC DISEASE), LUMBAR: ICD-10-CM

## 2022-11-30 PROCEDURE — G8417 CALC BMI ABV UP PARAM F/U: HCPCS | Performed by: ANESTHESIOLOGY

## 2022-11-30 PROCEDURE — G8484 FLU IMMUNIZE NO ADMIN: HCPCS | Performed by: ANESTHESIOLOGY

## 2022-11-30 PROCEDURE — 1036F TOBACCO NON-USER: CPT | Performed by: ANESTHESIOLOGY

## 2022-11-30 PROCEDURE — 99213 OFFICE O/P EST LOW 20 MIN: CPT | Performed by: ANESTHESIOLOGY

## 2022-11-30 PROCEDURE — G8428 CUR MEDS NOT DOCUMENT: HCPCS | Performed by: ANESTHESIOLOGY

## 2022-11-30 PROCEDURE — 99214 OFFICE O/P EST MOD 30 MIN: CPT | Performed by: ANESTHESIOLOGY

## 2022-11-30 RX ORDER — BACLOFEN 5 MG/1
10 TABLET ORAL DAILY PRN
Qty: 30 TABLET | Refills: 2 | Status: SHIPPED
Start: 2022-11-30 | End: 2022-11-30

## 2022-11-30 RX ORDER — BACLOFEN 5 MG/1
5 TABLET ORAL DAILY PRN
Qty: 30 TABLET | Refills: 2 | Status: SHIPPED | OUTPATIENT
Start: 2022-11-30

## 2022-11-30 NOTE — PROGRESS NOTES
[facility]   [place], Saida Elizabeth  Dept: [phone]    Follow up Note      Yi Ramey     Date of Visit:  11/30/2022    CC:  Patient presents for follow up   Chief Complaint   Patient presents with    Follow-up     Low back pain        HPI:  H/o Chronic neck pain. She is s/p C spein surgery on 11/1/2019 by Dr. Enedina Pelaez:    Bilateral C3, C4, C5, C6, and C7 laminectomy. Bilateral segmental arthrodesis and fusion from C3 to T1 with use of bilateral C3, C4, C5, and C6 lateral mass screws and bilateral T1 pedicle screws. Reexploration and repair of CSF leak on 11/11/2019. EMG: on 9/21/2020: Essentially normal.    Has been doing PT/ HEP. Trigger point injection helped. C/o diffuse back pain. Pain causes functional limitations/ limits Adl's : Yes    Nursing notes and details of the pain history reviewed. Please see intake notes for details. Previous treatments:   Physical Therapy : yes, for 6 weeks in Oct/ Nov 2022 has been doing PT/ HEP     Medications: - NSAID's : yes                        - Membrane stabilizers : yes                        - Opioids : yes, in the past                       - Adjuvants or Others : yes, muscle relaxant     Surgeries: yes : posterior cervical fusion as detailed above by Dr. Enedina Pelaez in Nov 2019     She has not been on anticoagulation medications no. She has not been on herbal supplements. She is not diabetic. H/O Smoking: had quit- has started intermittently. H/O alcohol abuse : denies  H/O Illicit drug use : H/O THC use +     Employment: [employer]. Labs: BUN/ Creatinine- normal.     Imaging:     CT cervical spine: 3/1/2021:     Impression   No evidence of acute fracture or dislocation. Status post posterior fusion from C3 through T1.       CT lumbar spine: 1/3/2020:     Findings: :   Changes seen throughout the discs are abnormal. There are findings to   suggest  degenerative disc disease.        Changes seen throughout the bone marrow are abnormal. Findings are   compatible with degenerative disc disease       Changes within the facets are abnormal. Findings are compatible with   degenerative facet disease. At L5-S1: There is a mild broad-based disc herniation, there is mild   canal stenosis       At L4-5: There is a mild broad-based disc herniation, there is mild   canal stenosis       At L3-4: There is no significant disc herniation, there is no   significant canal stenosis. At L2-3: There is no significant disc herniation, there is no   significant canal stenosis. At L1-2: There is no significant disc herniation, there is no   significant canal stenosis. Impression   Findings compatible with degenerative changes         X-ray cervical spine: 9/7/2021:  Impression   Cervicothoracic spinal fusion with normal alignment and no new abnormal   findings. Potential Aberrant Drug-Related Behavior:  H/o THC use +     Urine Drug Screening:  On 2/13/2020 reviewed; + for Oxycodone, + benzo and + for FISH [facility]     OARRS report[de-identified]  OARRS reviewed- today.      Past Medical History:   Diagnosis Date    Anxiety     Asthma     DDD (degenerative disc disease), cervical 3/9/2020    Depression     Ectopic pregnancy, tubal 2012    Gall stone 2005    Heart murmur     Hernia 7044    umbilical     History of blood transfusion     Postoperative anemia        Past Surgical History:   Procedure Laterality Date    ABDOMINAL WALL SURGERY  [id number]    CERVICAL FUSION N/A 11/1/2019    C3-C7  CERVICAL LAMINECTOMY AND POSTERIOR C3-T1 FUSION -- NEEDS JOSH TABLE, CELL SAVER, PLATELET GEL, PLATES, SCREWS -- GLOBUS performed by Be John MD at [address] N/A 11/11/2019    POSTERIOR CERVICAL WOUND EXPLORATION - WANTS TF performed by Be John MD at [address]       x3    [address] (CERVIX STATUS UNKNOWN) N/A 12/26/2017    robotic assisted ,   BSO    HYSTERECTOMY, TOTAL ABDOMINAL (CERVIX REMOVED)  8/20/14    LAPAROSCOPY  9/28/2012    left salpingectomy    LAPAROSCOPY  [id number]    RASHEEDA    OTHER SURGICAL HISTORY  3/25/2014    D&C;LEEP    TUBAL LIGATION         Prior to Admission medications    Medication Sig Start Date End Date Taking?  Authorizing Provider   cyclobenzaprine (FLEXERIL) 5 MG tablet Take 1 tablet by mouth daily as needed for Muscle spasms 11/15/21  Yes Silvnia Machado MD   Lidocaine (ZTLIDO) 1.8 % PTCH 12 hours on 12 hours off 3/9/20  Yes Silvina Machado MD   NARCAN 4 MG/0.1ML LIQD nasal spray ADMINISTER A SINGLE spray INTO ONE NOSTRIL CALL 911 MAY REPEAT ONCE 11/20/19  Yes Historical Provider, MD   albuterol sulfate  (90 Base) MCG/ACT inhaler Inhale 2 puffs into the lungs every 6 hours as needed for Wheezing   Yes Historical Provider, MD   fluticasone (FLONASE) 50 MCG/ACT nasal spray 2 sprays by Nasal route daily for 5 days 1/13/21 1/18/21  Riri Ybarra APRN - CNP   diclofenac sodium (VOLTAREN) 1 % GEL Apply 4 g topically 3 times daily as needed for Pain  Patient not taking: Reported on 11/30/2022 10/19/20   Silvina Machado MD   naproxen (NAPROXEN) 500 MG EC tablet Take 1 tablet by mouth 2 times daily (with meals) 7/30/20 10/4/21  MIKY Bear   FLUoxetine (PROZAC) 40 MG capsule  11/20/19   Historical Provider, MD   vitamin D (CHOLECALCIFEROL) 1000 UNIT TABS tablet Take 1 tablet by mouth daily  Patient not taking: Reported on 11/30/2022 6/1/19   Melany Coley MD       Allergies   Allergen Reactions    Latex     Bactrim [Sulfamethoxazole-Trimethoprim] Hives    Sulfamethoxazole-Trimethoprim Hives    Fish-Derived Products Swelling     Shrimp and Pirch    Ibuprofen Hives    Iodine      Fish derived products    Naproxen      headache    Percocet [Oxycodone-Acetaminophen] Hives       Social History     Socioeconomic History    Marital status: Single     Spouse name: Not on file    Number of children: Not on file    Years of education: Not on file    Highest education level: Not on file   Occupational History     Employer: [employer]   Tobacco Use    Smoking status: Former     Types: Cigarettes, Cigars     Quit date: 7/10/2016     Years since quittin.3    Smokeless tobacco: Never    Tobacco comments:     quite cigars   Vaping Use    Vaping Use: Never used   Substance and Sexual Activity    Alcohol use: Not Currently     Alcohol/week: 0.0 standard drinks    Drug use: Yes     Types: Marijuana Nury Coad)     Comment: occ    Sexual activity: Yes     Partners: Male   Other Topics Concern    Not on file   Social History Narrative    Not on file     Social Determinants of Health     Financial Resource Strain: Not on file   Food Insecurity: Not on file   Transportation Needs: Not on file   Physical Activity: Not on file   Stress: Not on file   Social Connections: Not on file   Intimate Partner Violence: Not on file   Housing Stability: Not on file       Family History   Problem Relation Age of Onset    Diabetes Mother     High Blood Pressure Mother     Cancer Mother     No Known Problems Father     No Known Problems Brother     No Known Problems Sister     Ovarian Cancer Neg Hx     Uterine Cancer Neg Hx     Breast Cancer Neg Hx     Colon Cancer Neg Hx        REVIEW OF SYSTEMS:     Rhina Borden denies fever/chills, chest pain, shortness of breath, new bowel or bladder complaints. All other review of systems was negative.     PHYSICAL EXAMINATION:      BP (!) 93/57   Pulse 61   Temp 97 °F (36.1 °C) (Infrared)   Resp 16   Ht 5' 3\" (1.6 m)   Wt 210 lb (95.3 kg)   LMP 2014   SpO2 97%   BMI 37.20 kg/m²   General:       General appearance:  Pleasant and well-hydrated, in no distress and A & O x 3  Build:Obese  Function: Rises from seated position easily and Moves about room without difficulty     HEENT:     Head:normocephalic, atraumatic       Lungs:     Breathing:normal breathing pattern      CVS:     RRR     Abdomen:     Shape:non-distended and normal     Cervical spine:     Inspection: posterior scar noted- healed well  Palpation:tenderness paravertebral muscles, tenderness trapezium, left, right and positive. Range of motion:Significantly reduced ROM flexion, extension, rotation bilaterally and is moderately painful. Spurling's: negative bilaterally     Thoracic spine:                Spine inspection:normal   Palpation:No tenderness over the midline and paraspinal area, bilaterally  Range of motion:normal in flexion, extension rotation bilateral and is not painful. Lumbar spine:     Spine inspection: Normal   Palpation: Tenderness paravertebral muscles No bilaterally  Range of motion: Decreased,  Sacroiliac joint tenderness No bilaterally  SLR : negative bilaterally  Trochanteric bursa tenderness: negative bilaterally  CVA tenderness:No      Musculoskeletal:     Trigger points in trapezius: Yes bilaterally  Trigger points in rhomboids: Yes bilaterally  Trigger points in Paravertebral: Yes bilaterally     Extremities:     No edema      Neurological:     Sensory: Normal to light touch      Motor:   NO new focal motor deficits. Gait:normal Yes     Dermatology:     Skin:no rashes or lesions noted     Assessment/Plan:   Diagnosis Orders   1. Cervical postlaminectomy syndrome        2. DDD (degenerative disc disease), lumbar        3. Lumbosacral spondylosis without myelopathy        4. Chronic myofascial pain            31 y.o. female with H/O cervical spine stenosis , S/p C3, C4, C5, C6, and C7 laminectomy and C3-T1 fusion by Dr. Manpreet Gurrola in Nov 2019, post op CSF leak which was repaired 10 days later. Upper extremity symptoms has improved after surgery. Diffuse back pain: features of myofacial pain. Discussed Trigger point injections- patient declined. Has been evaluated by NSG - CT C-spine/ Xray C-spine reviewed. EMG-  reviewed.     Recent image findings reviewed. Doing PT/ HEP. H/O  THC use +:     Lwo back pain - features of facet pain. Discussed facet interventions. Patient does not want interventions. Compound cream- local reaction. Medrol dose pack. Use instructions reviewed. TENS unit trial- long term use if helps. Muscle relaxant for prn use. Baclofen for prn use. Counseling done: reg HEP. F/u prn.     Melanie Sweeney MD

## 2022-12-06 ENCOUNTER — TELEPHONE (OUTPATIENT)
Dept: OBGYN | Age: 37
End: 2022-12-06

## 2022-12-06 DIAGNOSIS — N89.8 VAGINAL ITCHING: Primary | ICD-10-CM

## 2022-12-09 VITALS
HEIGHT: 63 IN | HEART RATE: 64 BPM | SYSTOLIC BLOOD PRESSURE: 118 MMHG | WEIGHT: 208 LBS | OXYGEN SATURATION: 99 % | BODY MASS INDEX: 36.86 KG/M2 | TEMPERATURE: 98.4 F | RESPIRATION RATE: 16 BRPM | DIASTOLIC BLOOD PRESSURE: 71 MMHG

## 2022-12-09 ASSESSMENT — PAIN DESCRIPTION - FREQUENCY: FREQUENCY: CONTINUOUS

## 2022-12-09 ASSESSMENT — PAIN DESCRIPTION - DESCRIPTORS: DESCRIPTORS: DISCOMFORT

## 2022-12-09 ASSESSMENT — PAIN DESCRIPTION - LOCATION: LOCATION: HEAD

## 2022-12-09 ASSESSMENT — PAIN SCALES - GENERAL: PAINLEVEL_OUTOF10: 8

## 2022-12-09 ASSESSMENT — PAIN DESCRIPTION - ONSET: ONSET: ON-GOING

## 2022-12-09 ASSESSMENT — PAIN DESCRIPTION - PAIN TYPE: TYPE: ACUTE PAIN

## 2022-12-09 ASSESSMENT — PAIN - FUNCTIONAL ASSESSMENT: PAIN_FUNCTIONAL_ASSESSMENT: 0-10

## 2022-12-10 ENCOUNTER — HOSPITAL ENCOUNTER (EMERGENCY)
Age: 37
Discharge: HOME | End: 2022-12-10
Attending: EMERGENCY MEDICINE
Payer: COMMERCIAL

## 2022-12-10 ENCOUNTER — APPOINTMENT (OUTPATIENT)
Dept: CT IMAGING | Age: 37
End: 2022-12-10
Payer: COMMERCIAL

## 2022-12-10 DIAGNOSIS — R51.9 ACUTE NONINTRACTABLE HEADACHE, UNSPECIFIED HEADACHE TYPE: Primary | ICD-10-CM

## 2022-12-10 PROBLEM — J39.2 THORNWALDT'S CYST: Chronic | Status: ACTIVE | Noted: 2022-12-10

## 2022-12-10 LAB
ANION GAP SERPL CALCULATED.3IONS-SCNC: 11 MMOL/L (ref 7–16)
BASOPHILS ABSOLUTE: 0.03 E9/L (ref 0–0.2)
BASOPHILS RELATIVE PERCENT: 0.4 % (ref 0–2)
BUN BLDV-MCNC: 16 MG/DL (ref 6–20)
CALCIUM SERPL-MCNC: 9.8 MG/DL (ref 8.6–10.2)
CHLORIDE BLD-SCNC: 104 MMOL/L (ref 98–107)
CO2: 24 MMOL/L (ref 22–29)
CREAT SERPL-MCNC: 0.8 MG/DL (ref 0.5–1)
EOSINOPHILS ABSOLUTE: 0.38 E9/L (ref 0.05–0.5)
EOSINOPHILS RELATIVE PERCENT: 4.8 % (ref 0–6)
GFR SERPL CREATININE-BSD FRML MDRD: >60 ML/MIN/1.73
GLUCOSE BLD-MCNC: 98 MG/DL (ref 74–99)
HCT VFR BLD CALC: 38 % (ref 34–48)
HEMOGLOBIN: 12.3 G/DL (ref 11.5–15.5)
IMMATURE GRANULOCYTES #: 0.02 E9/L
IMMATURE GRANULOCYTES %: 0.3 % (ref 0–5)
LYMPHOCYTES ABSOLUTE: 2.91 E9/L (ref 1.5–4)
LYMPHOCYTES RELATIVE PERCENT: 36.8 % (ref 20–42)
MCH RBC QN AUTO: 29.9 PG (ref 26–35)
MCHC RBC AUTO-ENTMCNC: 32.4 % (ref 32–34.5)
MCV RBC AUTO: 92.2 FL (ref 80–99.9)
MONOCYTES ABSOLUTE: 0.55 E9/L (ref 0.1–0.95)
MONOCYTES RELATIVE PERCENT: 7 % (ref 2–12)
NEUTROPHILS ABSOLUTE: 4.02 E9/L (ref 1.8–7.3)
NEUTROPHILS RELATIVE PERCENT: 50.7 % (ref 43–80)
PDW BLD-RTO: 13.2 FL (ref 11.5–15)
PLATELET # BLD: 322 E9/L (ref 130–450)
PMV BLD AUTO: 10 FL (ref 7–12)
POTASSIUM REFLEX MAGNESIUM: 4.1 MMOL/L (ref 3.5–5)
RBC # BLD: 4.12 E12/L (ref 3.5–5.5)
SODIUM BLD-SCNC: 139 MMOL/L (ref 132–146)
WBC # BLD: 7.9 E9/L (ref 4.5–11.5)

## 2022-12-10 PROCEDURE — 96372 THER/PROPH/DIAG INJ SC/IM: CPT

## 2022-12-10 PROCEDURE — 99284 EMERGENCY DEPT VISIT MOD MDM: CPT

## 2022-12-10 PROCEDURE — 80048 BASIC METABOLIC PNL TOTAL CA: CPT

## 2022-12-10 PROCEDURE — 96374 THER/PROPH/DIAG INJ IV PUSH: CPT

## 2022-12-10 PROCEDURE — 6360000002 HC RX W HCPCS: Performed by: EMERGENCY MEDICINE

## 2022-12-10 PROCEDURE — 6370000000 HC RX 637 (ALT 250 FOR IP): Performed by: EMERGENCY MEDICINE

## 2022-12-10 PROCEDURE — 70450 CT HEAD/BRAIN W/O DYE: CPT

## 2022-12-10 PROCEDURE — 96375 TX/PRO/DX INJ NEW DRUG ADDON: CPT

## 2022-12-10 PROCEDURE — 85025 COMPLETE CBC W/AUTO DIFF WBC: CPT

## 2022-12-10 RX ORDER — SUMATRIPTAN 6 MG/.5ML
6 INJECTION, SOLUTION SUBCUTANEOUS ONCE
Status: COMPLETED | OUTPATIENT
Start: 2022-12-10 | End: 2022-12-10

## 2022-12-10 RX ORDER — DEXAMETHASONE SODIUM PHOSPHATE 10 MG/ML
8 INJECTION INTRAMUSCULAR; INTRAVENOUS ONCE
Status: COMPLETED | OUTPATIENT
Start: 2022-12-10 | End: 2022-12-10

## 2022-12-10 RX ORDER — METOCLOPRAMIDE HYDROCHLORIDE 5 MG/ML
10 INJECTION INTRAMUSCULAR; INTRAVENOUS ONCE
Status: COMPLETED | OUTPATIENT
Start: 2022-12-10 | End: 2022-12-10

## 2022-12-10 RX ORDER — PREDNISONE 50 MG/1
50 TABLET ORAL DAILY
Qty: 5 TABLET | Refills: 0 | Status: SHIPPED | OUTPATIENT
Start: 2022-12-10 | End: 2022-12-13

## 2022-12-10 RX ADMIN — SUMATRIPTAN 6 MG: 6 INJECTION SUBCUTANEOUS at 02:25

## 2022-12-10 RX ADMIN — METOCLOPRAMIDE 10 MG: 5 INJECTION, SOLUTION INTRAMUSCULAR; INTRAVENOUS at 02:27

## 2022-12-10 RX ADMIN — DEXAMETHASONE SODIUM PHOSPHATE 8 MG: 10 INJECTION INTRAMUSCULAR; INTRAVENOUS at 02:25

## 2022-12-10 NOTE — H&P
The H&P has been reviewed, the patient examined, and I concur with the findings of the H & P dated  12/10/2022. The risks, benefits, expected outcomes and alternatives to the recommended procedure have been discussed with pt and family and pt/family wish to proceed. Again explained to pt that facial pressure, headaches, asthma likely not to be effected by removal of lesion in nasopharynx    A written consent sheet delineating INDICATIONS, ALTERNATIVES, ANESTHESIA COMPLICATIONS,SURGICAL COMPLICATIONS, AND GENERAL CONSIDERATIONS is present within our office chart and signed by the pt or their representative.

## 2022-12-10 NOTE — ED NOTES
Report given and care transferred to [facility], [address] [facility], [address] [facility]  12/10/22 0263

## 2022-12-10 NOTE — ED PROVIDER NOTES
[oxycodone-acetaminophen]        ---------------------------------------------------PHYSICAL EXAM--------------------------------------    Constitutional/General: Alert and oriented x3, well appearing, non toxic in NAD  Head: Normocephalic and atraumatic, right frontal sinus ttp  Eyes: PERRL, EOMI, conjunctive normal, sclera non icteric  Mouth: Oropharynx clear, handling secretions,   Neck: Supple, full ROM,   Respiratory: . Not in respiratory distress  Cardiovascular:   2+ distal pulses  Chest: No chest wall tenderness  GI:  Abdomen Soft, Non tender, Non distended. Musculoskeletal: Moves all extremities x 4. Warm and well perfused,   Integument: skin warm and dry. No rashes. Lymphatic: no lymphadenopathy noted  Neurologic: GCS 15, no focal deficits, motor nml sensory nml, cn2-12 intact  Psychiatric: Normal Affect    -------------------------------------------------- RESULTS -------------------------------------------------  I have personally reviewed all laboratory and imaging results for this patient. Results are listed below.      LABS:  Results for orders placed or performed during the hospital encounter of 12/10/22   CBC with Auto Differential   Result Value Ref Range    WBC 7.9 4.5 - 11.5 E9/L    RBC 4.12 3.50 - 5.50 E12/L    Hemoglobin 12.3 11.5 - 15.5 g/dL    Hematocrit 38.0 34.0 - 48.0 %    MCV 92.2 80.0 - 99.9 fL    MCH 29.9 26.0 - 35.0 pg    MCHC 32.4 32.0 - 34.5 %    RDW 13.2 11.5 - 15.0 fL    Platelets 460 372 - 135 E9/L    MPV 10.0 7.0 - 12.0 fL    Neutrophils % 50.7 43.0 - 80.0 %    Immature Granulocytes % 0.3 0.0 - 5.0 %    Lymphocytes % 36.8 20.0 - 42.0 %    Monocytes % 7.0 2.0 - 12.0 %    Eosinophils % 4.8 0.0 - 6.0 %    Basophils % 0.4 0.0 - 2.0 %    Neutrophils Absolute 4.02 1.80 - 7.30 E9/L    Immature Granulocytes # 0.02 E9/L    Lymphocytes Absolute 2.91 1.50 - 4.00 E9/L    Monocytes Absolute 0.55 0.10 - 0.95 E9/L    Eosinophils Absolute 0.38 0.05 - 0.50 E9/L    Basophils Absolute 0.03 0.00 - 0.20 H0/N   Basic Metabolic Panel w/ Reflex to MG   Result Value Ref Range    Sodium 139 132 - 146 mmol/L    Potassium reflex Magnesium 4.1 3.5 - 5.0 mmol/L    Chloride 104 98 - 107 mmol/L    CO2 24 22 - 29 mmol/L    Anion Gap 11 7 - 16 mmol/L    Glucose 98 74 - 99 mg/dL    BUN 16 6 - 20 mg/dL    Creatinine 0.8 0.5 - 1.0 mg/dL    Est, Glom Filt Rate >60 >=60 mL/min/1.73    Calcium 9.8 8.6 - 10.2 mg/dL       RADIOLOGY:  Interpreted by Radiologist.  CT Head W/O Contrast   Final Result   No acute intracranial abnormality. EKG:  This EKG is signed and interpreted by the EP. Time:   Rate:   Rhythm:   Interpretation:   Comparison:       ------------------------- NURSING NOTES AND VITALS REVIEWED ---------------------------   The nursing notes within the ED encounter and vital signs as below have been reviewed by myself. /71   Pulse 64   Temp 98.4 °F (36.9 °C) (Oral)   Resp 16   Ht 5' 3\" (1.6 m)   Wt 208 lb (94.3 kg)   LMP 05/19/2014   SpO2 99%   BMI 36.85 kg/m²   Oxygen Saturation Interpretation: Normal    The patients available past medical records and past encounters were reviewed. ------------------------------ ED COURSE/MEDICAL DECISION MAKING----------------------  Medications   SUMAtriptan (IMITREX) injection 6 mg (6 mg SubCUTAneous Given 12/10/22 0225)   dexamethasone (DECADRON) injection 8 mg (8 mg IntraVENous Given 12/10/22 0225)   metoclopramide (REGLAN) injection 10 mg (10 mg IntraVENous Given 12/10/22 0227)         ED COURSE:  ED Course as of 12/10/22 0331   Sat Dec 10, 2022   0331 Ha improved, nml neuro exam [AT]      ED Course User Index  [AT] Karyn Sharp MD       Medical Decision Making:    Chronic ha, improved with ed tx, ct neg, dc with outpt fu      This patient's ED course included: a personal history and physicial examination    This patient has remained hemodynamically stable during their ED course. Re-Evaluations:             Re-evaluation. Patients symptoms are improving            Consultations:                 Critical Care:         Counseling: The emergency provider has spoken with the patient and discussed todays results, in addition to providing specific details for the plan of care and counseling regarding the diagnosis and prognosis. Questions are answered at this time and they are agreeable with the plan.       --------------------------------- IMPRESSION AND DISPOSITION ---------------------------------    IMPRESSION  1. Acute nonintractable headache, unspecified headache type        DISPOSITION  Disposition: Discharge to home  Patient condition is stable    NOTE: This report was transcribed using voice recognition software.  Every effort was made to ensure accuracy; however, inadvertent computerized transcription errors may be present        Ana Maria Garduno MD  12/10/22 8951

## 2022-12-12 NOTE — H&P
[id number] [facility] [address]                       PREOPERATIVE HISTORY AND PHYSICAL    PATIENT NAME: Amanda Ballard                :        1985  MED REC NO:   [id number]                            ROOM:  ACCOUNT NO:   [de-identified]                           ADMIT DATE: 2022  PROVIDER:     Fiona Sorto MD    HISTORY OF PRESENT ILLNESS:  This is a 44-year-old female who presents  with \"sinus problems. \"  Headaches. Some pressure. Antibiotic. Never  had sinus problems before. Her headache is over her eyes and forehead. When seen, she had a deviated septum to the left with a mass in the  nasopharynx. This was consistent with a Tornwaldt cyst.  CT showed no  sinusitis, but that was a CT of her head. She presents now for again  excision of Tornwaldt cyst.    PAST MEDICAL HISTORY:  No other medical problems. PAST SURGICAL HISTORY:  Included correction of the spinal stenosis. MEDICATIONS:  Include Flonase, Flexeril, and albuterol. ALLERGIES:  IODINE and PERCOCET. FAMILY HISTORY:  Noncontributory. REVIEW OF SYSTEMS:  Negative except for she does consume marijuana. Again, no other contributing medical illnesses. PHYSICAL EXAMINATION:  HEENT:  Tympanic membranes are normal.  Mild rhinitis with a deviated  septum to the left. Nasopharynx showed a mass in the midline,  consistent with a Tornwaldt cyst.  NECK:  No adenopathy. CHEST:  Clear. CARDIAC:  No murmurs. ABDOMEN:  No masses. IMPRESSION:  Tornwaldt cyst.    RECOMMENDATIONS:  Excision of Tornwaldt cyst.  Risks include bleeding,  infection, and recurrence. I explained at length to the patient that  her facial pressure and asthma will not improve with removal of the  Tornwaldt cyst.  I recommended to stop smoking and to continue her  Flonase. She would like to proceed as recommended above.         Gilma Nielsen MD    D: 12/10/2022 8:48:21 T: 12/10/2022 8:50:36     [email or web address]  Job#: [id number]     Doc#: [id number]

## 2022-12-13 NOTE — PROGRESS NOTES
[facility] PRE-ADMISSION TESTING INSTRUCTIONS    The Preadmission Testing patient is instructed accordingly using the following criteria (check applicable):    ARRIVAL INSTRUCTIONS:  [x] Parking the day of Surgery is located in the Main Entrance lot. Upon entering the door, make an immediate right to the surgery reception desk    [x] Bring photo ID and insurance card    [] Bring in a copy of Living will or Durable Power of  papers. [x] Please be sure to arrange for responsible adult to provide transportation to and from the hospital    [x] Please arrange for responsible adult to be with you for the 24 hour period post procedure due to having anesthesia      GENERAL INSTRUCTIONS:    [x] Nothing by mouth after midnight, including gum, candy, mints or water    [x] You may brush your teeth, but do not swallow any water    [] Take medications as instructed with 1-2 oz of water    [] Stop herbal supplements and vitamins 5 days prior to procedure    [] Follow preop dosing of blood thinners per physician instructions    [] Take 1/2 dose of evening insulin, but no insulin after midnight    [] No oral diabetic medications after midnight    [] If diabetic and have low blood sugar or feel symptomatic, take 1-2oz apple juice only    [x] Bring inhalers day of surgery    [] Bring C-PAP/ Bi-Pap day of surgery    [] Bring urine specimen day of surgery    [x] Shower or bath with soap, lather and rinse well, AM of Surgery, no lotion, powders or creams to surgical site    [] Follow bowel prep as instructed per surgeon    [x] No tobacco products within 24 hours of surgery     [x] No alcohol or illegal drug use within 24 hours of surgery.     [x] Jewelry, body piercing's, eyeglasses, contact lenses and dentures are not permitted into surgery (bring cases)      [x] Please do not wear any nail polish, make up or hair products on the day of surgery    [x] You can expect a call the business day prior to procedure to notify you if your arrival time changes    [x] If you receive a survey after surgery we would greatly appreciate your comments    [] Parent/guardian of a minor must accompany their child and remain on the premises  the entire time they are under our care     [] Pediatric patients may bring favorite toy, blanket or comfort item with them    [] A caregiver or family member must remain with the patient during their stay if they are mentally handicapped, have dementia, disoriented or unable to use a call light or would be a safety concern if left unattended    [x] Please notify surgeon if you develop any illness between now and time of surgery (cold, cough, sore throat, fever, nausea, vomiting) or any signs of infections  including skin, wounds, and dental.    [x]  The Outpatient Pharmacy is available to fill your prescription here on your day of surgery, ask your preop nurse for details    [] Other instructions    EDUCATIONAL MATERIALS PROVIDED:    [] PAT Preoperative Education Packet/Booklet     [] Medication List    [] Transfusion bracelet applied with instructions    [] Shower with soap, lather and rinse well, and use CHG wipes provided the evening before surgery as instructed    [] Incentive spirometer with instructions

## 2022-12-14 ENCOUNTER — HOSPITAL ENCOUNTER (OUTPATIENT)
Age: 37
Setting detail: OUTPATIENT SURGERY
Discharge: HOME | End: 2022-12-14
Attending: OTOLARYNGOLOGY | Admitting: OTOLARYNGOLOGY
Payer: COMMERCIAL

## 2022-12-14 ENCOUNTER — ANESTHESIA EVENT (OUTPATIENT)
Dept: OPERATING ROOM | Age: 37
End: 2022-12-14
Payer: COMMERCIAL

## 2022-12-14 ENCOUNTER — ANESTHESIA (OUTPATIENT)
Dept: OPERATING ROOM | Age: 37
End: 2022-12-14
Payer: COMMERCIAL

## 2022-12-14 VITALS
TEMPERATURE: 97.4 F | OXYGEN SATURATION: 94 % | BODY MASS INDEX: 37.21 KG/M2 | WEIGHT: 210 LBS | DIASTOLIC BLOOD PRESSURE: 64 MMHG | HEART RATE: 65 BPM | SYSTOLIC BLOOD PRESSURE: 118 MMHG | HEIGHT: 63 IN | RESPIRATION RATE: 16 BRPM

## 2022-12-14 DIAGNOSIS — J39.2 NASOPHARYNGEAL MASS: ICD-10-CM

## 2022-12-14 DIAGNOSIS — J39.2 THORNWALDT'S CYST: Primary | Chronic | ICD-10-CM

## 2022-12-14 PROCEDURE — 2500000003 HC RX 250 WO HCPCS: Performed by: NURSE ANESTHETIST, CERTIFIED REGISTERED

## 2022-12-14 PROCEDURE — 3700000001 HC ADD 15 MINUTES (ANESTHESIA): Performed by: OTOLARYNGOLOGY

## 2022-12-14 PROCEDURE — 6360000002 HC RX W HCPCS

## 2022-12-14 PROCEDURE — 2580000003 HC RX 258: Performed by: NURSE ANESTHETIST, CERTIFIED REGISTERED

## 2022-12-14 PROCEDURE — 3600000002 HC SURGERY LEVEL 2 BASE: Performed by: OTOLARYNGOLOGY

## 2022-12-14 PROCEDURE — 3700000000 HC ANESTHESIA ATTENDED CARE: Performed by: OTOLARYNGOLOGY

## 2022-12-14 PROCEDURE — 7100000010 HC PHASE II RECOVERY - FIRST 15 MIN: Performed by: OTOLARYNGOLOGY

## 2022-12-14 PROCEDURE — 2500000003 HC RX 250 WO HCPCS: Performed by: ANESTHESIOLOGY

## 2022-12-14 PROCEDURE — 6360000002 HC RX W HCPCS: Performed by: NURSE ANESTHETIST, CERTIFIED REGISTERED

## 2022-12-14 PROCEDURE — 88305 TISSUE EXAM BY PATHOLOGIST: CPT

## 2022-12-14 PROCEDURE — 2709999900 HC NON-CHARGEABLE SUPPLY: Performed by: OTOLARYNGOLOGY

## 2022-12-14 PROCEDURE — 2580000003 HC RX 258: Performed by: OTOLARYNGOLOGY

## 2022-12-14 PROCEDURE — 6360000002 HC RX W HCPCS: Performed by: OTOLARYNGOLOGY

## 2022-12-14 PROCEDURE — 6370000000 HC RX 637 (ALT 250 FOR IP): Performed by: ANESTHESIOLOGY

## 2022-12-14 PROCEDURE — 7100000011 HC PHASE II RECOVERY - ADDTL 15 MIN: Performed by: OTOLARYNGOLOGY

## 2022-12-14 PROCEDURE — 7100000001 HC PACU RECOVERY - ADDTL 15 MIN: Performed by: OTOLARYNGOLOGY

## 2022-12-14 PROCEDURE — A4216 STERILE WATER/SALINE, 10 ML: HCPCS | Performed by: ANESTHESIOLOGY

## 2022-12-14 PROCEDURE — 6360000002 HC RX W HCPCS: Performed by: ANESTHESIOLOGY

## 2022-12-14 PROCEDURE — 3600000012 HC SURGERY LEVEL 2 ADDTL 15MIN: Performed by: OTOLARYNGOLOGY

## 2022-12-14 PROCEDURE — 7100000000 HC PACU RECOVERY - FIRST 15 MIN: Performed by: OTOLARYNGOLOGY

## 2022-12-14 PROCEDURE — 2580000003 HC RX 258: Performed by: ANESTHESIOLOGY

## 2022-12-14 PROCEDURE — 6370000000 HC RX 637 (ALT 250 FOR IP): Performed by: OTOLARYNGOLOGY

## 2022-12-14 RX ORDER — ACETAMINOPHEN 325 MG/1
650 TABLET ORAL EVERY 4 HOURS PRN
Status: DISCONTINUED | OUTPATIENT
Start: 2022-12-14 | End: 2022-12-14 | Stop reason: HOSPADM

## 2022-12-14 RX ORDER — OMEPRAZOLE 20 MG/1
40 CAPSULE, DELAYED RELEASE ORAL DAILY
COMMUNITY

## 2022-12-14 RX ORDER — GLYCOPYRROLATE 0.2 MG/ML
INJECTION INTRAMUSCULAR; INTRAVENOUS PRN
Status: DISCONTINUED | OUTPATIENT
Start: 2022-12-14 | End: 2022-12-14 | Stop reason: SDUPTHER

## 2022-12-14 RX ORDER — ONDANSETRON 2 MG/ML
INJECTION INTRAMUSCULAR; INTRAVENOUS PRN
Status: DISCONTINUED | OUTPATIENT
Start: 2022-12-14 | End: 2022-12-14 | Stop reason: SDUPTHER

## 2022-12-14 RX ORDER — SODIUM CHLORIDE 0.9 % (FLUSH) 0.9 %
5-40 SYRINGE (ML) INJECTION EVERY 12 HOURS SCHEDULED
Status: DISCONTINUED | OUTPATIENT
Start: 2022-12-14 | End: 2022-12-14 | Stop reason: HOSPADM

## 2022-12-14 RX ORDER — SODIUM CHLORIDE 9 MG/ML
INJECTION, SOLUTION INTRAVENOUS PRN
Status: DISCONTINUED | OUTPATIENT
Start: 2022-12-14 | End: 2022-12-14 | Stop reason: HOSPADM

## 2022-12-14 RX ORDER — DIPHENHYDRAMINE HYDROCHLORIDE 50 MG/ML
12.5 INJECTION INTRAMUSCULAR; INTRAVENOUS
Status: DISCONTINUED | OUTPATIENT
Start: 2022-12-14 | End: 2022-12-14 | Stop reason: HOSPADM

## 2022-12-14 RX ORDER — HYDROCODONE BITARTRATE AND ACETAMINOPHEN 5; 325 MG/1; MG/1
1 TABLET ORAL EVERY 6 HOURS PRN
Qty: 28 TABLET | Refills: 0 | Status: SHIPPED | OUTPATIENT
Start: 2022-12-14 | End: 2022-12-21

## 2022-12-14 RX ORDER — ROCURONIUM BROMIDE 10 MG/ML
INJECTION, SOLUTION INTRAVENOUS PRN
Status: DISCONTINUED | OUTPATIENT
Start: 2022-12-14 | End: 2022-12-14 | Stop reason: SDUPTHER

## 2022-12-14 RX ORDER — KETAMINE HYDROCHLORIDE 10 MG/ML
INJECTION, SOLUTION INTRAMUSCULAR; INTRAVENOUS PRN
Status: DISCONTINUED | OUTPATIENT
Start: 2022-12-14 | End: 2022-12-14 | Stop reason: SDUPTHER

## 2022-12-14 RX ORDER — SODIUM CHLORIDE, SODIUM LACTATE, POTASSIUM CHLORIDE, CALCIUM CHLORIDE 600; 310; 30; 20 MG/100ML; MG/100ML; MG/100ML; MG/100ML
INJECTION, SOLUTION INTRAVENOUS CONTINUOUS
Status: DISCONTINUED | OUTPATIENT
Start: 2022-12-14 | End: 2022-12-14 | Stop reason: HOSPADM

## 2022-12-14 RX ORDER — LIDOCAINE HYDROCHLORIDE 20 MG/ML
INJECTION, SOLUTION EPIDURAL; INFILTRATION; INTRACAUDAL; PERINEURAL PRN
Status: DISCONTINUED | OUTPATIENT
Start: 2022-12-14 | End: 2022-12-14 | Stop reason: SDUPTHER

## 2022-12-14 RX ORDER — PROPOFOL 10 MG/ML
INJECTION, EMULSION INTRAVENOUS PRN
Status: DISCONTINUED | OUTPATIENT
Start: 2022-12-14 | End: 2022-12-14 | Stop reason: SDUPTHER

## 2022-12-14 RX ORDER — SODIUM CHLORIDE 0.9 % (FLUSH) 0.9 %
5-40 SYRINGE (ML) INJECTION PRN
Status: DISCONTINUED | OUTPATIENT
Start: 2022-12-14 | End: 2022-12-14 | Stop reason: HOSPADM

## 2022-12-14 RX ORDER — AMOXICILLIN 500 MG/1
500 CAPSULE ORAL 3 TIMES DAILY
Qty: 21 CAPSULE | Refills: 0 | Status: SHIPPED | OUTPATIENT
Start: 2022-12-14 | End: 2022-12-21

## 2022-12-14 RX ORDER — ONDANSETRON 4 MG/1
4 TABLET, ORALLY DISINTEGRATING ORAL EVERY 8 HOURS PRN
Status: DISCONTINUED | OUTPATIENT
Start: 2022-12-14 | End: 2022-12-14 | Stop reason: HOSPADM

## 2022-12-14 RX ORDER — MEPERIDINE HYDROCHLORIDE 25 MG/ML
12.5 INJECTION INTRAMUSCULAR; INTRAVENOUS; SUBCUTANEOUS PRN
Status: DISCONTINUED | OUTPATIENT
Start: 2022-12-14 | End: 2022-12-14 | Stop reason: HOSPADM

## 2022-12-14 RX ORDER — METOCLOPRAMIDE HYDROCHLORIDE 5 MG/ML
10 INJECTION INTRAMUSCULAR; INTRAVENOUS ONCE
Status: COMPLETED | OUTPATIENT
Start: 2022-12-14 | End: 2022-12-14

## 2022-12-14 RX ORDER — ONDANSETRON 2 MG/ML
4 INJECTION INTRAMUSCULAR; INTRAVENOUS EVERY 6 HOURS PRN
Status: DISCONTINUED | OUTPATIENT
Start: 2022-12-14 | End: 2022-12-14 | Stop reason: HOSPADM

## 2022-12-14 RX ORDER — LABETALOL HYDROCHLORIDE 5 MG/ML
5 INJECTION, SOLUTION INTRAVENOUS
Status: DISCONTINUED | OUTPATIENT
Start: 2022-12-14 | End: 2022-12-14 | Stop reason: HOSPADM

## 2022-12-14 RX ORDER — PROCHLORPERAZINE EDISYLATE 5 MG/ML
5 INJECTION INTRAMUSCULAR; INTRAVENOUS
Status: DISCONTINUED | OUTPATIENT
Start: 2022-12-14 | End: 2022-12-14 | Stop reason: HOSPADM

## 2022-12-14 RX ORDER — HYDRALAZINE HYDROCHLORIDE 20 MG/ML
5 INJECTION INTRAMUSCULAR; INTRAVENOUS
Status: DISCONTINUED | OUTPATIENT
Start: 2022-12-14 | End: 2022-12-14 | Stop reason: HOSPADM

## 2022-12-14 RX ORDER — OXYMETAZOLINE HYDROCHLORIDE 0.05 G/100ML
2 SPRAY NASAL ONCE
Status: COMPLETED | OUTPATIENT
Start: 2022-12-14 | End: 2022-12-14

## 2022-12-14 RX ORDER — ONDANSETRON 4 MG/1
4 TABLET, FILM COATED ORAL EVERY 8 HOURS PRN
Qty: 6 TABLET | Refills: 0 | Status: SHIPPED | OUTPATIENT
Start: 2022-12-14

## 2022-12-14 RX ORDER — HYDROCODONE BITARTRATE AND ACETAMINOPHEN 5; 325 MG/1; MG/1
1 TABLET ORAL EVERY 6 HOURS PRN
Status: DISCONTINUED | OUTPATIENT
Start: 2022-12-14 | End: 2022-12-14 | Stop reason: HOSPADM

## 2022-12-14 RX ORDER — SODIUM CHLORIDE 9 MG/ML
INJECTION, SOLUTION INTRAVENOUS CONTINUOUS PRN
Status: DISCONTINUED | OUTPATIENT
Start: 2022-12-14 | End: 2022-12-14 | Stop reason: SDUPTHER

## 2022-12-14 RX ORDER — DEXAMETHASONE SODIUM PHOSPHATE 4 MG/ML
INJECTION, SOLUTION INTRA-ARTICULAR; INTRALESIONAL; INTRAMUSCULAR; INTRAVENOUS; SOFT TISSUE PRN
Status: DISCONTINUED | OUTPATIENT
Start: 2022-12-14 | End: 2022-12-14 | Stop reason: SDUPTHER

## 2022-12-14 RX ORDER — ACETAMINOPHEN 500 MG
1000 TABLET ORAL ONCE
Status: COMPLETED | OUTPATIENT
Start: 2022-12-14 | End: 2022-12-14

## 2022-12-14 RX ORDER — FENTANYL CITRATE 50 UG/ML
INJECTION, SOLUTION INTRAMUSCULAR; INTRAVENOUS PRN
Status: DISCONTINUED | OUTPATIENT
Start: 2022-12-14 | End: 2022-12-14 | Stop reason: SDUPTHER

## 2022-12-14 RX ORDER — MEPERIDINE HYDROCHLORIDE 25 MG/ML
INJECTION INTRAMUSCULAR; INTRAVENOUS; SUBCUTANEOUS
Status: COMPLETED
Start: 2022-12-14 | End: 2022-12-14

## 2022-12-14 RX ORDER — FENTANYL CITRATE 50 UG/ML
25 INJECTION, SOLUTION INTRAMUSCULAR; INTRAVENOUS EVERY 5 MIN PRN
Status: DISCONTINUED | OUTPATIENT
Start: 2022-12-14 | End: 2022-12-14 | Stop reason: HOSPADM

## 2022-12-14 RX ADMIN — ONDANSETRON 4 MG: 2 INJECTION INTRAMUSCULAR; INTRAVENOUS at 10:08

## 2022-12-14 RX ADMIN — METOCLOPRAMIDE 10 MG: 5 INJECTION, SOLUTION INTRAMUSCULAR; INTRAVENOUS at 09:04

## 2022-12-14 RX ADMIN — PROPOFOL 150 MG: 10 INJECTION, EMULSION INTRAVENOUS at 09:47

## 2022-12-14 RX ADMIN — FAMOTIDINE 20 MG: 10 INJECTION, SOLUTION INTRAVENOUS at 09:03

## 2022-12-14 RX ADMIN — ACETAMINOPHEN 1000 MG: 500 TABLET ORAL at 08:18

## 2022-12-14 RX ADMIN — PROPOFOL 50 MG: 10 INJECTION, EMULSION INTRAVENOUS at 09:57

## 2022-12-14 RX ADMIN — SUGAMMADEX 400 MG: 100 INJECTION, SOLUTION INTRAVENOUS at 10:12

## 2022-12-14 RX ADMIN — LIDOCAINE HYDROCHLORIDE 100 MG: 20 INJECTION, SOLUTION EPIDURAL; INFILTRATION; INTRACAUDAL; PERINEURAL at 09:47

## 2022-12-14 RX ADMIN — NASAL DECONGESTANT 2 SPRAY: 0.05 SPRAY NASAL at 08:19

## 2022-12-14 RX ADMIN — ROCURONIUM BROMIDE 40 MG: 50 INJECTION INTRAVENOUS at 09:47

## 2022-12-14 RX ADMIN — SODIUM CHLORIDE 3000 MG: 900 INJECTION INTRAVENOUS at 09:50

## 2022-12-14 RX ADMIN — MEPERIDINE HYDROCHLORIDE 12.5 MG: 25 INJECTION INTRAMUSCULAR; INTRAVENOUS; SUBCUTANEOUS at 10:51

## 2022-12-14 RX ADMIN — KETAMINE HYDROCHLORIDE 20 MG: 10 INJECTION INTRAMUSCULAR; INTRAVENOUS at 09:47

## 2022-12-14 RX ADMIN — FENTANYL CITRATE 100 MCG: 50 INJECTION, SOLUTION INTRAMUSCULAR; INTRAVENOUS at 09:47

## 2022-12-14 RX ADMIN — MEPERIDINE HYDROCHLORIDE 12.5 MG: 25 INJECTION, SOLUTION INTRAMUSCULAR; INTRAVENOUS; SUBCUTANEOUS at 10:51

## 2022-12-14 RX ADMIN — GLYCOPYRROLATE 0.2 MG: 1 INJECTION INTRAMUSCULAR; INTRAVENOUS at 09:51

## 2022-12-14 RX ADMIN — SODIUM CHLORIDE: 9 INJECTION, SOLUTION INTRAVENOUS at 09:35

## 2022-12-14 RX ADMIN — DEXAMETHASONE SODIUM PHOSPHATE 10 MG: 4 INJECTION, SOLUTION INTRAMUSCULAR; INTRAVENOUS at 09:53

## 2022-12-14 ASSESSMENT — PAIN DESCRIPTION - LOCATION
LOCATION: NECK
LOCATION: HEAD
LOCATION: HEAD

## 2022-12-14 ASSESSMENT — PAIN SCALES - GENERAL
PAINLEVEL_OUTOF10: 7
PAINLEVEL_OUTOF10: 3
PAINLEVEL_OUTOF10: 0

## 2022-12-14 ASSESSMENT — LIFESTYLE VARIABLES: SMOKING_STATUS: 0

## 2022-12-14 ASSESSMENT — PAIN DESCRIPTION - DESCRIPTORS
DESCRIPTORS: DISCOMFORT

## 2022-12-14 ASSESSMENT — PAIN SCALES - WONG BAKER
WONGBAKER_NUMERICALRESPONSE: 2
WONGBAKER_NUMERICALRESPONSE: 2

## 2022-12-14 ASSESSMENT — ENCOUNTER SYMPTOMS
SHORTNESS OF BREATH: 1
DYSPNEA ACTIVITY LEVEL: NO INTERVAL CHANGE

## 2022-12-14 ASSESSMENT — PAIN - FUNCTIONAL ASSESSMENT
PAIN_FUNCTIONAL_ASSESSMENT: 0-10
PAIN_FUNCTIONAL_ASSESSMENT: ACTIVITIES ARE NOT PREVENTED

## 2022-12-14 ASSESSMENT — PAIN DESCRIPTION - ONSET: ONSET: GRADUAL

## 2022-12-14 ASSESSMENT — PAIN DESCRIPTION - FREQUENCY: FREQUENCY: INTERMITTENT

## 2022-12-14 ASSESSMENT — PAIN DESCRIPTION - PAIN TYPE: TYPE: SURGICAL PAIN

## 2022-12-14 NOTE — ANESTHESIA PRE PROCEDURE
Department of Anesthesiology  Preprocedure Note       Name:  Anthony Paul   Age:  30 y.o.  :  1985                                          MRN:  [id number]         Date:  2022      Surgeon: Lyndsay Miranda):  Reid Shukla MD    Procedure: Procedure(s):  NASOPHARYNGOSCOPY EXCISION THORNWALDT CYST      ++LATEX ALLERGY++   ++IODINE ALLERGY++    Medications prior to admission:   Prior to Admission medications    Medication Sig Start Date End Date Taking? Authorizing Provider   Baclofen (LIORESAL) 5 MG tablet Take 1 tablet by mouth daily as needed (muscle spasm) 22   Juan Lima MD   fluticasone (FLONASE) 50 MCG/ACT nasal spray 2 sprays by Nasal route daily for 5 days 21  CHRIS Bey CNP   naproxen (NAPROXEN) 500 MG EC tablet Take 1 tablet by mouth 2 times daily (with meals) 7/30/20 10/4/21  MIKY Grande   albuterol sulfate  (90 Base) MCG/ACT inhaler Inhale 2 puffs into the lungs every 6 hours as needed for Wheezing    Historical Provider, MD       Current medications:    Current Facility-Administered Medications   Medication Dose Route Frequency Provider Last Rate Last Admin    lactated ringers infusion   IntraVENous Continuous Reid Shukla MD        sodium chloride flush 0.9 % injection 5-40 mL  5-40 mL IntraVENous 2 times per day Reid Shukla MD        sodium chloride flush 0.9 % injection 5-40 mL  5-40 mL IntraVENous PRN Reid Shukla MD        0.9 % sodium chloride infusion   IntraVENous PRN Reid Shukla MD        ampicillin-sulbactam (UNASYN) 3,000 mg in sodium chloride 0.9 % 100 mL IVPB (mini-bag)  3,000 mg IntraVENous On Call to [facility], MD           Allergies:     Allergies   Allergen Reactions    Latex     Bactrim [Sulfamethoxazole-Trimethoprim] Hives    Sulfamethoxazole-Trimethoprim Hives    Fish-Derived Products Swelling     Shrimp and Pirch    Ibuprofen Hives    Iodine      Fish derived products    Naproxen headache    Percocet [Oxycodone-Acetaminophen] Hives       Problem List:    Patient Active Problem List   Diagnosis Code    Ectopic pregnancy, tubal O00.109    HGSIL (high grade squamous intraepithelial dysplasia) [id number]    Wound infection after surgery T81.49XA    Anxiety state F41.1    Musculoskeletal chest pain R07.89    Intestinal adhesions K66.0    Pelvic pain in female R10.2    Adnexal adhesions N73.6    Atypical chest pain R07.89    Paresthesias R20.2    Cervical stenosis of spine M48.02    Cervical stenosis of spinal canal M48.02    Postoperative wound infection T81.49XA    Postoperative cerebrospinal fluid leak G97.82, G96.00    Sepsis (HCC) A41.9    Postoperative anemia D64.9    Fever R50.9    Drug-induced constipation K59.03    S/P cervical spinal fusion Z98.1    DDD (degenerative disc disease), cervical M50.30    Thornwaldt's cyst J39.2       Past Medical History:        Diagnosis Date    Anxiety     Asthma     DDD (degenerative disc disease), cervical 03/09/2020    Depression     Ectopic pregnancy, tubal 2012    Gall stone 2005    Heart murmur     Hernia 6860    umbilical     History of blood transfusion     Postoperative anemia        Past Surgical History:        Procedure Laterality Date    ABDOMINAL WALL SURGERY  09/09/2014    CERVICAL FUSION N/A 11/01/2019    C3-C7  CERVICAL LAMINECTOMY AND POSTERIOR C3-T1 FUSION -- NEEDS JOSH TABLE, CELL SAVER, PLATELET GEL, PLATES, SCREWS -- GLOBUS performed by Shannen Reynaga MD at [address] [place] [address] N/A 11/11/2019    POSTERIOR CERVICAL WOUND EXPLORATION - WANTS TF performed by Shannen Reynaga MD at [address] (CERVIX STATUS UNKNOWN) N/A 12/26/2017    robotic assisted ,   BSO    HYSTERECTOMY, TOTAL ABDOMINAL (CERVIX REMOVED)  08/20/2014    LAPAROSCOPY  09/28/2012    left salpingectomy    LAPAROSCOPY [id number]    RASHEEDA    OTHER SURGICAL HISTORY  2014    D&C;LEEP    TUBAL LIGATION         Social History:    Social History     Tobacco Use    Smoking status: Former     Types: Cigarettes, Cigars     Quit date: 7/10/2016     Years since quittin.4    Smokeless tobacco: Never    Tobacco comments:     quite cigars   Substance Use Topics    Alcohol use: Not Currently                                Counseling given: Not Answered  Tobacco comments: quite cigars      Vital Signs (Current):   Vitals:    22 1115 22 0736   Weight: 210 lb (95.3 kg) 210 lb (95.3 kg)   Height: 5' 3\" (1.6 m) 5' 3\" (1.6 m)                                              BP Readings from Last 3 Encounters:   22 118/71   22 (!) 93/57   10/03/22 107/68       NPO Status:                                                                                 BMI:   Wt Readings from Last 3 Encounters:   22 210 lb (95.3 kg)   22 208 lb (94.3 kg)   22 210 lb (95.3 kg)     Body mass index is 37.2 kg/m².     CBC:   Lab Results   Component Value Date/Time    WBC 7.9 12/10/2022 02:42 AM    RBC 4.12 12/10/2022 02:42 AM    HGB 12.3 12/10/2022 02:42 AM    HCT 38.0 12/10/2022 02:42 AM    MCV 92.2 12/10/2022 02:42 AM    RDW 13.2 12/10/2022 02:42 AM     12/10/2022 02:42 AM       CMP:   Lab Results   Component Value Date/Time     12/10/2022 02:42 AM    K 4.1 12/10/2022 02:42 AM     12/10/2022 02:42 AM    CO2 24 12/10/2022 02:42 AM    BUN 16 12/10/2022 02:42 AM    CREATININE 0.8 12/10/2022 02:42 AM    GFRAA >60 10/03/2022 03:28 PM    LABGLOM >60 12/10/2022 02:42 AM    GLUCOSE 98 12/10/2022 02:42 AM    PROT 8.1 11/10/2022 02:20 PM    CALCIUM 9.8 12/10/2022 02:42 AM    BILITOT 0.3 11/10/2022 02:20 PM    ALKPHOS 116 11/10/2022 02:20 PM    AST 24 11/10/2022 02:20 PM    ALT 17 11/10/2022 02:20 PM       POC Tests: No results for input(s): POCGLU, POCNA, POCK, POCCL, POCBUN, POCHEMO, POCHCT in the last 72 hours.    Coags:   Lab Results   Component Value Date/Time    PROTIME 13.4 11/12/2019 04:46 AM    INR 1.2 11/12/2019 04:46 AM    APTT 33.0 05/29/2019 10:09 AM       HCG (If Applicable):   Lab Results   Component Value Date    PREGTESTUR NEGATIVE 07/12/2019    HCG 5487.5 (H) 09/27/2012        ABGs: No results found for: PHART, PO2ART, OCX0AIC, WYH5OTO, BEART, Z2YXQWSI     Type & Screen (If Applicable):  No results found for: LABABO, LABRH    Drug/Infectious Status (If Applicable):  No results found for: HIV, HEPCAB    COVID-19 Screening (If Applicable):   Lab Results   Component Value Date/Time    COVID19 Detected 01/20/2022 11:09 AM    COVID19 Not Detected 12/06/2021 10:45 AM           Anesthesia Evaluation  Patient summary reviewed and Nursing notes reviewed no history of anesthetic complications:   Airway: Mallampati: III  TM distance: >3 FB   Neck ROM: limited  Comment: Decreased cervical ROM  Mouth opening: > = 3 FB   Dental:          Pulmonary:   (+) shortness of breath (Secondary to anxiety/panic attacks): no interval change,  decreased breath sounds: bilateral asthma: allergic asthma,     (-) sleep apnea and not a current smoker                           Cardiovascular:  Exercise tolerance: good (>4 METS),   (+) valvular problems/murmurs: no interval change, PONCE: no interval change,     (-) past MI, CAD, CABG/stent, dysrhythmias and  angina    ECG reviewed  Rhythm: regular  Rate: normal           Beta Blocker:  Not on Beta Blocker      ROS comment: EKG:  Sinus bradycardia  Early repolarization  Otherwise normal ECG  When compared with ECG of 13-JAN-2021 10:01,  Nonspecific T wave abnormality no longer evident in Anterior leads     Neuro/Psych:   (+) TIA (Remote w/o residual), psychiatric history:depression/anxiety  (Severe anxiety)   (-) seizures and CVA            ROS comment: Cervical stenosis of spine  Cervical stenosis of spinal canal  Thornwaldt's cyst GI/Hepatic/Renal: Neg GI/Hepatic/Renal ROS  (+) morbid obesity (BMI 37.2 kg/m2)     (-) hepatitis and no renal disease       Endo/Other:    (+) blood dyscrasia (Resolved): anemia, arthritis (S/P cervical fusion, DDD): OA., .    (-) diabetes mellitus        Pt had no PAT visit        ROS comment: Chronic neck and back pain managed with Baclofen Abdominal:   (+) obese,           Vascular: negative vascular ROS.  + PE.  - DVT. Other Findings:           Anesthesia Plan      general     ASA 3     (Modified RSI with HOB at 30 degrees RT  Pre-oxygenation x 3 minutes  Glidescope for intubation  20mg Ketamine  PONV prophylaxis)  Induction: intravenous. MIPS: Postoperative opioids intended and Prophylactic antiemetics administered. Anesthetic plan and risks discussed with patient. Plan discussed with CRNA. Mariano Hardin DO   12/14/2022      Pt seen and evaluated pre-procedure. Risks and benefits of anesthetic plan discussed as per custom.    CHRIS Shabazz - CRNA

## 2022-12-14 NOTE — DISCHARGE INSTRUCTIONS
No strenuous activity for 10 days post-op. All activities that require exerting energy, including gym class at school, should be avoided for 10 days. Most patients will miss about 7 - 10 days of work or school. There may be some blood tinged secretions from the nose or mouth. If there is any evidence of active bleeding from the nose or mouth, please call the office [phone] between the hours of 8:30 am - 5 pm, monday thru friday. If the answering machine at the office picks up, call the [facility] at [phone] or [phone]. If the page is not answered promptly, go to the emergency room nearest you. Cold substances will be best tolerated initially after surgery. Water, sport drinks, popsicles, sherbet are examples. The diet may be advanced as the patient tolerates it, but adequate liquids are most important to avoid dehydration. Several medications will be prescribed. A liquid pain medication or tablet will be provided, usually tylenol with codeine or tylenol with hydrocodone. Regular tylenol may be used as pain subsides. No ibuprofen, motrin, advil nuprin or any of the other non-steroidal anti-inflammatory agents. An antibiotic adjusted for weight and drug allergies, if any exist, will be prescribed. Suppositories for nausea or vomiting will be prescribed as well. An ice pack applied to the upper neck will provide some relief from the throat pain or referred pain to the ears from the throat. Some stiffness of the neck from removal of the adenoids is common as well. Ear pain is common and usually will not start until a few days after surgery. Gargles should not be used. Bad breath is expected until the surgical site heals. There will be a white film on the sides of the throat until the area is fully healed as well. A rise of up to 1 degree in the temperature post-op is usually expected.   Call if there is any concern about the amount or duration of the rise in temp. Snoring or mouth breathing may be present after surgery because of swelling in the surgical area. Return visit to office 2 weeks.  Please call to confirm day, time and location

## 2022-12-15 NOTE — PROGRESS NOTES
CLINICAL PHARMACY NOTE: MEDS TO BEDS    Total # of Prescriptions Filled: 3   The following medications were delivered to the patient:  Amoxicillin 500  Zofran 4  Norco 5/325    Additional Documentation:

## 2022-12-15 NOTE — OP NOTE
[address] [address]                                OPERATIVE REPORT    PATIENT NAME: Keya Crews                :        1985  MED REC NO:   [id number]                            ROOM:  ACCOUNT NO:   [de-identified]                           ADMIT DATE: 2022  PROVIDER:     Cristina Martinez MD    DATE OF PROCEDURE:  2022    PREOPERATIVE DIAGNOSIS:  Nasopharyngeal mass. POSTOPERATIVE DIAGNOSIS:  Nasopharyngeal mass. PROCEDURES PERFORMED:  Nasopharyngoscopy, excision of nasopharyngeal  mass. SURGEON:  Cristina Martinez MD.    FIRST ASSISTANT:  None. ANESTHESIA:  General per endotracheal tube. ESTIMATED BLOOD LOSS:  5 mL. COMPLICATIONS:  None. INDICATIONS:  Nasopharyngeal mass. DESCRIPTION OF PROCEDURE:  The patient was brought to the operating room  and placed in the supine position. Endotracheal tube passed, general  anesthesia administered. Maisha-Chava mouth gag was placed. Three plus  tonsils present, but adequate airway. The patient's neck had very  little mobility secondary to a prior posterior fusion, a very small roll  was placed underneath her shoulders with the neck essentially in the  neutral to slightly extended position. The head was supported  throughout the case. Red Haywood catheter was placed to the left nasal  passage retracting the palate. Using a mirror, I can inspect the  nasopharynx in the area where she had crusting, almost ulcerated area in  the midline. At this point, it was completely healed. I was able to  use a suction cautery to palpate the area and there was no ballotable  cystic mass present, but there was lymphoid-appearing tissue present. I  did take a biopsy. This was sent as nasopharyngeal mass. I was able to  cautery ablate tissue, remove it. No bleeding. The area was thoroughly  irrigated. The stomach was suctioned out as well.   The patient was then  awakened, extubated, and taken to the recovery room in stable condition. No intraoperative complications.         Ramsey Cool MD    D: 12/14/2022 10:33:10       T: 12/14/2022 11:10:40     [email or web address]  Job#: [id number]     Doc#: [id number]    CC:

## 2022-12-16 PROCEDURE — 99284 EMERGENCY DEPT VISIT MOD MDM: CPT

## 2022-12-17 ENCOUNTER — APPOINTMENT (OUTPATIENT)
Dept: CT IMAGING | Age: 37
End: 2022-12-17
Payer: COMMERCIAL

## 2022-12-17 ENCOUNTER — HOSPITAL ENCOUNTER (EMERGENCY)
Age: 37
Discharge: HOME | End: 2022-12-17
Payer: COMMERCIAL

## 2022-12-17 VITALS
RESPIRATION RATE: 15 BRPM | SYSTOLIC BLOOD PRESSURE: 110 MMHG | OXYGEN SATURATION: 100 % | DIASTOLIC BLOOD PRESSURE: 70 MMHG | HEART RATE: 55 BPM | WEIGHT: 208 LBS | BODY MASS INDEX: 36.86 KG/M2 | HEIGHT: 63 IN | TEMPERATURE: 97.9 F

## 2022-12-17 DIAGNOSIS — S39.012A LUMBAR STRAIN, INITIAL ENCOUNTER: ICD-10-CM

## 2022-12-17 DIAGNOSIS — S16.1XXA CERVICAL STRAIN, ACUTE, INITIAL ENCOUNTER: Primary | ICD-10-CM

## 2022-12-17 DIAGNOSIS — S29.019A THORACIC MYOFASCIAL STRAIN, INITIAL ENCOUNTER: ICD-10-CM
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
## 2022-12-17 PROCEDURE — 72128 CT CHEST SPINE W/O DYE: CPT

## 2022-12-17 PROCEDURE — 72131 CT LUMBAR SPINE W/O DYE: CPT

## 2022-12-17 PROCEDURE — 72125 CT NECK SPINE W/O DYE: CPT

## 2022-12-17 NOTE — ED NOTES
Radiology Procedure Waiver   Name: Gonzalez Huitron  : 1985  MRN: [id number]    Date:  22    Time: 1:35 AM EST    Benefits of immediately proceeding with Radiology exam(s) without pre-testing outweigh the risks or are not indicated as specified below and therefore the following is/are being waived:    [x] Pregnancy test   [] Patients LMP on-time and regular.   [] Patient had Tubal Ligation or has other Contraception Device. [] Patient  is Menopausal or Premenarcheal.    [x] Patient had Full or Partial Hysterectomy. [] Protocol for Iodine allergy    [] MRI Questionnaire     [] BUN/Creatinine   [] Patient age w/no hx of renal dysfunction. [] Patient on Dialysis. [] Recent Normal Labs.   Electronically signed by Nica Montoya PA-C on 22 at 1:35 AM EST               Nica Montoya PA-C  22 [id number]
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
## 2023-02-09 ENCOUNTER — HOSPITAL ENCOUNTER (OUTPATIENT)
Age: 38
Discharge: HOME | End: 2023-02-09
Payer: COMMERCIAL

## 2023-02-09 LAB
ALBUMIN SERPL-MCNC: 4.3 G/DL (ref 3.5–5.2)
ALP BLD-CCNC: 121 U/L (ref 35–104)
ALT SERPL-CCNC: 18 U/L (ref 0–32)
ANION GAP SERPL CALCULATED.3IONS-SCNC: 13 MMOL/L (ref 7–16)
AST SERPL-CCNC: 22 U/L (ref 0–31)
BILIRUB SERPL-MCNC: <0.2 MG/DL (ref 0–1.2)
BUN BLDV-MCNC: 13 MG/DL (ref 6–20)
CALCIUM SERPL-MCNC: 9.5 MG/DL (ref 8.6–10.2)
CHLORIDE BLD-SCNC: 107 MMOL/L (ref 98–107)
CO2: 24 MMOL/L (ref 22–29)
CREAT SERPL-MCNC: 0.8 MG/DL (ref 0.5–1)
FERRITIN: 97 NG/ML
GFR SERPL CREATININE-BSD FRML MDRD: >60 ML/MIN/1.73
GLUCOSE BLD-MCNC: 104 MG/DL (ref 74–99)
IRON SATURATION: 21 % (ref 15–50)
IRON: 61 MCG/DL (ref 37–145)
POTASSIUM SERPL-SCNC: 3.6 MMOL/L (ref 3.5–5)
SODIUM BLD-SCNC: 144 MMOL/L (ref 132–146)
TOTAL IRON BINDING CAPACITY: 294 MCG/DL (ref 250–450)
TOTAL PROTEIN: 7.9 G/DL (ref 6.4–8.3)

## 2023-02-09 PROCEDURE — 36415 COLL VENOUS BLD VENIPUNCTURE: CPT

## 2023-02-09 PROCEDURE — 84075 ASSAY ALKALINE PHOSPHATASE: CPT

## 2023-02-09 PROCEDURE — 80053 COMPREHEN METABOLIC PANEL: CPT

## 2023-02-09 PROCEDURE — 82728 ASSAY OF FERRITIN: CPT

## 2023-02-09 PROCEDURE — 84080 ASSAY ALKALINE PHOSPHATASES: CPT

## 2023-02-09 PROCEDURE — 83550 IRON BINDING TEST: CPT

## 2023-02-09 PROCEDURE — 83540 ASSAY OF IRON: CPT

## 2023-03-16 ENCOUNTER — OFFICE VISIT (OUTPATIENT)
Dept: FAMILY MEDICINE CLINIC | Age: 38
End: 2023-03-16
Payer: COMMERCIAL

## 2023-03-16 VITALS
WEIGHT: 206.4 LBS | HEIGHT: 63 IN | OXYGEN SATURATION: 98 % | DIASTOLIC BLOOD PRESSURE: 70 MMHG | RESPIRATION RATE: 16 BRPM | BODY MASS INDEX: 36.57 KG/M2 | HEART RATE: 84 BPM | SYSTOLIC BLOOD PRESSURE: 110 MMHG | TEMPERATURE: 97 F

## 2023-03-16 DIAGNOSIS — Z72.51 HIGH RISK SEXUAL BEHAVIOR, UNSPECIFIED TYPE: ICD-10-CM

## 2023-03-16 DIAGNOSIS — N76.0 ACUTE VAGINITIS: Primary | ICD-10-CM

## 2023-03-16 LAB
APPEARANCE FLUID: CLEAR
BILIRUBIN, POC: NEGATIVE
BLOOD URINE, POC: NEGATIVE
CLARITY, POC: CLEAR
COLOR, POC: YELLOW
CONTROL: NORMAL
GLUCOSE URINE, POC: NEGATIVE
KETONES, POC: NEGATIVE
LEUKOCYTE EST, POC: NEGATIVE
NITRITE, POC: NEGATIVE
PH, POC: 5.5
PREGNANCY TEST URINE, POC: NEGATIVE
PROTEIN, POC: NEGATIVE
SPECIFIC GRAVITY, POC: >=1.03
UROBILINOGEN, POC: 0.2

## 2023-03-16 PROCEDURE — 99213 OFFICE O/P EST LOW 20 MIN: CPT

## 2023-03-16 PROCEDURE — 1036F TOBACCO NON-USER: CPT

## 2023-03-16 PROCEDURE — 81002 URINALYSIS NONAUTO W/O SCOPE: CPT

## 2023-03-16 PROCEDURE — G8484 FLU IMMUNIZE NO ADMIN: HCPCS

## 2023-03-16 PROCEDURE — 96372 THER/PROPH/DIAG INJ SC/IM: CPT

## 2023-03-16 PROCEDURE — G8417 CALC BMI ABV UP PARAM F/U: HCPCS

## 2023-03-16 PROCEDURE — 81025 URINE PREGNANCY TEST: CPT

## 2023-03-16 PROCEDURE — G8427 DOCREV CUR MEDS BY ELIG CLIN: HCPCS

## 2023-03-16 RX ORDER — FLUCONAZOLE 150 MG/1
TABLET ORAL
Qty: 2 TABLET | Refills: 0 | Status: SHIPPED | OUTPATIENT
Start: 2023-03-16

## 2023-03-16 RX ORDER — CEFTRIAXONE 500 MG/1
500 INJECTION, POWDER, FOR SOLUTION INTRAMUSCULAR; INTRAVENOUS ONCE
Status: COMPLETED | OUTPATIENT
Start: 2023-03-16 | End: 2023-03-16

## 2023-03-16 RX ORDER — AZITHROMYCIN 250 MG/1
1000 TABLET, FILM COATED ORAL ONCE
Status: COMPLETED | OUTPATIENT
Start: 2023-03-16 | End: 2023-03-16

## 2023-03-16 RX ADMIN — AZITHROMYCIN 1000 MG: 250 TABLET, FILM COATED ORAL at 16:08

## 2023-03-16 RX ADMIN — CEFTRIAXONE 500 MG: 500 INJECTION, POWDER, FOR SOLUTION INTRAMUSCULAR; INTRAVENOUS at 16:11

## 2023-03-16 SDOH — ECONOMIC STABILITY: HOUSING INSECURITY
IN THE LAST 12 MONTHS, WAS THERE A TIME WHEN YOU DID NOT HAVE A STEADY PLACE TO SLEEP OR SLEPT IN A SHELTER (INCLUDING NOW)?: NO

## 2023-03-16 SDOH — ECONOMIC STABILITY: INCOME INSECURITY: HOW HARD IS IT FOR YOU TO PAY FOR THE VERY BASICS LIKE FOOD, HOUSING, MEDICAL CARE, AND HEATING?: NOT HARD AT ALL

## 2023-03-16 SDOH — ECONOMIC STABILITY: FOOD INSECURITY: WITHIN THE PAST 12 MONTHS, THE FOOD YOU BOUGHT JUST DIDN'T LAST AND YOU DIDN'T HAVE MONEY TO GET MORE.: NEVER TRUE

## 2023-03-16 SDOH — ECONOMIC STABILITY: FOOD INSECURITY: WITHIN THE PAST 12 MONTHS, YOU WORRIED THAT YOUR FOOD WOULD RUN OUT BEFORE YOU GOT MONEY TO BUY MORE.: NEVER TRUE

## 2023-03-16 NOTE — PROGRESS NOTES
Chief Complaint       Sexually Transmitted Diseases and Vaginitis (Itching, irritated, x 3 days)    History of Present Illness   Source of history provided by:  patient. Anjum Hardin is a 30 y.o. old female presenting to the walk in clinic for evaluation of vaginal irritation and itching which began 3 days ago. Since onset the symptoms have not improved. Has tried \"topical yeast cream.\" Denies any lower abdominal cramping. Admits to possible STD exposure, has has unprotected sex with 1 male partner. She also reports using \"toys with latex\" that may have caused irritation. Denies any fever, chills, pain with intercourse, N/V/D, back pain, possibility of pregnancy, or lethargy. Patient's last menstrual period was 05/19/2014. Has had hysterectomy. ROS    Unless otherwise stated in this report or unable to obtain because of the patient's clinical or mental status as evidenced by the medical record, this patients's positive and negative responses for Review of Systems, constitutional, psych, eyes, ENT, cardiovascular, respiratory, gastrointestinal, neurological, genitourinary, musculoskeletal, integument systems and systems related to the presenting problem are either stated in the preceding or were not pertinent or were negative for the symptoms and/or complaints related to the medical problem. Physical Exam         VS:  /70   Pulse 84   Temp 97 °F (36.1 °C) (Temporal)   Resp 16   Ht 5' 3\" (1.6 m)   Wt 206 lb 6.4 oz (93.6 kg)   LMP 05/19/2014   SpO2 98%   BMI 36.56 kg/m²    Oxygen Saturation Interpretation: Normal.    Constitutional:  A&Ox3, development consistent with age, NAD. CV: Heart RRR, no murmurs, rubs, or gallops. Lungs: CTAB without wheezing, rales, or rhonchi  Abdomen:  General Appearance: No rashes, bruising, or abrasions noted. Bowel sounds: BS+x4. Distension:  None. Tenderness: Non. Liver/Spleen:  Non-tender and no hepatosplenomegaly.   Back: CVA Tenderness: None bilaterally. Pelvic Exam: Pt declined chaperone. External Genitalia: Appears wnl without rashes or lesions. Vagina: Vaginal mucosa pink and moist with thin, colorless discharge noted. Skin:  Normal turgor. Warm, dry, without visible rash, unless noted elsewhere. Neurological:  Orientation age-appropriate. Motor functions intact. Lab / Imaging Results   (All laboratory and radiology results have been personally reviewed by myself)  Labs:  No results found for this visit on 03/16/23. Imaging: All Radiology results interpreted by Radiologist unless otherwise noted. Assessment / Plan     Impression(s):  Melinda was seen today for sexually transmitted diseases and vaginitis. Diagnoses and all orders for this visit:    Acute vaginitis  -     fluconazole (DIFLUCAN) 150 MG tablet; 1 tab po x 1 dose, may repeat in 72 hrs if still symptomatic    High risk sexual behavior, unspecified type  -     POCT urine pregnancy  -     POCT Urinalysis no Micro  -     Trichomonas Screen; Future  -     C.trachomatis N.gonorrhoeae DNA, Urine; Future  -     Culture, Genital; Future  -     cefTRIAXone (ROCEPHIN) injection 500 mg  -     azithromycin (ZITHROMAX) tablet 1,000 mg    Disposition:  Disposition: Discharge to home. I am going to treat patient for suspected yeast infection based on her positive clinical picture. Script written for Diflucan, side effects discussed. Swabs obtained and pending for genital culture, will call with results once available. Pt also concerned for STD, testing for c/g/t ordered, she would like to be treated in advance for IM ceftriaxone and azithromycin. Advised to avoid sexual contact until results are confirmed. F/u PCP or GYN in 1 week if symptoms persist. ED sooner if symptoms worsen or change. ED immediately with the development of fever, pelvic pain, body aches, shaking chills, severe/worsening abdominal pain, dyspareunia, CP, or SOB.  Pt is in agreement with this care plan. All questions answered. MIKY Ramirez    **This report was transcribed using voice recognition software. Every effort was made to ensure accuracy; however, inadvertent computerized transcription errors may be present.

## 2023-03-20 ENCOUNTER — HOSPITAL ENCOUNTER (OUTPATIENT)
Age: 38
Discharge: HOME | End: 2023-03-20
Payer: COMMERCIAL

## 2023-03-20 DIAGNOSIS — T81.49XA SURGICAL WOUND INFECTION: ICD-10-CM

## 2023-03-20 LAB
ALBUMIN SERPL-MCNC: 4.4 G/DL (ref 3.5–5.2)
ALP SERPL-CCNC: 126 U/L (ref 35–104)
ALT SERPL-CCNC: 23 U/L (ref 0–32)
ANION GAP SERPL CALCULATED.3IONS-SCNC: 13 MMOL/L (ref 7–16)
AST SERPL-CCNC: 24 U/L (ref 0–31)
BACTERIA GENITAL AEROBE CULT: ABNORMAL
BASOPHILS # BLD: 0.04 E9/L (ref 0–0.2)
BASOPHILS NFR BLD: 0.4 % (ref 0–2)
BILIRUB SERPL-MCNC: 0.3 MG/DL (ref 0–1.2)
BUN SERPL-MCNC: 12 MG/DL (ref 6–20)
CALCIUM SERPL-MCNC: 9.8 MG/DL (ref 8.6–10.2)
CHLAMYDIA DNA UR QL NAA+PROBE: NEGATIVE
CHLORIDE SERPL-SCNC: 105 MMOL/L (ref 98–107)
CO2 SERPL-SCNC: 26 MMOL/L (ref 22–29)
CREAT SERPL-MCNC: 0.9 MG/DL (ref 0.5–1)
CRP SERPL HS-MCNC: <0.3 MG/DL (ref 0–0.4)
EOSINOPHIL # BLD: 0.23 E9/L (ref 0.05–0.5)
EOSINOPHIL NFR BLD: 2.3 % (ref 0–6)
ERYTHROCYTE [DISTWIDTH] IN BLOOD BY AUTOMATED COUNT: 13.6 FL (ref 11.5–15)
ERYTHROCYTE [SEDIMENTATION RATE] IN BLOOD BY WESTERGREN METHOD: 34 MM/HR (ref 0–20)
GLUCOSE SERPL-MCNC: 92 MG/DL (ref 74–99)
HCT VFR BLD AUTO: 41.5 % (ref 34–48)
HGB BLD-MCNC: 12.9 G/DL (ref 11.5–15.5)
IMM GRANULOCYTES # BLD: 0.04 E9/L
IMM GRANULOCYTES NFR BLD: 0.4 % (ref 0–5)
LYMPHOCYTES # BLD: 3.37 E9/L (ref 1.5–4)
LYMPHOCYTES NFR BLD: 33.6 % (ref 20–42)
MCH RBC QN AUTO: 28.9 PG (ref 26–35)
MCHC RBC AUTO-ENTMCNC: 31.1 % (ref 32–34.5)
MCV RBC AUTO: 92.8 FL (ref 80–99.9)
MONOCYTES # BLD: 0.75 E9/L (ref 0.1–0.95)
MONOCYTES NFR BLD: 7.5 % (ref 2–12)
N GONORRHOEA DNA UR QL NAA+PROBE: NEGATIVE
NEUTROPHILS # BLD: 5.59 E9/L (ref 1.8–7.3)
NEUTS SEG NFR BLD: 55.8 % (ref 43–80)
ORGANISM: ABNORMAL
PLATELET # BLD AUTO: 343 E9/L (ref 130–450)
PMV BLD AUTO: 10 FL (ref 7–12)
POTASSIUM SERPL-SCNC: 4 MMOL/L (ref 3.5–5)
PROT SERPL-MCNC: 8.4 G/DL (ref 6.4–8.3)
RBC # BLD AUTO: 4.47 E12/L (ref 3.5–5.5)
SODIUM SERPL-SCNC: 144 MMOL/L (ref 132–146)
SPECIMEN SOURCE: NORMAL
WBC # BLD: 10 E9/L (ref 4.5–11.5)

## 2023-03-20 PROCEDURE — 85651 RBC SED RATE NONAUTOMATED: CPT

## 2023-03-20 PROCEDURE — 36415 COLL VENOUS BLD VENIPUNCTURE: CPT

## 2023-03-20 PROCEDURE — 86140 C-REACTIVE PROTEIN: CPT

## 2023-03-20 PROCEDURE — 85025 COMPLETE CBC W/AUTO DIFF WBC: CPT

## 2023-03-20 PROCEDURE — 80053 COMPREHEN METABOLIC PANEL: CPT

## 2023-03-21 LAB — CULTURE, TRICHOMONAS: NORMAL

## 2023-04-17 ENCOUNTER — APPOINTMENT (OUTPATIENT)
Dept: GENERAL RADIOLOGY | Age: 38
End: 2023-04-17
Payer: COMMERCIAL

## 2023-04-17 ENCOUNTER — HOSPITAL ENCOUNTER (EMERGENCY)
Age: 38
Discharge: HOME | End: 2023-04-17
Payer: COMMERCIAL

## 2023-04-17 VITALS
DIASTOLIC BLOOD PRESSURE: 89 MMHG | HEIGHT: 63 IN | TEMPERATURE: 98.6 F | OXYGEN SATURATION: 100 % | WEIGHT: 208 LBS | RESPIRATION RATE: 18 BRPM | SYSTOLIC BLOOD PRESSURE: 116 MMHG | BODY MASS INDEX: 36.86 KG/M2 | HEART RATE: 56 BPM

## 2023-04-17 DIAGNOSIS — S93.602A SPRAIN OF LEFT FOOT, INITIAL ENCOUNTER: ICD-10-CM

## 2023-04-17 DIAGNOSIS — M25.572 PAIN AND SWELLING OF LEFT ANKLE: ICD-10-CM

## 2023-04-17 DIAGNOSIS — M25.572 PAIN OF JOINT OF LEFT ANKLE AND FOOT: ICD-10-CM

## 2023-04-17 DIAGNOSIS — M25.472 PAIN AND SWELLING OF LEFT ANKLE: ICD-10-CM

## 2023-04-17 DIAGNOSIS — S93.402A SPRAIN OF LEFT ANKLE, UNSPECIFIED LIGAMENT, INITIAL ENCOUNTER: Primary | ICD-10-CM

## 2023-04-17 PROCEDURE — 99283 EMERGENCY DEPT VISIT LOW MDM: CPT

## 2023-04-17 PROCEDURE — 73610 X-RAY EXAM OF ANKLE: CPT

## 2023-04-17 PROCEDURE — 73630 X-RAY EXAM OF FOOT: CPT

## 2023-04-17 PROCEDURE — 6370000000 HC RX 637 (ALT 250 FOR IP): Performed by: PHYSICIAN ASSISTANT

## 2023-04-17 RX ORDER — HYDROCODONE BITARTRATE AND ACETAMINOPHEN 5; 325 MG/1; MG/1
1 TABLET ORAL ONCE
Status: COMPLETED | OUTPATIENT
Start: 2023-04-17 | End: 2023-04-17

## 2023-04-17 RX ORDER — HYDROCODONE BITARTRATE AND ACETAMINOPHEN 5; 325 MG/1; MG/1
1 TABLET ORAL EVERY 6 HOURS PRN
Qty: 12 TABLET | Refills: 0 | Status: SHIPPED | OUTPATIENT
Start: 2023-04-17 | End: 2023-04-19

## 2023-04-17 RX ADMIN — HYDROCODONE BITARTRATE AND ACETAMINOPHEN 1 TABLET: 5; 325 TABLET ORAL at 12:39

## 2023-04-17 ASSESSMENT — PAIN - FUNCTIONAL ASSESSMENT: PAIN_FUNCTIONAL_ASSESSMENT: 0-10

## 2023-04-17 ASSESSMENT — PAIN DESCRIPTION - PAIN TYPE: TYPE: ACUTE PAIN

## 2023-04-17 ASSESSMENT — PAIN SCALES - GENERAL
PAINLEVEL_OUTOF10: 10
PAINLEVEL_OUTOF10: 10

## 2023-04-17 ASSESSMENT — PAIN DESCRIPTION - FREQUENCY: FREQUENCY: CONTINUOUS

## 2023-04-17 ASSESSMENT — PAIN DESCRIPTION - LOCATION: LOCATION: ANKLE;FOOT

## 2023-04-17 ASSESSMENT — PAIN DESCRIPTION - ORIENTATION: ORIENTATION: LEFT

## 2023-04-17 NOTE — ED NOTES
Reviewed discharge instructions with patient, discussed medications and addressed all patient questions/concerns. Pt verbalizes understanding.        Terrence Vaz RN  04/17/23 1763

## 2023-04-17 NOTE — ED PROVIDER NOTES
[facility]. Department of Emergency Medicine   ED  Provider Note  Admit Date/RoomTime: 2023 12:13 PM  ED Room: 32/32    Chief Complaint   Ankle Pain (Left ankle injury. Twisted foot in a hole in yard this AM)    History of Present Illness      Melinda Gomez is a 30 y.o. old female who presents to the emergency department for left foot and ankle pain. Patient states she was rushing to catch the garbage can when she tripped in a hole in her yard. She states she twisted her left foot and ankle. She denies hitting her head or have any loss of consciousness. She denies numbness/tingling or sensation changes. Patient states he tried to wrap her ankle with an Ace wrap but did not have any relief. She still able to ambulate but does have pain while doing so. She denies any pain to her knee, hip, back. She is alert and oriented x3 at this exam.  She is nontoxic. She denies any past injury to affected ankle or foot. PCP: MIKY Corbin  Ortho: None    ROS   Pertinent positives and negatives are stated within HPI, all other systems reviewed and are negative. Past Medical History:   Past Medical History:   Diagnosis Date    Anxiety     Asthma     DDD (degenerative disc disease), cervical 2020    Depression     Ectopic pregnancy, tubal 2012    Gall stone 2005    Heart murmur     Hernia 1619    umbilical     History of blood transfusion     Postoperative anemia       Past Surgical History:  has a past surgical history that includes  section; laparoscopy (2012); Ectopic pregnancy surgery; Tubal ligation; laparoscopy ([id number]); other surgical history (2014); Hysterectomy, total abdominal (2014); Abdominal wall surgery (2014); Cholecystectomy; Hysterectomy (N/A, 2017); hernia repair; cervical fusion (N/A, 2019); cervical fusion (N/A, 2019); and Nose surgery (N/A, 2022).   Social History:  reports that she quit smoking

## 2023-04-18 ENCOUNTER — TELEPHONE (OUTPATIENT)
Dept: ORTHOPEDIC SURGERY | Age: 38
End: 2023-04-18

## 2023-04-18 NOTE — TELEPHONE ENCOUNTER
LVM to return call to follow up on ankle injury. Recommended patient f/u with PCP or Dr. Saúl Wing in May.

## 2023-04-21 ENCOUNTER — TELEPHONE (OUTPATIENT)
Dept: ORTHOPEDIC SURGERY | Age: 38
End: 2023-04-21

## 2023-04-21 NOTE — TELEPHONE ENCOUNTER
----- Message from Kimberli Barbosa sent at 4/18/2023  7:50 AM EDT -----  Referral from Dr Katya Ervin ED F/U   ----- Message -----  From: Mildred George DO  Sent: 4/17/2023   4:58 PM EDT  To: Randolph Ayala MA, Ammon Mak MA, Joshua Vazquez or desmond  ----- Message -----  From: Automatic Discharge Provider  Sent: 4/17/2023   2:14 PM EDT  To: Mildred George DO

## 2023-05-01 RX ORDER — CYCLOBENZAPRINE HCL 5 MG
TABLET ORAL
Qty: 30 TABLET | Refills: 2 | OUTPATIENT
Start: 2023-05-01

## 2023-05-13 NOTE — PLAN OF CARE
Problem: Falls - Risk of:  Goal: Will remain free from falls  Description  Will remain free from falls  Outcome: Met This Shift  Goal: Absence of physical injury  Description  Absence of physical injury  Outcome: Met This Shift     Problem: Pain:  Goal: Pain level will decrease  Description  Pain level will decrease  Outcome: Met This Shift  Goal: Control of acute pain  Description  Control of acute pain  Outcome: Met This Shift  Goal: Control of chronic pain  Description  Control of chronic pain  Outcome: Met This Shift PAST SURGICAL HISTORY:  H/O ankle fusion right - 2013    H/O thyroidectomy 2008 - thryoid cancer    H/O: hysterectomy 1993    History of hand surgery carpal tunnel - both hands 19911    History of lumpectomy of right breast 2001- breast cancer

## 2023-05-15 ENCOUNTER — HOSPITAL ENCOUNTER (OUTPATIENT)
Age: 38
Discharge: HOME | End: 2023-05-15
Payer: COMMERCIAL

## 2023-05-15 LAB
ALBUMIN SERPL-MCNC: 4.5 G/DL (ref 3.5–5.2)
ALP SERPL-CCNC: 122 U/L (ref 35–104)
ALT SERPL-CCNC: 18 U/L (ref 0–32)
AMYLASE SERPL-CCNC: 130 U/L (ref 20–100)
ANION GAP SERPL CALCULATED.3IONS-SCNC: 18 MMOL/L (ref 7–16)
AST SERPL-CCNC: 22 U/L (ref 0–31)
BILIRUB SERPL-MCNC: <0.2 MG/DL (ref 0–1.2)
BUN SERPL-MCNC: 14 MG/DL (ref 6–20)
CALCIUM SERPL-MCNC: 9.7 MG/DL (ref 8.6–10.2)
CHLORIDE SERPL-SCNC: 103 MMOL/L (ref 98–107)
CO2 SERPL-SCNC: 23 MMOL/L (ref 22–29)
CREAT SERPL-MCNC: 0.8 MG/DL (ref 0.5–1)
ERYTHROCYTE [DISTWIDTH] IN BLOOD BY AUTOMATED COUNT: 13.6 FL (ref 11.5–15)
GLUCOSE SERPL-MCNC: 92 MG/DL (ref 74–99)
HBA1C MFR BLD: 5.6 % (ref 4–5.6)
HCT VFR BLD AUTO: 40.3 % (ref 34–48)
HGB BLD-MCNC: 12.7 G/DL (ref 11.5–15.5)
MCH RBC QN AUTO: 29.2 PG (ref 26–35)
MCHC RBC AUTO-ENTMCNC: 31.5 % (ref 32–34.5)
MCV RBC AUTO: 92.6 FL (ref 80–99.9)
PLATELET # BLD AUTO: 334 E9/L (ref 130–450)
PMV BLD AUTO: 10.8 FL (ref 7–12)
POTASSIUM SERPL-SCNC: 3.6 MMOL/L (ref 3.5–5)
PROT SERPL-MCNC: 8.2 G/DL (ref 6.4–8.3)
RBC # BLD AUTO: 4.35 E12/L (ref 3.5–5.5)
SODIUM SERPL-SCNC: 144 MMOL/L (ref 132–146)
WBC # BLD: 9.8 E9/L (ref 4.5–11.5)

## 2023-05-15 PROCEDURE — 84075 ASSAY ALKALINE PHOSPHATASE: CPT

## 2023-05-15 PROCEDURE — 82150 ASSAY OF AMYLASE: CPT

## 2023-05-15 PROCEDURE — 84080 ASSAY ALKALINE PHOSPHATASES: CPT

## 2023-05-15 PROCEDURE — 36415 COLL VENOUS BLD VENIPUNCTURE: CPT

## 2023-05-15 PROCEDURE — 80053 COMPREHEN METABOLIC PANEL: CPT

## 2023-05-15 PROCEDURE — 85027 COMPLETE CBC AUTOMATED: CPT

## 2023-05-15 PROCEDURE — 83036 HEMOGLOBIN GLYCOSYLATED A1C: CPT

## 2023-05-18 LAB
ALP BONE SERPL-CCNC: 42 U/L (ref 0–55)
ALP ISOS SERPL HS-CCNC: 0 U/L
ALP LIVER SERPL-CCNC: 76 U/L (ref 0–94)
ALP SERPL-CCNC: 118 U/L (ref 40–120)

## 2023-05-24 ENCOUNTER — TELEPHONE (OUTPATIENT)
Dept: ENDOSCOPY | Age: 38
End: 2023-05-24

## 2023-06-02 ENCOUNTER — TELEPHONE (OUTPATIENT)
Dept: ENDOSCOPY | Age: 38
End: 2023-06-02

## 2023-06-02 NOTE — TELEPHONE ENCOUNTER
pt called in to reschedule esohageal manometry. pt states that she is out of town and may not be back in time for apt. she would like to r/s. rescheduled for 5/12 at 1330.

## 2023-07-14 ENCOUNTER — OFFICE VISIT (OUTPATIENT)
Dept: FAMILY MEDICINE CLINIC | Age: 38
End: 2023-07-14

## 2023-07-14 ENCOUNTER — HOSPITAL ENCOUNTER (OUTPATIENT)
Age: 38
Discharge: HOME | End: 2023-07-14
Payer: COMMERCIAL

## 2023-07-14 VITALS
HEIGHT: 63 IN | DIASTOLIC BLOOD PRESSURE: 80 MMHG | BODY MASS INDEX: 36.89 KG/M2 | HEART RATE: 62 BPM | SYSTOLIC BLOOD PRESSURE: 110 MMHG | OXYGEN SATURATION: 99 % | WEIGHT: 208.2 LBS | RESPIRATION RATE: 16 BRPM | TEMPERATURE: 97.5 F

## 2023-07-14 DIAGNOSIS — Z11.3 SCREENING FOR STD (SEXUALLY TRANSMITTED DISEASE): ICD-10-CM

## 2023-07-14 DIAGNOSIS — Z72.51 HIGH RISK SEXUAL BEHAVIOR, UNSPECIFIED TYPE: ICD-10-CM

## 2023-07-14 DIAGNOSIS — Z72.51 HIGH RISK SEXUAL BEHAVIOR, UNSPECIFIED TYPE: Primary | ICD-10-CM

## 2023-07-14 LAB
BILIRUBIN, POC: NEGATIVE
BLOOD URINE, POC: NEGATIVE
CLARITY, POC: CLEAR
COLOR, POC: YELLOW
CONTROL: NORMAL
GLUCOSE URINE, POC: NEGATIVE
KETONES, POC: NEGATIVE
LEUKOCYTE EST, POC: NEGATIVE
NITRITE, POC: NEGATIVE
PH, POC: 5.5
PREGNANCY TEST URINE, POC: NEGATIVE
PROTEIN, POC: NEGATIVE
RPR SER QL: NORMAL
SPECIFIC GRAVITY, POC: 1.02
UROBILINOGEN, POC: 0.2

## 2023-07-14 PROCEDURE — 86703 HIV-1/HIV-2 1 RESULT ANTBDY: CPT

## 2023-07-14 PROCEDURE — 86592 SYPHILIS TEST NON-TREP QUAL: CPT

## 2023-07-14 PROCEDURE — 86694 HERPES SIMPLEX NES ANTBDY: CPT

## 2023-07-14 PROCEDURE — 36415 COLL VENOUS BLD VENIPUNCTURE: CPT

## 2023-07-14 NOTE — PROGRESS NOTES
with PCP for recheck if symptoms persist. ED sooner if symptoms worsen or change. ED immediately with the development of fever, pelvic pain, body aches, shaking chills, severe/worsening abdominal pain, vomiting, pain with intercourse, CP, or SOB. Pt states understanding and is in agreement with this care plan. All questions answered. MIKY Valdes    **This report was transcribed using voice recognition software. Every effort was made to ensure accuracy; however, inadvertent computerized transcription errors may be present.

## 2023-07-15 LAB — HIV1+2 AB SERPL QL IA: NORMAL

## 2023-07-17 LAB
BACTERIA GENITAL AEROBE CULT: ABNORMAL
HSV1 GG IGG SER-ACNC: 20.2 IV
HSV1+2 IGG SER IA-ACNC: >22.4 IV
HSV1+2 IGM SER IA-ACNC: 0.51 IV
HSV2 GG IGG SER-ACNC: 2.02 IV
ORGANISM: ABNORMAL

## 2023-07-19 ENCOUNTER — HOSPITAL ENCOUNTER (OUTPATIENT)
Age: 38
Discharge: HOME | End: 2023-07-19
Payer: COMMERCIAL

## 2023-07-19 ENCOUNTER — OFFICE VISIT (OUTPATIENT)
Dept: FAMILY MEDICINE CLINIC | Age: 38
End: 2023-07-19

## 2023-07-19 VITALS
TEMPERATURE: 97.9 F | OXYGEN SATURATION: 98 % | HEART RATE: 65 BPM | RESPIRATION RATE: 18 BRPM | DIASTOLIC BLOOD PRESSURE: 70 MMHG | WEIGHT: 208 LBS | HEIGHT: 63 IN | BODY MASS INDEX: 36.86 KG/M2 | SYSTOLIC BLOOD PRESSURE: 110 MMHG

## 2023-07-19 DIAGNOSIS — B00.9 HSV-2 INFECTION: ICD-10-CM

## 2023-07-19 DIAGNOSIS — B00.9 HSV-2 INFECTION: Primary | ICD-10-CM

## 2023-07-19 DIAGNOSIS — F41.8 SITUATIONAL ANXIETY: ICD-10-CM

## 2023-07-19 LAB
CHLAMYDIA DNA UR QL NAA+PROBE: NEGATIVE
CULTURE, TRICHOMONAS: NORMAL
N GONORRHOEA DNA UR QL NAA+PROBE: NEGATIVE
SPECIMEN SOURCE: NORMAL

## 2023-07-19 PROCEDURE — 86695 HERPES SIMPLEX TYPE 1 TEST: CPT

## 2023-07-19 PROCEDURE — 86694 HERPES SIMPLEX NES ANTBDY: CPT

## 2023-07-19 PROCEDURE — 36415 COLL VENOUS BLD VENIPUNCTURE: CPT

## 2023-07-22 LAB
HSV I/II AB, IGM: 0.35 IV
HSV I/II IGG: >22.4 IV

## 2023-07-31 ENCOUNTER — OFFICE VISIT (OUTPATIENT)
Dept: PAIN MANAGEMENT | Age: 38
End: 2023-07-31
Payer: COMMERCIAL

## 2023-07-31 VITALS
HEART RATE: 74 BPM | RESPIRATION RATE: 16 BRPM | SYSTOLIC BLOOD PRESSURE: 110 MMHG | DIASTOLIC BLOOD PRESSURE: 70 MMHG | OXYGEN SATURATION: 98 % | WEIGHT: 208 LBS | HEIGHT: 63 IN | BODY MASS INDEX: 36.86 KG/M2 | TEMPERATURE: 98.4 F

## 2023-07-31 DIAGNOSIS — M47.812 CERVICAL SPONDYLOSIS: ICD-10-CM

## 2023-07-31 DIAGNOSIS — M50.30 DDD (DEGENERATIVE DISC DISEASE), CERVICAL: Primary | ICD-10-CM

## 2023-07-31 DIAGNOSIS — Z98.1 S/P CERVICAL SPINAL FUSION: ICD-10-CM

## 2023-07-31 DIAGNOSIS — M54.2 CERVICALGIA: ICD-10-CM

## 2023-07-31 PROCEDURE — G8417 CALC BMI ABV UP PARAM F/U: HCPCS | Performed by: ANESTHESIOLOGY

## 2023-07-31 PROCEDURE — 99214 OFFICE O/P EST MOD 30 MIN: CPT | Performed by: ANESTHESIOLOGY

## 2023-07-31 PROCEDURE — 1036F TOBACCO NON-USER: CPT | Performed by: ANESTHESIOLOGY

## 2023-07-31 PROCEDURE — G8427 DOCREV CUR MEDS BY ELIG CLIN: HCPCS | Performed by: ANESTHESIOLOGY

## 2023-07-31 PROCEDURE — 99213 OFFICE O/P EST LOW 20 MIN: CPT | Performed by: ANESTHESIOLOGY

## 2023-07-31 RX ORDER — METHYLPREDNISOLONE 4 MG/1
TABLET ORAL
Qty: 1 KIT | Refills: 0 | Status: SHIPPED | OUTPATIENT
Start: 2023-07-31 | End: 2023-08-06

## 2023-07-31 RX ORDER — BACLOFEN 5 MG/1
5 TABLET ORAL DAILY PRN
Qty: 30 TABLET | Refills: 2 | Status: SHIPPED | OUTPATIENT
Start: 2023-07-31

## 2023-08-21 ENCOUNTER — OFFICE VISIT (OUTPATIENT)
Dept: OBGYN | Age: 38
End: 2023-08-21
Payer: COMMERCIAL

## 2023-08-21 VITALS
HEIGHT: 63 IN | DIASTOLIC BLOOD PRESSURE: 72 MMHG | WEIGHT: 207 LBS | SYSTOLIC BLOOD PRESSURE: 119 MMHG | HEART RATE: 75 BPM | BODY MASS INDEX: 36.68 KG/M2

## 2023-08-21 DIAGNOSIS — N76.1 CHRONIC VAGINITIS: Primary | ICD-10-CM

## 2023-08-21 PROCEDURE — G8427 DOCREV CUR MEDS BY ELIG CLIN: HCPCS | Performed by: OBSTETRICS & GYNECOLOGY

## 2023-08-21 PROCEDURE — 1036F TOBACCO NON-USER: CPT | Performed by: OBSTETRICS & GYNECOLOGY

## 2023-08-21 PROCEDURE — G8417 CALC BMI ABV UP PARAM F/U: HCPCS | Performed by: OBSTETRICS & GYNECOLOGY

## 2023-08-21 PROCEDURE — 99213 OFFICE O/P EST LOW 20 MIN: CPT | Performed by: OBSTETRICS & GYNECOLOGY

## 2023-08-21 NOTE — PROGRESS NOTES
Patient alert and peasant with concerns about STI screening completed on 7/14/23. Would like to discuss results  Discharge instructions have been discussed with the patient. Patient advised to call our office with any questions or concerns. Voiced understanding.

## 2023-08-21 NOTE — PROGRESS NOTES
Here to review her STD screening particularly HSV screening. The tesing shows some sort of old exposure to herpes. After questioning she has a history of Cold sores in the past.  They do perhaps recurr from time to time but not really too often. There is no evidence that she has ever had primary type 2 vulvar or genital herpes infection. Went over this as best as I could. There was ot real evidence of any recent infections. Had a long discussion regarding herpes. Told her what to watch for as far a genital outbreak.  See prn at anytime

## 2023-09-13 ENCOUNTER — HOSPITAL ENCOUNTER (OUTPATIENT)
Age: 38
Discharge: HOME | End: 2023-09-13
Payer: COMMERCIAL

## 2023-09-13 DIAGNOSIS — T81.49XA POSTOPERATIVE WOUND INFECTION: ICD-10-CM

## 2023-09-13 LAB
ALBUMIN SERPL-MCNC: 4.3 G/DL (ref 3.5–5.2)
ALP SERPL-CCNC: 124 U/L (ref 35–104)
ALT SERPL-CCNC: 20 U/L (ref 0–32)
ANION GAP SERPL CALCULATED.3IONS-SCNC: 15 MMOL/L (ref 7–16)
AST SERPL-CCNC: 25 U/L (ref 0–31)
BASOPHILS # BLD: 0.05 K/UL (ref 0–0.2)
BASOPHILS NFR BLD: 1 % (ref 0–2)
BILIRUB SERPL-MCNC: 0.3 MG/DL (ref 0–1.2)
BUN SERPL-MCNC: 10 MG/DL (ref 6–20)
CALCIUM SERPL-MCNC: 9.8 MG/DL (ref 8.6–10.2)
CHLORIDE SERPL-SCNC: 101 MMOL/L (ref 98–107)
CO2 SERPL-SCNC: 23 MMOL/L (ref 22–29)
CREAT SERPL-MCNC: 0.8 MG/DL (ref 0.5–1)
CRP SERPL HS-MCNC: <3 MG/L (ref 0–5)
EOSINOPHIL # BLD: 0.17 K/UL (ref 0.05–0.5)
EOSINOPHILS RELATIVE PERCENT: 2 % (ref 0–6)
ERYTHROCYTE [DISTWIDTH] IN BLOOD BY AUTOMATED COUNT: 13.6 % (ref 11.5–15)
ERYTHROCYTE [SEDIMENTATION RATE] IN BLOOD BY WESTERGREN METHOD: 24 MM/HR (ref 0–20)
GFR SERPL CREATININE-BSD FRML MDRD: >60 ML/MIN/1.73M2
GLUCOSE SERPL-MCNC: 92 MG/DL (ref 74–99)
HCT VFR BLD AUTO: 39.5 % (ref 34–48)
HGB BLD-MCNC: 12.8 G/DL (ref 11.5–15.5)
IMM GRANULOCYTES # BLD AUTO: 0.04 K/UL (ref 0–0.58)
IMM GRANULOCYTES NFR BLD: 0 % (ref 0–5)
LYMPHOCYTES NFR BLD: 3.23 K/UL (ref 1.5–4)
LYMPHOCYTES RELATIVE PERCENT: 33 % (ref 20–42)
MCH RBC QN AUTO: 29.6 PG (ref 26–35)
MCHC RBC AUTO-ENTMCNC: 32.4 G/DL (ref 32–34.5)
MCV RBC AUTO: 91.2 FL (ref 80–99.9)
MONOCYTES NFR BLD: 0.67 K/UL (ref 0.1–0.95)
MONOCYTES NFR BLD: 7 % (ref 2–12)
NEUTROPHILS NFR BLD: 58 % (ref 43–80)
NEUTS SEG NFR BLD: 5.76 K/UL (ref 1.8–7.3)
PLATELET # BLD AUTO: 324 K/UL (ref 130–450)
PMV BLD AUTO: 9.6 FL (ref 7–12)
POTASSIUM SERPL-SCNC: 4.3 MMOL/L (ref 3.5–5)
PROT SERPL-MCNC: 7.9 G/DL (ref 6.4–8.3)
RBC # BLD AUTO: 4.33 M/UL (ref 3.5–5.5)
SODIUM SERPL-SCNC: 139 MMOL/L (ref 132–146)
WBC OTHER # BLD: 9.9 K/UL (ref 4.5–11.5)

## 2023-09-13 PROCEDURE — 86140 C-REACTIVE PROTEIN: CPT

## 2023-09-13 PROCEDURE — 80053 COMPREHEN METABOLIC PANEL: CPT

## 2023-09-13 PROCEDURE — 36415 COLL VENOUS BLD VENIPUNCTURE: CPT

## 2023-09-13 PROCEDURE — 85025 COMPLETE CBC W/AUTO DIFF WBC: CPT

## 2023-09-13 PROCEDURE — 85652 RBC SED RATE AUTOMATED: CPT

## 2023-11-26 ENCOUNTER — HOSPITAL ENCOUNTER (EMERGENCY)
Age: 38
Discharge: LEFT BEFORE DISCHARGE | End: 2023-11-26

## 2023-11-26 NOTE — ED NOTES
Called pt name for intake for vitals and triage, no answer and unable to locate.       Lamont August RN  11/26/23 2196

## 2024-02-09 ENCOUNTER — HOSPITAL ENCOUNTER (EMERGENCY)
Age: 39
Discharge: HOME | End: 2024-02-10
Payer: COMMERCIAL

## 2024-02-09 VITALS
WEIGHT: 210 LBS | TEMPERATURE: 98.3 F | OXYGEN SATURATION: 100 % | DIASTOLIC BLOOD PRESSURE: 59 MMHG | HEIGHT: 63 IN | BODY MASS INDEX: 37.21 KG/M2 | SYSTOLIC BLOOD PRESSURE: 130 MMHG | RESPIRATION RATE: 16 BRPM | HEART RATE: 71 BPM

## 2024-02-09 DIAGNOSIS — J02.9 ACUTE PHARYNGITIS, UNSPECIFIED ETIOLOGY: Primary | ICD-10-CM

## 2024-02-09 LAB
INFLUENZA A BY PCR: NOT DETECTED
INFLUENZA B BY PCR: NOT DETECTED
SARS-COV-2 RDRP RESP QL NAA+PROBE: NOT DETECTED
SPECIMEN DESCRIPTION: NORMAL
SPECIMEN SOURCE: NORMAL
STREP A, MOLECULAR: NEGATIVE

## 2024-02-09 PROCEDURE — 87635 SARS-COV-2 COVID-19 AMP PRB: CPT

## 2024-02-09 PROCEDURE — 99284 EMERGENCY DEPT VISIT MOD MDM: CPT

## 2024-02-09 PROCEDURE — 87502 INFLUENZA DNA AMP PROBE: CPT

## 2024-02-09 PROCEDURE — 87651 STREP A DNA AMP PROBE: CPT

## 2024-02-09 RX ORDER — AMOXICILLIN AND CLAVULANATE POTASSIUM 875; 125 MG/1; MG/1
1 TABLET, FILM COATED ORAL 2 TIMES DAILY
Qty: 14 TABLET | Refills: 0 | Status: SHIPPED | OUTPATIENT
Start: 2024-02-09 | End: 2024-02-16

## 2024-02-09 RX ORDER — AMOXICILLIN AND CLAVULANATE POTASSIUM 875; 125 MG/1; MG/1
1 TABLET, FILM COATED ORAL ONCE
Status: COMPLETED | OUTPATIENT
Start: 2024-02-09 | End: 2024-02-10

## 2024-02-09 RX ORDER — DEXAMETHASONE SODIUM PHOSPHATE 10 MG/ML
8 INJECTION INTRAMUSCULAR; INTRAVENOUS ONCE
Status: COMPLETED | OUTPATIENT
Start: 2024-02-09 | End: 2024-02-10

## 2024-02-09 RX ORDER — ACETAMINOPHEN 500 MG
500 TABLET ORAL 4 TIMES DAILY PRN
Qty: 120 TABLET | Refills: 0 | Status: SHIPPED | OUTPATIENT
Start: 2024-02-09

## 2024-02-09 RX ORDER — KETOROLAC TROMETHAMINE 30 MG/ML
30 INJECTION, SOLUTION INTRAMUSCULAR; INTRAVENOUS ONCE
Status: COMPLETED | OUTPATIENT
Start: 2024-02-09 | End: 2024-02-10

## 2024-02-09 ASSESSMENT — PAIN - FUNCTIONAL ASSESSMENT: PAIN_FUNCTIONAL_ASSESSMENT: 0-10

## 2024-02-09 ASSESSMENT — PAIN SCALES - GENERAL: PAINLEVEL_OUTOF10: 8

## 2024-02-09 ASSESSMENT — PAIN DESCRIPTION - LOCATION: LOCATION: THROAT;HEAD

## 2024-02-10 PROCEDURE — 96372 THER/PROPH/DIAG INJ SC/IM: CPT

## 2024-02-10 PROCEDURE — 6360000002 HC RX W HCPCS

## 2024-02-10 PROCEDURE — 6370000000 HC RX 637 (ALT 250 FOR IP)

## 2024-02-10 RX ADMIN — KETOROLAC TROMETHAMINE 30 MG: 30 INJECTION, SOLUTION INTRAMUSCULAR; INTRAVENOUS at 00:19

## 2024-02-10 RX ADMIN — AMOXICILLIN AND CLAVULANATE POTASSIUM 1 TABLET: 875; 125 TABLET, FILM COATED ORAL at 00:18

## 2024-02-10 RX ADMIN — DEXAMETHASONE SODIUM PHOSPHATE 8 MG: 10 INJECTION INTRAMUSCULAR; INTRAVENOUS at 00:19

## 2024-02-10 ASSESSMENT — PAIN SCALES - GENERAL: PAINLEVEL_OUTOF10: 0

## 2024-02-10 NOTE — ED PROVIDER NOTES
[facility] Visit.       [facility]  Department of Emergency Medicine   ED  Encounter Note  Admit Date/RoomTime: 2024 11:28 PM  ED Room: 35/      NAME: Melinda Owusu  : 1985  MRN: [id number]     Chief Complaint:  Pharyngitis (Sore swollen throat, onset wed. Hard to swallow. ), Fever (Fever of 101.8 f on Wed), Generalized Body Aches, and Headache    History of Present Illness      Melinda Owusu is a 38 y.o. old female who presents to the emergency department by private vehicle, for persistent of bilateral sore throat pain, which occured 2 day(s) prior to arrival. Associated Signs & Symptoms: fever and headache Since onset the symptoms have been persistent. She is drinking moderate amounts of fluids. There has been no abdominal pain, chest tightness, dry cough, productive cough, nausea, vomiting, diarrhea, dizziness, dysuria, wheezing, loss of taste, or loss of smell.  Patient states that her grandson tested positive for strep yesterday, and her sore throat has been persistent.  States it is painful to swallow.  She handles her secretions well.  Patient states that on Wednesday she had a fever 101.8.  Denies chest pain, shortness of breath, or abdominal pain.  Patient well-appearing, nontoxic in acute distress          Exposed To:  Streptococcus: yes.                              Infectious Mononucleosis:  no.        Symptoms:  Pain:  Yes.                            Muffled Voice:  No.                            Hoarse:  No.                            Difficulty with Secretions:  No.    Centor Score (MODIFIED) For Strep Pharyngitis:    Age 3-14 Years   no (0)     Age >45 Years   NO     Exudate or Swelling on Tonsils   yes (1)     Tender/Swollen Anterior Cervical Nodes   yes (1)     Fever? (T > 38°C, 100.4°F)   no (0)     Absence of Cough   yes (1)   TOTAL POINTS   3   Score of Zero = Probability of Strep Pharyngitis: 1% - 2.5%.,   No further testing nor

## 2024-02-11 ENCOUNTER — APPOINTMENT (OUTPATIENT)
Dept: CT IMAGING | Age: 39
End: 2024-02-11
Payer: COMMERCIAL

## 2024-02-11 ENCOUNTER — HOSPITAL ENCOUNTER (EMERGENCY)
Age: 39
Discharge: HOME | End: 2024-02-12
Attending: EMERGENCY MEDICINE
Payer: COMMERCIAL

## 2024-02-11 VITALS
HEIGHT: 63 IN | WEIGHT: 210 LBS | BODY MASS INDEX: 37.21 KG/M2 | OXYGEN SATURATION: 97 % | SYSTOLIC BLOOD PRESSURE: 125 MMHG | DIASTOLIC BLOOD PRESSURE: 71 MMHG | HEART RATE: 58 BPM | TEMPERATURE: 98.5 F | RESPIRATION RATE: 15 BRPM

## 2024-02-11 DIAGNOSIS — J02.9 ACUTE PHARYNGITIS, UNSPECIFIED ETIOLOGY: ICD-10-CM

## 2024-02-11 DIAGNOSIS — R51.9 NONINTRACTABLE HEADACHE, UNSPECIFIED CHRONICITY PATTERN, UNSPECIFIED HEADACHE TYPE: Primary | ICD-10-CM

## 2024-02-11 LAB
ALBUMIN SERPL-MCNC: 3.9 G/DL (ref 3.5–5.2)
ALP SERPL-CCNC: 100 U/L (ref 35–104)
ALT SERPL-CCNC: 15 U/L (ref 0–32)
ANION GAP SERPL CALCULATED.3IONS-SCNC: 10 MMOL/L (ref 7–16)
AST SERPL-CCNC: 26 U/L (ref 0–31)
ATYPICAL LYMPHOCYTE ABSOLUTE COUNT: 0.31 K/UL (ref 0–0.46)
ATYPICAL LYMPHOCYTES: 3 % (ref 0–4)
BASOPHILS # BLD: 0 K/UL (ref 0–0.2)
BASOPHILS NFR BLD: 0 % (ref 0–2)
BILIRUB SERPL-MCNC: 0.2 MG/DL (ref 0–1.2)
BUN SERPL-MCNC: 14 MG/DL (ref 6–20)
CALCIUM SERPL-MCNC: 9.4 MG/DL (ref 8.6–10.2)
CHLORIDE SERPL-SCNC: 101 MMOL/L (ref 98–107)
CO2 SERPL-SCNC: 25 MMOL/L (ref 22–29)
CREAT SERPL-MCNC: 0.9 MG/DL (ref 0.5–1)
EOSINOPHIL # BLD: 0.1 K/UL (ref 0.05–0.5)
EOSINOPHILS RELATIVE PERCENT: 1 % (ref 0–6)
ERYTHROCYTE [DISTWIDTH] IN BLOOD BY AUTOMATED COUNT: 13.2 % (ref 11.5–15)
GFR SERPL CREATININE-BSD FRML MDRD: >60 ML/MIN/1.73M2
GLUCOSE SERPL-MCNC: 103 MG/DL (ref 74–99)
HCT VFR BLD AUTO: 39.4 % (ref 34–48)
HETEROPH AB BLD QL IA: NEGATIVE
HGB BLD-MCNC: 12.5 G/DL (ref 11.5–15.5)
LYMPHOCYTES NFR BLD: 4.27 K/UL (ref 1.5–4)
LYMPHOCYTES RELATIVE PERCENT: 37 % (ref 20–42)
MCH RBC QN AUTO: 29.2 PG (ref 26–35)
MCHC RBC AUTO-ENTMCNC: 31.7 G/DL (ref 32–34.5)
MCV RBC AUTO: 92.1 FL (ref 80–99.9)
METAMYELOCYTES ABSOLUTE COUNT: 0.1 K/UL (ref 0–0.12)
METAMYELOCYTES: 1 % (ref 0–1)
MONOCYTES NFR BLD: 1.42 K/UL (ref 0.1–0.95)
MONOCYTES NFR BLD: 12 % (ref 2–12)
NEUTROPHILS NFR BLD: 47 % (ref 43–80)
NEUTS SEG NFR BLD: 5.49 K/UL (ref 1.8–7.3)
PLATELET # BLD AUTO: 385 K/UL (ref 130–450)
PMV BLD AUTO: 10 FL (ref 7–12)
POTASSIUM SERPL-SCNC: 3.7 MMOL/L (ref 3.5–5)
PROT SERPL-MCNC: 7.9 G/DL (ref 6.4–8.3)
RBC # BLD AUTO: 4.28 M/UL (ref 3.5–5.5)
RBC # BLD: ABNORMAL 10*6/UL
SODIUM SERPL-SCNC: 136 MMOL/L (ref 132–146)
WBC OTHER # BLD: 11.7 K/UL (ref 4.5–11.5)

## 2024-02-11 PROCEDURE — 99285 EMERGENCY DEPT VISIT HI MDM: CPT

## 2024-02-11 PROCEDURE — 96375 TX/PRO/DX INJ NEW DRUG ADDON: CPT

## 2024-02-11 PROCEDURE — 96374 THER/PROPH/DIAG INJ IV PUSH: CPT

## 2024-02-11 PROCEDURE — 80053 COMPREHEN METABOLIC PANEL: CPT

## 2024-02-11 PROCEDURE — 6370000000 HC RX 637 (ALT 250 FOR IP)

## 2024-02-11 PROCEDURE — 6360000004 HC RX CONTRAST MEDICATION: Performed by: RADIOLOGY

## 2024-02-11 PROCEDURE — 86308 HETEROPHILE ANTIBODY SCREEN: CPT

## 2024-02-11 PROCEDURE — 6360000002 HC RX W HCPCS

## 2024-02-11 PROCEDURE — 70491 CT SOFT TISSUE NECK W/DYE: CPT

## 2024-02-11 PROCEDURE — 85025 COMPLETE CBC W/AUTO DIFF WBC: CPT

## 2024-02-11 RX ORDER — PREDNISONE 20 MG/1
20 TABLET ORAL 2 TIMES DAILY
Qty: 10 TABLET | Refills: 0 | Status: SHIPPED | OUTPATIENT
Start: 2024-02-11 | End: 2024-02-16

## 2024-02-11 RX ORDER — METHYLPREDNISOLONE SODIUM SUCCINATE 125 MG/2ML
125 INJECTION, POWDER, LYOPHILIZED, FOR SOLUTION INTRAMUSCULAR; INTRAVENOUS ONCE
Status: COMPLETED | OUTPATIENT
Start: 2024-02-11 | End: 2024-02-11

## 2024-02-11 RX ORDER — ACETAMINOPHEN 325 MG/1
650 TABLET ORAL ONCE
Status: COMPLETED | OUTPATIENT
Start: 2024-02-11 | End: 2024-02-11

## 2024-02-11 RX ORDER — DIPHENHYDRAMINE HYDROCHLORIDE 50 MG/ML
50 INJECTION INTRAMUSCULAR; INTRAVENOUS ONCE
Status: COMPLETED | OUTPATIENT
Start: 2024-02-11 | End: 2024-02-11

## 2024-02-11 RX ADMIN — DIPHENHYDRAMINE HYDROCHLORIDE 50 MG: 50 INJECTION INTRAMUSCULAR; INTRAVENOUS at 20:36

## 2024-02-11 RX ADMIN — ACETAMINOPHEN 650 MG: 325 TABLET ORAL at 23:15

## 2024-02-11 RX ADMIN — METHYLPREDNISOLONE SODIUM SUCCINATE 125 MG: 125 INJECTION INTRAMUSCULAR; INTRAVENOUS at 20:37

## 2024-02-11 RX ADMIN — IOPAMIDOL 75 ML: 755 INJECTION, SOLUTION INTRAVENOUS at 21:48

## 2024-02-12 NOTE — DISCHARGE INSTRUCTIONS
Return to the emergency department if you have any new or worsening symptoms such as fever, excessive bleeding, chest pain, or intractable vomiting.     Stop taking your antibiotics.  Take steroids as prescribed.

## 2024-02-12 NOTE — ED PROVIDER NOTES
Rate and Rhythm: Normal rate and regular rhythm.   Pulmonary:      Effort: Pulmonary effort is normal.      Breath sounds: Normal breath sounds. No stridor. No wheezing, rhonchi or rales.   Abdominal:      General: Abdomen is flat. There is no distension.      Palpations: Abdomen is soft.      Tenderness: There is no guarding.   Musculoskeletal:         General: No deformity. Normal range of motion.      Cervical back: Normal range of motion. Edema present.   Skin:     Capillary Refill: Capillary refill takes less than 2 seconds.      Coloration: Skin is not jaundiced.      Findings: No rash.   Neurological:      General: No focal deficit present.      Mental Status: She is oriented to person, place, and time.   Psychiatric:         Mood and Affect: Mood normal.         Behavior: Behavior normal.         ------------------------- PAST HISTORY -------------------------    I personally reviewed the following history:    Past Medical History:  has a past medical history of Anxiety, Asthma, DDD (degenerative disc disease), cervical, Depression, Ectopic pregnancy, tubal, Gall stone, Heart murmur, Hernia, History of blood transfusion, and Postoperative anemia.    Past Surgical History:  has a past surgical history that includes  section; laparoscopy (2012); Ectopic pregnancy surgery; Tubal ligation; laparoscopy (); other surgical history (2014); Hysterectomy, total abdominal (2014); Abdominal wall surgery (2014); Cholecystectomy; Hysterectomy (N/A, 2017); hernia repair; cervical fusion (N/A, 2019); cervical fusion (N/A, 2019); Nose surgery (N/A, 2022); and esophageal motility study (N/A, 2023).    Social History:  reports that she quit smoking about 7 years ago. Her smoking use included cigarettes and cigars. She has been exposed to tobacco smoke. She has never used smokeless tobacco. She reports that she does not currently use alcohol. She reports

## 2024-02-12 NOTE — PROGRESS NOTES
Radiology Procedure Waiver   Name: Melinda Owusu  : 1985  MRN: [id number]    Date:  24    Time: 8:22 PM EST    Benefits of immediately proceeding with Radiology exam(s) without pre-testing outweigh the risks or are not indicated as specified below and therefore the following is/are being waived:    [] Pregnancy test   [] Patients LMP on-time and regular.   [] Patient had Tubal Ligation or has other Contraception Device.   [] Patient  is Menopausal or Premenarcheal.    [] Patient had Full or Partial Hysterectomy.    [x] Protocol for Iodine allergy    [] MRI Questionnaire     [] BUN/Creatinine   [] Patient age w/no hx of renal dysfunction.   [] Patient on Dialysis.   [] Recent Normal Labs.  Electronically signed by Andrea Geronimo DO on 24 at 8:22 PM EST

## 2024-02-19 ENCOUNTER — HOSPITAL ENCOUNTER (OUTPATIENT)
Dept: GENERAL RADIOLOGY | Age: 39
Discharge: HOME | End: 2024-02-21
Payer: COMMERCIAL

## 2024-02-19 ENCOUNTER — HOSPITAL ENCOUNTER (OUTPATIENT)
Age: 39
Discharge: HOME | End: 2024-02-21
Payer: COMMERCIAL

## 2024-02-19 DIAGNOSIS — R52 PAIN: ICD-10-CM

## 2024-02-19 PROCEDURE — 74018 RADEX ABDOMEN 1 VIEW: CPT

## 2024-03-05 ENCOUNTER — OFFICE VISIT (OUTPATIENT)
Dept: NEUROSURGERY | Age: 39
End: 2024-03-05
Payer: COMMERCIAL

## 2024-03-05 VITALS — WEIGHT: 210 LBS | HEIGHT: 63 IN | RESPIRATION RATE: 18 BRPM | BODY MASS INDEX: 37.21 KG/M2

## 2024-03-05 DIAGNOSIS — M54.42 CHRONIC BILATERAL LOW BACK PAIN WITH BILATERAL SCIATICA: ICD-10-CM

## 2024-03-05 DIAGNOSIS — G89.29 CHRONIC BILATERAL LOW BACK PAIN WITH BILATERAL SCIATICA: ICD-10-CM

## 2024-03-05 DIAGNOSIS — M54.12 CERVICAL RADICULOPATHY: ICD-10-CM

## 2024-03-05 DIAGNOSIS — M54.2 NECK PAIN: Primary | ICD-10-CM

## 2024-03-05 DIAGNOSIS — M54.16 LUMBAR RADICULOPATHY: ICD-10-CM

## 2024-03-05 DIAGNOSIS — M54.41 CHRONIC BILATERAL LOW BACK PAIN WITH BILATERAL SCIATICA: ICD-10-CM

## 2024-03-05 DIAGNOSIS — Z98.1 S/P CERVICAL SPINAL FUSION: ICD-10-CM

## 2024-03-05 PROCEDURE — 99204 OFFICE O/P NEW MOD 45 MIN: CPT | Performed by: STUDENT IN AN ORGANIZED HEALTH CARE EDUCATION/TRAINING PROGRAM

## 2024-03-05 PROCEDURE — G8427 DOCREV CUR MEDS BY ELIG CLIN: HCPCS | Performed by: STUDENT IN AN ORGANIZED HEALTH CARE EDUCATION/TRAINING PROGRAM

## 2024-03-05 PROCEDURE — 99212 OFFICE O/P EST SF 10 MIN: CPT

## 2024-03-05 PROCEDURE — G8417 CALC BMI ABV UP PARAM F/U: HCPCS | Performed by: STUDENT IN AN ORGANIZED HEALTH CARE EDUCATION/TRAINING PROGRAM

## 2024-03-05 PROCEDURE — 1036F TOBACCO NON-USER: CPT | Performed by: STUDENT IN AN ORGANIZED HEALTH CARE EDUCATION/TRAINING PROGRAM

## 2024-03-05 PROCEDURE — G8484 FLU IMMUNIZE NO ADMIN: HCPCS | Performed by: STUDENT IN AN ORGANIZED HEALTH CARE EDUCATION/TRAINING PROGRAM

## 2024-03-05 RX ORDER — DIPHENHYDRAMINE HCL 25 MG
50 CAPSULE ORAL ONCE
Qty: 2 CAPSULE | Refills: 0 | Status: SHIPPED | OUTPATIENT
Start: 2024-03-05 | End: 2024-03-05

## 2024-03-05 RX ORDER — PREDNISONE 50 MG/1
50 TABLET ORAL EVERY 6 HOURS
Qty: 3 TABLET | Refills: 0 | Status: SHIPPED | OUTPATIENT
Start: 2024-03-05 | End: 2024-03-06

## 2024-03-05 ASSESSMENT — ENCOUNTER SYMPTOMS
TROUBLE SWALLOWING: 0
BACK PAIN: 1
ABDOMINAL PAIN: 0
PHOTOPHOBIA: 0
SHORTNESS OF BREATH: 0

## 2024-03-05 NOTE — PROGRESS NOTES
Problem Focused Office Visit     Subjective: Melinda Owusu is a 38 y.o.  female who has a past medical history of previous C3-T1 fusion by Dr. Turk in 2019. Patient present for evaluation of neck pain. Patient states she was sweeping about 2 weeks ago and started to have worsening neck and low back pain. She admits to numbness throughout her arms and legs, as well as weakness in her legs. She has tried steriods and baclofen for the pain with no relief. She denies any recent PT or NEGRITA, but does have an appointment with Pain Management tomorrow. She denies any bowel or bladder incontinence. She is a former smoker and she states she quit 1 month ago. She denies any blood thinner usage.     Review of Systems   Constitutional:  Negative for chills and fever.   HENT:  Negative for trouble swallowing.    Eyes:  Negative for photophobia.   Respiratory:  Negative for shortness of breath.    Cardiovascular:  Negative for chest pain.   Gastrointestinal:  Negative for abdominal pain.   Endocrine: Negative for heat intolerance.   Genitourinary:  Negative for difficulty urinating and flank pain.   Musculoskeletal:  Positive for arthralgias, back pain and neck pain. Negative for myalgias.   Skin:  Negative for wound.   Neurological:  Positive for weakness and numbness. Negative for headaches.   Psychiatric/Behavioral:  Negative for confusion.        Physical Exam  HENT:      Head: Normocephalic.   Eyes:      Pupils: Pupils are equal, round, and reactive to light.   Neck:      Comments: Decrease ROM, which is baseline since cervical fusion  Cardiovascular:      Rate and Rhythm: Normal rate.   Pulmonary:      Effort: Pulmonary effort is normal.   Abdominal:      General: There is no distension.   Musculoskeletal:      Comments: Mild tenderness to palpation of cervical and lumbar spine   Skin:     General: Skin is warm and dry.   Neurological:      Mental Status: She is alert.      Comments: A&Ox3  CN3-12 intact  Bilateral

## 2024-03-06 ENCOUNTER — OFFICE VISIT (OUTPATIENT)
Dept: PAIN MANAGEMENT | Age: 39
End: 2024-03-06
Payer: COMMERCIAL

## 2024-03-06 VITALS
OXYGEN SATURATION: 93 % | SYSTOLIC BLOOD PRESSURE: 111 MMHG | DIASTOLIC BLOOD PRESSURE: 78 MMHG | BODY MASS INDEX: 36.86 KG/M2 | TEMPERATURE: 97.3 F | RESPIRATION RATE: 16 BRPM | HEART RATE: 78 BPM | WEIGHT: 208 LBS | HEIGHT: 63 IN

## 2024-03-06 DIAGNOSIS — M47.812 CERVICAL SPONDYLOSIS: ICD-10-CM

## 2024-03-06 DIAGNOSIS — M47.817 LUMBOSACRAL SPONDYLOSIS WITHOUT MYELOPATHY: ICD-10-CM

## 2024-03-06 DIAGNOSIS — Z98.1 S/P CERVICAL SPINAL FUSION: ICD-10-CM

## 2024-03-06 DIAGNOSIS — M54.2 CERVICALGIA: ICD-10-CM

## 2024-03-06 DIAGNOSIS — M50.30 DDD (DEGENERATIVE DISC DISEASE), CERVICAL: Primary | ICD-10-CM

## 2024-03-06 DIAGNOSIS — M51.36 DDD (DEGENERATIVE DISC DISEASE), LUMBAR: ICD-10-CM

## 2024-03-06 PROCEDURE — G8484 FLU IMMUNIZE NO ADMIN: HCPCS | Performed by: ANESTHESIOLOGY

## 2024-03-06 PROCEDURE — G8428 CUR MEDS NOT DOCUMENT: HCPCS | Performed by: ANESTHESIOLOGY

## 2024-03-06 PROCEDURE — 99214 OFFICE O/P EST MOD 30 MIN: CPT

## 2024-03-06 PROCEDURE — G8417 CALC BMI ABV UP PARAM F/U: HCPCS | Performed by: ANESTHESIOLOGY

## 2024-03-06 PROCEDURE — 1036F TOBACCO NON-USER: CPT | Performed by: ANESTHESIOLOGY

## 2024-03-06 PROCEDURE — 99214 OFFICE O/P EST MOD 30 MIN: CPT | Performed by: ANESTHESIOLOGY

## 2024-03-06 RX ORDER — METHYLPREDNISOLONE 4 MG/1
TABLET ORAL
Qty: 1 KIT | Refills: 0 | Status: SHIPPED | OUTPATIENT
Start: 2024-03-06 | End: 2024-03-12

## 2024-03-06 RX ORDER — BACLOFEN 5 MG/1
10 TABLET ORAL 3 TIMES DAILY PRN
Qty: 30 TABLET | Refills: 1 | Status: SHIPPED | OUTPATIENT
Start: 2024-03-06

## 2024-03-06 RX ORDER — HYDROCODONE BITARTRATE AND ACETAMINOPHEN 5; 325 MG/1; MG/1
1 TABLET ORAL 2 TIMES DAILY PRN
Qty: 14 TABLET | Refills: 0 | Status: SHIPPED | OUTPATIENT
Start: 2024-03-06 | End: 2024-03-13

## 2024-03-06 NOTE — PROGRESS NOTES
[facility]   [place], [place] [address]  Dept: [phone]    Follow up Note      Melinda Owusu     Date of Visit:  3/6/2024    CC:  Patient presents for follow up   Chief Complaint   Patient presents with    Follow-up     Back pain        HPI:  H/o Chronic neck pain.     She is s/p C spine surgery on 11/1/2019 by Dr. Turk:    Bilateral C3, C4, C5, C6, and C7 laminectomy. Bilateral segmental arthrodesis and fusion from C3 to T1 with use of bilateral C3, C4, C5, and C6 lateral mass screws and bilateral T1 pedicle screws.     EMG UE: on 9/21/2020: Essentially normal.    Has been doing PT/ HEP.    Trigger point injection helped for short duration.    C/o diffuse back pain.    Pain causes functional limitations/ limits Adl's : Yes    Nursing notes and details of the pain history reviewed. Please see intake notes for details.    Recent exacerbation of pain and episodes of weakness or UE and LE.  Has been evaluated by NSG- has pending orders for CT myelogram and MRI of LS spine.     Previous treatments:   Physical Therapy : yes, has been doing PT/ HEP     Medications: - NSAID's : yes                        - Membrane stabilizers : yes                        - Opioids : yes, in the past                       - Adjuvants or Others : yes, muscle relaxant     Surgeries: yes : posterior cervical fusion as detailed above by Dr. Turk in Nov 2019     She has not been on anticoagulation medications no.     She has not been on herbal supplements.       She is not diabetic.     H/O Smoking: had quit- has started intermittently.  H/O alcohol abuse : denies  H/O Illicit drug use : H/O THC use +occasionally.     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     Labs: BUN/ Creatinine- normal.     Imaging:     CT cervical spine: 12/7/2022  IMPRESSION:  1. No acute abnormality of the cervical spine.  2. Posterior instrumented decompression and lateral mass fusion spans C3  through T1 with hardware intact.      CT lumbar spine:

## 2024-03-06 NOTE — PROGRESS NOTES
Melinda Owusu presents to the [facility] on 3/6/2024. Melinda is complaining of pain in her low back. The pain is constant. The pain is described as aching, shooting, dull, and sharp. Pain is rated on her best day at a 8, on her worst day at a 10, and on average at a 9 on the VAS scale.     Any procedures since your last visit: No    Pacemaker or defibrillator: No    She is not on NSAIDS and is not on anticoagulation medications.    Medication Contract and Consent for Opioid Use Documents Filed       Patient Documents       Type of Document Status Date Received Received By Description    Medication Contract Received 10/17/2019 12:24 PM MANA COMBS 10/8/19 Opioid/Narcotic Med Contract for NeuroSurgery    Medication Contract Signed 2/13/2020 11:58 AM JT MATA medication contract                    /78   Pulse 78   Temp 97.3 °F (36.3 °C) (Infrared)   Resp 16   Ht 1.6 m (5' 3\")   Wt 94.3 kg (208 lb)   LMP 05/19/2014   SpO2 93%   BMI 36.85 kg/m²      Patient's last menstrual period was 05/19/2014.

## 2024-03-11 ENCOUNTER — TELEPHONE (OUTPATIENT)
Dept: NEUROSURGERY | Age: 39
End: 2024-03-11

## 2024-03-11 NOTE — TELEPHONE ENCOUNTER
Order:[id number]  Insurance: [facility]  Request type: Cervical  Myelogram  Request/tracking #: [id number]  Status: authorization  Auth#: [id number]  Validity dates: 3/11/24-5/10/24    Scanned documents to media.

## 2024-03-14 NOTE — TELEPHONE ENCOUNTER
CT/Myelogram myelogram sites: Cervical    Date: 04/02/24                     Facility: [facility]    Arrival Time:10:00    NPO after Midnight  Bring   Will be at facility 4-6hrs  Bring ID, insurance cards and list of current medications.    ASA/Ibuprofen/Blood Thinners to be held for 5 days prior     Antidepressants hold for 3 days prior    Labs: PT/INR, Platelets  PATIENT TO COMPLETE LABS 1 WEEK PRIOR AT A [facility]    Does patient have DIABETIC MONITOR N/A  Must remove monitor prior to procedure. If near change time, patient should wait til after procedure to change unit adhesive.    Note who informed patient on instructions: [place] FROM IR SCHEDULED PATIENT, GAVE TIME, DATE AND ALL INSTRUCTIONS.

## 2024-03-25 ENCOUNTER — HOSPITAL ENCOUNTER (OUTPATIENT)
Age: 39
Discharge: HOME | End: 2024-03-25
Payer: COMMERCIAL

## 2024-03-25 DIAGNOSIS — M54.41 CHRONIC BILATERAL LOW BACK PAIN WITH BILATERAL SCIATICA: ICD-10-CM

## 2024-03-25 DIAGNOSIS — M54.16 LUMBAR RADICULOPATHY: ICD-10-CM

## 2024-03-25 DIAGNOSIS — M54.42 CHRONIC BILATERAL LOW BACK PAIN WITH BILATERAL SCIATICA: ICD-10-CM

## 2024-03-25 DIAGNOSIS — G89.29 CHRONIC BILATERAL LOW BACK PAIN WITH BILATERAL SCIATICA: ICD-10-CM

## 2024-03-25 LAB
INR PPP: 1
PLATELET # BLD AUTO: 383 K/UL (ref 130–450)
PROTHROMBIN TIME: 10.9 SEC (ref 9.3–12.4)

## 2024-03-25 PROCEDURE — 85049 AUTOMATED PLATELET COUNT: CPT

## 2024-03-25 PROCEDURE — 36415 COLL VENOUS BLD VENIPUNCTURE: CPT

## 2024-03-25 PROCEDURE — 85610 PROTHROMBIN TIME: CPT

## 2024-03-31 ENCOUNTER — HOSPITAL ENCOUNTER (OUTPATIENT)
Dept: MRI IMAGING | Age: 39
Discharge: HOME | End: 2024-04-02
Payer: COMMERCIAL

## 2024-03-31 DIAGNOSIS — M54.16 LUMBAR RADICULOPATHY: ICD-10-CM

## 2024-03-31 DIAGNOSIS — G89.29 CHRONIC BILATERAL LOW BACK PAIN WITH BILATERAL SCIATICA: ICD-10-CM

## 2024-03-31 DIAGNOSIS — M54.42 CHRONIC BILATERAL LOW BACK PAIN WITH BILATERAL SCIATICA: ICD-10-CM

## 2024-03-31 DIAGNOSIS — M54.41 CHRONIC BILATERAL LOW BACK PAIN WITH BILATERAL SCIATICA: ICD-10-CM

## 2024-03-31 PROCEDURE — 72148 MRI LUMBAR SPINE W/O DYE: CPT

## 2024-04-02 ENCOUNTER — HOSPITAL ENCOUNTER (OUTPATIENT)
Dept: CT IMAGING | Age: 39
Discharge: HOME | End: 2024-04-04
Payer: COMMERCIAL

## 2024-04-02 ENCOUNTER — HOSPITAL ENCOUNTER (OUTPATIENT)
Dept: GENERAL RADIOLOGY | Age: 39
Discharge: HOME | End: 2024-04-04
Payer: COMMERCIAL

## 2024-04-02 VITALS
TEMPERATURE: 97.8 F | WEIGHT: 210 LBS | DIASTOLIC BLOOD PRESSURE: 66 MMHG | RESPIRATION RATE: 18 BRPM | HEART RATE: 72 BPM | HEIGHT: 63 IN | OXYGEN SATURATION: 99 % | SYSTOLIC BLOOD PRESSURE: 117 MMHG | BODY MASS INDEX: 37.21 KG/M2

## 2024-04-02 VITALS
RESPIRATION RATE: 18 BRPM | HEART RATE: 72 BPM | SYSTOLIC BLOOD PRESSURE: 122 MMHG | OXYGEN SATURATION: 95 % | DIASTOLIC BLOOD PRESSURE: 68 MMHG

## 2024-04-02 DIAGNOSIS — M54.12 CERVICAL RADICULOPATHY: ICD-10-CM

## 2024-04-02 DIAGNOSIS — Z98.1 S/P CERVICAL SPINAL FUSION: ICD-10-CM

## 2024-04-02 DIAGNOSIS — M54.2 NECK PAIN: ICD-10-CM

## 2024-04-02 PROCEDURE — 7100000011 HC PHASE II RECOVERY - ADDTL 15 MIN

## 2024-04-02 PROCEDURE — 6360000004 HC RX CONTRAST MEDICATION: Performed by: PHYSICIAN ASSISTANT

## 2024-04-02 PROCEDURE — 36415 COLL VENOUS BLD VENIPUNCTURE: CPT

## 2024-04-02 PROCEDURE — 72126 CT NECK SPINE W/DYE: CPT

## 2024-04-02 PROCEDURE — 72240 MYELOGRAPHY NECK SPINE: CPT

## 2024-04-02 PROCEDURE — 7100000010 HC PHASE II RECOVERY - FIRST 15 MIN

## 2024-04-02 PROCEDURE — 6360000002 HC RX W HCPCS: Performed by: PHYSICIAN ASSISTANT

## 2024-04-02 RX ORDER — SUMATRIPTAN 25 MG/1
25 TABLET, FILM COATED ORAL
COMMUNITY

## 2024-04-02 RX ORDER — HYDROCODONE BITARTRATE AND ACETAMINOPHEN 5; 325 MG/1; MG/1
1 TABLET ORAL SEE ADMIN INSTRUCTIONS
COMMUNITY

## 2024-04-02 RX ORDER — FAMOTIDINE 40 MG/1
40 TABLET, FILM COATED ORAL DAILY
COMMUNITY

## 2024-04-02 RX ORDER — AMITRIPTYLINE HYDROCHLORIDE 25 MG/1
25 TABLET, FILM COATED ORAL NIGHTLY
COMMUNITY

## 2024-04-02 RX ORDER — IOPAMIDOL 612 MG/ML
13 INJECTION, SOLUTION INTRATHECAL ONCE
Status: COMPLETED | OUTPATIENT
Start: 2024-04-02 | End: 2024-04-02

## 2024-04-02 RX ORDER — ONDANSETRON 2 MG/ML
4 INJECTION INTRAMUSCULAR; INTRAVENOUS ONCE
Status: COMPLETED | OUTPATIENT
Start: 2024-04-02 | End: 2024-04-02

## 2024-04-02 RX ORDER — FENTANYL CITRATE 50 UG/ML
25 INJECTION, SOLUTION INTRAMUSCULAR; INTRAVENOUS ONCE
Status: COMPLETED | OUTPATIENT
Start: 2024-04-02 | End: 2024-04-02

## 2024-04-02 RX ORDER — SENNOSIDES A AND B 8.6 MG/1
1 TABLET, FILM COATED ORAL DAILY
COMMUNITY

## 2024-04-02 RX ADMIN — FENTANYL CITRATE 25 MCG: 50 INJECTION, SOLUTION INTRAMUSCULAR; INTRAVENOUS at 11:01

## 2024-04-02 RX ADMIN — IOPAMIDOL 13 ML: 612 INJECTION, SOLUTION INTRATHECAL at 10:35

## 2024-04-02 RX ADMIN — ONDANSETRON HYDROCHLORIDE 4 MG: 2 SOLUTION INTRAMUSCULAR; INTRAVENOUS at 11:02

## 2024-04-02 NOTE — H&P
the Last Year: Not on file     Number of Places Lived in the Last Year: Not on file     Unstable Housing in the Last Year: No       Vital Signs:  Vitals:    04/02/24 0931   BP: 132/70   Pulse: 78   Resp: 18   Temp: 97.8 °F (36.6 °C)   TempSrc: Temporal   SpO2: 97%   Weight: 95.3 kg (210 lb)   Height: 1.6 m (5' 3\")     Patient Vitals for the past 8 hrs:   BP Temp Temp src Pulse Resp SpO2 Height Weight   04/02/24 0931 132/70 97.8 °F (36.6 °C) Temporal 78 18 97 % 1.6 m (5' 3\") 95.3 kg (210 lb)     /70   Pulse 78   Temp 97.8 °F (36.6 °C) (Temporal)   Resp 18   Ht 1.6 m (5' 3\")   Wt 95.3 kg (210 lb)   LMP 05/19/2014   SpO2 97%   BMI 37.20 kg/m²   PHYSICAL EXAM:  Constitutional:  Awake and alert. cooperative.    Heart:  regular rhythm    Lungs:  Clear     Abdomen:  soft, NT, non-distended    DATA:  CBC:   Lab Results   Component Value Date/Time    WBC 11.7 02/11/2024 08:25 PM    RBC 4.28 02/11/2024 08:25 PM    HGB 12.5 02/11/2024 08:25 PM    HCT 39.4 02/11/2024 08:25 PM    MCV 92.1 02/11/2024 08:25 PM    MCH 29.2 02/11/2024 08:25 PM    MCHC 31.7 02/11/2024 08:25 PM    RDW 13.2 02/11/2024 08:25 PM     03/25/2024 12:39 PM    MPV 10.0 02/11/2024 08:25 PM     CBC with Differential:    Lab Results   Component Value Date/Time    WBC 11.7 02/11/2024 08:25 PM    RBC 4.28 02/11/2024 08:25 PM    HGB 12.5 02/11/2024 08:25 PM    HCT 39.4 02/11/2024 08:25 PM     03/25/2024 12:39 PM    MCV 92.1 02/11/2024 08:25 PM    MCH 29.2 02/11/2024 08:25 PM    MCHC 31.7 02/11/2024 08:25 PM    RDW 13.2 02/11/2024 08:25 PM    SEGSPCT 57 02/13/2013 05:30 PM    METASPCT 1 02/11/2024 08:25 PM    LYMPHOPCT 37 02/11/2024 08:25 PM    MONOPCT 12 02/11/2024 08:25 PM    BASOPCT 0 02/11/2024 08:25 PM    MONOSABS 1.42 02/11/2024 08:25 PM    LYMPHSABS 4.27 02/11/2024 08:25 PM    EOSABS 0.10 02/11/2024 08:25 PM    BASOSABS 0.00 02/11/2024 08:25 PM     Platelets:    Lab Results   Component Value Date/Time     03/25/2024 12:39 PM

## 2024-04-02 NOTE — PROGRESS NOTES
Patient arrived via ambulatory with daughter to Radiology department for myelogram. Allergies, home medications, H&P and fasting instructions reviewed with patient. Vital signs taken.  PIV placed, IV flushed and prn adapter attached. Procedural instructions given, questions answered, understanding expressed and consent signed. Patient given fluoroscopy education, no questions at this time.

## 2024-04-02 NOTE — PROCEDURES
1050Patient returned from cervical myelogram. Dressing checked, clean, dry, and intact. Patient stable. Pt c/o 10/10 headache, CINTHYA Howard notified. Vitals will be checked q 15min, see flow sheets.    1052 K Lee at bedside to speak to patient., orders received.    1100 medicated for pain per LOREN Howard order.      1130 Pt reports relief.  Pain @ 6/10    1145Patient eating and drinking well with no s/s of complications noted or reported.    1200 Site was checked with every set of vitals. Reports pain at 4/10, same as pts history of headache pain.   Site clean dry and intact. Discharge papers reviewed with patient, questions answered. Patient taken to door. Patient in stable condition, no s/s of complications noted or reported.

## 2024-04-02 NOTE — OP NOTE
Operative Note  ______________________________________________________________      IR/FL LUMBAR PUNCTURE WITH CONTRAST  INJECTION FOR CT MYELOGRAM  [facility]    Patient Name: Melinda Owusu   YOB: 1985  Medical Record Number: [id number]  Date of Procedure: 4/2/24  Room/Bed: Room/bed info not found    Preoperative diagnosis: Cervical Radiculopathy (M54.12 [ICD-10-CM]) and Prior Cervical Fusion (Z98.1 [ICD-10-CM])    Postoperative diagnosis: Same    Consent: Informed consent was obtained from the patient prior to the procedure. The details of the procedure, as well is its risks, benefits, and alternatives, were explained. These risks include, but are not limited to, nerve root injury, bleeding, infection (including meningitis), reaction to contrast used, dural leak (possibly requiring blood patch treatment), and spinal headache. Opportunity was provided to ask questions, which were answered.     Procedure Type: Fluoroscopic-guided lumbar puncture with contrast injection for CT Myelogram Imaging of cervical Spine.    Anesthesia: LLocal anesthesia with approximately 5 mL of 1% Lidocaine without epinephrine administered subcutaneously.    Performed by: MIKY Morales under on-site supervision by Belinda Hogan MD.    Estimated blood loss: None    Complications: None    Implants: None    Procedure: The appropriate labs reviewed including INR and PT.  A site under fluoroscopy guidance at the level of the L3-L4 interspace was selected.  This site was prepped in usual sterile fashion.  1% lidocaine local injection was used for local anesthesia.  A 3.5 in. 20g spinal needle inserted into thecal sac.     Subsequently 13 mL of Isovue M 300 contrast was injected. Final needle placement and contrast within the thecal sac was confirmed and images were saved into PACs.    The patient tolerated the procedure.  There were no immediate complications. Patient was taken to the CT suite for

## 2024-04-02 NOTE — BRIEF OP NOTE
Brief-Op Note  ______________________________________________________________      IR/FL LUMBAR PUNCTURE WITH CONTRAST  INJECTION   FOR CT MYELOGRAM  [facility]      Patient Name: Melinda Owusu   YOB: 1985  Medical Record Number: [id number]  Date of Procedure: 4/2/24  Room/Bed: Room/bed info not found    Preoperative diagnosis: Cervical Radiculopathy (M54.12 [ICD-10-CM]) and Prior Cervical Fusion (Z98.1 [ICD-10-CM])    Postoperative diagnosis: Same    Consent: Informed consent was obtained from the patient prior to the procedure.      Procedure Type: Fluoroscopic-guided lumbar puncture with contrast injection for CT Myelogram Imaging of cervical Spine.    Anesthesia: Local anesthesia administered subcutaneously.    Performed by: MIKY Morales under on-site supervision by Belinda Hogan MD.    Estimated blood loss: None    Complications: None    Implants: None    Electronically signed by MIKY Morales   DD: 4/2/24  10:53 AM

## 2024-04-15 ENCOUNTER — OFFICE VISIT (OUTPATIENT)
Dept: NEUROSURGERY | Age: 39
End: 2024-04-15
Payer: COMMERCIAL

## 2024-04-15 DIAGNOSIS — R29.898 BILATERAL LEG WEAKNESS: ICD-10-CM

## 2024-04-15 DIAGNOSIS — R29.898 BILATERAL ARM WEAKNESS: ICD-10-CM

## 2024-04-15 DIAGNOSIS — Z98.1 S/P CERVICAL SPINAL FUSION: Primary | ICD-10-CM

## 2024-04-15 PROCEDURE — G8427 DOCREV CUR MEDS BY ELIG CLIN: HCPCS | Performed by: STUDENT IN AN ORGANIZED HEALTH CARE EDUCATION/TRAINING PROGRAM

## 2024-04-15 PROCEDURE — 1036F TOBACCO NON-USER: CPT | Performed by: STUDENT IN AN ORGANIZED HEALTH CARE EDUCATION/TRAINING PROGRAM

## 2024-04-15 PROCEDURE — G8417 CALC BMI ABV UP PARAM F/U: HCPCS | Performed by: STUDENT IN AN ORGANIZED HEALTH CARE EDUCATION/TRAINING PROGRAM

## 2024-04-15 PROCEDURE — 99213 OFFICE O/P EST LOW 20 MIN: CPT | Performed by: STUDENT IN AN ORGANIZED HEALTH CARE EDUCATION/TRAINING PROGRAM

## 2024-04-15 NOTE — PROGRESS NOTES
Problem Focused Office Visit     Subjective: Melinda Owusu is a 38 y.o.  female who has a past medical history of past medical history of previous C3-T1 fusion by Dr. Turk in 2019. Patient presents today to for office for office follow up/review imaging. Patient states her pain has remained the same since last visit. She describes her pain as a constant aching pain in her neck and her back. She admits to weakness in her legs and states her legs give out. She also admits to numbness throughout her arms and legs. She has tried steriods and baclofen for the pain with no relief. She denies any recent PT or NEGRITA. She denies any bowel or bladder incontinence. Myelogram and MRI reviewed with patient.      Physical Exam  HENT:      Head: Normocephalic.   Eyes:      Pupils: Pupils are equal, round, and reactive to light.   Neck:      Comments: Decreased ROM which is baseline since surgery  Cardiovascular:      Rate and Rhythm: Normal rate.   Pulmonary:      Effort: Pulmonary effort is normal.   Abdominal:      General: There is no distension.   Musculoskeletal:         General: Normal range of motion.   Skin:     General: Skin is warm and dry.   Neurological:      Mental Status: She is alert.      Comments: A&Ox3  CN3-12 intact  Bilateral  4/5 otherwise Motor Strength full   Sensation intact to light touch   Reflexes normal   (+)Left Hoffmans  (-)Bilateral Straight leg test    Psychiatric:         Thought Content: Thought content normal.                 Imagin2024 CT Cervical Myelogram   1. No acute osseous abnormality of the cervical spine.  2. Status post decompressive laminectomies from C3-C7 with posterior interbody fusion from C3-T1. The right-sided screw at C6 traverses the superior aspect of the right C6-7 neural foramina.  3. Mild C6-7 neural foraminal stenosis, resulting from intrusion of the screw into its superior aspect.  4. No central canal stenosis.    2024 MRI Lumbar

## 2024-04-29 ENCOUNTER — OFFICE VISIT (OUTPATIENT)
Dept: PAIN MANAGEMENT | Age: 39
End: 2024-04-29
Payer: COMMERCIAL

## 2024-04-29 VITALS
OXYGEN SATURATION: 98 % | HEART RATE: 87 BPM | WEIGHT: 208 LBS | HEIGHT: 63 IN | RESPIRATION RATE: 16 BRPM | BODY MASS INDEX: 36.86 KG/M2 | SYSTOLIC BLOOD PRESSURE: 102 MMHG | DIASTOLIC BLOOD PRESSURE: 67 MMHG | TEMPERATURE: 97.2 F

## 2024-04-29 DIAGNOSIS — M47.812 CERVICAL SPONDYLOSIS: ICD-10-CM

## 2024-04-29 DIAGNOSIS — Z98.1 S/P CERVICAL SPINAL FUSION: ICD-10-CM

## 2024-04-29 DIAGNOSIS — M79.18 CHRONIC MYOFASCIAL PAIN: ICD-10-CM

## 2024-04-29 DIAGNOSIS — M50.30 DDD (DEGENERATIVE DISC DISEASE), CERVICAL: ICD-10-CM

## 2024-04-29 DIAGNOSIS — M47.817 LUMBOSACRAL SPONDYLOSIS WITHOUT MYELOPATHY: ICD-10-CM

## 2024-04-29 DIAGNOSIS — G89.29 CHRONIC MYOFASCIAL PAIN: ICD-10-CM

## 2024-04-29 DIAGNOSIS — Z79.891 ADMISSION FOR LONG-TERM OPIATE ANALGESIC USE: Primary | ICD-10-CM

## 2024-04-29 PROCEDURE — G8427 DOCREV CUR MEDS BY ELIG CLIN: HCPCS | Performed by: ANESTHESIOLOGY

## 2024-04-29 PROCEDURE — G8417 CALC BMI ABV UP PARAM F/U: HCPCS | Performed by: ANESTHESIOLOGY

## 2024-04-29 PROCEDURE — 1036F TOBACCO NON-USER: CPT | Performed by: ANESTHESIOLOGY

## 2024-04-29 PROCEDURE — 99214 OFFICE O/P EST MOD 30 MIN: CPT | Performed by: ANESTHESIOLOGY

## 2024-04-29 PROCEDURE — 99214 OFFICE O/P EST MOD 30 MIN: CPT

## 2024-04-29 RX ORDER — BACLOFEN 5 MG/1
10 TABLET ORAL 2 TIMES DAILY PRN
Qty: 30 TABLET | Refills: 1 | Status: SHIPPED | OUTPATIENT
Start: 2024-04-29

## 2024-04-29 RX ORDER — HYDROCODONE BITARTRATE AND ACETAMINOPHEN 5; 325 MG/1; MG/1
1 TABLET ORAL 2 TIMES DAILY PRN
Qty: 14 TABLET | Refills: 0 | Status: SHIPPED | OUTPATIENT
Start: 2024-04-29 | End: 2024-05-06

## 2024-04-29 NOTE — PROGRESS NOTES
Melinda Owusu presents to the [facility] on 4/29/2024. Melinda is complaining of pain cervical. The pain is constant. The pain is described as throbbing. Pain is rated on her best day at a 6, on her worst day at a 10, and on average at a 8 on the VAS scale. She took her last dose of tylenol.    Any procedures since your last visit: No    She is not on NSAIDS and  is not on anticoagulation medications   Pacemaker or defibrillator: No       Medication Contract and Consent for Opioid Use Documents Filed       Patient Documents       Type of Document Status Date Received Received By Description    Medication Contract Received 10/17/2019 12:24 PM MANA COMBS 10/8/19 Opioid/Narcotic Med Contract for NeuroSurgery    Medication Contract Signed 2/13/2020 11:58 AM JT MATA medication contract                       /67   Pulse 87   Temp 97.2 °F (36.2 °C) (Temporal)   Resp 16   Ht 1.6 m (5' 3\")   Wt 94.3 kg (208 lb)   LMP 05/19/2014   SpO2 98%   BMI 36.85 kg/m²      Patient's last menstrual period was 05/19/2014.  
bilaterally  Range of motion: Decreased,  Sacroiliac joint tenderness No bilaterally  SLR : negative bilaterally     Musculoskeletal:     Trigger points +     Extremities:     No edema      Neurological:     Sensory: Normal to light touch      Motor:   UE and LE weakness + 4/5      Gait:normal Yes     Dermatology:     Skin:no rashes or lesions noted     Assessment/Plan:   Diagnosis Orders   1. S/P cervical spinal fusion  [facility]    HYDROcodone-acetaminophen (NORCO) 5-325 MG per tablet      2. DDD (degenerative disc disease), cervical  [facility]      3. Cervical spondylosis  [facility]      4. Chronic myofascial pain        5. Lumbosacral spondylosis without myelopathy          38 y.o. female with H/O cervical spine stenosis , S/p C3, C4, C5, C6, and C7 laminectomy and C3-T1 fusion by Dr. Turk in Nov 2019.    Upper extremity symptoms has improved after surgery.     Diffuse back pain: features of myofacial pain.    Had recent episodes of weakness of the UE and LE.    Prior C-spine/ Xray C-spine reviewed.    Prior CT of thoracic spine and CT LS spine reviewed.    Has been evaluated by NSG.    CT myelogram C spine reviewed.    MRI of LS spine- unremarkable.    Doing PT/ HEP.    H/O  THC use + occasionally :  Caution with controlled meds.    Low back pain - features of facet pain.     Medrol dose pack- completed.    PLAN:    Compound cream- local use- did not get. Use instructions reviewed.    Muscle relaxant for prn use. Baclofen for prn use. Refill e scribed.    Trigger point injection over the b/l trapezius and upper thoracic paraspinal muscles. RBA discussed- patient declined.    Offer lumbar facet interventions to help with low back pain- patient declined.    PT- ordered.    Short course of Norco for prn use # 14. RBA of opioid use discussed.    Counseling done: reg HEP.    Apparently is getting

## 2024-04-30 LAB
6-MAM, QUANTITATIVE, URINE: <10 NG/ML
6-MONOACETYLMORPHINE, URINE: NEGATIVE
7-AMINOCLONAZEPAM, QUANTITATIVE, URINE: <50 NG/ML
ABNORMAL SPECIMEN VALIDITY TEST: ABNORMAL
ALCOHOL URINE: NOT DETECTED MG/DL
ALPHA-HYDROXYALPRAZOLAM, QUANTITATIVE, URINE: <50 NG/ML
ALPHA-HYDROXYMIDAZOLAM, QUANTITATIVE, URINE: <50 NG/ML
ALPHA-HYDROXYTRIAZOLAM, QUANTITATIVE, URINE: <50 NG/ML
ALPRAZOLAM URINE QUANT: <50 NG/ML
AMPHETAMINE SCREEN URINE: NEGATIVE
BARBITURATE SCREEN URINE: NEGATIVE
BENZODIAZEPINE SCREEN, URINE: NEGATIVE
BUPRENORPHINE URINE: NEGATIVE
CANNABINOID SCREEN URINE: POSITIVE
CHLORDIAZEPOXIDE, QUANTITATIVE, URINE: <50 NG/ML
CLONAZEPAM, QUANTITATIVE, URINE: <50 NG/ML
COCAINE METABOLITE, URINE: NEGATIVE
CODEINE, QUANTITATIVE, URINE: <50 NG/ML
COMPLIANCE DRUG ANALYSIS, URINE: NORMAL
DIAZEPAM URINE QUANT: <50 NG/ML
FENTANYL URINE: NEGATIVE
FLUNITRAZEPAM, QUANTITATIVE, URINE: <50 NG/ML
FLURAZEPAM, QUANTITATIVE, URINE: <50 NG/ML
HYDROCODONE, QUANTITATIVE, URINE: <50 NG/ML
HYDROMORPHONE, QUANTITATIVE, URINE: <50 NG/ML
INTEGRITY CHECK, CREATININE, URINE: 208.1 MG/DL (ref 22–250)
INTEGRITY CHECK, OXIDANT, URINE: <40 MG/L
INTEGRITY CHECK, PH, URINE: 5.7 (ref 4.5–9)
INTEGRITY CHECK, SPECIFIC GRAVITY, URINE: 1.02 (ref 1–1.03)
LORAZEPAM URINE QUANT: <50 NG/ML
METHADONE SCREEN, URINE: NEGATIVE
MIDAZOLAM URINE QUANT: <50 NG/ML
MORPHINE, QUANTITATIVE, URINE: <50 NG/ML
NORDIAZEPAM URINE QUANT: <50 NG/ML
NORHYDROCODONE, QUANTITATIVE, URINE: <50 NG/ML
NOROXYCODONE, QUANTITATIVE, URINE: <50 NG/ML
OPIATES, URINE: NEGATIVE
OXAZEPAM URINE QUANT: <50 NG/ML
OXYCODONE SCREEN URINE: NEGATIVE
OXYCODONE URINE, QUANTITATIVE: <50 NG/ML
OXYMORPHONE, QUANTITATIVE, URINE: <50 NG/ML
PHENCYCLIDINE, URINE: NEGATIVE
TEMAZEPAM, QUANTITATIVE, URINE: <50 NG/ML
TEST INFORMATION: ABNORMAL
THC NORMALIZED, QUANTITIATIVE, URINE: 30.3 NG/ML
THC-COOH, QUANTITATIVE, URINE: 63 NG/ML
TRAMADOL, URINE: NEGATIVE

## 2024-05-02 ENCOUNTER — TELEPHONE (OUTPATIENT)
Dept: PAIN MANAGEMENT | Age: 39
End: 2024-05-02

## 2024-05-02 NOTE — TELEPHONE ENCOUNTER
Pharmacist from  the compounding cream called stated Melinda Owusu has an allergy to naproxen. The formula selected includes other nsaids. Should they hold the nsaids or is it ok to still include. I called the patient she stated she has only had a reaction to naproxen, no other nsaids. Please advise.

## 2024-05-07 ENCOUNTER — HOSPITAL ENCOUNTER (OUTPATIENT)
Dept: NEUROLOGY | Age: 39
Discharge: HOME | End: 2024-05-07
Payer: COMMERCIAL

## 2024-05-07 VITALS — WEIGHT: 208 LBS | BODY MASS INDEX: 36.86 KG/M2 | HEIGHT: 63 IN

## 2024-05-07 DIAGNOSIS — R29.898 BILATERAL LEG WEAKNESS: ICD-10-CM

## 2024-05-07 DIAGNOSIS — Z98.1 S/P CERVICAL SPINAL FUSION: ICD-10-CM

## 2024-05-07 DIAGNOSIS — R29.898 BILATERAL ARM WEAKNESS: ICD-10-CM

## 2024-05-07 PROCEDURE — 95911 NRV CNDJ TEST 9-10 STUDIES: CPT

## 2024-05-07 PROCEDURE — 95886 MUSC TEST DONE W/N TEST COMP: CPT

## 2024-05-07 NOTE — PROCEDURES
[facility]  Electrodiagnostic Laboratory  [address] [place] [address]  Phone: [phone]  Fax: [phone]      Referring Provider: Maria Guadalupe Cardenas PA  Primary Care Physician: Glo Damon PA  Patient Name: Melinda Owusu  Patient YOB: 1985  Gender: female  BMI: Body mass index is 36.85 kg/m².  Height 1.6 m (5' 3\"), weight 94.3 kg (208 lb), last menstrual period 05/19/2014, not currently breastfeeding.    5/7/2024    Reason for referral: History of cervical fusion, bilateral arm/leg weakness. EMG ordered for bilateral lower extremities.     Description of clinical problem:   Patient has history of C3-T1 fusion in 2019.  She complains primarily of persistent constant neck pain. She does also endorses achy low back pain. She reports numbness/tingling in the arms and sometimes in the legs. She endorses upper extremity weakness. She states the legs are not really weak, but that she is having balance issues and has had falls. Recent lumbar MRI 3/31/2024 was unremarkable.      Pain: Yes   ; Numbness/tingling: Yes; Weakness: Yes       Brief physical exam:   Sensory deficit: No, SILT BLE. Sensory testing performed only on lower extremities; Weakness: No weakness in BLE; Atrophy: No      Study Limitations:  None      Motor NCS      Nerve / Sites Lat. Amplitude Distance Velocity Temp.    ms mV cm m/s °C   L Peroneal - EDB      Ankle 3.33 8.7 8  33.6      Fib head 8.70 8.4 31 58 33.3      Pop fossa 10.52 8.5 10 55 33.3   R Peroneal - EDB      Ankle 3.33 7.6 8  33.2      Fib head 8.59 7.3 31 59 33.2      Pop fossa 10.42 6.4 10 55 33.1   R Tibial - AH      Ankle 3.07 12.3 8  33.6      Pop fossa 10.47 10.8 38 51 33.6   L Tibial - AH      Ankle 2.71 11.9 8  33.1      Pop fossa 10.42 9.7 36 47 33       Sensory NCS      Nerve / Sites Onset Lat Peak Lat PP Amp Distance Velocity Temp.    ms ms µV cm m/s °C   R Superficial peroneal - Ankle      Lat leg 1.98 2.55 19.5 10 51 33.3

## 2024-05-21 ENCOUNTER — OFFICE VISIT (OUTPATIENT)
Dept: OBGYN | Age: 39
End: 2024-05-21

## 2024-05-21 VITALS
HEART RATE: 89 BPM | SYSTOLIC BLOOD PRESSURE: 111 MMHG | DIASTOLIC BLOOD PRESSURE: 65 MMHG | WEIGHT: 215.4 LBS | BODY MASS INDEX: 38.16 KG/M2

## 2024-05-21 DIAGNOSIS — N95.1 MENOPAUSAL SYMPTOMS: ICD-10-CM

## 2024-05-21 DIAGNOSIS — Z01.419 WELL WOMAN EXAM: Primary | ICD-10-CM

## 2024-05-21 RX ORDER — VENLAFAXINE HYDROCHLORIDE 37.5 MG/1
37.5 CAPSULE, EXTENDED RELEASE ORAL DAILY
Qty: 30 CAPSULE | Refills: 0 | Status: SHIPPED | OUTPATIENT
Start: 2024-05-21

## 2024-05-21 NOTE — PROGRESS NOTES
Patient alert and pleasant with no complaints  Here today for annual GYN visit.  Pelvic exam completed, pap smear obtained, labeled and sent to lab.   Discharge instructions have been discussed with the patient. Patient advised to call our office with any questions or concerns.   Voiced understanding.   
answered.    Started on effexor for post menopausal sx-follow up in 1month  Orders Placed This Encounter    PAP SMEAR     Patient History:    Patient's last menstrual period was 2014.  OBGYN Status: Hysterectomy  Past Surgical History:  2014: ABDOMINAL WALL SURGERY  2019: CERVICAL FUSION; N/A      Comment:  C3-C7  CERVICAL LAMINECTOMY AND POSTERIOR C3-T1 FUSION                -- NEEDS JOSH TABLE, CELL SAVER, PLATELET GEL, PLATES,               SCREWS -- GLOBUS performed by Manuel Turk [facility]  2019: CERVICAL FUSION; N/A      Comment:  POSTERIOR CERVICAL WOUND EXPLORATION - WANTS TF                performed by Manuel Turk MD [place] [facility]  No date:  SECTION      Comment:  x3  No date: CHOLECYSTECTOMY  No date: ECTOPIC PREGNANCY SURGERY  2023: ESOPHAGEAL MOTILITY STUDY; N/A      Comment:  ESOPHAGEAL MOTILITY/MANOMETRY STUDY performed by Meet Santos DO [facility]  No date: HERNIA REPAIR  2017: HYSTERECTOMY (CERVIX STATUS UNKNOWN); N/A      Comment:  robotic assisted ,   BSO  2014: HYSTERECTOMY, TOTAL ABDOMINAL (CERVIX REMOVED)  2012: LAPAROSCOPY      Comment:  left salpingectomy  : LAPAROSCOPY      Comment:  RASHEEDA  2022: NOSE SURGERY; N/A      Comment:  NASOPHARYNGOSCOPY EXCISION NASOPHARYNGEAL MASS performed               by Brock Sparks MD at [facility] OR  2014: OTHER SURGICAL HISTORY      Comment:  D&C;LEEP  No date: TUBAL LIGATION      Social History    Tobacco Use      Smoking status: Former        Packs/day: 0.00        Types: Cigarettes, Cigars        Quit date: 7/10/2016        Years since quittin.8        Passive exposure: Current      Smokeless tobacco: Never      Tobacco comments: quite cigars       Standing Status:   Future     Number of Occurrences:   1     Standing Expiration Date:   2025     Order Specific Question:   Collection Type     Answer:   Imaged Thin Prep     Order Specific

## 2024-05-26 LAB
HPV SAMPLE: NORMAL
HPV SOURCE: NORMAL
HPV, GENOTYPE 16: NOT DETECTED
HPV, GENOTYPE 18: NOT DETECTED
HPV, HIGH RISK OTHER: NOT DETECTED
HPV, INTERPRETATION: NORMAL

## 2024-05-29 LAB — GYNECOLOGY CYTOLOGY REPORT: NORMAL

## 2024-06-08 ENCOUNTER — APPOINTMENT (OUTPATIENT)
Dept: CT IMAGING | Age: 39
End: 2024-06-08
Payer: COMMERCIAL

## 2024-06-08 ENCOUNTER — HOSPITAL ENCOUNTER (EMERGENCY)
Age: 39
Discharge: HOME | End: 2024-06-08
Attending: EMERGENCY MEDICINE
Payer: COMMERCIAL

## 2024-06-08 VITALS
TEMPERATURE: 98.5 F | HEIGHT: 63 IN | OXYGEN SATURATION: 99 % | DIASTOLIC BLOOD PRESSURE: 81 MMHG | RESPIRATION RATE: 16 BRPM | SYSTOLIC BLOOD PRESSURE: 134 MMHG | WEIGHT: 200 LBS | HEART RATE: 100 BPM | BODY MASS INDEX: 35.44 KG/M2

## 2024-06-08 DIAGNOSIS — K04.7 DENTAL INFECTION: Primary | ICD-10-CM

## 2024-06-08 LAB
ALBUMIN SERPL-MCNC: 4.1 G/DL (ref 3.5–5.2)
ALP SERPL-CCNC: 112 U/L (ref 35–104)
ALT SERPL-CCNC: 33 U/L (ref 0–32)
ANION GAP SERPL CALCULATED.3IONS-SCNC: 11 MMOL/L (ref 7–16)
AST SERPL-CCNC: 34 U/L (ref 0–31)
BASOPHILS # BLD: 0.03 K/UL (ref 0–0.2)
BASOPHILS NFR BLD: 0 % (ref 0–2)
BILIRUB SERPL-MCNC: 0.2 MG/DL (ref 0–1.2)
BUN SERPL-MCNC: 13 MG/DL (ref 6–20)
CALCIUM SERPL-MCNC: 9.2 MG/DL (ref 8.6–10.2)
CHLORIDE SERPL-SCNC: 101 MMOL/L (ref 98–107)
CO2 SERPL-SCNC: 25 MMOL/L (ref 22–29)
CREAT SERPL-MCNC: 0.9 MG/DL (ref 0.5–1)
CRP SERPL HS-MCNC: <3 MG/L (ref 0–5)
EOSINOPHIL # BLD: 0.29 K/UL (ref 0.05–0.5)
EOSINOPHILS RELATIVE PERCENT: 3 % (ref 0–6)
ERYTHROCYTE [DISTWIDTH] IN BLOOD BY AUTOMATED COUNT: 13.6 % (ref 11.5–15)
ERYTHROCYTE [SEDIMENTATION RATE] IN BLOOD BY WESTERGREN METHOD: 22 MM/HR (ref 0–20)
GFR, ESTIMATED: 86 ML/MIN/1.73M2
GLUCOSE SERPL-MCNC: 122 MG/DL (ref 74–99)
HCT VFR BLD AUTO: 39 % (ref 34–48)
HGB BLD-MCNC: 12.6 G/DL (ref 11.5–15.5)
IMM GRANULOCYTES # BLD AUTO: 0.04 K/UL (ref 0–0.58)
IMM GRANULOCYTES NFR BLD: 0 % (ref 0–5)
LYMPHOCYTES NFR BLD: 3.24 K/UL (ref 1.5–4)
LYMPHOCYTES RELATIVE PERCENT: 30 % (ref 20–42)
MCH RBC QN AUTO: 29.4 PG (ref 26–35)
MCHC RBC AUTO-ENTMCNC: 32.3 G/DL (ref 32–34.5)
MCV RBC AUTO: 91.1 FL (ref 80–99.9)
MONOCYTES NFR BLD: 0.79 K/UL (ref 0.1–0.95)
MONOCYTES NFR BLD: 7 % (ref 2–12)
NEUTROPHILS NFR BLD: 59 % (ref 43–80)
NEUTS SEG NFR BLD: 6.37 K/UL (ref 1.8–7.3)
PLATELET # BLD AUTO: 333 K/UL (ref 130–450)
PMV BLD AUTO: 9.5 FL (ref 7–12)
POTASSIUM SERPL-SCNC: 3.9 MMOL/L (ref 3.5–5)
PROT SERPL-MCNC: 7.7 G/DL (ref 6.4–8.3)
RBC # BLD AUTO: 4.28 M/UL (ref 3.5–5.5)
SODIUM SERPL-SCNC: 137 MMOL/L (ref 132–146)
WBC OTHER # BLD: 10.8 K/UL (ref 4.5–11.5)

## 2024-06-08 PROCEDURE — 2580000003 HC RX 258

## 2024-06-08 PROCEDURE — 96365 THER/PROPH/DIAG IV INF INIT: CPT

## 2024-06-08 PROCEDURE — 85025 COMPLETE CBC W/AUTO DIFF WBC: CPT

## 2024-06-08 PROCEDURE — 99285 EMERGENCY DEPT VISIT HI MDM: CPT

## 2024-06-08 PROCEDURE — 70491 CT SOFT TISSUE NECK W/DYE: CPT

## 2024-06-08 PROCEDURE — 6360000004 HC RX CONTRAST MEDICATION: Performed by: RADIOLOGY

## 2024-06-08 PROCEDURE — 6370000000 HC RX 637 (ALT 250 FOR IP): Performed by: EMERGENCY MEDICINE

## 2024-06-08 PROCEDURE — 85652 RBC SED RATE AUTOMATED: CPT

## 2024-06-08 PROCEDURE — 87040 BLOOD CULTURE FOR BACTERIA: CPT

## 2024-06-08 PROCEDURE — 86140 C-REACTIVE PROTEIN: CPT

## 2024-06-08 PROCEDURE — 80053 COMPREHEN METABOLIC PANEL: CPT

## 2024-06-08 PROCEDURE — 6360000002 HC RX W HCPCS

## 2024-06-08 RX ORDER — ACETAMINOPHEN 500 MG
1000 TABLET ORAL ONCE
Status: COMPLETED | OUTPATIENT
Start: 2024-06-08 | End: 2024-06-08

## 2024-06-08 RX ORDER — AMOXICILLIN AND CLAVULANATE POTASSIUM 875; 125 MG/1; MG/1
1 TABLET, FILM COATED ORAL 2 TIMES DAILY
Qty: 14 TABLET | Refills: 0 | Status: SHIPPED | OUTPATIENT
Start: 2024-06-09 | End: 2024-06-16

## 2024-06-08 RX ADMIN — AMPICILLIN SODIUM AND SULBACTAM SODIUM 3000 MG: 2; 1 INJECTION, POWDER, FOR SOLUTION INTRAMUSCULAR; INTRAVENOUS at 17:54

## 2024-06-08 RX ADMIN — ACETAMINOPHEN 1000 MG: 500 TABLET ORAL at 19:17

## 2024-06-08 RX ADMIN — IOPAMIDOL 75 ML: 755 INJECTION, SOLUTION INTRAVENOUS at 17:48

## 2024-06-08 ASSESSMENT — PAIN DESCRIPTION - LOCATION
LOCATION: TEETH
LOCATION: THROAT

## 2024-06-08 ASSESSMENT — PAIN DESCRIPTION - ORIENTATION: ORIENTATION: RIGHT;LOWER

## 2024-06-08 ASSESSMENT — PAIN DESCRIPTION - DESCRIPTORS
DESCRIPTORS: THROBBING;DISCOMFORT
DESCRIPTORS: DISCOMFORT

## 2024-06-08 ASSESSMENT — PAIN SCALES - GENERAL
PAINLEVEL_OUTOF10: 8
PAINLEVEL_OUTOF10: 7

## 2024-06-08 ASSESSMENT — LIFESTYLE VARIABLES
HOW MANY STANDARD DRINKS CONTAINING ALCOHOL DO YOU HAVE ON A TYPICAL DAY: 1 OR 2
HOW OFTEN DO YOU HAVE A DRINK CONTAINING ALCOHOL: MONTHLY OR LESS

## 2024-06-08 ASSESSMENT — PAIN - FUNCTIONAL ASSESSMENT: PAIN_FUNCTIONAL_ASSESSMENT: 0-10

## 2024-06-08 NOTE — ED NOTES
Name: Melinda Owusu  : 1985  MRN: [id number]    Date: 2024    Benefits of immediately proceeding with Radiology exam outweigh the risks and therefore the following is being waived:      [x] Pregnancy test    [x] Protocol for Iodine allergy    [] MRI questionnaire    [x] BUN/Creatinine        DO Emeka Billings Charles, DO  24 9767

## 2024-06-08 NOTE — ED PROVIDER NOTES
[facility] EMERGENCY DEPARTMENT  EMERGENCY DEPARTMENT ENCOUNTER        Pt Name: Melinda Owusu  MRN: [id number]  Birthdate 1985  Date of evaluation: 2024  Provider: Ny Jaeger MD  PCP: Glo Damon PA  Note Started: 4:34 PM EDT 24    CHIEF COMPLAINT       Chief Complaint   Patient presents with    Headache    Dental Pain     Right lower tooth/gum pain, n/v last night, pharyngitis       HISTORY OF PRESENT ILLNESS: 1 or more Elements   History From: Patient    Limitations to history : None    Melinda Owusu is a 38 y.o. female with past medical history of cervical stenosis of spine, history of ectopic pregnancy who presents to the emergency department with right lower gum swelling and pain.  According to the patient she was doing well until a week and a half ago when she started having right lower gum pain followed by swelling and tenderness.  She also has sensation of having hair in her throat, tried coughing it out, tried using a Q-tip to clean however nothing came out.  Patient denies having any fever, chills, vomiting but does endorse feeling a little nauseous.  She denies having any chest pain, abdominal pain, urinary symptoms or troubles with her bowels.     Nursing Notes were all reviewed and agreed with or any disagreements were addressed in the HPI.      REVIEW OF EXTERNAL NOTE :       reviewed    REVIEW OF SYSTEMS :           Positives and Pertinent negatives as per HPI.     SURGICAL HISTORY     Past Surgical History:   Procedure Laterality Date    ABDOMINAL WALL SURGERY  2014    CERVICAL FUSION N/A 2019    C3-C7  CERVICAL LAMINECTOMY AND POSTERIOR C3-T1 FUSION -- NEEDS JOSH TABLE, CELL SAVER, PLATELET GEL, PLATES, SCREWS -- GLOBUS performed by Manuel Turk [place] [facility] [place]    CERVICAL FUSION N/A 2019    POSTERIOR CERVICAL WOUND EXPLORATION - WANTS TF performed by Manuel Turk MD at [facility] [place]     SECTION      x3  none          CRITICAL CARE TIME (.cct)   30 min    PAST MEDICAL HISTORY/Chronic Conditions Affecting Care      has a past medical history of Anxiety, Asthma, DDD (degenerative disc disease), cervical (03/09/2020), Depression, Ectopic pregnancy, tubal (2012), Gall stone (2005), Heart murmur, Hernia (2005), History of blood transfusion, and Postoperative anemia.     EMERGENCY DEPARTMENT COURSE    Vitals:    Vitals:    06/08/24 1539 06/08/24 1540   BP:  134/81   Pulse: 100    Resp: 16    Temp: 98.5 °F (36.9 °C)    SpO2: 99%    Weight: 90.7 kg (200 lb)    Height: 1.6 m (5' 3\")        Patient was given the following medications:  Medications   ampicillin-sulbactam (UNASYN) 3,000 mg in sodium chloride 0.9 % 100 mL IVPB (0 mg IntraVENous Stopped 6/8/24 1855)   iopamidol (ISOVUE-370) 76 % injection 75 mL (75 mLs IntraVENous Given 6/8/24 1748)   acetaminophen (TYLENOL) tablet 1,000 mg (1,000 mg Oral Given 6/8/24 1917)           Is this patient to be included in the SEP-1 Core Measure due to severe sepsis or septic shock?   No Exclusion criteria - the patient is NOT to be included for SEP-1 Core Measure due to: 2+ SIRS criteria are not met        Medical Decision Making/Differential Diagnosis:    CC/HPI Summary, Social Determinants of health, Records Reviewed, DDx, testing done/not done, ED Course, Reassessment, disposition considerations/shared decision making with patient, consults, disposition:            Medical Decision Making  Amount and/or Complexity of Data Reviewed  Labs: ordered.  Radiology: ordered.    Risk  OTC drugs.  Prescription drug management.        Melinda Owusu presents to the ED for dental infection. Differential diagnoses included but not limited to dental abscess versus gingivitis versus pharyngitis vs epiglottitis.  Patient had a tender small sized swelling around the right lower gum however no evidence of purulence was found.  Workup in the ED was significant for mildly elevated sed rate of 22.

## 2024-06-09 LAB
MICROORGANISM SPEC CULT: NORMAL
MICROORGANISM SPEC CULT: NORMAL
SERVICE CMNT-IMP: NORMAL
SERVICE CMNT-IMP: NORMAL
SPECIMEN DESCRIPTION: NORMAL
SPECIMEN DESCRIPTION: NORMAL

## 2024-06-21 ENCOUNTER — HOSPITAL ENCOUNTER (EMERGENCY)
Age: 39
Discharge: HOME | End: 2024-06-21
Payer: COMMERCIAL

## 2024-06-21 VITALS
OXYGEN SATURATION: 99 % | RESPIRATION RATE: 18 BRPM | DIASTOLIC BLOOD PRESSURE: 79 MMHG | HEART RATE: 87 BPM | TEMPERATURE: 97.4 F | SYSTOLIC BLOOD PRESSURE: 109 MMHG

## 2024-06-21 DIAGNOSIS — M54.50 ACUTE EXACERBATION OF CHRONIC LOW BACK PAIN: ICD-10-CM

## 2024-06-21 DIAGNOSIS — M54.32 SCIATICA OF LEFT SIDE: Primary | ICD-10-CM

## 2024-06-21 DIAGNOSIS — G89.29 ACUTE EXACERBATION OF CHRONIC LOW BACK PAIN: ICD-10-CM

## 2024-06-21 PROCEDURE — 99283 EMERGENCY DEPT VISIT LOW MDM: CPT

## 2024-06-21 PROCEDURE — 6370000000 HC RX 637 (ALT 250 FOR IP): Performed by: NURSE PRACTITIONER

## 2024-06-21 RX ORDER — METHYLPREDNISOLONE 4 MG/1
TABLET ORAL
Qty: 1 KIT | Refills: 0 | Status: SHIPPED | OUTPATIENT
Start: 2024-06-21 | End: 2024-06-27

## 2024-06-21 RX ORDER — PREDNISONE 20 MG/1
40 TABLET ORAL ONCE
Status: COMPLETED | OUTPATIENT
Start: 2024-06-21 | End: 2024-06-21

## 2024-06-21 RX ADMIN — PREDNISONE 40 MG: 20 TABLET ORAL at 22:45

## 2024-06-21 ASSESSMENT — LIFESTYLE VARIABLES
HOW OFTEN DO YOU HAVE A DRINK CONTAINING ALCOHOL: NEVER
HOW MANY STANDARD DRINKS CONTAINING ALCOHOL DO YOU HAVE ON A TYPICAL DAY: PATIENT DOES NOT DRINK

## 2024-06-22 NOTE — ED PROVIDER NOTES
uncertainty in estimating outcomes. At this time, the risk of acute decompensation with death before the patient can return for re-evaluation is most likely lower than the risk of death attributable to being in the hospital.       *At this time the patient is without objective evidence of an acute process requiring inpatient management.    The emergency provider has spoken with the patient and discussed today’s emergency visit, in addition to providing specific details for the plan of care and counseling regarding the diagnosis and prognosis.  She was counseled on the role of the emergency department regarding prescribing medications for chronic conditions including Narcotic and other controlled substances. Based on the presenting complaint and nature of illness, the requested medications will not be provided today in prescription form and she is instructed to contact their provider for supplemental medications as soon as possible. Questions are answered at this time and they are agreeable with the plan.    Assessment      1. Sciatica of left side    2. Acute exacerbation of chronic low back pain      Plan   Discharged home.  Patient condition is stable    New Medications     Discharge Medication List as of 6/21/2024 10:47 PM        START taking these medications    Details   methylPREDNISolone (MEDROL, CARLINE,) 4 MG tablet Take by mouth., Disp-1 kit, R-0Normal           Electronically signed by CHRIS Moreno NP   DD: 6/21/24  **This report was transcribed using voice recognition software. Every effort was made to ensure accuracy; however, inadvertent computerized transcription errors may be present.  END OF ED PROVIDER NOTE

## 2024-06-28 ENCOUNTER — OFFICE VISIT (OUTPATIENT)
Dept: PAIN MANAGEMENT | Age: 39
End: 2024-06-28
Payer: COMMERCIAL

## 2024-06-28 VITALS
OXYGEN SATURATION: 94 % | BODY MASS INDEX: 35.43 KG/M2 | HEART RATE: 96 BPM | DIASTOLIC BLOOD PRESSURE: 70 MMHG | SYSTOLIC BLOOD PRESSURE: 104 MMHG | WEIGHT: 199.96 LBS | RESPIRATION RATE: 18 BRPM | TEMPERATURE: 97.1 F | HEIGHT: 63 IN

## 2024-06-28 DIAGNOSIS — G89.29 CHRONIC MYOFASCIAL PAIN: ICD-10-CM

## 2024-06-28 DIAGNOSIS — M47.812 CERVICAL SPONDYLOSIS: ICD-10-CM

## 2024-06-28 DIAGNOSIS — M79.18 CHRONIC MYOFASCIAL PAIN: ICD-10-CM

## 2024-06-28 DIAGNOSIS — Z98.1 S/P CERVICAL SPINAL FUSION: ICD-10-CM

## 2024-06-28 DIAGNOSIS — M50.30 DDD (DEGENERATIVE DISC DISEASE), CERVICAL: Primary | ICD-10-CM

## 2024-06-28 DIAGNOSIS — M47.817 LUMBOSACRAL SPONDYLOSIS WITHOUT MYELOPATHY: ICD-10-CM

## 2024-06-28 PROCEDURE — 99213 OFFICE O/P EST LOW 20 MIN: CPT | Performed by: ANESTHESIOLOGY

## 2024-06-28 PROCEDURE — 1036F TOBACCO NON-USER: CPT | Performed by: ANESTHESIOLOGY

## 2024-06-28 PROCEDURE — G8427 DOCREV CUR MEDS BY ELIG CLIN: HCPCS | Performed by: ANESTHESIOLOGY

## 2024-06-28 PROCEDURE — G8417 CALC BMI ABV UP PARAM F/U: HCPCS | Performed by: ANESTHESIOLOGY

## 2024-06-28 PROCEDURE — 99214 OFFICE O/P EST MOD 30 MIN: CPT | Performed by: ANESTHESIOLOGY

## 2024-06-28 RX ORDER — BACLOFEN 5 MG/1
10 TABLET ORAL 2 TIMES DAILY PRN
Qty: 30 TABLET | Refills: 1 | Status: SHIPPED | OUTPATIENT
Start: 2024-06-28

## 2024-06-28 RX ORDER — HYDROCODONE BITARTRATE AND ACETAMINOPHEN 5; 325 MG/1; MG/1
1 TABLET ORAL 2 TIMES DAILY PRN
Qty: 14 TABLET | Refills: 0 | Status: SHIPPED | OUTPATIENT
Start: 2024-06-28 | End: 2024-07-05

## 2024-06-28 NOTE — PROGRESS NOTES
[facility]   [place], [place] [address]  Dept: [phone]    Follow up Note      Melinda Owusu     Date of Visit:  2024    CC:  Patient presents for follow up   Chief Complaint   Patient presents with    Follow-up     Lower and mid back and legs     HPI:  H/o Chronic neck pain.     She is s/p C spine surgery on 2019 by Dr. Turk:    Bilateral C3, C4, C5, C6, and C7 laminectomy. Bilateral segmental arthrodesis and fusion from C3 to T1 with use of bilateral C3, C4, C5, and C6 lateral mass screws and bilateral T1 pedicle screws.     EMG UE: on 2020: Essentially normal.    Has been doing PT/ HEP.    Trigger point injection helped for short duration.    C/o diffuse back pain.    Pain causes functional limitations/ limits Adl's : Yes    Nursing notes and details of the pain history reviewed. Please see intake notes for details.    Recent exacerbation of pain and episodes of weakness or UE and LE.  Has been evaluated by NSG- had CT myelogram and MRI of LS spine. Follow up notes reviewed. NSG recommend conservative treatment.     Previous treatments:   Physical Therapy : yes, has been doing PT/ HEP     Medications: - NSAID's : yes                        - Membrane stabilizers : yes                        - Opioids : yes, in the past                       - Adjuvants or Others : yes, muscle relaxant     Surgeries: yes : posterior cervical fusion as detailed above by Dr. Turk in 2019     She has not been on anticoagulation medications no.     She has not been on herbal supplements.       She is not diabetic.     H/O Smoking: had quit- has started intermittently.  H/O alcohol abuse : denies  H/O Illicit drug use : H/O THC use + occasionally.     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     Labs: BUN/ Creatinine- normal.     Imagin2024 CT Cervical Myelogram   1. No acute osseous abnormality of the cervical spine.  2. Status post decompressive laminectomies from C3-C7 with posterior

## 2024-06-28 NOTE — PROGRESS NOTES
Melinda Owusu presents to the [facility] on 6/28/2024. Melinda is complaining of pain in her lower back and legs. The pain is constant. The pain is described as aching and sharp. Pain is rated on her best day at a 7, on her worst day at a 10, and on average at a 9 on the VAS scale. She took her last dose of Norco and Tylenol last night.      Any procedures since your last visit: No    She is  on NSAIDS and  is not on anticoagulation medications to include none     Pacemaker or defibrillator: No     Medication Contract and Consent for Opioid Use Documents Filed       Patient Documents       Type of Document Status Date Received Received By Description    Medication Contract Received 10/17/2019 12:24 PM MANA COMBS 10/8/19 Opioid/Narcotic Med Contract for NeuroSurgery    Medication Contract Signed 2/13/2020 11:58 AM JT [facility] medication contract                       /70   Pulse 96   Temp 97.1 °F (36.2 °C) (Temporal)   Resp 18   Ht 1.6 m (5' 2.99\")   Wt 90.7 kg (199 lb 15.3 oz)   LMP 05/19/2014   SpO2 94%   BMI 35.43 kg/m²      Patient's last menstrual period was 05/19/2014.

## (undated) DEVICE — SET PROC W/ 250ML BOWL 30UM FLTR RESVR BRAT 2

## (undated) DEVICE — Device

## (undated) DEVICE — GOWN,SIRUS,FABRNF,XL,20/CS: Brand: MEDLINE

## (undated) DEVICE — 3M(TM) MEDIPORE(TM) +PAD SOFT CLOTH ADHESIVE WOUND DRESSING 3569: Brand: 3M™ MEDIPORE™

## (undated) DEVICE — SYRINGE IRRIG 60ML SFT PLIABLE BLB EZ TO GRP 1 HND USE W/

## (undated) DEVICE — SKIN AFFIX SURG ADHESIVE 72/CS 0.55ML: Brand: MEDLINE

## (undated) DEVICE — COVER,TABLE,60X90,STERILE: Brand: MEDLINE

## (undated) DEVICE — SWABSTICK SURG PREP BENZOIN TINCTURE SINGLE ST

## (undated) DEVICE — LABEL MED 4 IN SURG PANEL W/ PEN STRL

## (undated) DEVICE — READY WET SKIN SCRUB TRAY-LF: Brand: MEDLINE INDUSTRIES, INC.

## (undated) DEVICE — CLOTH SURG PREP PREOPERATIVE CHLORHEXIDINE GLUC 2% READYPREP

## (undated) DEVICE — HYPODERMIC SAFETY NEEDLE: Brand: MAGELLAN

## (undated) DEVICE — INTENDED FOR TISSUE SEPARATION, AND OTHER PROCEDURES THAT REQUIRE A SHARP SURGICAL BLADE TO PUNCTURE OR CUT.: Brand: BARD-PARKER ® STAINLESS STEEL BLADES

## (undated) DEVICE — DOUBLE BASIN SET: Brand: MEDLINE INDUSTRIES, INC.

## (undated) DEVICE — 3.0MM NEURO (MATCH HEAD)

## (undated) DEVICE — SOLUTION IV IRRIG WATER 1000ML POUR BRL 2F7114

## (undated) DEVICE — MARKER,SKIN,WI/RULER AND LABELS: Brand: MEDLINE

## (undated) DEVICE — SHEET,DRAPE,40X58,STERILE: Brand: MEDLINE

## (undated) DEVICE — KIT EVAC 400CC DIA1/8IN H PAT 12.5IN 3 SPR RND SHP PVC DRN

## (undated) DEVICE — 3.0MM PRECISION NEURO (MATCH HEAD)

## (undated) DEVICE — CATHETER,SUCTION,18FR,WHISTL,STR PK,CAL: Brand: MEDLINE

## (undated) DEVICE — TUBING SUCT 12FR MAL ALUM SHFT FN CAP VENT UNIV CONN W/ OBT

## (undated) DEVICE — BLOOD TRANSFUSION FILTER: Brand: HAEMONETICS

## (undated) DEVICE — TOTAL TRAY, 16FR 10ML SIL FOLEY, URN: Brand: MEDLINE

## (undated) DEVICE — TOWEL,OR,DSP,ST,BLUE,DLX,10/PK,8PK/CS: Brand: MEDLINE

## (undated) DEVICE — E-Z CLEAN, NON-STICK, PTFE COATED, ELECTROSURGICAL BLADE ELECTRODE, 6.5 INCH (16.5 CM): Brand: MEGADYNE

## (undated) DEVICE — SET SPINAL NEURO STNEU1

## (undated) DEVICE — GRADUATE

## (undated) DEVICE — THE MILL DISPOSABLE - MEDIUM

## (undated) DEVICE — KIT PLT RATIO DISPNS KT 2IN CANN TIP SPRY TIP DISP MAGELLAN

## (undated) DEVICE — GLOVE SURG SZ 65 THK91MIL LTX FREE SYN POLYISOPRENE

## (undated) DEVICE — EXTRAS UGOKWE

## (undated) DEVICE — 4-PORT MANIFOLD: Brand: NEPTUNE 2

## (undated) DEVICE — THE LUMBAR CATHETER ACCESSORY KIT (LCAK) IS DESIGNED FOR CEREBROSPINAL FLUID DRAINAGE, AND CONSISTS OF: A 80 CM RADIOPAQUE LUMBAR CATHETER (F5) WITH GRAPHITE BASED LENGTH MARKS AT 2 CM INTERVALS (FIRST MARK AT 10 CM FROM THE CLOSED TIP); A GUIDEWIRE IN DISPENSER; A LUER-LOCK CONNECTOR; A BUTTERFLY SUTURE CLAMP; A 14-GA, THIN-WALL TUOHY NEEDLE WITH OBTURATOR.: Brand: LUMBAR CATHETER ACCESSORY KIT

## (undated) DEVICE — SURGICAL PROCEDURE PACK EENT CUST

## (undated) DEVICE — INCISE SURGICAL DRAPE: Brand: OPSITE INCISE 28X30CM CTN 10

## (undated) DEVICE — SYRINGE 20ML LL S/C 50

## (undated) DEVICE — APPLICATOR PREP 26ML 0.7% IOD POVACRYLEX 74% ISO ALC ST

## (undated) DEVICE — 1.5L THIN WALL CAN: Brand: CRD

## (undated) DEVICE — 3M™ IOBAN™ 2 ANTIMICROBIAL INCISE DRAPE 6640EZ: Brand: IOBAN™ 2

## (undated) DEVICE — LOCATOR RAD PASS SPHR MRK NAVIGATION BALL DISP STLTH STN

## (undated) DEVICE — ELECTRODE PT RET AD L9FT HI MOIST COND ADH HYDRGEL CORDED

## (undated) DEVICE — SHEET, T, LAPAROTOMY, STERILE: Brand: MEDLINE

## (undated) DEVICE — GLOVE ORANGE PI 8   MSG9080

## (undated) DEVICE — BLADE CLP TAPR HD WET DRY CAPABILITY GTT IN CHARGING USE

## (undated) DEVICE — VENTRICULAR DRAINAGE SYSTEM

## (undated) DEVICE — SURGICAL PROCEDURE PACK LAMINECTOMY LUMBAR

## (undated) DEVICE — CATHETER,RED SUCT,POLY-CATH,10: Brand: MEDLINE INDUSTRIES, INC.

## (undated) DEVICE — GLOVE ORANGE PI 7 1/2   MSG9075

## (undated) DEVICE — JACKSON TABLE POSITIONER KIT: Brand: MEDLINE INDUSTRIES, INC.

## (undated) DEVICE — BASIC SINGLE BASIN 1-LF: Brand: MEDLINE INDUSTRIES, INC.

## (undated) DEVICE — GAUZE,SPONGE,4"X4",16PLY,XRAY,STRL,LF: Brand: MEDLINE

## (undated) DEVICE — TOWEL,OR,DSP,ST,BLUE,STD,6/PK,12PK/CS: Brand: MEDLINE

## (undated) DEVICE — DRILL SYSTEM 7

## (undated) DEVICE — COAGULATOR SUCT 10FR LAIN FTSWCH ACTIVATION DISP VALLEYLAB